# Patient Record
Sex: MALE | Race: WHITE | Employment: FULL TIME | URBAN - METROPOLITAN AREA
[De-identification: names, ages, dates, MRNs, and addresses within clinical notes are randomized per-mention and may not be internally consistent; named-entity substitution may affect disease eponyms.]

---

## 2020-12-05 ENCOUNTER — HOSPITAL ENCOUNTER (EMERGENCY)
Age: 40
Discharge: HOME | End: 2020-12-05
Attending: EMERGENCY MEDICINE

## 2020-12-05 VITALS
RESPIRATION RATE: 16 BRPM | TEMPERATURE: 97.9 F | OXYGEN SATURATION: 98 % | DIASTOLIC BLOOD PRESSURE: 82 MMHG | HEIGHT: 72 IN | HEART RATE: 89 BPM | SYSTOLIC BLOOD PRESSURE: 136 MMHG | WEIGHT: 240 LBS | BODY MASS INDEX: 32.51 KG/M2

## 2020-12-05 DIAGNOSIS — S61.209A FINGERTIP AVULSION, INITIAL ENCOUNTER: Primary | ICD-10-CM

## 2020-12-05 PROCEDURE — 99284 EMERGENCY DEPT VISIT MOD MDM: CPT

## 2020-12-05 PROCEDURE — 74011000250 HC RX REV CODE- 250: Performed by: EMERGENCY MEDICINE

## 2020-12-05 PROCEDURE — 96372 THER/PROPH/DIAG INJ SC/IM: CPT

## 2020-12-05 RX ORDER — LIDOCAINE HYDROCHLORIDE AND EPINEPHRINE 10; 10 MG/ML; UG/ML
10 INJECTION, SOLUTION INFILTRATION; PERINEURAL
Status: COMPLETED | OUTPATIENT
Start: 2020-12-05 | End: 2020-12-05

## 2020-12-05 RX ADMIN — LIDOCAINE HYDROCHLORIDE,EPINEPHRINE BITARTRATE 100 MG: 10; .01 INJECTION, SOLUTION INFILTRATION; PERINEURAL at 13:47

## 2020-12-05 NOTE — DISCHARGE INSTRUCTIONS
Neopsporin, surgicel if bleeing, adaptic or telfa if not bleeding  Keep clean and wrapped  Return to the e.r. if worse in any way      Patient Education        Cuts Left Open: Care Instructions  Your Care Instructions     A cut can happen anywhere on your body. Sometimes a cut can injure the tendons, blood vessels, or nerves. A cut may be left open instead of being closed with stitches, staples, or adhesive. A cut may be left open when it is likely to become infected, because closing it can make infection even more likely. You will probably have a bandage. The doctor may want the cut to stay open the whole time it heals. This happens with some cuts when too much time has gone by since the cut happened. Or the doctor may tell you to come back to have the cut closed in 4 to 5 days, when there is less chance of infection. If the cut stays open while healing, your scar may be larger than if the cut was closed. But you can get treatment later to make the scar smaller. The doctor has checked you carefully, but problems can develop later. If you notice any problems or new symptoms, get medical treatment right away. Follow-up care is a key part of your treatment and safety. Be sure to make and go to all appointments, and call your doctor if you are having problems. It's also a good idea to know your test results and keep a list of the medicines you take. How can you care for yourself at home? · Keep the cut dry for the first 24 to 48 hours. After this, you can shower if your doctor okays it. Pat the cut dry. · Don't soak the cut, such as in a bathtub. Your doctor will tell you when it's safe to get the cut wet. · If your doctor told you how to care for your cut, follow your doctor's instructions. If you did not get instructions, follow this general advice:  ? After the first 24 to 48 hours, wash the cut with clean water 2 times a day. Don't use hydrogen peroxide or alcohol, which can slow healing.   ? You may cover the cut with a thin layer of petroleum jelly, such as Vaseline, and a nonstick bandage. ? Apply more petroleum jelly and replace the bandage as needed. · Prop up the injured area on a pillow anytime you sit or lie down during the next 3 days. Try to keep it above the level of your heart. This will help reduce swelling. · Avoid any activity that could cause your cut to get worse. · Take pain medicines exactly as directed. ? If the doctor gave you a prescription medicine for pain, take it as prescribed. ? If you are not taking a prescription pain medicine, ask your doctor if you can take an over-the-counter medicine. When should you call for help? Call your doctor now or seek immediate medical care if:    · You have new pain, or your pain gets worse.     · The cut starts to bleed, and blood soaks through the bandage. Oozing small amounts of blood is normal.     · The skin near the cut is cold or pale or changes color.     · You have tingling, weakness, or numbness near the cut.     · You have trouble moving the area near the cut.     · You have symptoms of infection, such as:  ? Increased pain, swelling, warmth, or redness around the cut.  ? Red streaks leading from the cut.  ? Pus draining from the cut.  ? A fever. Watch closely for changes in your health, and be sure to contact your doctor if:    · The cut is not closing (getting smaller).     · You do not get better as expected. Where can you learn more? Go to http://www.gray.com/  Enter O575 in the search box to learn more about \"Cuts Left Open: Care Instructions. \"  Current as of: June 26, 2019               Content Version: 12.6  © 3329-1981 [employer]. Care instructions adapted under license by [employer] (which disclaims liability or warranty for this information).  If you have questions about a medical condition or this instruction, always ask your healthcare professional. [employer] disclaims any warranty or liability for your use of this information.

## 2020-12-05 NOTE — ED NOTES
I have reviewed discharge instructions with the patient. The patient verbalized understanding. Patient left ED via Discharge Method: ambulatory to Home with self. Opportunity for questions and clarification provided. Patient given 0 scripts. To continue your aftercare when you leave the hospital, you may receive an automated call from our care team to check in on how you are doing. This is a free service and part of our promise to provide the best care and service to meet your aftercare needs.  If you have questions, or wish to unsubscribe from this service please call [phone]. Thank you for Choosing our [facility] Emergency Department.

## 2020-12-05 NOTE — ED TRIAGE NOTES
Pt states he was using a mandolin last night and chopped the tip of right thumb off. States it is still bleeding a lot so he came in to have it evaluated. States he is in recovery so does not want any type of narcotics. Pt has a mask on in triage.

## 2020-12-05 NOTE — ED PROVIDER NOTES
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     Social History     Socioeconomic History    Marital status: Not on file     Spouse name: Not on file    Number of children: Not on file    Years of education: Not on file    Highest education level: Not on file   Occupational History    Not on file   Social Needs    Financial resource strain: Not on file    Food insecurity     Worry: Not on file     Inability: Not on file    Transportation needs     Medical: Not on file     Non-medical: Not on file   Tobacco Use    Smoking status: Current Every Day Smoker   Substance and Sexual Activity    Alcohol use: Not Currently    Drug use: Not Currently    Sexual activity: Not on file   Lifestyle    Physical activity     Days per week: Not on file     Minutes per session: Not on file    Stress: Not on file   Relationships    Social connections     Talks on phone: Not on file     Gets together: Not on file     Attends Oriental orthodox service: Not on file     Active member of club or organization: Not on file     Attends meetings of clubs or organizations: Not on file     Relationship status: Not on file    Intimate partner violence     Fear of current or ex partner: Not on file     Emotionally abused: Not on file     Physically abused: Not on file     Forced sexual activity: Not on file   Other Topics Concern    Not on file   Social History Narrative    Not on file         ALLERGIES: Patient has no known allergies. Review of Systems   Musculoskeletal: Negative for arthralgias and joint swelling. Skin: Positive for wound. Negative for color change. Neurological: Negative for weakness and numbness. Vitals:    12/05/20 1318   BP: 132/88   Pulse: (!) 101   Resp: 16   Temp: 97.9 °F (36.6 °C)   SpO2: 97%   Weight: 108.9 kg (240 lb)   Height: 6' (1.829 m)            Physical Exam  Vitals signs and nursing note reviewed. Constitutional:       General: He is not in acute distress. Appearance: Normal appearance. He is well-developed. He is not ill-appearing, toxic-appearing or diaphoretic. HENT:      Head: Normocephalic and atraumatic. Right Ear: External ear normal.      Left Ear: External ear normal.   Eyes:      General:         Right eye: No discharge. Left eye: No discharge. Conjunctiva/sclera: Conjunctivae normal.   Neck:      Musculoskeletal: Normal range of motion and neck supple. Pulmonary:      Effort: Pulmonary effort is normal. No respiratory distress. Musculoskeletal: Normal range of motion. General: Tenderness present. Hands:    Skin:     General: Skin is warm and dry. Findings: No rash. Neurological:      General: No focal deficit present. Mental Status: He is alert and oriented to person, place, and time. Mental status is at baseline. Motor: No abnormal muscle tone. Comments: cni 2-12 grossly  Nl gait,  Nl speech     Psychiatric:         Mood and Affect: Mood normal.         Behavior: Behavior normal.          MDM  Number of Diagnoses or Management Options  Fingertip avulsion, initial encounter: new and requires workup  Diagnosis management comments: Medical decision making note:  Fingertip avulsion, bleeding controlled after small dose lidocaine with epi.   Dressed with surgicell  This concludes the \"medical decision making note\" part of this emergency department visit note. Risk of Complications, Morbidity, and/or Mortality  Presenting problems: minimal  Diagnostic procedures: minimal  Management options: minimal    Patient Progress  Patient progress: improved         Other Procedure    Date/Time: 12/5/2020 2:10 PM  Performed by: Kel Sims MD  Authorized by: Kel Sims MD     Consent:     Consent obtained:  Verbal    Consent given by:  Patient    Risks discussed:  Bleeding    Alternatives discussed:  No treatment  Pre-procedure details:     Skin preparation:  Betadine  Anesthesia (see MAR for exact dosages): Anesthesia method:  Local infiltration    Local anesthetic:  Lidocaine 1% WITH epi  Post-procedure details:     Patient tolerance of procedure:   Tolerated well, no immediate complications  Comments:      Numbed, cleaned wrapped with surgicell

## 2022-03-25 ENCOUNTER — HOSPITAL ENCOUNTER (EMERGENCY)
Age: 42
Discharge: HOME | End: 2022-03-25
Attending: EMERGENCY MEDICINE
Payer: COMMERCIAL

## 2022-03-25 VITALS
OXYGEN SATURATION: 98 % | DIASTOLIC BLOOD PRESSURE: 82 MMHG | RESPIRATION RATE: 18 BRPM | HEART RATE: 108 BPM | SYSTOLIC BLOOD PRESSURE: 129 MMHG | TEMPERATURE: 98 F

## 2022-03-25 DIAGNOSIS — M10.9 ACUTE GOUT OF RIGHT ANKLE, UNSPECIFIED CAUSE: Primary | ICD-10-CM

## 2022-03-25 PROCEDURE — 74011250636 HC RX REV CODE- 250/636: Performed by: EMERGENCY MEDICINE

## 2022-03-25 PROCEDURE — 99284 EMERGENCY DEPT VISIT MOD MDM: CPT

## 2022-03-25 PROCEDURE — 96374 THER/PROPH/DIAG INJ IV PUSH: CPT

## 2022-03-25 RX ORDER — DEXAMETHASONE SODIUM PHOSPHATE 100 MG/10ML
10 INJECTION INTRAMUSCULAR; INTRAVENOUS
Status: COMPLETED | OUTPATIENT
Start: 2022-03-25 | End: 2022-03-25

## 2022-03-25 RX ORDER — INDOMETHACIN 50 MG/1
50 CAPSULE ORAL 3 TIMES DAILY
Qty: 30 CAPSULE | Refills: 0 | OUTPATIENT
Start: 2022-03-25 | End: 2022-03-27

## 2022-03-25 RX ADMIN — DEXAMETHASONE SODIUM PHOSPHATE 10 MG: 10 INJECTION INTRAMUSCULAR; INTRAVENOUS at 20:51

## 2022-03-26 PROCEDURE — 96374 THER/PROPH/DIAG INJ IV PUSH: CPT

## 2022-03-26 PROCEDURE — 99284 EMERGENCY DEPT VISIT MOD MDM: CPT

## 2022-03-26 NOTE — ED PROVIDER NOTES
Mask was worn during the entire patient examination. Simon Stauffer is a 46 y.o. male who presents to the ED with a chief complaint of right ankle pain and foot pain. He states it started about 4 days ago and he was seen in the emergency room yesterday. He states they did x-rays and they reported everything looked okay. Maurice Stock He went back to an urgent care today after his boss told him he could not work like that as he is having trouble bearing weight on the ankle. He states the urgent care did some lab work and told him he had an elevated white blood cell count and they sent him here for further evaluation. Patient states that when he is not bearing weight on the ankle his discomfort is 2/10 if he is walking or having any sort of weight on the ankle it becomes a 9/10. He denies any measured fevers, chills, body aches, calf pain, shortness of breath, rashes, or any other symptoms. The history is provided by the patient. Leg Pain  This is a new problem. The current episode started more than 2 days ago. The problem occurs constantly. The problem has been gradually worsening. Pertinent negatives include no chest pain, no abdominal pain, no headaches and no shortness of breath. Nothing aggravates the symptoms. Nothing relieves the symptoms. He has tried nothing for the symptoms. Past Medical History:   Diagnosis Date    Infectious disease     hep C       Past Surgical History:   Procedure Laterality Date    HX ORTHOPAEDIC      spinal fusion         No family history on file.     Social History     Socioeconomic History    Marital status:      Spouse name: Not on file    Number of children: Not on file    Years of education: Not on file    Highest education level: Not on file   Occupational History    Not on file   Tobacco Use    Smoking status: Current Every Day Smoker    Smokeless tobacco: Not on file   Substance and Sexual Activity    Alcohol use: Not Currently    Drug use: Not Currently    Sexual activity: Not on file   Other Topics Concern    Not on file   Social History Narrative    Not on file     Social Determinants of Health     Financial Resource Strain:     Difficulty of Paying Living Expenses: Not on file   Food Insecurity:     Worried About Running Out of Food in the Last Year: Not on file    Em of Food in the Last Year: Not on file   Transportation Needs:     Lack of Transportation (Medical): Not on file    Lack of Transportation (Non-Medical): Not on file   Physical Activity:     Days of Exercise per Week: Not on file    Minutes of Exercise per Session: Not on file   Stress:     Feeling of Stress : Not on file   Social Connections:     Frequency of Communication with Friends and Family: Not on file    Frequency of Social Gatherings with Friends and Family: Not on file    Attends Gnosticist Services: Not on file    Active Member of [address] [place] [address] [employer] or Organizations: Not on file    Attends Club or Organization Meetings: Not on file    Marital Status: Not on file   Intimate Partner Violence:     Fear of Current or Ex-Partner: Not on file    Emotionally Abused: Not on file    Physically Abused: Not on file    Sexually Abused: Not on file   Housing Stability:     Unable to Pay for Housing in the Last Year: Not on file    Number of [place] in the Last Year: Not on file    Unstable Housing in the Last Year: Not on file         ALLERGIES: Patient has no known allergies. Review of Systems   Constitutional: Negative for chills and fever. Respiratory: Negative for shortness of breath. Cardiovascular: Negative for chest pain. Gastrointestinal: Negative for abdominal pain, nausea and vomiting. Musculoskeletal: Positive for arthralgias (R ankle) and gait problem (painful). Skin: Negative for color change, rash and wound. Neurological: Negative for headaches. Psychiatric/Behavioral: Negative for agitation and behavioral problems.    All other systems reviewed and are negative. Vitals:    03/25/22 2035   BP: 129/82   Pulse: (!) 108   Resp: 18   Temp: 98 °F (36.7 °C)   SpO2: 98%            Physical Exam  Vitals and nursing note reviewed. Constitutional:       General: He is not in acute distress. Appearance: Normal appearance. He is not ill-appearing. HENT:      Head: Normocephalic and atraumatic. Right Ear: External ear normal.      Left Ear: External ear normal.   Eyes:      Extraocular Movements: Extraocular movements intact. Conjunctiva/sclera: Conjunctivae normal.   Cardiovascular:      Rate and Rhythm: Normal rate and regular rhythm. Pulses: Normal pulses. Heart sounds: Normal heart sounds. Pulmonary:      Effort: Pulmonary effort is normal.      Breath sounds: Normal breath sounds. Abdominal:      General: Abdomen is flat. Bowel sounds are normal.      Palpations: Abdomen is soft. Musculoskeletal:         General: Tenderness (R ankle) present. No swelling, deformity or signs of injury. Normal range of motion. Cervical back: Normal range of motion. Comments: Right ankle and proximal foot are moderately warm to palpation; range of motion is intact but he reports pain with it   Skin:     General: Skin is warm and dry. Capillary Refill: Capillary refill takes less than 2 seconds. Coloration: Skin is not jaundiced or pale. Findings: No bruising, erythema, lesion or rash. Neurological:      General: No focal deficit present. Mental Status: He is alert and oriented to person, place, and time. Psychiatric:         Mood and Affect: Mood normal.         Behavior: Behavior normal.          MDM  Number of Diagnoses or Management Options  Acute gout of right ankle, unspecified cause  Diagnosis management comments: Mr. Aris Damon is a 79-year-old male who presented to the facility today with right ankle pain. On exam patient is well-appearing but mildly uncomfortable.   Patient is afebrile and mildly tachycardic. Physical exam is noted for some warmth to the right ankle and proximal foot. Range of motion does elicit some discomfort. Calf and distal foot are unremarkable. No overlying skin changes or streaking concerning for cellulitis or superficial skin. No appreciable edema, calf pain, shortness of breath, or other symptoms concerning for DVT. I discussed with patient the clinical likelihood of an acute gout flare given the findings here today. Patient had normal x-rays yesterday in a different emergency department of both the right ankle and right foot. I discussed with patient the plan of care inclusive of anti-inflammatories for the next 10 days to help treat the inflammation as well as a steroid shot in the department today. I gave the patient educational material on gout as well as purine diet restriction type of education. Patient reports understanding of the findings here today     Voice dictation software was used during the making of this note. This software is not perfect and grammatical and other typographical errors may be present. This note has been proofread, but may still contain errors.   VINICIUS Phillips; 3/25/2022 @10:02 PM   ===================================================================         Amount and/or Complexity of Data Reviewed  Review and summarize past medical records: yes  Independent visualization of images, tracings, or specimens: yes    Risk of Complications, Morbidity, and/or Mortality  Presenting problems: moderate  Diagnostic procedures: low  Management options: low    Patient Progress  Patient progress: stable         Procedures

## 2022-03-26 NOTE — DISCHARGE INSTRUCTIONS
Symptoms here today are highly consistent with gout attacks. I have provided you with education both on gout as well as purine restricted diets. Take the medication as instructed for the next 1 week. You will have a couple extra days worth of medications to be used if you are still having symptoms at the 1 week anant. Follow-up with your PCP or the ER if needed.

## 2022-03-26 NOTE — ED TRIAGE NOTES
Ambulated to triage with mask in place. C/o right ankle pain. No obvious deformity. Warmth to ankle.  Describes increased pain with movement

## 2022-03-27 ENCOUNTER — APPOINTMENT (OUTPATIENT)
Dept: GENERAL RADIOLOGY | Age: 42
End: 2022-03-27
Attending: EMERGENCY MEDICINE
Payer: COMMERCIAL

## 2022-03-27 ENCOUNTER — HOSPITAL ENCOUNTER (EMERGENCY)
Age: 42
Discharge: HOME | End: 2022-03-27
Attending: EMERGENCY MEDICINE
Payer: COMMERCIAL

## 2022-03-27 VITALS
SYSTOLIC BLOOD PRESSURE: 153 MMHG | DIASTOLIC BLOOD PRESSURE: 98 MMHG | WEIGHT: 230 LBS | HEIGHT: 72 IN | OXYGEN SATURATION: 96 % | HEART RATE: 121 BPM | TEMPERATURE: 98.7 F | BODY MASS INDEX: 31.15 KG/M2 | RESPIRATION RATE: 18 BRPM

## 2022-03-27 DIAGNOSIS — L03.119 CELLULITIS OF LOWER EXTREMITY, UNSPECIFIED LATERALITY: Primary | ICD-10-CM

## 2022-03-27 LAB
ALBUMIN SERPL-MCNC: 2.7 G/DL (ref 3.5–5)
ALBUMIN/GLOB SERPL: 0.5 {RATIO} (ref 1.2–3.5)
ALP SERPL-CCNC: 88 U/L (ref 50–136)
ALT SERPL-CCNC: 37 U/L (ref 12–65)
ANION GAP SERPL CALC-SCNC: 5 MMOL/L (ref 7–16)
AST SERPL-CCNC: 19 U/L (ref 15–37)
BASOPHILS # BLD: 0 K/UL (ref 0–0.2)
BASOPHILS NFR BLD: 0 % (ref 0–2)
BILIRUB SERPL-MCNC: 0.5 MG/DL (ref 0.2–1.1)
BUN SERPL-MCNC: 13 MG/DL (ref 6–23)
CALCIUM SERPL-MCNC: 8.3 MG/DL (ref 8.3–10.4)
CHLORIDE SERPL-SCNC: 104 MMOL/L (ref 98–107)
CO2 SERPL-SCNC: 27 MMOL/L (ref 21–32)
CREAT SERPL-MCNC: 0.5 MG/DL (ref 0.8–1.5)
CRP SERPL HS-MCNC: 99.1 MG/L
DIFFERENTIAL METHOD BLD: ABNORMAL
EOSINOPHIL # BLD: 0 K/UL (ref 0–0.8)
EOSINOPHIL NFR BLD: 0 % (ref 0.5–7.8)
ERYTHROCYTE [DISTWIDTH] IN BLOOD BY AUTOMATED COUNT: 13.3 % (ref 11.9–14.6)
ERYTHROCYTE [SEDIMENTATION RATE] IN BLOOD: 93 MM/HR (ref 0–20)
GLOBULIN SER CALC-MCNC: 5.8 G/DL (ref 2.3–3.5)
GLUCOSE SERPL-MCNC: 123 MG/DL (ref 65–100)
HCT VFR BLD AUTO: 34.4 % (ref 41.1–50.3)
HGB BLD-MCNC: 11.6 G/DL (ref 13.6–17.2)
IMM GRANULOCYTES # BLD AUTO: 0.2 K/UL (ref 0–0.5)
IMM GRANULOCYTES NFR BLD AUTO: 1 % (ref 0–5)
LACTATE SERPL-SCNC: 0.9 MMOL/L (ref 0.4–2)
LYMPHOCYTES # BLD: 1.7 K/UL (ref 0.5–4.6)
LYMPHOCYTES NFR BLD: 12 % (ref 13–44)
MCH RBC QN AUTO: 31.1 PG (ref 26.1–32.9)
MCHC RBC AUTO-ENTMCNC: 33.7 G/DL (ref 31.4–35)
MCV RBC AUTO: 92.2 FL (ref 79.6–97.8)
MONOCYTES # BLD: 1.1 K/UL (ref 0.1–1.3)
MONOCYTES NFR BLD: 8 % (ref 4–12)
NEUTS SEG # BLD: 11.4 K/UL (ref 1.7–8.2)
NEUTS SEG NFR BLD: 79 % (ref 43–78)
NRBC # BLD: 0 K/UL (ref 0–0.2)
PLATELET # BLD AUTO: 249 K/UL (ref 150–450)
PMV BLD AUTO: 8.5 FL (ref 9.4–12.3)
POTASSIUM SERPL-SCNC: 3.7 MMOL/L (ref 3.5–5.1)
PROT SERPL-MCNC: 8.5 G/DL (ref 6.3–8.2)
RBC # BLD AUTO: 3.73 M/UL (ref 4.23–5.6)
SODIUM SERPL-SCNC: 136 MMOL/L (ref 136–145)
URATE SERPL-MCNC: 3.2 MG/DL (ref 2.6–6)
WBC # BLD AUTO: 14.5 K/UL (ref 4.3–11.1)

## 2022-03-27 PROCEDURE — 85025 COMPLETE CBC W/AUTO DIFF WBC: CPT

## 2022-03-27 PROCEDURE — 74011250636 HC RX REV CODE- 250/636: Performed by: EMERGENCY MEDICINE

## 2022-03-27 PROCEDURE — 83605 ASSAY OF LACTIC ACID: CPT

## 2022-03-27 PROCEDURE — 73630 X-RAY EXAM OF FOOT: CPT

## 2022-03-27 PROCEDURE — 85652 RBC SED RATE AUTOMATED: CPT

## 2022-03-27 PROCEDURE — 86141 C-REACTIVE PROTEIN HS: CPT

## 2022-03-27 PROCEDURE — 73610 X-RAY EXAM OF ANKLE: CPT

## 2022-03-27 PROCEDURE — 73660 X-RAY EXAM OF TOE(S): CPT

## 2022-03-27 PROCEDURE — 84550 ASSAY OF BLOOD/URIC ACID: CPT

## 2022-03-27 PROCEDURE — 80053 COMPREHEN METABOLIC PANEL: CPT

## 2022-03-27 RX ORDER — KETOROLAC TROMETHAMINE 30 MG/ML
30 INJECTION, SOLUTION INTRAMUSCULAR; INTRAVENOUS
Status: COMPLETED | OUTPATIENT
Start: 2022-03-27 | End: 2022-03-27

## 2022-03-27 RX ORDER — SULFAMETHOXAZOLE AND TRIMETHOPRIM 800; 160 MG/1; MG/1
1 TABLET ORAL 2 TIMES DAILY
Qty: 20 TABLET | Refills: 0 | Status: SHIPPED | OUTPATIENT
Start: 2022-03-27 | End: 2022-04-06

## 2022-03-27 RX ORDER — KETOROLAC TROMETHAMINE 10 MG/1
10 TABLET, FILM COATED ORAL
Qty: 15 TABLET | Refills: 0 | Status: SHIPPED | OUTPATIENT
Start: 2022-03-27

## 2022-03-27 RX ADMIN — SODIUM CHLORIDE 1000 ML: 900 INJECTION, SOLUTION INTRAVENOUS at 01:32

## 2022-03-27 RX ADMIN — KETOROLAC TROMETHAMINE 30 MG: 30 INJECTION, SOLUTION INTRAMUSCULAR at 01:43

## 2022-03-27 NOTE — Clinical Note
[address] [facility] EMERGENCY DEPT   [facility] [id number] [phone]    Work/School Note    Date: 3/26/2022    To Whom It May concern:    Lucille Virk was seen and treated today in the emergency room by the following provider(s):  No providers found. Lucille Virk is excused from work/school on 03/27/22 and 03/28/22. He is medically clear to return to work/school on 3/29/2022.        Sincerely,          Altagracia Gotti RN

## 2022-03-27 NOTE — ED TRIAGE NOTES
Pt was seen here yesterday and was told he had gout and he states he started the medication he was given and he started to have fever and more swelling and pain is worse after taking the medication.

## 2022-03-27 NOTE — Clinical Note
[address] [facility] EMERGENCY DEPT   [facility]  [address] [place] [address] [phone]    Work/School Note    Date: 3/26/2022    To Whom It May concern:    Sangeetha Genao was seen and treated today in the emergency room by the following provider(s):  Attending Provider: Arian Conawy MD.      Sangeetha Genao is excused from work/school on 03/27/22 and 03/28/22. He is medically clear to return to work/school on 3/29/2022.        Sincerely,          Eric Mcgregor MD

## 2022-03-27 NOTE — ED NOTES
.I have reviewed discharge instructions with the patient. The patient verbalized understanding. Patient left ED via Discharge Method: wheelchair to Home with self). Opportunity for questions and clarification provided. Patient given 2 scripts. To continue your aftercare when you leave the hospital, you may receive an automated call from our care team to check in on how you are doing. This is a free service and part of our promise to provide the best care and service to meet your aftercare needs.  If you have questions, or wish to unsubscribe from this service please call [phone]. Thank you for Choosing our [facility] Emergency Department.

## 2022-03-27 NOTE — ED PROVIDER NOTES
Patient was seen 2 days ago yesterday and today. Has been having right ankle and foot pain and swelling. 2 days ago x-rays negative told it was arthritis. Yesterday told possible gout. Today pain is worse. 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 States he had an infection of the bone there. States now the left great toe is red painful and swollen and getting worse. Here for evaluation. States he did have a fever at home earlier today but none currently. The history is provided by the patient. No  was used. Foot Pain   This is a new problem. The current episode started more than 2 days ago. The problem occurs constantly. The problem has been gradually worsening. The pain is present in the right ankle, right foot and left toes. The quality of the pain is described as aching and sharp. The pain is moderate. Associated symptoms include numbness and limited range of motion. Pertinent negatives include no back pain and no neck pain. The symptoms are aggravated by palpation, movement and standing. He has tried nothing for the symptoms. There has been no history of extremity trauma. Past Medical History:   Diagnosis Date    Infectious disease     hep C       Past Surgical History:   Procedure Laterality Date    HX ORTHOPAEDIC      spinal fusion         No family history on file.     Social History     Socioeconomic History    Marital status:      Spouse name: Not on file    Number of children: Not on file    Years of education: Not on file    Highest education level: Not on file   Occupational History    Not on file   Tobacco Use    Smoking status: Current Every Day Smoker    Smokeless tobacco: Not on file   Substance and Sexual Activity    Alcohol use: Not Currently    Drug use: Not Currently    Sexual activity: Not on file   Other Topics Concern    Not on file   Social History Narrative    Not on file     Social Determinants of Health Financial Resource Strain:     Difficulty of Paying Living Expenses: Not on file   Food Insecurity:     Worried About Running Out of Food in the Last Year: Not on file    Em of Food in the Last Year: Not on file   Transportation Needs:     Lack of Transportation (Medical): Not on file    Lack of Transportation (Non-Medical): Not on file   Physical Activity:     Days of Exercise per Week: Not on file    Minutes of Exercise per Session: Not on file   Stress:     Feeling of Stress : Not on file   Social Connections:     Frequency of Communication with Friends and Family: Not on file    Frequency of Social Gatherings with Friends and Family: Not on file    Attends [employer] Services: Not on file    Active Member of [address] [place] [address] [employer] or Organizations: Not on file    Attends Club or Organization Meetings: Not on file    Marital Status: Not on file   Intimate Partner Violence:     Fear of Current or Ex-Partner: Not on file    Emotionally Abused: Not on file    Physically Abused: Not on file    Sexually Abused: Not on file   Housing Stability:     Unable to Pay for Housing in the Last Year: Not on file    Number of [place] in the Last Year: Not on file    Unstable Housing in the Last Year: Not on file         ALLERGIES: Patient has no known allergies. Review of Systems   Constitutional: Positive for fever. Negative for chills. HENT: Negative for rhinorrhea and sore throat. Eyes: Negative for pain and redness. Respiratory: Negative for chest tightness, shortness of breath and wheezing. Cardiovascular: Negative for chest pain and leg swelling. Gastrointestinal: Negative for abdominal pain, diarrhea, nausea and vomiting. Genitourinary: Negative for dysuria and hematuria. Musculoskeletal: Positive for arthralgias, gait problem and joint swelling. Negative for back pain, neck pain and neck stiffness. Skin: Positive for color change. Negative for rash and wound.    Neurological: Positive for numbness. Negative for weakness and headaches. Vitals:    03/26/22 2300   BP: (!) 157/107   Pulse: (!) 121   Resp: 18   Temp: 98.7 °F (37.1 °C)   SpO2: 98%   Weight: 104.3 kg (230 lb)   Height: 6' (1.829 m)            Physical Exam  Constitutional:       Appearance: Normal appearance. He is well-developed. HENT:      Head: Normocephalic and atraumatic. Cardiovascular:      Rate and Rhythm: Regular rhythm. Tachycardia present. Pulmonary:      Effort: Pulmonary effort is normal.      Breath sounds: Normal breath sounds. Abdominal:      General: Bowel sounds are normal.      Palpations: Abdomen is soft. Tenderness: There is no abdominal tenderness. Musculoskeletal:         General: Swelling and tenderness present. Cervical back: Normal range of motion and neck supple. Feet:    Skin:     General: Skin is warm and dry. Findings: Erythema present. Neurological:      Mental Status: He is alert and oriented to person, place, and time. MDM  Number of Diagnoses or Management Options  Diagnosis management comments: Patient with cellulitis. Will treat at home with pain medication antibiotics and outpatient follow-up. Amount and/or Complexity of Data Reviewed  Clinical lab tests: ordered and reviewed  Tests in the radiology section of CPT®: ordered and reviewed  Tests in the medicine section of CPT®: ordered and reviewed    Patient Progress  Patient progress: stable         Procedures        XR GREAT TOE LT MIN 2 V (Final result)  Result time 03/27/22 02:33:02  Final result by Bishop Underwood MD (03/27/22 02:33:02)                Impression:    No evidence of acute fracture or dislocation within the great toe. Apparent fusion of the proximal and distal phalanges with mild lateral   angulation of the distal portion. There is soft tissue swelling of the great   toe. Changes involving the remaining toes as described above.             Narrative:    EXAMINATION: XR GREAT TOE LT MIN 2 V     HISTORY: pain.       TECHNIQUE: Frontal, lateral, and oblique views of the left great toe. COMPARISON: None available. FINDINGS:   There is no evidence of acute fracture or dislocation. There appears to the fusion of the proximal and distal phalanges with mild   lateral angulation of the distal portion. The second toe is absent. The visualized portions of the third and fourth toes   show absence of the distal phalanges. There is soft tissue swelling of the great toe. The bones appear diffusely osteopenic. No radiopaque foreign body.                         XR ANKLE RT MIN 3 V (Final result)  Result time 03/27/22 02:18:12  Final result by Simi Holm MD (03/27/22 02:18:12)                Impression:    No evidence of acute fracture or dislocation within the right foot. Degenerative changes are noted in the tarsal bones. Soft tissue swelling as described above. EXAMINATION: Right Ankle     HISTORY: pain.       TECHNIQUE: Frontal, lateral, and oblique views of the right ankle. COMPARISON: None available. FINDINGS:   There is no evidence of acute fracture or dislocation. Joint spaces are maintained. Soft tissues are within normal limits. No radiopaque foreign body. IMPRESSION:   No evidence of acute fracture or dislocation within the right ankle. Narrative:    EXAMINATION: Right foot     HISTORY: pain.       TECHNIQUE: Frontal, lateral, and oblique views of the right foot. COMPARISON: None. FINDINGS:   Due to technique, the distal portions of the toes are overpenetrated on the AP   and oblique views of the. As seen, there is no evidence of acute fracture or dislocation. Joint spaces are maintained. Degenerative changes are noted in the tarsal bones. Small calcaneal bone spur. There is medial, lateral, and dorsal soft tissue swelling, particularly   proximally. No radiopaque foreign body.           XR FOOT RT MIN 3 V (Final result)  Result time 03/27/22 02:18:12  Final result by Lakeisha Fortune MD (03/27/22 02:18:12)                Impression:    No evidence of acute fracture or dislocation within the right foot. Degenerative changes are noted in the tarsal bones. Soft tissue swelling as described above. EXAMINATION: Right Ankle     HISTORY: pain.       TECHNIQUE: Frontal, lateral, and oblique views of the right ankle. COMPARISON: None available. FINDINGS:   There is no evidence of acute fracture or dislocation. Joint spaces are maintained. Soft tissues are within normal limits. No radiopaque foreign body. IMPRESSION:   No evidence of acute fracture or dislocation within the right ankle. Narrative:    EXAMINATION: Right foot     HISTORY: pain.       TECHNIQUE: Frontal, lateral, and oblique views of the right foot. COMPARISON: None. FINDINGS:   Due to technique, the distal portions of the toes are overpenetrated on the AP   and oblique views of the. As seen, there is no evidence of acute fracture or dislocation. Joint spaces are maintained. Degenerative changes are noted in the tarsal bones. Small calcaneal bone spur. There is medial, lateral, and dorsal soft tissue swelling, particularly   proximally.    No radiopaque foreign body.                   Results Include:    Recent Results (from the past 24 hour(s))   CBC WITH AUTOMATED DIFF    Collection Time: 03/27/22 12:22 AM   Result Value Ref Range    WBC 14.5 (H) 4.3 - 11.1 K/uL    RBC 3.73 (L) 4.23 - 5.6 M/uL    HGB 11.6 (L) 13.6 - 17.2 g/dL    HCT 34.4 (L) 41.1 - 50.3 %    MCV 92.2 79.6 - 97.8 FL    MCH 31.1 26.1 - 32.9 PG    MCHC 33.7 31.4 - 35.0 g/dL    RDW 13.3 11.9 - 14.6 %    PLATELET 698 775 - 150 K/uL    MPV 8.5 (L) 9.4 - 12.3 FL    ABSOLUTE NRBC 0.00 0.0 - 0.2 K/uL    DF AUTOMATED      NEUTROPHILS 79 (H) 43 - 78 %    LYMPHOCYTES 12 (L) 13 - 44 %    MONOCYTES 8 4.0 - 12.0 %    EOSINOPHILS 0 (L) 0.5 - 7.8 %    BASOPHILS 0 0.0 - 2.0 %    IMMATURE GRANULOCYTES 1 0.0 - 5.0 %    ABS. NEUTROPHILS 11.4 (H) 1.7 - 8.2 K/UL    ABS. LYMPHOCYTES 1.7 0.5 - 4.6 K/UL    ABS. MONOCYTES 1.1 0.1 - 1.3 K/UL    ABS. EOSINOPHILS 0.0 0.0 - 0.8 K/UL    ABS. BASOPHILS 0.0 0.0 - 0.2 K/UL    ABS. IMM. GRANS. 0.2 0.0 - 0.5 K/UL   METABOLIC PANEL, COMPREHENSIVE    Collection Time: 03/27/22 12:22 AM   Result Value Ref Range    Sodium 136 136 - 145 mmol/L    Potassium 3.7 3.5 - 5.1 mmol/L    Chloride 104 98 - 107 mmol/L    CO2 27 21 - 32 mmol/L    Anion gap 5 (L) 7 - 16 mmol/L    Glucose 123 (H) 65 - 100 mg/dL    BUN 13 6 - 23 MG/DL    Creatinine 0.50 (L) 0.8 - 1.5 MG/DL    GFR est AA >60 >60 ml/min/1.73m2    GFR est non-AA >60 >60 ml/min/1.73m2    Calcium 8.3 8.3 - 10.4 MG/DL    Bilirubin, total 0.5 0.2 - 1.1 MG/DL    ALT (SGPT) 37 12 - 65 U/L    AST (SGOT) 19 15 - 37 U/L    Alk.  phosphatase 88 50 - 136 U/L    Protein, total 8.5 (H) 6.3 - 8.2 g/dL    Albumin 2.7 (L) 3.5 - 5.0 g/dL    Globulin 5.8 (H) 2.3 - 3.5 g/dL    A-G Ratio 0.5 (L) 1.2 - 3.5     SED RATE, AUTOMATED    Collection Time: 03/27/22 12:22 AM   Result Value Ref Range    Sed rate, automated 93 (H) 0 - 20 mm/hr   CRP, HIGH SENSITIVITY    Collection Time: 03/27/22 12:22 AM   Result Value Ref Range    CRP, High sensitivity 99.1 mg/L   URIC ACID    Collection Time: 03/27/22 12:22 AM   Result Value Ref Range    Uric acid 3.2 2.6 - 6.0 MG/DL   LACTIC ACID    Collection Time: 03/27/22  1:42 AM   Result Value Ref Range    Lactic acid 0.9 0.4 - 2.0 MMOL/L

## 2022-06-16 ENCOUNTER — HOSPITAL ENCOUNTER (INPATIENT)
Age: 42
LOS: 6 days | Discharge: HOME | DRG: 477 | End: 2022-06-22
Attending: HOSPITALIST | Admitting: HOSPITALIST

## 2022-06-16 ENCOUNTER — HOSPITAL ENCOUNTER (INPATIENT)
Age: 42
LOS: 1 days | Discharge: ANOTHER HOSPITAL | DRG: 539 | End: 2022-06-16
Attending: EMERGENCY MEDICINE | Admitting: FAMILY MEDICINE

## 2022-06-16 ENCOUNTER — HOSPITAL ENCOUNTER (EMERGENCY)
Dept: GENERAL RADIOLOGY | Age: 42
Discharge: HOME | DRG: 539 | End: 2022-06-19

## 2022-06-16 ENCOUNTER — HOSPITAL ENCOUNTER (EMERGENCY)
Dept: CT IMAGING | Age: 42
Discharge: HOME | DRG: 539 | End: 2022-06-19

## 2022-06-16 VITALS
RESPIRATION RATE: 16 BRPM | HEIGHT: 72 IN | OXYGEN SATURATION: 96 % | DIASTOLIC BLOOD PRESSURE: 77 MMHG | TEMPERATURE: 97.9 F | BODY MASS INDEX: 30.48 KG/M2 | SYSTOLIC BLOOD PRESSURE: 121 MMHG | HEART RATE: 76 BPM | WEIGHT: 225 LBS

## 2022-06-16 DIAGNOSIS — M46.45 DISCITIS OF THORACOLUMBAR REGION: Primary | ICD-10-CM

## 2022-06-16 DIAGNOSIS — M46.20 PARASPINAL ABSCESS (HCC): ICD-10-CM

## 2022-06-16 DIAGNOSIS — M46.44 DISCITIS OF THORACIC REGION: Primary | ICD-10-CM

## 2022-06-16 DIAGNOSIS — K59.00 CONSTIPATION, UNSPECIFIED CONSTIPATION TYPE: ICD-10-CM

## 2022-06-16 DIAGNOSIS — R16.1 SPLENOMEGALY: ICD-10-CM

## 2022-06-16 DIAGNOSIS — M46.44 DISCITIS OF THORACIC REGION: ICD-10-CM

## 2022-06-16 DIAGNOSIS — M46.24 OSTEOMYELITIS OF THORACIC SPINE (HCC): ICD-10-CM

## 2022-06-16 PROBLEM — L02.91 PHLEGMON: Status: ACTIVE | Noted: 2022-06-16

## 2022-06-16 PROBLEM — R07.9 CHEST PAIN: Status: ACTIVE | Noted: 2022-06-16

## 2022-06-16 PROBLEM — B18.2 CHRONIC HEPATITIS C WITHOUT HEPATIC COMA (HCC): Status: ACTIVE | Noted: 2022-06-16

## 2022-06-16 PROBLEM — J90 PLEURAL EFFUSION: Status: ACTIVE | Noted: 2022-06-16

## 2022-06-16 PROBLEM — G06.2 EPIDURAL ABSCESS: Status: ACTIVE | Noted: 2022-06-16

## 2022-06-16 PROBLEM — M46.40 DISCITIS: Status: ACTIVE | Noted: 2022-06-16

## 2022-06-16 PROBLEM — J86.9 EMPYEMA LUNG (HCC): Status: ACTIVE | Noted: 2022-06-16

## 2022-06-16 LAB
ALBUMIN SERPL-MCNC: 2.8 G/DL (ref 3.5–5)
ALBUMIN/GLOB SERPL: 0.6 {RATIO} (ref 1.2–3.5)
ALP SERPL-CCNC: 100 U/L (ref 50–136)
ALT SERPL-CCNC: 14 U/L (ref 12–65)
ANION GAP SERPL CALC-SCNC: 8 MMOL/L (ref 7–16)
AST SERPL-CCNC: 12 U/L (ref 15–37)
BASOPHILS # BLD: 0 K/UL (ref 0–0.2)
BASOPHILS NFR BLD: 0 % (ref 0–2)
BILIRUB SERPL-MCNC: 0.3 MG/DL (ref 0.2–1.1)
BUN SERPL-MCNC: 19 MG/DL (ref 6–23)
CALCIUM SERPL-MCNC: 8.6 MG/DL (ref 8.3–10.4)
CHLORIDE SERPL-SCNC: 103 MMOL/L (ref 98–107)
CO2 SERPL-SCNC: 27 MMOL/L (ref 21–32)
CREAT SERPL-MCNC: 0.63 MG/DL (ref 0.8–1.5)
CRP SERPL-MCNC: 2.5 MG/DL (ref 0–0.9)
DIFFERENTIAL METHOD BLD: ABNORMAL
EOSINOPHIL # BLD: 0.1 K/UL (ref 0–0.8)
EOSINOPHIL NFR BLD: 1 % (ref 0.5–7.8)
ERYTHROCYTE [DISTWIDTH] IN BLOOD BY AUTOMATED COUNT: 13.7 % (ref 11.9–14.6)
ERYTHROCYTE [SEDIMENTATION RATE] IN BLOOD: 40 MM/HR (ref 0–20)
GLOBULIN SER CALC-MCNC: 5 G/DL (ref 2.3–3.5)
GLUCOSE SERPL-MCNC: 189 MG/DL (ref 65–100)
HCT VFR BLD AUTO: 38.7 % (ref 41.1–50.3)
HGB BLD-MCNC: 13 G/DL (ref 13.6–17.2)
IMM GRANULOCYTES # BLD AUTO: 0.1 K/UL (ref 0–0.5)
IMM GRANULOCYTES NFR BLD AUTO: 1 % (ref 0–5)
LACTATE SERPL-SCNC: 1 MMOL/L (ref 0.4–2)
LIPASE SERPL-CCNC: 118 U/L (ref 73–393)
LYMPHOCYTES # BLD: 2.5 K/UL (ref 0.5–4.6)
LYMPHOCYTES NFR BLD: 29 % (ref 13–44)
MAGNESIUM SERPL-MCNC: 1.9 MG/DL (ref 1.8–2.4)
MCH RBC QN AUTO: 31.5 PG (ref 26.1–32.9)
MCHC RBC AUTO-ENTMCNC: 33.6 G/DL (ref 31.4–35)
MCV RBC AUTO: 93.7 FL (ref 79.6–97.8)
MONOCYTES # BLD: 0.8 K/UL (ref 0.1–1.3)
MONOCYTES NFR BLD: 9 % (ref 4–12)
NEUTS SEG # BLD: 5.1 K/UL (ref 1.7–8.2)
NEUTS SEG NFR BLD: 59 % (ref 43–78)
NRBC # BLD: 0 K/UL (ref 0–0.2)
PLATELET # BLD AUTO: 313 K/UL (ref 150–450)
PMV BLD AUTO: 8 FL (ref 9.4–12.3)
POTASSIUM SERPL-SCNC: 3.6 MMOL/L (ref 3.5–5.1)
PROCALCITONIN SERPL-MCNC: <0.05 NG/ML (ref 0–0.49)
PROT SERPL-MCNC: 7.8 G/DL (ref 6.3–8.2)
RBC # BLD AUTO: 4.13 M/UL (ref 4.23–5.6)
SODIUM SERPL-SCNC: 138 MMOL/L (ref 136–145)
TROPONIN I SERPL HS-MCNC: 5 PG/ML (ref 0–14)
TROPONIN I SERPL HS-MCNC: 5.5 PG/ML (ref 0–14)
TROPONIN I SERPL HS-MCNC: 5.8 PG/ML (ref 0–14)
TROPONIN I SERPL HS-MCNC: 8.8 PG/ML (ref 0–14)
WBC # BLD AUTO: 8.6 K/UL (ref 4.3–11.1)

## 2022-06-16 PROCEDURE — 83735 ASSAY OF MAGNESIUM: CPT

## 2022-06-16 PROCEDURE — 6360000002 HC RX W HCPCS: Performed by: PHYSICIAN ASSISTANT

## 2022-06-16 PROCEDURE — 2580000003 HC RX 258: Performed by: HOSPITALIST

## 2022-06-16 PROCEDURE — 84484 ASSAY OF TROPONIN QUANT: CPT

## 2022-06-16 PROCEDURE — 2500000003 HC RX 250 WO HCPCS: Performed by: HOSPITALIST

## 2022-06-16 PROCEDURE — 6370000000 HC RX 637 (ALT 250 FOR IP): Performed by: FAMILY MEDICINE

## 2022-06-16 PROCEDURE — 2580000003 HC RX 258: Performed by: PHYSICIAN ASSISTANT

## 2022-06-16 PROCEDURE — 87040 BLOOD CULTURE FOR BACTERIA: CPT

## 2022-06-16 PROCEDURE — 6370000000 HC RX 637 (ALT 250 FOR IP): Performed by: HOSPITALIST

## 2022-06-16 PROCEDURE — 96375 TX/PRO/DX INJ NEW DRUG ADDON: CPT

## 2022-06-16 PROCEDURE — 85025 COMPLETE CBC W/AUTO DIFF WBC: CPT

## 2022-06-16 PROCEDURE — 96374 THER/PROPH/DIAG INJ IV PUSH: CPT

## 2022-06-16 PROCEDURE — 36415 COLL VENOUS BLD VENIPUNCTURE: CPT

## 2022-06-16 PROCEDURE — 80307 DRUG TEST PRSMV CHEM ANLYZR: CPT

## 2022-06-16 PROCEDURE — 81003 URINALYSIS AUTO W/O SCOPE: CPT

## 2022-06-16 PROCEDURE — 6360000002 HC RX W HCPCS: Performed by: HOSPITALIST

## 2022-06-16 PROCEDURE — 6360000004 HC RX CONTRAST MEDICATION: Performed by: EMERGENCY MEDICINE

## 2022-06-16 PROCEDURE — 2580000003 HC RX 258: Performed by: EMERGENCY MEDICINE

## 2022-06-16 PROCEDURE — 99285 EMERGENCY DEPT VISIT HI MDM: CPT

## 2022-06-16 PROCEDURE — 85652 RBC SED RATE AUTOMATED: CPT

## 2022-06-16 PROCEDURE — 71046 X-RAY EXAM CHEST 2 VIEWS: CPT

## 2022-06-16 PROCEDURE — 1100000000 HC RM PRIVATE

## 2022-06-16 PROCEDURE — 83690 ASSAY OF LIPASE: CPT

## 2022-06-16 PROCEDURE — 74177 CT ABD & PELVIS W/CONTRAST: CPT

## 2022-06-16 PROCEDURE — 80053 COMPREHEN METABOLIC PANEL: CPT

## 2022-06-16 PROCEDURE — 6360000002 HC RX W HCPCS: Performed by: EMERGENCY MEDICINE

## 2022-06-16 PROCEDURE — 83605 ASSAY OF LACTIC ACID: CPT

## 2022-06-16 PROCEDURE — 84145 PROCALCITONIN (PCT): CPT

## 2022-06-16 PROCEDURE — 1100000003 HC PRIVATE W/ TELEMETRY

## 2022-06-16 PROCEDURE — 86140 C-REACTIVE PROTEIN: CPT

## 2022-06-16 RX ORDER — CYCLOBENZAPRINE HCL 10 MG
10 TABLET ORAL 3 TIMES DAILY PRN
Status: DISCONTINUED | OUTPATIENT
Start: 2022-06-16 | End: 2022-06-22 | Stop reason: HOSPADM

## 2022-06-16 RX ORDER — PANTOPRAZOLE SODIUM 40 MG/1
40 TABLET, DELAYED RELEASE ORAL
Status: DISCONTINUED | OUTPATIENT
Start: 2022-06-16 | End: 2022-06-22 | Stop reason: HOSPADM

## 2022-06-16 RX ORDER — ENOXAPARIN SODIUM 100 MG/ML
30 INJECTION SUBCUTANEOUS EVERY 12 HOURS
Status: DISCONTINUED | OUTPATIENT
Start: 2022-06-16 | End: 2022-06-22 | Stop reason: HOSPADM

## 2022-06-16 RX ORDER — ONDANSETRON 2 MG/ML
4 INJECTION INTRAMUSCULAR; INTRAVENOUS
Status: COMPLETED | OUTPATIENT
Start: 2022-06-16 | End: 2022-06-16

## 2022-06-16 RX ORDER — SODIUM CHLORIDE 0.9 % (FLUSH) 0.9 %
5-40 SYRINGE (ML) INJECTION EVERY 12 HOURS SCHEDULED
Status: DISCONTINUED | OUTPATIENT
Start: 2022-06-16 | End: 2022-06-22 | Stop reason: HOSPADM

## 2022-06-16 RX ORDER — 0.9 % SODIUM CHLORIDE 0.9 %
100 INTRAVENOUS SOLUTION INTRAVENOUS
Status: COMPLETED | OUTPATIENT
Start: 2022-06-16 | End: 2022-06-16

## 2022-06-16 RX ORDER — HYDROMORPHONE HYDROCHLORIDE 1 MG/ML
0.5 INJECTION, SOLUTION INTRAMUSCULAR; INTRAVENOUS; SUBCUTANEOUS EVERY 4 HOURS PRN
Status: DISCONTINUED | OUTPATIENT
Start: 2022-06-16 | End: 2022-06-22 | Stop reason: HOSPADM

## 2022-06-16 RX ORDER — HYDROMORPHONE HYDROCHLORIDE 1 MG/ML
1 INJECTION, SOLUTION INTRAMUSCULAR; INTRAVENOUS; SUBCUTANEOUS EVERY 4 HOURS PRN
Status: DISCONTINUED | OUTPATIENT
Start: 2022-06-16 | End: 2022-06-22 | Stop reason: HOSPADM

## 2022-06-16 RX ORDER — ONDANSETRON 8 MG/1
4 TABLET, ORALLY DISINTEGRATING ORAL EVERY 8 HOURS PRN
Status: DISCONTINUED | OUTPATIENT
Start: 2022-06-16 | End: 2022-06-22 | Stop reason: HOSPADM

## 2022-06-16 RX ORDER — SODIUM CHLORIDE 0.9 % (FLUSH) 0.9 %
5-40 SYRINGE (ML) INJECTION PRN
Status: DISCONTINUED | OUTPATIENT
Start: 2022-06-16 | End: 2022-06-22 | Stop reason: HOSPADM

## 2022-06-16 RX ORDER — KETOROLAC TROMETHAMINE 15 MG/ML
15 INJECTION, SOLUTION INTRAMUSCULAR; INTRAVENOUS ONCE
Status: COMPLETED | OUTPATIENT
Start: 2022-06-16 | End: 2022-06-16

## 2022-06-16 RX ORDER — SODIUM CHLORIDE 9 MG/ML
INJECTION, SOLUTION INTRAVENOUS PRN
Status: DISCONTINUED | OUTPATIENT
Start: 2022-06-16 | End: 2022-06-22 | Stop reason: HOSPADM

## 2022-06-16 RX ORDER — LORAZEPAM 2 MG/ML
1 INJECTION INTRAMUSCULAR
Status: ACTIVE | OUTPATIENT
Start: 2022-06-16 | End: 2022-06-16

## 2022-06-16 RX ORDER — KETOROLAC TROMETHAMINE 30 MG/ML
30 INJECTION, SOLUTION INTRAMUSCULAR; INTRAVENOUS EVERY 6 HOURS PRN
Status: DISPENSED | OUTPATIENT
Start: 2022-06-16 | End: 2022-06-21

## 2022-06-16 RX ORDER — ONDANSETRON 2 MG/ML
4 INJECTION INTRAMUSCULAR; INTRAVENOUS EVERY 6 HOURS PRN
Status: DISCONTINUED | OUTPATIENT
Start: 2022-06-16 | End: 2022-06-22 | Stop reason: HOSPADM

## 2022-06-16 RX ORDER — SODIUM CHLORIDE, SODIUM LACTATE, POTASSIUM CHLORIDE, AND CALCIUM CHLORIDE .6; .31; .03; .02 G/100ML; G/100ML; G/100ML; G/100ML
1000 INJECTION, SOLUTION INTRAVENOUS ONCE
Status: COMPLETED | OUTPATIENT
Start: 2022-06-16 | End: 2022-06-16

## 2022-06-16 RX ORDER — ACETAMINOPHEN 650 MG/1
650 SUPPOSITORY RECTAL EVERY 6 HOURS PRN
Status: DISCONTINUED | OUTPATIENT
Start: 2022-06-16 | End: 2022-06-22 | Stop reason: HOSPADM

## 2022-06-16 RX ORDER — ACETAMINOPHEN 325 MG/1
650 TABLET ORAL EVERY 6 HOURS PRN
Status: DISCONTINUED | OUTPATIENT
Start: 2022-06-16 | End: 2022-06-22 | Stop reason: HOSPADM

## 2022-06-16 RX ORDER — POLYETHYLENE GLYCOL 3350 17 G/17G
17 POWDER, FOR SOLUTION ORAL DAILY PRN
Status: DISCONTINUED | OUTPATIENT
Start: 2022-06-16 | End: 2022-06-22 | Stop reason: HOSPADM

## 2022-06-16 RX ORDER — CYCLOBENZAPRINE HCL 10 MG
10 TABLET ORAL 3 TIMES DAILY PRN
Status: ON HOLD | COMMUNITY
End: 2022-06-22 | Stop reason: SDUPTHER

## 2022-06-16 RX ORDER — OXYCODONE HYDROCHLORIDE 5 MG/1
10 TABLET ORAL EVERY 4 HOURS PRN
Status: DISCONTINUED | OUTPATIENT
Start: 2022-06-16 | End: 2022-06-22 | Stop reason: HOSPADM

## 2022-06-16 RX ORDER — SODIUM CHLORIDE 0.9 % (FLUSH) 0.9 %
10 SYRINGE (ML) INJECTION
Status: COMPLETED | OUTPATIENT
Start: 2022-06-16 | End: 2022-06-16

## 2022-06-16 RX ADMIN — OXYCODONE 10 MG: 5 TABLET ORAL at 22:23

## 2022-06-16 RX ADMIN — ENOXAPARIN SODIUM 30 MG: 100 INJECTION SUBCUTANEOUS at 16:43

## 2022-06-16 RX ADMIN — HYDROMORPHONE HYDROCHLORIDE 1 MG: 1 INJECTION, SOLUTION INTRAMUSCULAR; INTRAVENOUS; SUBCUTANEOUS at 20:05

## 2022-06-16 RX ADMIN — HYDROMORPHONE HYDROCHLORIDE 1 MG: 1 INJECTION, SOLUTION INTRAMUSCULAR; INTRAVENOUS; SUBCUTANEOUS at 15:54

## 2022-06-16 RX ADMIN — KETOROLAC TROMETHAMINE 15 MG: 15 INJECTION, SOLUTION INTRAMUSCULAR; INTRAVENOUS at 07:43

## 2022-06-16 RX ADMIN — ONDANSETRON 4 MG: 2 INJECTION INTRAMUSCULAR; INTRAVENOUS at 07:43

## 2022-06-16 RX ADMIN — PIPERACILLIN SODIUM,TAZOBACTAM SODIUM 4500 MG: 4; .5 INJECTION, POWDER, FOR SOLUTION INTRAVENOUS at 10:08

## 2022-06-16 RX ADMIN — SODIUM CHLORIDE, PRESERVATIVE FREE 10 ML: 5 INJECTION INTRAVENOUS at 20:07

## 2022-06-16 RX ADMIN — SODIUM CHLORIDE, PRESERVATIVE FREE 10 ML: 5 INJECTION INTRAVENOUS at 08:38

## 2022-06-16 RX ADMIN — SODIUM CHLORIDE 100 ML: 9 INJECTION, SOLUTION INTRAVENOUS at 08:39

## 2022-06-16 RX ADMIN — IOPAMIDOL 100 ML: 755 INJECTION, SOLUTION INTRAVENOUS at 08:37

## 2022-06-16 RX ADMIN — PANTOPRAZOLE SODIUM 40 MG: 40 TABLET, DELAYED RELEASE ORAL at 15:54

## 2022-06-16 RX ADMIN — VANCOMYCIN HYDROCHLORIDE 1250 MG: 10 INJECTION, POWDER, LYOPHILIZED, FOR SOLUTION INTRAVENOUS at 22:23

## 2022-06-16 RX ADMIN — PIPERACILLIN AND TAZOBACTAM 3375 MG: 3; .375 INJECTION, POWDER, LYOPHILIZED, FOR SOLUTION INTRAVENOUS at 16:41

## 2022-06-16 RX ADMIN — CYCLOBENZAPRINE HYDROCHLORIDE 10 MG: 10 TABLET, FILM COATED ORAL at 22:23

## 2022-06-16 RX ADMIN — Medication 2500 MG: at 10:39

## 2022-06-16 RX ADMIN — SODIUM CHLORIDE, POTASSIUM CHLORIDE, SODIUM LACTATE AND CALCIUM CHLORIDE 1000 ML: 600; 310; 30; 20 INJECTION, SOLUTION INTRAVENOUS at 07:42

## 2022-06-16 ASSESSMENT — PAIN DESCRIPTION - LOCATION
LOCATION: BACK;FLANK
LOCATION: ABDOMEN;BACK
LOCATION: ABDOMEN
LOCATION: BACK;FLANK
LOCATION: ABDOMEN

## 2022-06-16 ASSESSMENT — PAIN DESCRIPTION - FREQUENCY
FREQUENCY: INTERMITTENT

## 2022-06-16 ASSESSMENT — PAIN - FUNCTIONAL ASSESSMENT
PAIN_FUNCTIONAL_ASSESSMENT: PREVENTS OR INTERFERES SOME ACTIVE ACTIVITIES AND ADLS
PAIN_FUNCTIONAL_ASSESSMENT: 0-10

## 2022-06-16 ASSESSMENT — PAIN DESCRIPTION - DESCRIPTORS
DESCRIPTORS: ACHING;SORE;SHARP
DESCRIPTORS: ACHING;SORE;SHARP
DESCRIPTORS: ACHING;SORE
DESCRIPTORS: ACHING

## 2022-06-16 ASSESSMENT — PAIN DESCRIPTION - PAIN TYPE
TYPE: ACUTE PAIN

## 2022-06-16 ASSESSMENT — ENCOUNTER SYMPTOMS
BELCHING: 0
SHORTNESS OF BREATH: 0
CHEST TIGHTNESS: 0
HEMATOCHEZIA: 0
VOMITING: 0
EYE DISCHARGE: 0
ABDOMINAL PAIN: 1
NAUSEA: 1
HEMATEMESIS: 0
EYE ITCHING: 0
CONSTIPATION: 1
DIARRHEA: 0
COUGH: 0

## 2022-06-16 ASSESSMENT — PAIN SCALES - GENERAL
PAINLEVEL_OUTOF10: 2
PAINLEVEL_OUTOF10: 10
PAINLEVEL_OUTOF10: 0
PAINLEVEL_OUTOF10: 5
PAINLEVEL_OUTOF10: 8
PAINLEVEL_OUTOF10: 6
PAINLEVEL_OUTOF10: 4
PAINLEVEL_OUTOF10: 0
PAINLEVEL_OUTOF10: 10

## 2022-06-16 ASSESSMENT — PAIN DESCRIPTION - ORIENTATION
ORIENTATION: MID;LEFT
ORIENTATION: MID;LEFT
ORIENTATION: MID
ORIENTATION: MID

## 2022-06-16 ASSESSMENT — PAIN DESCRIPTION - ONSET
ONSET: ON-GOING
ONSET: ON-GOING
ONSET: GRADUAL

## 2022-06-16 NOTE — Clinical Note
Discharge Plan[de-identified] Other/[facility])   Telemetry/Cardiac Monitoring Required?: Yes

## 2022-06-16 NOTE — ED PROVIDER NOTES
[facility] Emergency Department Provider Note                   PCP:                Rosangela Mccallum MD               Age: 46 y.o. Sex: male       ICD-10-CM    1. Discitis of thoracic region  M46.44    2. Osteomyelitis of thoracic spine (HCC)  M46.24    3. 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 Constipation, unspecified constipation type  K59.00    5. Splenomegaly  R16.1        DISPOSITION Admitted 06/16/2022 10:06:09 AM       New Prescriptions    No medications on file       Orders Placed This Encounter   Procedures    Culture, Blood 1    Culture, Blood 1    XR CHEST (2 VW)    CT ABDOMEN PELVIS W IV CONTRAST Additional Contrast? Radiologist Recommendation    CBC with Diff    CMP    Lipase    Troponin    Magnesium    Troponin    Lactic Acid    Procalcitonin    Sedimentation Rate    C-Reactive Protein    Diet NPO    POCT Urine Dipstick    Telemetry monitoring - 24 hour duration    EKG 12 Lead (Select if Upper Abd Pain, or SOB, Diaphoresis or Tachy)    ADMIT TO INPATIENT        Chriss Helton MD 10:30 AM      MDM  Number of Diagnoses or Management Options  Constipation, unspecified constipation type: new, needed workup  Discitis of thoracic region: new, needed workup  Osteomyelitis of thoracic spine [facility]): new, needed workup  Paraspinal abscess ([facility] [address]): new, needed workup  Splenomegaly: new, needed workup  Diagnosis management comments: 62-year-old male patient presents with a progressive left-sided chest and abdominal pain. Patient reports that his symptoms started at least a month ago  They are gradually gotten worse. He gets associated some intermittent episodes of constipation although he said he had something of a bowel movement yesterday. Has had nausea but no vomiting. He is on no blood in his stools or vomitus.   Has had no fever  He has not seen his primary care doctor in several months related to a lapse in his current medical coverage    We will proceed with labs chest Progress  Patient progress: stable       Lotus Barbosa is a 46 y.o. male who presents to the Emergency Department with chief complaint of    Chief Complaint   Patient presents with    Abdominal Pain    Chest Pain      The history is provided by the patient. Abdominal Pain  Pain location:  LUQ  Pain quality: aching and cramping    Pain radiates to:  Does not radiate  Pain severity:  Moderate  Onset quality:  Gradual  Duration:  4 weeks  Timing:  Intermittent  Progression:  Waxing and waning  Chronicity:  New  Context: not alcohol use, not diet changes, not laxative use, not medication withdrawal, not sick contacts and not suspicious food intake    Relieved by:  Nothing  Worsened by: Movement and deep breathing  Associated symptoms: chest pain, constipation and nausea    Associated symptoms: no belching, no chills, no cough, no diarrhea, no dysuria, no fever, no hematemesis, no hematochezia, no hematuria, no melena, no shortness of breath and no vomiting    Chest Pain  Associated symptoms: abdominal pain and nausea    Associated symptoms: no cough, no fever, no headache, no palpitations, no shortness of breath and no vomiting        All other systems reviewed and are negative. Review of Systems   Constitutional: Negative for chills and fever. HENT: Negative for hearing loss, sneezing and tinnitus. Eyes: Negative for discharge and itching. Respiratory: Negative for cough, chest tightness and shortness of breath. Cardiovascular: Positive for chest pain. Negative for palpitations. Gastrointestinal: Positive for abdominal pain, constipation and nausea. Negative for diarrhea, hematemesis, hematochezia, melena and vomiting. Endocrine: Negative for cold intolerance, heat intolerance, polydipsia, polyphagia and polyuria. Genitourinary: Negative for dysuria, hematuria and urgency. Musculoskeletal: Positive for arthralgias. Negative for myalgias. Skin: Negative for rash and wound. Allergic/Immunologic: Negative for immunocompromised state. Neurological: Negative for seizures, syncope and headaches. Hematological: Negative. Psychiatric/Behavioral: Negative for dysphoric mood and self-injury. The patient is not nervous/anxious. All other systems reviewed and are negative. Past Medical History:   Diagnosis Date    Infectious disease     hep C        Past Surgical History:   Procedure Laterality Date    ORTHOPEDIC SURGERY      spinal fusion        History reviewed. No pertinent family history. Social Connections:     Frequency of Communication with Friends and Family: Not on file    Frequency of Social Gatherings with Friends and Family: Not on file    Attends [employer] Services: Not on file    Active Member of Clubs or Organizations: Not on file    Attends Club or Organization Meetings: Not on file    Marital Status: Not on file        Allergies   Allergen Reactions    Ethanol-Alcohol [Ethanol]         Vitals signs and nursing note reviewed. Patient Vitals for the past 4 hrs:   Temp Pulse Resp BP SpO2   06/16/22 0830 -- 87 14 (!) 162/92 98 %   06/16/22 0703 97.9 °F (36.6 °C) 95 20 (!) 160/100 98 %          Physical Exam  Vitals and nursing note reviewed. Constitutional:       General: He is in acute distress. Appearance: Normal appearance. He is well-developed and normal weight. HENT:      Head: Normocephalic and atraumatic. Right Ear: External ear normal.      Left Ear: External ear normal.      Nose: Nose normal.      Mouth/Throat:      Mouth: Mucous membranes are moist.      Pharynx: Oropharynx is clear. Eyes:      General: No scleral icterus. Extraocular Movements: Extraocular movements intact. Conjunctiva/sclera: Conjunctivae normal.      Pupils: Pupils are equal, round, and reactive to light. Cardiovascular:      Rate and Rhythm: Normal rate and regular rhythm. Pulses: Normal pulses. Heart sounds: Normal heart sounds. Pulmonary:      Effort: Pulmonary effort is normal. No respiratory distress. Breath sounds: Normal breath sounds. Chest:       Abdominal:      General: Abdomen is flat. Bowel sounds are normal. There is no distension. Palpations: Abdomen is soft. There is no mass. Tenderness: There is no abdominal tenderness. Musculoskeletal:         General: No deformity or signs of injury. Normal range of motion. Cervical back: Normal range of motion and neck supple. Skin:     General: Skin is warm and dry. Capillary Refill: Capillary refill takes less than 2 seconds. Neurological:      General: No focal deficit present. Mental Status: He is alert and oriented to person, place, and time. Psychiatric:         Mood and Affect: Mood normal. Affect is blunt. Speech: Speech normal.         Behavior: Behavior normal. Behavior is cooperative. Thought Content:  Thought content normal.         Cognition and Memory: Cognition and memory normal.         Judgment: Judgment normal.          EKG 12 Lead (Select if Upper Abd Pain, or SOB, Diaphoresis or Tachy)    Date/Time: 6/16/2022 7:15 AM  Performed by: Marianne Love MD  Authorized by: Marianne Love MD     ECG reviewed by ED Physician in the absence of a cardiologist: yes    Previous ECG:     Previous ECG:  Unavailable  Interpretation:     Interpretation: normal    Rate:     ECG rate assessment: normal    Rhythm:     Rhythm: sinus rhythm    Ectopy:     Ectopy: none    QRS:     QRS axis:  Normal    QRS intervals:  Normal  ST segments:     ST segments:  Normal  T waves:     T waves: normal    Comments:      No acute ischemic changes  No prior tracings available for [id number]        ED EKG Interpretation  EKG was interpreted in the absence of a cardiologist.    Rate: Rate: Normal  EKG Interpretation: EKG Interpretation: no acute changes  ST Segments: Normal ST segments - NO STEMI    Labs Reviewed   CBC WITH AUTO DIFFERENTIAL - Abnormal; Notable for the following components:       Result Value    RBC 4.13 (*)     Hemoglobin 13.0 (*)     Hematocrit 38.7 (*)     MPV 8.0 (*)     All other components within normal limits   COMPREHENSIVE METABOLIC PANEL - Abnormal; Notable for the following components:    Glucose 189 (*)     CREATININE 0.63 (*)     AST 12 (*)     Albumin 2.8 (*)     Globulin 5.0 (*)     Albumin/Globulin Ratio 0.6 (*)     All other components within normal limits   SEDIMENTATION RATE - Abnormal; Notable for the following components:    Sed Rate, Automated 40 (*)     All other components within normal limits   C-REACTIVE PROTEIN - Abnormal; Notable for the following components:    CRP 2.5 (*)     All other components within normal limits   CULTURE, BLOOD 1   CULTURE, BLOOD 1   LIPASE   MAGNESIUM   TROPONIN   LACTIC ACID   PROCALCITONIN   TROPONIN   TROPONIN   TROPONIN   TROPONIN   TROPONIN   TROPONIN   TROPONIN        CT ABDOMEN PELVIS W IV CONTRAST Additional Contrast? Radiologist Recommendation   Final Result      1. Suspected discitis osteomyelitis at T9-T10 with paraspinal phlegmon and left   pleural effusion/empyema. 2. Constipation. 3. Spinal megaly. XR CHEST (2 VW)   Final Result   Left lower lobe pleural thickening or unusual left pleural   collection. Correlate with prior thoracic imaging if available and consider   short follow-up thoracic imaging in several weeks for further assessment. ED Course as of 06/16/22 1030   Thu Jun 16, 2022   0933 XR CHEST (2 VW) [KH]      ED Course User Index  [KH] Kelly Castaneda MD        Voice dictation software was used during the making of this note. This software is not perfect and grammatical and other typographical errors may be present. This note has not been completely proofread for errors.      Kelly Castaneda MD  06/16/22 [address], [place]  06/16/22 1030

## 2022-06-16 NOTE — H&P
Hospitalist History and Physical   Admit Date:  2022  7:00 AM   Name:  Raul Flood   Age:  46 y.o. Sex:  male  :  1980   MRN:  [id number]   Room:  [facility]/    Presenting Complaint: Abdominal Pain and Chest Pain     Reason(s) for Admission: Chest pain [R07.9]     History of Present Illness:   Raul Flood is a 46 y.o. male with medical history of hepatitis C, L foot osteomyelitis, IVDU (recovery x 3 months)  who presented with complaints of ongoing and intolerable L sided chest and abdominal pain. Recent visit to urgent care and was given toradol for symptoms with no improvement. CT of abd/pelvis found to have T9-10 discitisi/osteomyelitis with paraspinal phlegmon and associated L pleural effusion/empyema. Seen at bedside. Continued pain that is worse with inspiration. Concerned about his financial situation and did try to delay coming to the hospital given he is currently uninsured and will not receive benefits until . Lives with roommates. Takes his medications regularly. Has not used heroine in 3 months and active in recovery. Denies n/v/d, palpitations, dizziness, changes in vision/hearing, SOB. Does endorse some chronic constipation. Review of Systems:  10 systems reviewed and negative except as noted in HPI. Assessment & Plan:   Raul Flood is a 46 y.o. male with medical history of hepatitis C, L foot osteomyelitis, IVDU (recovery x 3 months) who presented with L sided chest pain found to have T9-10 discitis/osteomyelitis with paraspinal phlegmon and associated L pleural effusion/empyema.     Patient Active Problem List   Diagnosis    Chest pain    Discitis of thoracic region    Pleural effusion    Empyema lung (HCC)    Phlegmon     T9-10 discitis/osteomyelitis  - Given vanc/zosyn in the ED, continue upon transfer  - Likely requires IR intervention and/or neurosurgical consult  - ID consult  - BCx pending  - MRI spine    Chest pain, low suspicion for cardiac etiology  - Likely pleuritic/ related to effusion  - Pulm c/s for thoracentesis and culture  - trend troponin X3 (negative x1)     Hepatitis C, chronic  - continue Mavyret - patient needs to confirm home dose      Dispo/Discharge Planning:     [place] facility for further ongoing acute care    Diet: Diet NPO  VTE ppx: lovenox  Code status: No Order    Hospital Problems           Last Modified POA    * (Principal) Chest pain 6/16/2022 Yes          Past History:  Past Medical History:   Diagnosis Date    Infectious disease     hep C     Past Surgical History:   Procedure Laterality Date    ORTHOPEDIC SURGERY      spinal fusion      Allergies   Allergen Reactions    Ethanol-Alcohol [Ethanol]       Social History     Tobacco Use    Smoking status: Current Every Day Smoker    Smokeless tobacco: Not on file   Substance Use Topics    Alcohol use: Not Currently      History reviewed. No pertinent family history. Family history reviewed and negative except as noted above. There is no immunization history on file for this patient. Prior to Admit Medications:  Current Outpatient Medications   Medication Instructions    cyclobenzaprine (FLEXERIL) 10 mg, Oral, 3 TIMES DAILY PRN    Glecaprevir-Pibrentasvir (MAVYRET PO) Oral    ketorolac (TORADOL) 10 mg, Oral, EVERY 6 HOURS PRN         Objective:     Patient Vitals for the past 24 hrs:   Temp Pulse Resp BP SpO2   06/16/22 0830 -- 87 14 (!) 162/92 98 %   06/16/22 0703 97.9 °F (36.6 °C) 95 20 (!) 160/100 98 %       Estimated body mass index is 30.52 kg/m² as calculated from the following:    Height as of this encounter: 6' (1.829 m). Weight as of this encounter: 225 lb (102.1 kg). No intake or output data in the 24 hours ending 06/16/22 1136      Physical Exam:  Blood pressure (!) 162/92, pulse 87, temperature 97.9 °F (36.6 °C), temperature source Oral, resp. rate 14, height 6' (1.829 m), weight 225 lb (102.1 kg), SpO2 98 %.   General:    Well nourished. No overt distress  Head:  Normocephalic, atraumatic  Eyes:  Sclerae appear normal.  Pupils equally round. ENT:  Nares appear normal, no drainage. Moist oral mucosa  Neck:  No restricted ROM. Trachea midline   CV:   RRR. No m/r/g. No jugular venous distension. Lungs:   CTAB. No wheezing, rhonchi, or rales. Respirations even, unlabored  Abdomen: Bowel sounds present. Soft, nontender, nondistended. Extremities: No cyanosis or clubbing. No edema  Skin:     No rashes and normal coloration. Warm and dry. Neuro:  CN II-XII grossly intact. Sensation intact. A&Ox3  Psych:  Normal mood and affect.       I have reviewed ordered lab tests and independently visualized imaging below:    Last 24hr Labs:  Recent Results (from the past 24 hour(s))   CBC with Diff    Collection Time: 06/16/22  7:45 AM   Result Value Ref Range    WBC 8.6 4.3 - 11.1 K/uL    RBC 4.13 (L) 4.23 - 5.6 M/uL    Hemoglobin 13.0 (L) 13.6 - 17.2 g/dL    Hematocrit 38.7 (L) 41.1 - 50.3 %    MCV 93.7 79.6 - 97.8 FL    MCH 31.5 26.1 - 32.9 PG    MCHC 33.6 31.4 - 35.0 g/dL    RDW 13.7 11.9 - 14.6 %    Platelets 998 749 - 744 K/uL    MPV 8.0 (L) 9.4 - 12.3 FL    nRBC 0.00 0.0 - 0.2 K/uL    Differential Type AUTOMATED      Seg Neutrophils 59 43 - 78 %    Lymphocytes 29 13 - 44 %    Monocytes 9 4.0 - 12.0 %    Eosinophils % 1 0.5 - 7.8 %    Basophils 0 0.0 - 2.0 %    Immature Granulocytes 1 0.0 - 5.0 %    Segs Absolute 5.1 1.7 - 8.2 K/UL    Absolute Lymph # 2.5 0.5 - 4.6 K/UL    Absolute Mono # 0.8 0.1 - 1.3 K/UL    Absolute Eos # 0.1 0.0 - 0.8 K/UL    Basophils Absolute 0.0 0.0 - 0.2 K/UL    Absolute Immature Granulocyte 0.1 0.0 - 0.5 K/UL   CMP    Collection Time: 06/16/22  7:45 AM   Result Value Ref Range    Sodium 138 136 - 145 mmol/L    Potassium 3.6 3.5 - 5.1 mmol/L    Chloride 103 98 - 107 mmol/L    CO2 27 21 - 32 mmol/L    Anion Gap 8 7 - 16 mmol/L    Glucose 189 (H) 65 - 100 mg/dL    BUN 19 6 - 23 MG/DL    CREATININE 0.63 (L) 0.8 - 1.5 MG/DL    GFR African American >60 >60 ml/min/1.73m2    GFR Non- >60 >60 ml/min/1.73m2    Calcium 8.6 8.3 - 10.4 MG/DL    Total Bilirubin 0.3 0.2 - 1.1 MG/DL    ALT 14 12 - 65 U/L    AST 12 (L) 15 - 37 U/L    Alk Phosphatase 100 50 - 136 U/L    Total Protein 7.8 6.3 - 8.2 g/dL    Albumin 2.8 (L) 3.5 - 5.0 g/dL    Globulin 5.0 (H) 2.3 - 3.5 g/dL    Albumin/Globulin Ratio 0.6 (L) 1.2 - 3.5     Lipase    Collection Time: 06/16/22  7:45 AM   Result Value Ref Range    Lipase 118 73 - 393 U/L   Magnesium    Collection Time: 06/16/22  7:45 AM   Result Value Ref Range    Magnesium 1.9 1.8 - 2.4 mg/dL   Troponin    Collection Time: 06/16/22  7:45 AM   Result Value Ref Range    Troponin, High Sensitivity 8.8 0 - 14 pg/mL   Procalcitonin    Collection Time: 06/16/22  7:45 AM   Result Value Ref Range    Procalcitonin <0.05 0.00 - 0.49 ng/mL   Sedimentation Rate    Collection Time: 06/16/22  7:45 AM   Result Value Ref Range    Sed Rate, Automated 40 (H) 0 - 20 mm/hr   C-Reactive Protein    Collection Time: 06/16/22  7:45 AM   Result Value Ref Range    CRP 2.5 (H) 0.0 - 0.9 mg/dL   Lactic Acid    Collection Time: 06/16/22  9:53 AM   Result Value Ref Range    Lactic Acid, Plasma 1.0 0.4 - 2.0 MMOL/L         Other Studies:  XR CHEST (2 VW)    Result Date: 6/16/2022  AP LATERAL CHEST X-RAY HISTORY: Chest pain; left upper and lower quadrant pain. Constipation. COMPARISON: None FINDINGS: Left lower lobe pleural thickening is present. There is no lobar consolidation. Pleural pulmonary vascularity is normal. The heart size is upper limits of normal.     Left lower lobe pleural thickening or unusual left pleural collection. Correlate with prior thoracic imaging if available and consider short follow-up thoracic imaging in several weeks for further assessment.     CT ABDOMEN PELVIS W IV CONTRAST Additional Contrast? Radiologist Recommendation    Result Date: 6/16/2022  CT ABDOMEN AND PELVIS WITH CONTRAST 6/16/2022 8:55 AM History:  Left lower quadrant and left upper quadrant abdominal pain and chest pain.; TECHNIQUE: The patient received 100 mL Isovue-370 nonionic IV contrast. Axial images were obtained through the abdomen and pelvis. Coronal reformatted images were generated. All CT scans at this facility used dose modulation, interactive reconstruction and/or weight based dosing when appropriate to reduce radiation dose to as low as reasonably achievable. Comparison: None available Findings: Included portions of the lung bases demonstrate a left pleural effusion with atelectasis in the lower lobes. There are endplate irregularities around the T9-T10 disc with abnormal paraspinal soft tissue. Features are suggestive of discitis osteomyelitis with paraspinal phlegmon and associated left pleural effusion/empyema. ABDOMEN: Gallbladder: Contracted Liver: No focal hepatic lesions or biliary ductal dilatation. Pancreas: Normal. Spleen: Splenomegaly with splenic length measuring 17 cm. Adrenal glands: Normal. Kidneys: The kidneys enhance symmetrically with contrast and there is no hydronephrosis. Bowel: The appendix is normal in appearance. Small bowel loops are normal in caliber. A large amount of stool is present throughout the colon. Retroperitoneum:  No adenopathy. Abdominal aorta is normal in caliber. PELVIS:The bladder is normal in appearance. There is no free pelvic fluid. Hardware is present in the left hip. Posterior fixation hardware is present at L4-L5 and S1.     1. Suspected discitis osteomyelitis at T9-T10 with paraspinal phlegmon and left pleural effusion/empyema. 2. Constipation. 3. Spinal megaly. No results found for this or any previous visit.        vancomycin  25 mg/kg IntraVENous Once       Signed:  OTF Sawyer

## 2022-06-16 NOTE — ED TRIAGE NOTES
Pt here with LLQ/LUQ abd pain and CP. Was seen at urgent care for same thing and sent homme with flexeril and Toradol. C/o some constipation and that this pain has been going on for a month. Masked.

## 2022-06-16 NOTE — CARE COORDINATION
06/16/22 1044   Service Assessment   Patient Orientation Alert and Oriented;Person;Place;Situation   Cognition Alert   History Provided By Patient   Primary Caregiver Self   Support Systems None   PCP Verified by CM No   Prior Functional Level Independent in ADLs/IADLs   Can patient return to prior living arrangement Yes   Ability to make needs known: Good   Family able to assist with home care needs: No   Social/Functional History   Lives With Other (comment)  (States he has housemates.)   Type of [address] One level   ADL Assistance Independent   Homemaking Assistance Independent   Active  Yes   Mode of Transportation Car   Type of Occupation [employer]    Discharge Planning   Type of [place]       In to see patient. Came in [facility] chest pain/back pain and SOBl. States he lives in [place], [place] in a home and has housemates. 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 States he ttried to wait to come in for when his insurance kicked in but pain got so bad he had to come in. Uninsured packet given. Patient being admitted but plan is to return home after discharge. Did not give any emergency contacts. States he is happily  and is estranged from his parents. CM following.

## 2022-06-16 NOTE — PROGRESS NOTES
[facility] Pharmacokinetic Monitoring Service - Vancomycin     Eri Live is a 46 y.o. male starting on vancomycin therapy for bone and joint infection. Pharmacy consulted by OTF Harris for monitoring and adjustment. Target Concentration: Goal AUC/LAURA 400-600 mg*hr/L    Additional Antimicrobials: Zosyn    Pertinent Laboratory Values: Wt Readings from Last 1 Encounters:   06/16/22 225 lb (102.1 kg)     Temp Readings from Last 1 Encounters:   06/16/22 97.9 °F (36.6 °C) (Oral)     Recent Labs     06/16/22  0745 06/16/22  0953   BUN 19  --    CREATININE 0.63*  --    WBC 8.6  --    PROCAL <0.05  --    LACACIDPL  --  1.0     Estimated Creatinine Clearance: 195 mL/min (A) (based on SCr of 0.63 mg/dL (L)). No results found for: Khurram Segovia    MRSA Nasal Swab: N/A. Non-respiratory infection.     Plan:  Dosing recommendations based on Bayesian software  Start vancomycin 2500 mg x 1, followed by 1250 mg q12h  Anticipated AUC of 464 and trough concentration of 14.3 at steady state  Renal labs as indicated   Vancomycin concentration ordered for 6/17 @ 0600    Pharmacy will continue to monitor patient and adjust therapy as indicated    Thank you for the consult,  Graeme Gannon, [facility]

## 2022-06-17 ENCOUNTER — APPOINTMENT (OUTPATIENT)
Dept: MRI IMAGING | Age: 42
DRG: 477 | End: 2022-06-17
Attending: HOSPITALIST

## 2022-06-17 LAB
AMPHET UR QL SCN: POSITIVE
BARBITURATES UR QL SCN: NEGATIVE
BENZODIAZ UR QL: NEGATIVE
CANNABINOIDS UR QL SCN: NEGATIVE
COCAINE UR QL SCN: NEGATIVE
CRP SERPL-MCNC: 1.9 MG/DL (ref 0–0.9)
ERYTHROCYTE [SEDIMENTATION RATE] IN BLOOD: 63 MM/HR (ref 0–20)
METHADONE UR QL: NEGATIVE
OPIATES UR QL: NEGATIVE
PCP UR QL: NEGATIVE
PROCALCITONIN SERPL-MCNC: <0.05 NG/ML (ref 0–0.49)
VANCOMYCIN SERPL-MCNC: 9.9 UG/ML

## 2022-06-17 PROCEDURE — 97165 OT EVAL LOW COMPLEX 30 MIN: CPT

## 2022-06-17 PROCEDURE — 72157 MRI CHEST SPINE W/O & W/DYE: CPT

## 2022-06-17 PROCEDURE — 6360000002 HC RX W HCPCS: Performed by: HOSPITALIST

## 2022-06-17 PROCEDURE — 6360000004 HC RX CONTRAST MEDICATION: Performed by: PHYSICIAN ASSISTANT

## 2022-06-17 PROCEDURE — 6370000000 HC RX 637 (ALT 250 FOR IP): Performed by: HOSPITALIST

## 2022-06-17 PROCEDURE — 86803 HEPATITIS C AB TEST: CPT

## 2022-06-17 PROCEDURE — 97161 PT EVAL LOW COMPLEX 20 MIN: CPT

## 2022-06-17 PROCEDURE — 85652 RBC SED RATE AUTOMATED: CPT

## 2022-06-17 PROCEDURE — 36415 COLL VENOUS BLD VENIPUNCTURE: CPT

## 2022-06-17 PROCEDURE — 2580000003 HC RX 258: Performed by: PHYSICIAN ASSISTANT

## 2022-06-17 PROCEDURE — 6370000000 HC RX 637 (ALT 250 FOR IP): Performed by: INTERNAL MEDICINE

## 2022-06-17 PROCEDURE — 86140 C-REACTIVE PROTEIN: CPT

## 2022-06-17 PROCEDURE — 84145 PROCALCITONIN (PCT): CPT

## 2022-06-17 PROCEDURE — 1100000003 HC PRIVATE W/ TELEMETRY

## 2022-06-17 PROCEDURE — 87522 HEPATITIS C REVRS TRNSCRPJ: CPT

## 2022-06-17 PROCEDURE — 2500000003 HC RX 250 WO HCPCS: Performed by: HOSPITALIST

## 2022-06-17 PROCEDURE — A9579 GAD-BASE MR CONTRAST NOS,1ML: HCPCS | Performed by: PHYSICIAN ASSISTANT

## 2022-06-17 PROCEDURE — 80202 ASSAY OF VANCOMYCIN: CPT

## 2022-06-17 PROCEDURE — 6360000002 HC RX W HCPCS: Performed by: PHYSICIAN ASSISTANT

## 2022-06-17 RX ORDER — DIAPER,BRIEF,INFANT-TODD,DISP
EACH MISCELLANEOUS 2 TIMES DAILY
Status: DISCONTINUED | OUTPATIENT
Start: 2022-06-17 | End: 2022-06-22 | Stop reason: HOSPADM

## 2022-06-17 RX ORDER — LORAZEPAM 2 MG/ML
1 INJECTION INTRAMUSCULAR
Status: COMPLETED | OUTPATIENT
Start: 2022-06-17 | End: 2022-06-17

## 2022-06-17 RX ORDER — LORAZEPAM 2 MG/ML
1 INJECTION INTRAMUSCULAR ONCE
Status: DISCONTINUED | OUTPATIENT
Start: 2022-06-17 | End: 2022-06-17

## 2022-06-17 RX ADMIN — PANTOPRAZOLE SODIUM 40 MG: 40 TABLET, DELAYED RELEASE ORAL at 04:35

## 2022-06-17 RX ADMIN — OXYCODONE 10 MG: 5 TABLET ORAL at 08:39

## 2022-06-17 RX ADMIN — HYDROCORTISONE: 1 CREAM TOPICAL at 14:47

## 2022-06-17 RX ADMIN — SODIUM CHLORIDE 25 ML: 9 INJECTION, SOLUTION INTRAVENOUS at 00:18

## 2022-06-17 RX ADMIN — ENOXAPARIN SODIUM 30 MG: 100 INJECTION SUBCUTANEOUS at 04:36

## 2022-06-17 RX ADMIN — PIPERACILLIN AND TAZOBACTAM 3375 MG: 3; .375 INJECTION, POWDER, LYOPHILIZED, FOR SOLUTION INTRAVENOUS at 00:17

## 2022-06-17 RX ADMIN — HYDROMORPHONE HYDROCHLORIDE 0.5 MG: 1 INJECTION, SOLUTION INTRAMUSCULAR; INTRAVENOUS; SUBCUTANEOUS at 21:12

## 2022-06-17 RX ADMIN — KETOROLAC TROMETHAMINE 30 MG: 30 INJECTION, SOLUTION INTRAMUSCULAR at 18:21

## 2022-06-17 RX ADMIN — PIPERACILLIN AND TAZOBACTAM 3375 MG: 3; .375 INJECTION, POWDER, LYOPHILIZED, FOR SOLUTION INTRAVENOUS at 08:40

## 2022-06-17 RX ADMIN — SODIUM CHLORIDE, PRESERVATIVE FREE 10 ML: 5 INJECTION INTRAVENOUS at 20:47

## 2022-06-17 RX ADMIN — SODIUM CHLORIDE, PRESERVATIVE FREE 5 ML: 5 INJECTION INTRAVENOUS at 08:40

## 2022-06-17 RX ADMIN — LORAZEPAM 1 MG: 2 INJECTION INTRAMUSCULAR; INTRAVENOUS at 20:43

## 2022-06-17 RX ADMIN — KETOROLAC TROMETHAMINE 30 MG: 30 INJECTION, SOLUTION INTRAMUSCULAR at 00:33

## 2022-06-17 RX ADMIN — HYDROMORPHONE HYDROCHLORIDE 0.5 MG: 1 INJECTION, SOLUTION INTRAMUSCULAR; INTRAVENOUS; SUBCUTANEOUS at 09:40

## 2022-06-17 RX ADMIN — MICONAZOLE NITRATE: 20 CREAM TOPICAL at 14:47

## 2022-06-17 RX ADMIN — KETOROLAC TROMETHAMINE 30 MG: 30 INJECTION, SOLUTION INTRAMUSCULAR at 11:15

## 2022-06-17 RX ADMIN — PANTOPRAZOLE SODIUM 40 MG: 40 TABLET, DELAYED RELEASE ORAL at 16:50

## 2022-06-17 RX ADMIN — HYDROMORPHONE HYDROCHLORIDE 0.5 MG: 1 INJECTION, SOLUTION INTRAMUSCULAR; INTRAVENOUS; SUBCUTANEOUS at 14:58

## 2022-06-17 RX ADMIN — OXYCODONE 10 MG: 5 TABLET ORAL at 04:35

## 2022-06-17 RX ADMIN — GADOTERIDOL 22 ML: 279.3 INJECTION, SOLUTION INTRAVENOUS at 21:55

## 2022-06-17 RX ADMIN — ENOXAPARIN SODIUM 30 MG: 100 INJECTION SUBCUTANEOUS at 16:50

## 2022-06-17 ASSESSMENT — PAIN DESCRIPTION - DESCRIPTORS
DESCRIPTORS: ACHING;SORE
DESCRIPTORS: ACHING;SORE
DESCRIPTORS: ACHING;SHARP
DESCRIPTORS: ACHING;SORE
DESCRIPTORS: ACHING;SORE
DESCRIPTORS: SHARP
DESCRIPTORS: ACHING;SORE
DESCRIPTORS: ACHING;SORE

## 2022-06-17 ASSESSMENT — PAIN SCALES - GENERAL
PAINLEVEL_OUTOF10: 6
PAINLEVEL_OUTOF10: 0
PAINLEVEL_OUTOF10: 6
PAINLEVEL_OUTOF10: 7
PAINLEVEL_OUTOF10: 5
PAINLEVEL_OUTOF10: 6
PAINLEVEL_OUTOF10: 0

## 2022-06-17 ASSESSMENT — PAIN DESCRIPTION - FREQUENCY
FREQUENCY: CONTINUOUS
FREQUENCY: INTERMITTENT
FREQUENCY: CONTINUOUS
FREQUENCY: INTERMITTENT

## 2022-06-17 ASSESSMENT — PAIN - FUNCTIONAL ASSESSMENT
PAIN_FUNCTIONAL_ASSESSMENT: PREVENTS OR INTERFERES SOME ACTIVE ACTIVITIES AND ADLS

## 2022-06-17 ASSESSMENT — PAIN DESCRIPTION - LOCATION
LOCATION: BACK

## 2022-06-17 ASSESSMENT — PAIN DESCRIPTION - ONSET
ONSET: ON-GOING

## 2022-06-17 ASSESSMENT — PAIN DESCRIPTION - ORIENTATION
ORIENTATION: MID
ORIENTATION: LEFT
ORIENTATION: MID
ORIENTATION: LEFT

## 2022-06-17 ASSESSMENT — PAIN DESCRIPTION - PAIN TYPE
TYPE: ACUTE PAIN

## 2022-06-17 NOTE — PROGRESS NOTES
OCCUPATIONAL THERAPY Initial Assessment, Discharge and AM           Acknowledge Orders  Time  OT Charge Capture  Rehab Caseload Tracker      Chris Bustamante is a 46 y.o. male   PRIMARY DIAGNOSIS: Discitis  Discitis [M46.40]       Reason for Referral: Generalized Muscle Weakness (M62.81)  Inpatient: Payor: /     ASSESSMENT:     REHAB RECOMMENDATIONS:   Recommendation to date pending progress:  Setting:   No further skilled therapy after discharge from hospital    Equipment:     None     ASSESSMENT:  Mr. Latha Tijerina presents with deficits in overall strength, activity tolerance, ADL performance, and functional mobility. Presents for L sided back and chest pain . BUE (4/5) are generally decreased but WFL. Supervision for functional bed mobility/supine <> sit transfer to EOB; good EOB unsupported sitting balance. SBA for functional mobility for short household distances; fair dynamic standing balance (limited d/t back pain). At this time, Chris Bustamante is functioning close to baseline for ADL performance and functional mobility. Patient to be discharged at this time. [facility] [address] [address] [facility] 6 Clicks Daily Activity Inpatient Short Form:    AM-PAC Daily Activity Inpatient   How much help for putting on and taking off regular lower body clothing?: A Little  How much help for Bathing?: None  How much help for Toileting?: None  How much help for putting on and taking off regular upper body clothing?: None  How much help for taking care of personal grooming?: None  How much help for eating meals?: None  [facility] Inpatient Daily Activity Raw Score: 23  AM-PAC Inpatient ADL T-Scale Score : 51.12  ADL Inpatient CMS 0-100% Score: 15.86  ADL Inpatient CMS G-Code Modifier : CI           SUBJECTIVE:     Mr. Latha Tijerina states, \"Yeah i'm familiar with physical therapy. \"     Social/Functional Lives With: Other (comment) (Roommates)  Type of Home: House  ADL Assistance: Independent  Ambulation Assistance: Independent  Transfer Body Dressing [] [] [] [] [] [] [] [] [] [x]    Feeding [] [] [] [] [] [] [] [] [] [x]    Grooming [] [] [] [] [] [] [] [] [] [x]    Personal Device Care [] [] [] [] [] [] [] [] [] [x]    Functional Mobility [] [] [x] [] [] [] [] [] [] []    I=Independent, Mod I=Modified Independent, S=Supervision/Setup, SBA=Standby Assistance, CGA=Contact Guard Assistance, Min=Minimal Assistance, Mod=Moderate Assistance, Max=Maximal Assistance, Total=Total Assistance, NT=Not Tested    PLAN:     FREQUENCY/DURATION     for duration of hospital stay or until stated goals are met, whichever comes first.    ACUTE OCCUPATIONAL THERAPY GOALS:   (Developed with and agreed upon by patient and/or caregiver.)  eval and d/c    PROBLEM LIST:   (Skilled intervention is medically necessary to address:)  discharge   INTERVENTIONS PLANNED:  (Benefits and precautions of occupational therapy have been discussed with the patient.)  discharge         TREATMENT:     EVALUATION: LOW COMPLEXITY: (Untimed Charge)    TREATMENT:   eval and d/c; at baseline    TREATMENT GRID:  N/A    AFTER TREATMENT PRECAUTIONS: Bed, Call light within reach and RN notified    INTERDISCIPLINARY COLLABORATION:  RN/ PCT, PT/ PTA and OT/ GRIJALVA    EDUCATION:  Education Given To: Patient  Education Provided: Role of Therapy;Plan of Care  Barriers to Learning: None  Education Outcome: Verbalized understanding;Demonstrated understanding    TOTAL TREATMENT DURATION AND TIME:  Time In: [address]  Time Out: 1108  Minutes: [address] [place] [address] Mayte Linares, OT

## 2022-06-17 NOTE — PROGRESS NOTES
Hospitalist Progress Note   Admit Date:  2022  2:26 PM   Name:  Becca Khan   Age:  46 y.o. Sex:  male  :  1980   MRN:  [id number]   Room:  [facility]/    Presenting Complaint: back pain with fever     Reason(s) for Admission: Discitis [M46.40]     Interval Hx/Subjective:   Becca Khan is a 46 y.o. male with medical history of hepatitis C, L foot osteomyelitis, IVDU (recovery x 3 months) admitted on  with discitis and osteomyelitis with possible epidural vs paraspinal abscess. CT of abd/pelvis found to have T9-10 discitisi/osteomyelitis with paraspinal phlegmon and associated L pleural effusion/empyema. :  Bedside, resting in bed comfortably. Reports of having back pain but is better controlled. Denies any chest pain, nausea vomiting, fever, chills, diarrhea. Discussed about possibility epidural versus paraspinal abscess for which MRI spine needs to be done. Review of Systems:  10 systems reviewed and negative except as noted above. Assessment & Plan:   Becca Khan is a 46 y.o. male with medical history of hepatitis C, L foot osteomyelitis, IVDU (recovery x 3 months) who presented with L sided chest pain found to have T9-10 discitis/osteomyelitis with paraspinal phlegmon and associated L pleural effusion/empyema. Patient Active Problem List   Diagnosis    Chest pain    Discitis of thoracic region    Pleural effusion    Empyema lung (HCC)    Phlegmon    Chronic hepatitis C without hepatic coma ([facility] [address]    Discitis    Epidural abscess     T9-10 discitis/osteomyelitis  :  Vanco and Zosyn stopped as cultures are negative so far the patient is clinically stable. Follow-up with MRI spine as ordered  ID on board. Pro-José Manuel 0.05, CRP 1.9, ESR 63  Blood cultures negative to date. Chest pain, low suspicion for cardiac etiology  Chest pain has resolved  If blood culture is positive, will consider doing WEST.        Hepatitis C, chronic  - continue [place] Dispo/Discharge Planning:   Patient is uninsured,  is assisting with disposition and insurance. Diet: ADULT DIET; Regular  VTE ppx: lovenox  Code status: Full Code    Hospital Problems           Last Modified POA    * (Principal) Discitis 6/16/2022 Yes    Chest pain 6/16/2022 Yes    Pleural effusion 6/16/2022 Yes    Epidural abscess 6/16/2022 Yes    Discitis of thoracic region 6/16/2022 Yes    Overview Signed 6/16/2022 11:44 AM by OTF Dickens     T9-T10                 Objective:     Patient Vitals for the past 24 hrs:   Temp Pulse Resp BP SpO2   06/17/22 0528 98 °F (36.7 °C) 82 18 (!) 143/93 97 %   06/17/22 0125 98.2 °F (36.8 °C) 93 18 137/88 94 %   06/16/22 2020 98.4 °F (36.9 °C) 97 18 130/86 96 %   06/16/22 1415 97.9 °F (36.6 °C) 78 16 (!) 142/98 --       Estimated body mass index is 32.98 kg/m² as calculated from the following:    Height as of an earlier encounter on 6/16/22: 6' (1.829 m). Weight as of this encounter: 243 lb 3.2 oz (110.3 kg). Intake/Output Summary (Last 24 hours) at 6/17/2022 0721  Last data filed at 6/17/2022 0437  Gross per 24 hour   Intake 380 ml   Output 875 ml   Net -495 ml         Physical Exam:  Blood pressure (!) 143/93, pulse 82, temperature 98 °F (36.7 °C), temperature source Oral, resp. rate 18, height 6' (1.829 m), weight 243 lb 3.2 oz (110.3 kg), SpO2 97 %. General:    Alert, awake, NAD, on room air  HEENT:           Head NCAT, PERRLA positive, MMM  Neck:  No restricted ROM. Trachea midline   CV:   RRR. No m/r/g. No jugular venous distension. Lungs:   Breath sounds clear to auscultation bilaterally  Abdomen: Bowel sounds present. Soft, nontender, nondistended. Extremities: No cyanosis or clubbing. No edema  Skin:     No rashes and normal coloration. Warm and dry.     Neuro:  GCS 15, cranial nerves intact, no motor or sensory deficit, cerebellar functions intact  Psych:  AOx3, mood and affect flat    I have reviewed ordered lab tests and independently visualized imaging below:    Last 24hr Labs:  Recent Results (from the past 24 hour(s))   CBC with Diff    Collection Time: 06/16/22  7:45 AM   Result Value Ref Range    WBC 8.6 4.3 - 11.1 K/uL    RBC 4.13 (L) 4.23 - 5.6 M/uL    Hemoglobin 13.0 (L) 13.6 - 17.2 g/dL    Hematocrit 38.7 (L) 41.1 - 50.3 %    MCV 93.7 79.6 - 97.8 FL    MCH 31.5 26.1 - 32.9 PG    MCHC 33.6 31.4 - 35.0 g/dL    RDW 13.7 11.9 - 14.6 %    Platelets 783 681 - 591 K/uL    MPV 8.0 (L) 9.4 - 12.3 FL    nRBC 0.00 0.0 - 0.2 K/uL    Differential Type AUTOMATED      Seg Neutrophils 59 43 - 78 %    Lymphocytes 29 13 - 44 %    Monocytes 9 4.0 - 12.0 %    Eosinophils % 1 0.5 - 7.8 %    Basophils 0 0.0 - 2.0 %    Immature Granulocytes 1 0.0 - 5.0 %    Segs Absolute 5.1 1.7 - 8.2 K/UL    Absolute Lymph # 2.5 0.5 - 4.6 K/UL    Absolute Mono # 0.8 0.1 - 1.3 K/UL    Absolute Eos # 0.1 0.0 - 0.8 K/UL    Basophils Absolute 0.0 0.0 - 0.2 K/UL    Absolute Immature Granulocyte 0.1 0.0 - 0.5 K/UL   CMP    Collection Time: 06/16/22  7:45 AM   Result Value Ref Range    Sodium 138 136 - 145 mmol/L    Potassium 3.6 3.5 - 5.1 mmol/L    Chloride 103 98 - 107 mmol/L    CO2 27 21 - 32 mmol/L    Anion Gap 8 7 - 16 mmol/L    Glucose 189 (H) 65 - 100 mg/dL    BUN 19 6 - 23 MG/DL    CREATININE 0.63 (L) 0.8 - 1.5 MG/DL    GFR African American >60 >60 ml/min/1.73m2    GFR Non- >60 >60 ml/min/1.73m2    Calcium 8.6 8.3 - 10.4 MG/DL    Total Bilirubin 0.3 0.2 - 1.1 MG/DL    ALT 14 12 - 65 U/L    AST 12 (L) 15 - 37 U/L    Alk Phosphatase 100 50 - 136 U/L    Total Protein 7.8 6.3 - 8.2 g/dL    Albumin 2.8 (L) 3.5 - 5.0 g/dL    Globulin 5.0 (H) 2.3 - 3.5 g/dL    Albumin/Globulin Ratio 0.6 (L) 1.2 - 3.5     Lipase    Collection Time: 06/16/22  7:45 AM   Result Value Ref Range    Lipase 118 73 - 393 U/L   Magnesium    Collection Time: 06/16/22  7:45 AM   Result Value Ref Range    Magnesium 1.9 1.8 - 2.4 mg/dL   Troponin    Collection Time: 06/16/22  7:45 AM   Result Value Ref Range    Troponin, High Sensitivity 8.8 0 - 14 pg/mL   Procalcitonin    Collection Time: 06/16/22  7:45 AM   Result Value Ref Range    Procalcitonin <0.05 0.00 - 0.49 ng/mL   Sedimentation Rate    Collection Time: 06/16/22  7:45 AM   Result Value Ref Range    Sed Rate, Automated 40 (H) 0 - 20 mm/hr   C-Reactive Protein    Collection Time: 06/16/22  7:45 AM   Result Value Ref Range    CRP 2.5 (H) 0.0 - 0.9 mg/dL   Lactic Acid    Collection Time: 06/16/22  9:53 AM   Result Value Ref Range    Lactic Acid, Plasma 1.0 0.4 - 2.0 MMOL/L   Urine Drug Screen    Collection Time: 06/16/22  3:59 PM   Result Value Ref Range    PCP, Urine Negative      Benzodiazepines, Urine Negative      Cocaine, Urine Negative      Amphetamine, Urine Positive      Methadone, Urine Negative      THC, TH-Cannabinol, Urine Negative      Opiates, Urine Negative      Barbiturates, Urine Negative     Culture, Blood 1    Collection Time: 06/16/22  4:06 PM    Specimen: Blood   Result Value Ref Range    Special Requests RIGHT  Antecubital        Culture NO GROWTH AFTER 13 HOURS     Troponin    Collection Time: 06/16/22  4:07 PM   Result Value Ref Range    Troponin, High Sensitivity 5.0 0 - 14 pg/mL   Troponin    Collection Time: 06/16/22  5:20 PM   Result Value Ref Range    Troponin, High Sensitivity 5.8 0 - 14 pg/mL   Troponin    Collection Time: 06/16/22  7:28 PM   Result Value Ref Range    Troponin, High Sensitivity 5.5 0 - 14 pg/mL         Other Studies:  XR CHEST (2 VW)    Result Date: 6/16/2022  AP LATERAL CHEST X-RAY HISTORY: Chest pain; left upper and lower quadrant pain. Constipation. COMPARISON: None FINDINGS: Left lower lobe pleural thickening is present. There is no lobar consolidation. Pleural pulmonary vascularity is normal. The heart size is upper limits of normal.     Left lower lobe pleural thickening or unusual left pleural collection.  Correlate with prior thoracic imaging if available and consider short follow-up thoracic imaging in several weeks for further assessment. CT ABDOMEN PELVIS W IV CONTRAST Additional Contrast? Radiologist Recommendation    Result Date: 6/16/2022  CT ABDOMEN AND PELVIS WITH CONTRAST 6/16/2022 8:55 AM History:  Left lower quadrant and left upper quadrant abdominal pain and chest pain.; TECHNIQUE: The patient received 100 mL Isovue-370 nonionic IV contrast. Axial images were obtained through the abdomen and pelvis. Coronal reformatted images were generated. All CT scans at this facility used dose modulation, interactive reconstruction and/or weight based dosing when appropriate to reduce radiation dose to as low as reasonably achievable. Comparison: None available Findings: Included portions of the lung bases demonstrate a left pleural effusion with atelectasis in the lower lobes. There are endplate irregularities around the T9-T10 disc with abnormal paraspinal soft tissue. Features are suggestive of discitis osteomyelitis with paraspinal phlegmon and associated left pleural effusion/empyema. ABDOMEN: Gallbladder: Contracted Liver: No focal hepatic lesions or biliary ductal dilatation. Pancreas: Normal. Spleen: Splenomegaly with splenic length measuring 17 cm. Adrenal glands: Normal. Kidneys: The kidneys enhance symmetrically with contrast and there is no hydronephrosis. Bowel: The appendix is normal in appearance. Small bowel loops are normal in caliber. A large amount of stool is present throughout the colon. Retroperitoneum:  No adenopathy. Abdominal aorta is normal in caliber. PELVIS:The bladder is normal in appearance. There is no free pelvic fluid. Hardware is present in the left hip. Posterior fixation hardware is present at L4-L5 and S1.     1. Suspected discitis osteomyelitis at T9-T10 with paraspinal phlegmon and left pleural effusion/empyema. 2. Constipation. 3. Spinal megaly. No results found for this or any previous visit.        glecaprevir-pibrentasvir  3 tablet Oral Lunch  enoxaparin  30 mg SubCUTAneous Q12H    sodium chloride flush  5-40 mL IntraVENous 2 times per day    piperacillin-tazobactam  3,375 mg IntraVENous q8h    pantoprazole  40 mg Oral BID AC    vancomycin  1,250 mg IntraVENous Q12H       Signed:  Robby Herrera MD

## 2022-06-17 NOTE — PROGRESS NOTES
PHYSICAL THERAPY Initial Assessment, Discharge and AM  (Link to Caseload Tracking: PT Visit Days : 1  Acknowledge Orders  Time In/Out  PT Charge Capture  Rehab Caseload Tracker    Zonia Joe is a 46 y.o. male   PRIMARY DIAGNOSIS: Discitis  Discitis [M46.40]       Reason for Referral: Difficulty in walking, Not elsewhere classified (R26.2)  Other abnormalities of gait and mobility (R26.89)  Low Back Pain (M54.5)  Inpatient: Payor: /     ASSESSMENT:     REHAB RECOMMENDATIONS:   Recommendation to date pending progress:  Setting:   No further skilled therapy after discharge from hospital    Equipment:     None     ASSESSMENT:  Mr. Arian Ignacio Is a 46 y.o. male presenting to PT after being admitted on 6/16 for discitis. PTA pt lives in a single-level home c roomates where he is typically functionally (I) for all needs and uses a SPC PRN for mobility needs; he also drives for [employer]. At time of initial evaluation, pt presents at approximate baseline LOF with only slight deficits and activity tolerance d/t back pain limiting his overall functional mobility. Today, pt performed all mobility Mod (I)/ (S) with inc time including ambulation of 125'x1 without any AD, miss-steps or LOB. Of note, pt c/o constant 6/10 back pain throughout session but otherwise did not require physical (A) from therapy staff throughout session. On inquiry, pt confirming he is at his approximate baseline mobility status and declines need for therapy services. At this time, pt is an appropriate candidate for skilled PT and will benefit from POC designed to address the aforementioned deficits. Upon completion of treatment, pt was positioned to comfort in bed with needs in reach. RN was made aware of pt performance.      DC Recommendation: No Needs       [facility] AM-PAC 6 Clicks Basic Mobility Inpatient Short Form  AM-PAC Mobility Inpatient   How much difficulty turning over in bed?: None  How much difficulty sitting down on / standing up from a chair with arms?: None  How much difficulty moving from lying on back to sitting on side of bed?: A Little  How much help from another person moving to and from a bed to a chair?: None  How much help from another person needed to walk in hospital room?: None  How much help from another person for climbing 3-5 steps with a railing?: A Little  AM-PAC Inpatient Mobility Raw Score : 22  AM-PAC Inpatient T-Scale Score : 53.28  Mobility Inpatient CMS 0-100% Score: 20.91  Mobility Inpatient CMS G-Code Modifier : CJ    SUBJECTIVE:   Mr. Daralyn Canavan states, \"I don't need any therapy but thanks\"     Social/Functional Lives With: Other (comment) (Roommates)  Type of Home: House  ADL Assistance: Independent  Ambulation Assistance: Independent  Transfer Assistance: Independent  Active : Yes  Mode of Transportation: Car  Type of Occupation: [employer]     OBJECTIVE:     PAIN: Tirso Ji / O2: PRECAUTION / Ardith Pott / Pownal Ebbing:   Pre Treatment:   Pain Assessment: 0-10  Pain Level: 7      Post Treatment: 7 Vitals        Oxygen  O2 Device: None (Room air)   None    RESTRICTIONS/PRECAUTIONS:                    GROSS EVALUATION: Intact Impaired (Comments):   AROM [x]  trunk rotation limited BEAU d/t pain   PROM []    Strength []  L foot drop; WFL fr txfers and mobility   Balance []  inc postural sway   Posture [] Forward Head   Sensation []  NT   Coordination []   WNL   Tone []     Edema []    Activity Tolerance []  slightly diminished    []      COGNITION/  PERCEPTION: Intact Impaired (Comments):   Orientation [x]     Vision [x]     Hearing [x]     Cognition  [x]       MOBILITY: I Mod I S SBA CGA Min Mod Max Total  NT x2 Comments:   Bed Mobility    Rolling [] [x] [] [] [] [] [] [] [] [] []    Supine to Sit [] [x] [] [] [] [] [] [] [] [] []    Scooting [] [x] [] [] [] [] [] [] [] [] []    Sit to Supine [] [x] [] [] [] [] [] [] [] [] []    Transfers    Sit to Stand [] [x] [] [] [] [] [] [] [] [] []    Bed to Chair [] [] [] [] [] [] [] []

## 2022-06-17 NOTE — CARE COORDINATION
LOS 1 D  CM following for discharge needs. Patient admitted with discitis. CM met with patient to discuss discharge needs. Patient is currently employed as [employer] . Patient voices concern to get back to work to be able to pay a premium to get insurance. Patient has a h/o of IV drug abuse. He states he used twice in the last few months. Prior to that, patient states he was drug free for several years. Patient discussed in [address] [place] [address]. MRI of spine pending. ID consulted for possible discitis diagnosis. Patient is self pay and  will require continued hospital stay for IV antibiotic therapy if discitis diagnosis conclusive. CM informed patient is a flight risk due to concerns of keeping his job. CM will continue to follow.
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
## 2022-06-18 PROBLEM — M46.20 PARASPINAL ABSCESS (HCC): Status: ACTIVE | Noted: 2022-06-16

## 2022-06-18 LAB
ALBUMIN SERPL-MCNC: 2.8 G/DL (ref 3.5–5)
ALBUMIN/GLOB SERPL: 0.5 {RATIO} (ref 1.2–3.5)
ALP SERPL-CCNC: 99 U/L (ref 50–136)
ALT SERPL-CCNC: 16 U/L (ref 12–65)
ANION GAP SERPL CALC-SCNC: 5 MMOL/L (ref 7–16)
AST SERPL-CCNC: 19 U/L (ref 15–37)
BILIRUB SERPL-MCNC: 0.4 MG/DL (ref 0.2–1.1)
BUN SERPL-MCNC: 22 MG/DL (ref 6–23)
CALCIUM SERPL-MCNC: 8.4 MG/DL (ref 8.3–10.4)
CHLORIDE SERPL-SCNC: 101 MMOL/L (ref 98–107)
CO2 SERPL-SCNC: 30 MMOL/L (ref 21–32)
CREAT SERPL-MCNC: 0.6 MG/DL (ref 0.8–1.5)
ERYTHROCYTE [DISTWIDTH] IN BLOOD BY AUTOMATED COUNT: 13.6 % (ref 11.9–14.6)
GLOBULIN SER CALC-MCNC: 5.4 G/DL (ref 2.3–3.5)
GLUCOSE SERPL-MCNC: 107 MG/DL (ref 65–100)
HCT VFR BLD AUTO: 37.4 % (ref 41.1–50.3)
HGB BLD-MCNC: 12.9 G/DL (ref 13.6–17.2)
MCH RBC QN AUTO: 31.5 PG (ref 26.1–32.9)
MCHC RBC AUTO-ENTMCNC: 34.5 G/DL (ref 31.4–35)
MCV RBC AUTO: 91.4 FL (ref 79.6–97.8)
NRBC # BLD: 0 K/UL (ref 0–0.2)
PLATELET # BLD AUTO: 262 K/UL (ref 150–450)
PMV BLD AUTO: 8.3 FL (ref 9.4–12.3)
POTASSIUM SERPL-SCNC: 3.9 MMOL/L (ref 3.5–5.1)
PROT SERPL-MCNC: 8.2 G/DL (ref 6.3–8.2)
RBC # BLD AUTO: 4.09 M/UL (ref 4.23–5.6)
SODIUM SERPL-SCNC: 136 MMOL/L (ref 138–145)
WBC # BLD AUTO: 7.4 K/UL (ref 4.3–11.1)

## 2022-06-18 PROCEDURE — 76604 US EXAM CHEST: CPT | Performed by: INTERNAL MEDICINE

## 2022-06-18 PROCEDURE — 85027 COMPLETE CBC AUTOMATED: CPT

## 2022-06-18 PROCEDURE — 36415 COLL VENOUS BLD VENIPUNCTURE: CPT

## 2022-06-18 PROCEDURE — 6360000002 HC RX W HCPCS: Performed by: HOSPITALIST

## 2022-06-18 PROCEDURE — 2580000003 HC RX 258: Performed by: PHYSICIAN ASSISTANT

## 2022-06-18 PROCEDURE — 2500000003 HC RX 250 WO HCPCS: Performed by: HOSPITALIST

## 2022-06-18 PROCEDURE — 80053 COMPREHEN METABOLIC PANEL: CPT

## 2022-06-18 PROCEDURE — 6370000000 HC RX 637 (ALT 250 FOR IP): Performed by: PHYSICIAN ASSISTANT

## 2022-06-18 PROCEDURE — 6360000002 HC RX W HCPCS: Performed by: PHYSICIAN ASSISTANT

## 2022-06-18 PROCEDURE — 6370000000 HC RX 637 (ALT 250 FOR IP): Performed by: HOSPITALIST

## 2022-06-18 PROCEDURE — 99223 1ST HOSP IP/OBS HIGH 75: CPT | Performed by: INTERNAL MEDICINE

## 2022-06-18 PROCEDURE — 1100000003 HC PRIVATE W/ TELEMETRY

## 2022-06-18 RX ADMIN — KETOROLAC TROMETHAMINE 30 MG: 30 INJECTION, SOLUTION INTRAMUSCULAR at 20:26

## 2022-06-18 RX ADMIN — OXYCODONE 10 MG: 5 TABLET ORAL at 07:30

## 2022-06-18 RX ADMIN — MICONAZOLE NITRATE: 20 CREAM TOPICAL at 20:32

## 2022-06-18 RX ADMIN — HYDROMORPHONE HYDROCHLORIDE 1 MG: 1 INJECTION, SOLUTION INTRAMUSCULAR; INTRAVENOUS; SUBCUTANEOUS at 10:12

## 2022-06-18 RX ADMIN — HYDROMORPHONE HYDROCHLORIDE 1 MG: 1 INJECTION, SOLUTION INTRAMUSCULAR; INTRAVENOUS; SUBCUTANEOUS at 01:44

## 2022-06-18 RX ADMIN — HYDROMORPHONE HYDROCHLORIDE 1 MG: 1 INJECTION, SOLUTION INTRAMUSCULAR; INTRAVENOUS; SUBCUTANEOUS at 21:31

## 2022-06-18 RX ADMIN — ENOXAPARIN SODIUM 30 MG: 100 INJECTION SUBCUTANEOUS at 04:05

## 2022-06-18 RX ADMIN — HYDROCORTISONE: 1 CREAM TOPICAL at 20:33

## 2022-06-18 RX ADMIN — HYDROCORTISONE: 1 CREAM TOPICAL at 09:10

## 2022-06-18 RX ADMIN — KETOROLAC TROMETHAMINE 30 MG: 30 INJECTION, SOLUTION INTRAMUSCULAR at 08:47

## 2022-06-18 RX ADMIN — POLYETHYLENE GLYCOL 3350 17 G: 17 POWDER, FOR SOLUTION ORAL at 08:46

## 2022-06-18 RX ADMIN — SODIUM CHLORIDE, PRESERVATIVE FREE 10 ML: 5 INJECTION INTRAVENOUS at 09:10

## 2022-06-18 RX ADMIN — HYDROMORPHONE HYDROCHLORIDE 1 MG: 1 INJECTION, SOLUTION INTRAMUSCULAR; INTRAVENOUS; SUBCUTANEOUS at 17:02

## 2022-06-18 RX ADMIN — HYDROMORPHONE HYDROCHLORIDE 1 MG: 1 INJECTION, SOLUTION INTRAMUSCULAR; INTRAVENOUS; SUBCUTANEOUS at 05:55

## 2022-06-18 RX ADMIN — PANTOPRAZOLE SODIUM 40 MG: 40 TABLET, DELAYED RELEASE ORAL at 17:02

## 2022-06-18 RX ADMIN — MICONAZOLE NITRATE: 20 CREAM TOPICAL at 09:10

## 2022-06-18 RX ADMIN — SODIUM CHLORIDE, PRESERVATIVE FREE 10 ML: 5 INJECTION INTRAVENOUS at 20:28

## 2022-06-18 RX ADMIN — ENOXAPARIN SODIUM 30 MG: 100 INJECTION SUBCUTANEOUS at 17:02

## 2022-06-18 RX ADMIN — PANTOPRAZOLE SODIUM 40 MG: 40 TABLET, DELAYED RELEASE ORAL at 05:56

## 2022-06-18 ASSESSMENT — PAIN DESCRIPTION - PAIN TYPE
TYPE: ACUTE PAIN

## 2022-06-18 ASSESSMENT — PAIN DESCRIPTION - DESCRIPTORS
DESCRIPTORS: SHARP
DESCRIPTORS: ACHING;SHARP
DESCRIPTORS: SHARP

## 2022-06-18 ASSESSMENT — PAIN DESCRIPTION - ORIENTATION
ORIENTATION: LEFT
ORIENTATION: MID
ORIENTATION: LEFT

## 2022-06-18 ASSESSMENT — PAIN SCALES - GENERAL
PAINLEVEL_OUTOF10: 6
PAINLEVEL_OUTOF10: 7
PAINLEVEL_OUTOF10: 6
PAINLEVEL_OUTOF10: 9
PAINLEVEL_OUTOF10: 6
PAINLEVEL_OUTOF10: 5
PAINLEVEL_OUTOF10: 0
PAINLEVEL_OUTOF10: 0
PAINLEVEL_OUTOF10: 4
PAINLEVEL_OUTOF10: 6
PAINLEVEL_OUTOF10: 10
PAINLEVEL_OUTOF10: 0
PAINLEVEL_OUTOF10: 6
PAINLEVEL_OUTOF10: 7
PAINLEVEL_OUTOF10: 8

## 2022-06-18 ASSESSMENT — PAIN DESCRIPTION - LOCATION
LOCATION: BACK
LOCATION: FLANK
LOCATION: BACK
LOCATION: FLANK
LOCATION: BACK
LOCATION: BACK
LOCATION: FLANK
LOCATION: BACK
LOCATION: BACK
LOCATION: FLANK
LOCATION: FLANK

## 2022-06-18 ASSESSMENT — PAIN DESCRIPTION - FREQUENCY
FREQUENCY: CONTINUOUS

## 2022-06-18 ASSESSMENT — PAIN DESCRIPTION - ONSET
ONSET: ON-GOING

## 2022-06-18 ASSESSMENT — PAIN - FUNCTIONAL ASSESSMENT
PAIN_FUNCTIONAL_ASSESSMENT: PREVENTS OR INTERFERES SOME ACTIVE ACTIVITIES AND ADLS
PAIN_FUNCTIONAL_ASSESSMENT: ACTIVITIES ARE NOT PREVENTED
PAIN_FUNCTIONAL_ASSESSMENT: PREVENTS OR INTERFERES SOME ACTIVE ACTIVITIES AND ADLS
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
## 2022-06-18 NOTE — PROGRESS NOTES
suspicion for cardiac etiology  Chest pain has resolved  If blood culture is positive, will consider doing WEST. Hepatitis C, chronic  - continue Devante       Dispo/Discharge Planning:   Patient is uninsured,  is assisting with disposition and insurance. Patient is not medically clear for discharge yet. Diet: ADULT DIET; Regular  VTE ppx: lovenox  Code status: Full Code    Hospital Problems           Last Modified POA    * (Principal) Discitis 6/16/2022 Yes    Chest pain 6/16/2022 Yes    Pleural effusion 6/16/2022 Yes    Epidural abscess 6/16/2022 Yes    Discitis of thoracic region 6/16/2022 Yes    Overview Signed 6/16/2022 11:44 AM by OTF Rivas     T9-T10                 Objective:     Patient Vitals for the past 24 hrs:   Temp Pulse Resp BP SpO2   06/18/22 0441 98.4 °F (36.9 °C) 92 18 127/80 100 %   06/18/22 0034 98.7 °F (37.1 °C) 99 16 (!) 129/91 100 %   06/17/22 2014 98.1 °F (36.7 °C) 91 20 111/68 96 %   06/17/22 1524 97.5 °F (36.4 °C) 80 16 131/89 95 %   06/17/22 1134 97.7 °F (36.5 °C) 88 20 134/87 95 %   06/17/22 0854 98.4 °F (36.9 °C) 84 20 139/82 98 %       Estimated body mass index is 33.43 kg/m² as calculated from the following:    Height as of an earlier encounter on 6/16/22: 6' (1.829 m). Weight as of this encounter: 246 lb 8 oz (111.8 kg). Intake/Output Summary (Last 24 hours) at 6/18/2022 0806  Last data filed at 6/17/2022 2048  Gross per 24 hour   Intake 480 ml   Output 1010 ml   Net -530 ml         Physical Exam:  Blood pressure 127/80, pulse 92, temperature 98.4 °F (36.9 °C), resp. rate 18, height 6' (1.829 m), weight 246 lb 8 oz (111.8 kg), SpO2 100 %. General:    Alert, awake, NAD, on room air  HEENT:           Head NCAT, PERRLA positive, MMM  Neck:  No restricted ROM. Trachea midline   CV:   RRR. No m/r/g. No jugular venous distension. Lungs:   Breath sounds clear to auscultation bilaterally  Abdomen: Bowel sounds present.   Soft, nontender, nondistended. Extremities: No cyanosis or clubbing. No edema  Skin:     No rashes and normal coloration. Warm and dry.     Neuro:  GCS 15, cranial nerves intact, no motor or sensory deficit, cerebellar functions intact  Psych:  AOx3, mood and affect flat    I have reviewed ordered lab tests and independently visualized imaging below:    Last 24hr Labs:  Recent Results (from the past 24 hour(s))   Sedimentation Rate    Collection Time: 06/17/22  9:56 AM   Result Value Ref Range    Sed Rate, Automated 63 (H) 0 - 20 mm/hr   Hepatitis C Ab, Rflx to Qt by PCR    Collection Time: 06/17/22  9:56 AM   Result Value Ref Range    HCV Ab >11.0 (H) 0.0 - 0.9 s/co ratio   HCV by PCR Qn Reflex    Collection Time: 06/17/22  9:56 AM   Result Value Ref Range    Hep C Qnt PENDING IU/mL    HCV LOG10 PENDING log10 IU/mL    TEST INFORMATION Comment      Interpretation PENDING     Comprehensive Metabolic Panel    Collection Time: 06/18/22  6:18 AM   Result Value Ref Range    Sodium 136 (L) 138 - 145 mmol/L    Potassium 3.9 3.5 - 5.1 mmol/L    Chloride 101 98 - 107 mmol/L    CO2 30 21 - 32 mmol/L    Anion Gap 5 (L) 7 - 16 mmol/L    Glucose 107 (H) 65 - 100 mg/dL    BUN 22 6 - 23 MG/DL    CREATININE 0.60 (L) 0.8 - 1.5 MG/DL    GFR African American >60 >60 ml/min/1.73m2    GFR Non- >60 >60 ml/min/1.73m2    Calcium 8.4 8.3 - 10.4 MG/DL    Total Bilirubin 0.4 0.2 - 1.1 MG/DL    ALT 16 12 - 65 U/L    AST 19 15 - 37 U/L    Alk Phosphatase 99 50 - 136 U/L    Total Protein 8.2 6.3 - 8.2 g/dL    Albumin 2.8 (L) 3.5 - 5.0 g/dL    Globulin 5.4 (H) 2.3 - 3.5 g/dL    Albumin/Globulin Ratio 0.5 (L) 1.2 - 3.5     CBC    Collection Time: 06/18/22  6:18 AM   Result Value Ref Range    WBC 7.4 4.3 - 11.1 K/uL    RBC 4.09 (L) 4.23 - 5.6 M/uL    Hemoglobin 12.9 (L) 13.6 - 17.2 g/dL    Hematocrit 37.4 (L) 41.1 - 50.3 %    MCV 91.4 79.6 - 97.8 FL    MCH 31.5 26.1 - 32.9 PG    MCHC 34.5 31.4 - 35.0 g/dL    RDW 13.6 11.9 - 14.6 %    Platelets 262 150 - 450 K/uL    MPV 8.3 (L) 9.4 - 12.3 FL    nRBC 0.00 0.0 - 0.2 K/uL         Other Studies:  XR CHEST (2 VW)    Result Date: 6/16/2022  AP LATERAL CHEST X-RAY HISTORY: Chest pain; left upper and lower quadrant pain. Constipation. COMPARISON: None FINDINGS: Left lower lobe pleural thickening is present. There is no lobar consolidation. Pleural pulmonary vascularity is normal. The heart size is upper limits of normal.     Left lower lobe pleural thickening or unusual left pleural collection. Correlate with prior thoracic imaging if available and consider short follow-up thoracic imaging in several weeks for further assessment. CT ABDOMEN PELVIS W IV CONTRAST Additional Contrast? Radiologist Recommendation    Result Date: 6/16/2022  CT ABDOMEN AND PELVIS WITH CONTRAST 6/16/2022 8:55 AM History:  Left lower quadrant and left upper quadrant abdominal pain and chest pain.; TECHNIQUE: The patient received 100 mL Isovue-370 nonionic IV contrast. Axial images were obtained through the abdomen and pelvis. Coronal reformatted images were generated. All CT scans at this facility used dose modulation, interactive reconstruction and/or weight based dosing when appropriate to reduce radiation dose to as low as reasonably achievable. Comparison: None available Findings: Included portions of the lung bases demonstrate a left pleural effusion with atelectasis in the lower lobes. There are endplate irregularities around the T9-T10 disc with abnormal paraspinal soft tissue. Features are suggestive of discitis osteomyelitis with paraspinal phlegmon and associated left pleural effusion/empyema. ABDOMEN: Gallbladder: Contracted Liver: No focal hepatic lesions or biliary ductal dilatation. Pancreas: Normal. Spleen: Splenomegaly with splenic length measuring 17 cm. Adrenal glands: Normal. Kidneys: The kidneys enhance symmetrically with contrast and there is no hydronephrosis. Bowel: The appendix is normal in appearance.  Small bowel loops are normal in caliber. A large amount of stool is present throughout the colon. Retroperitoneum:  No adenopathy. Abdominal aorta is normal in caliber. PELVIS:The bladder is normal in appearance. There is no free pelvic fluid. Hardware is present in the left hip. Posterior fixation hardware is present at L4-L5 and S1.     1. Suspected discitis osteomyelitis at T9-T10 with paraspinal phlegmon and left pleural effusion/empyema. 2. Constipation. 3. Spinal megaly. No results found for this or any previous visit.        glecaprevir-pibrentasvir  3 tablet Oral Lunch    hydrocortisone   Topical BID    miconazole   Topical BID    enoxaparin  30 mg SubCUTAneous Q12H    sodium chloride flush  5-40 mL IntraVENous 2 times per day    pantoprazole  40 mg Oral BID AC       Signed:  Emma Griggs MD

## 2022-06-18 NOTE — PROCEDURES
PROCEDURE:  DIAGNOSTIC ULTRASOUND OF CHEST    DIAGNOSIS:  Left PLEURAL EFFUSION    CHEST ULTRASOUND FINDINGS:    A [employer] ultrasound was used to image the chest and localize the pleural effusion on the Left  chest.    A very small anechoic space was seen on the Left  consistent with an uncomplicated pleural effusion.     No effusion on right    IMAGE: scanned to Owen Bain MD

## 2022-06-19 PROCEDURE — 1100000003 HC PRIVATE W/ TELEMETRY

## 2022-06-19 PROCEDURE — 2580000003 HC RX 258: Performed by: PHYSICIAN ASSISTANT

## 2022-06-19 PROCEDURE — 6370000000 HC RX 637 (ALT 250 FOR IP): Performed by: HOSPITALIST

## 2022-06-19 PROCEDURE — 2500000003 HC RX 250 WO HCPCS: Performed by: HOSPITALIST

## 2022-06-19 PROCEDURE — 6370000000 HC RX 637 (ALT 250 FOR IP): Performed by: PHYSICIAN ASSISTANT

## 2022-06-19 PROCEDURE — 6360000002 HC RX W HCPCS: Performed by: PHYSICIAN ASSISTANT

## 2022-06-19 PROCEDURE — 6360000002 HC RX W HCPCS: Performed by: HOSPITALIST

## 2022-06-19 RX ADMIN — ENOXAPARIN SODIUM 30 MG: 100 INJECTION SUBCUTANEOUS at 16:16

## 2022-06-19 RX ADMIN — KETOROLAC TROMETHAMINE 30 MG: 30 INJECTION, SOLUTION INTRAMUSCULAR at 09:14

## 2022-06-19 RX ADMIN — OXYCODONE 10 MG: 5 TABLET ORAL at 14:24

## 2022-06-19 RX ADMIN — MICONAZOLE NITRATE: 20 CREAM TOPICAL at 20:32

## 2022-06-19 RX ADMIN — OXYCODONE 10 MG: 5 TABLET ORAL at 09:49

## 2022-06-19 RX ADMIN — HYDROCORTISONE: 1 CREAM TOPICAL at 09:17

## 2022-06-19 RX ADMIN — HYDROMORPHONE HYDROCHLORIDE 1 MG: 1 INJECTION, SOLUTION INTRAMUSCULAR; INTRAVENOUS; SUBCUTANEOUS at 02:35

## 2022-06-19 RX ADMIN — OXYCODONE 10 MG: 5 TABLET ORAL at 22:07

## 2022-06-19 RX ADMIN — HYDROMORPHONE HYDROCHLORIDE 1 MG: 1 INJECTION, SOLUTION INTRAMUSCULAR; INTRAVENOUS; SUBCUTANEOUS at 06:39

## 2022-06-19 RX ADMIN — SODIUM CHLORIDE, PRESERVATIVE FREE 10 ML: 5 INJECTION INTRAVENOUS at 09:18

## 2022-06-19 RX ADMIN — MICONAZOLE NITRATE: 20 CREAM TOPICAL at 09:17

## 2022-06-19 RX ADMIN — POLYETHYLENE GLYCOL 3350 17 G: 17 POWDER, FOR SOLUTION ORAL at 22:15

## 2022-06-19 RX ADMIN — SODIUM CHLORIDE, PRESERVATIVE FREE 10 ML: 5 INJECTION INTRAVENOUS at 20:34

## 2022-06-19 RX ADMIN — HYDROMORPHONE HYDROCHLORIDE 0.5 MG: 1 INJECTION, SOLUTION INTRAMUSCULAR; INTRAVENOUS; SUBCUTANEOUS at 16:17

## 2022-06-19 RX ADMIN — HYDROMORPHONE HYDROCHLORIDE 1 MG: 1 INJECTION, SOLUTION INTRAMUSCULAR; INTRAVENOUS; SUBCUTANEOUS at 20:23

## 2022-06-19 RX ADMIN — PANTOPRAZOLE SODIUM 40 MG: 40 TABLET, DELAYED RELEASE ORAL at 05:22

## 2022-06-19 RX ADMIN — PANTOPRAZOLE SODIUM 40 MG: 40 TABLET, DELAYED RELEASE ORAL at 16:17

## 2022-06-19 RX ADMIN — ENOXAPARIN SODIUM 30 MG: 100 INJECTION SUBCUTANEOUS at 04:16

## 2022-06-19 RX ADMIN — HYDROCORTISONE: 1 CREAM TOPICAL at 20:31

## 2022-06-19 RX ADMIN — HYDROMORPHONE HYDROCHLORIDE 1 MG: 1 INJECTION, SOLUTION INTRAMUSCULAR; INTRAVENOUS; SUBCUTANEOUS at 11:05

## 2022-06-19 ASSESSMENT — PAIN SCALES - GENERAL
PAINLEVEL_OUTOF10: 4
PAINLEVEL_OUTOF10: 7
PAINLEVEL_OUTOF10: 4
PAINLEVEL_OUTOF10: 6
PAINLEVEL_OUTOF10: 5
PAINLEVEL_OUTOF10: 7
PAINLEVEL_OUTOF10: 5
PAINLEVEL_OUTOF10: 4
PAINLEVEL_OUTOF10: 4
PAINLEVEL_OUTOF10: 8
PAINLEVEL_OUTOF10: 5
PAINLEVEL_OUTOF10: 4
PAINLEVEL_OUTOF10: 0
PAINLEVEL_OUTOF10: 6

## 2022-06-19 ASSESSMENT — PAIN - FUNCTIONAL ASSESSMENT

## 2022-06-19 ASSESSMENT — PAIN DESCRIPTION - DESCRIPTORS
DESCRIPTORS: SHARP

## 2022-06-19 ASSESSMENT — PAIN DESCRIPTION - LOCATION
LOCATION: BACK

## 2022-06-19 ASSESSMENT — PAIN DESCRIPTION - PAIN TYPE
TYPE: ACUTE PAIN

## 2022-06-19 ASSESSMENT — PAIN DESCRIPTION - FREQUENCY
FREQUENCY: CONTINUOUS

## 2022-06-19 ASSESSMENT — PAIN DESCRIPTION - ONSET
ONSET: ON-GOING

## 2022-06-19 ASSESSMENT — PAIN DESCRIPTION - ORIENTATION
ORIENTATION: LEFT

## 2022-06-19 NOTE — PLAN OF CARE
Pt /104, 138/95. MD Madden Payment notified, told to give pt PO oxycodone for pain control and to address HTN.

## 2022-06-19 NOTE — PROGRESS NOTES
Hospitalist Progress Note   Admit Date:  2022  2:26 PM   Name:  Isai Small   Age:  46 y.o. Sex:  male  :  1980   MRN:  [id number]   Room:  [facility]/    Presenting Complaint: back pain with fever     Reason(s) for Admission: Discitis [M46.40]     Interval Hx/Subjective:   Isai Small is a 46 y.o. male with medical history of hepatitis C, L foot osteomyelitis, IVDU (recovery x 3 months) admitted on  with discitis and osteomyelitis with possible epidural vs paraspinal abscess. CT of abd/pelvis found to have T9-10 discitisi/osteomyelitis with paraspinal phlegmon and associated L pleural effusion/empyema. :  Patient seen at bedside, resting in bed. Reports of having 7/10 pain in lower back. Denies any fever, chills, nausea vomiting or diarrhea. Review of Systems:  10 systems reviewed and negative except as noted above. Assessment & Plan:   Isai Small is a 46 y.o. male with medical history of hepatitis C, L foot osteomyelitis, IVDU (recovery x 3 months) who presented with L sided chest pain found to have T9-10 discitis/osteomyelitis with paraspinal phlegmon and associated L pleural effusion/empyema. Patient Active Problem List   Diagnosis    Chest pain    Discitis of thoracic region    Pleural effusion    Empyema lung (HCC)    Phlegmon    Chronic hepatitis C without hepatic coma (HCC)    Discitis    Paraspinal abscess (HCC)     T9-10 discitis/osteomyelitis with paraspinal phlegmon/abscess  :  Vanco and Zosyn stopped on  as cultures need to be obtained  MRI spine shows discitis osteomyelitis at T9-T10 with paraspinal phlegmon/abscess, no evidence of epidural abscess. IR consulted for  for aspiration/biopsy  ID on board. Pro-José Manuel 0.05, CRP 1.9, ESR 63  Blood cultures negative to date.     Chest pain, low suspicion for cardiac etiology  Chest pain has resolved  Blood cultures negative to date       Hepatitis C, chronic  - continue Mavyret Dispo/Discharge Planning:   Patient is uninsured,  is assisting with disposition and insurance. Patient is not medically clear for discharge yet. Diet: ADULT DIET; Regular  VTE ppx: lovenox  Code status: Full Code    Hospital Problems           Last Modified POA    * (Principal) Discitis 6/16/2022 Yes    Chest pain 6/16/2022 Yes    Pleural effusion 6/16/2022 Yes    Paraspinal abscess ([address] 6/18/2022 Yes    Discitis of thoracic region 6/16/2022 Yes    Overview Signed 6/16/2022 11:44 AM by OTF West     T9-T10                 Objective:     Patient Vitals for the past 24 hrs:   Temp Pulse Resp BP SpO2   06/19/22 0408 97.8 °F (36.6 °C) 90 16 (!) 164/89 100 %   06/19/22 0103 98.3 °F (36.8 °C) 85 22 (!) 147/90 98 %   06/18/22 2015 97.9 °F (36.6 °C) 95 18 (!) 160/77 96 %   06/18/22 1601 97.6 °F (36.4 °C) 88 16 120/81 95 %   06/18/22 1148 98 °F (36.7 °C) 94 16 (!) 134/93 96 %       Estimated body mass index is 33.47 kg/m² as calculated from the following:    Height as of an earlier encounter on 6/16/22: 6' (1.829 m). Weight as of this encounter: 246 lb 12.8 oz (111.9 kg). Intake/Output Summary (Last 24 hours) at 6/19/2022 0816  Last data filed at 6/19/2022 0525  Gross per 24 hour   Intake 530 ml   Output 1225 ml   Net -695 ml         Physical Exam:  Blood pressure (!) 164/89, pulse 90, temperature 97.8 °F (36.6 °C), resp. rate 16, height 6' (1.829 m), weight 246 lb 12.8 oz (111.9 kg), SpO2 100 %. General:    Alert, awake, NAD, on room air  HEENT:           Head NCAT, PERRLA positive, MMM  Neck:  No restricted ROM. Trachea midline   CV:   RRR. No m/r/g. No jugular venous distension. Lungs:   Breath sounds clear to auscultation bilaterally  Abdomen: Bowel sounds present. Soft, nontender, nondistended. Extremities: No cyanosis or clubbing. No edema  Skin:     No rashes and normal coloration. Warm and dry.     Neuro:  GCS 15, cranial nerves intact, no motor or sensory deficit, cerebellar functions intact  Psych:  AOx3, mood and affect flat    I have reviewed ordered lab tests and independently visualized imaging below:    Last 24hr Labs:  No results found for this or any previous visit (from the past 24 hour(s)). Other Studies:  XR CHEST (2 VW)    Result Date: 6/16/2022  AP LATERAL CHEST X-RAY HISTORY: Chest pain; left upper and lower quadrant pain. Constipation. COMPARISON: None FINDINGS: Left lower lobe pleural thickening is present. There is no lobar consolidation. Pleural pulmonary vascularity is normal. The heart size is upper limits of normal.     Left lower lobe pleural thickening or unusual left pleural collection. Correlate with prior thoracic imaging if available and consider short follow-up thoracic imaging in several weeks for further assessment. CT ABDOMEN PELVIS W IV CONTRAST Additional Contrast? Radiologist Recommendation    Result Date: 6/16/2022  CT ABDOMEN AND PELVIS WITH CONTRAST 6/16/2022 8:55 AM History:  Left lower quadrant and left upper quadrant abdominal pain and chest pain.; TECHNIQUE: The patient received 100 mL Isovue-370 nonionic IV contrast. Axial images were obtained through the abdomen and pelvis. Coronal reformatted images were generated. All CT scans at this facility used dose modulation, interactive reconstruction and/or weight based dosing when appropriate to reduce radiation dose to as low as reasonably achievable. Comparison: None available Findings: Included portions of the lung bases demonstrate a left pleural effusion with atelectasis in the lower lobes. There are endplate irregularities around the T9-T10 disc with abnormal paraspinal soft tissue. Features are suggestive of discitis osteomyelitis with paraspinal phlegmon and associated left pleural effusion/empyema. ABDOMEN: Gallbladder: Contracted Liver: No focal hepatic lesions or biliary ductal dilatation. Pancreas: Normal. Spleen: Splenomegaly with splenic length measuring 17 cm.  Adrenal glands: Normal. Kidneys: The kidneys enhance symmetrically with contrast and there is no hydronephrosis. Bowel: The appendix is normal in appearance. Small bowel loops are normal in caliber. A large amount of stool is present throughout the colon. Retroperitoneum:  No adenopathy. Abdominal aorta is normal in caliber. PELVIS:The bladder is normal in appearance. There is no free pelvic fluid. Hardware is present in the left hip. Posterior fixation hardware is present at L4-L5 and S1.     1. Suspected discitis osteomyelitis at T9-T10 with paraspinal phlegmon and left pleural effusion/empyema. 2. Constipation. 3. Spinal megaly. No results found for this or any previous visit.        glecaprevir-pibrentasvir  3 tablet Oral Lunch    hydrocortisone   Topical BID    miconazole   Topical BID    enoxaparin  30 mg SubCUTAneous Q12H    sodium chloride flush  5-40 mL IntraVENous 2 times per day    pantoprazole  40 mg Oral BID AC       Signed:  Blanka Ritchie MD

## 2022-06-19 NOTE — PROGRESS NOTES
No new discharge needs identified. Pt not medically stable for discharge. Will continue to monitor. CM will see if able to negate IV abx cost between Intra[facility] Plus and SFD, will discuss with Seema tomorrow.

## 2022-06-20 LAB
ALBUMIN SERPL-MCNC: 3 G/DL (ref 3.5–5)
ALBUMIN/GLOB SERPL: 0.5 {RATIO} (ref 1.2–3.5)
ALP SERPL-CCNC: 98 U/L (ref 50–136)
ALT SERPL-CCNC: 18 U/L (ref 12–65)
ANION GAP SERPL CALC-SCNC: 4 MMOL/L (ref 7–16)
AST SERPL-CCNC: 15 U/L (ref 15–37)
BILIRUB SERPL-MCNC: 0.4 MG/DL (ref 0.2–1.1)
BUN SERPL-MCNC: 16 MG/DL (ref 6–23)
CALCIUM SERPL-MCNC: 8.7 MG/DL (ref 8.3–10.4)
CHLORIDE SERPL-SCNC: 99 MMOL/L (ref 98–107)
CO2 SERPL-SCNC: 33 MMOL/L (ref 21–32)
CREAT SERPL-MCNC: 0.7 MG/DL (ref 0.8–1.5)
ERYTHROCYTE [DISTWIDTH] IN BLOOD BY AUTOMATED COUNT: 13.2 % (ref 11.9–14.6)
GLOBULIN SER CALC-MCNC: 5.6 G/DL (ref 2.3–3.5)
GLUCOSE SERPL-MCNC: 89 MG/DL (ref 65–100)
HCT VFR BLD AUTO: 39.8 % (ref 41.1–50.3)
HGB BLD-MCNC: 13.3 G/DL (ref 13.6–17.2)
MCH RBC QN AUTO: 30.9 PG (ref 26.1–32.9)
MCHC RBC AUTO-ENTMCNC: 33.4 G/DL (ref 31.4–35)
MCV RBC AUTO: 92.6 FL (ref 79.6–97.8)
NRBC # BLD: 0 K/UL (ref 0–0.2)
PLATELET # BLD AUTO: 255 K/UL (ref 150–450)
PMV BLD AUTO: 7.9 FL (ref 9.4–12.3)
POTASSIUM SERPL-SCNC: 4.5 MMOL/L (ref 3.5–5.1)
PROT SERPL-MCNC: 8.6 G/DL (ref 6.3–8.2)
RBC # BLD AUTO: 4.3 M/UL (ref 4.23–5.6)
SODIUM SERPL-SCNC: 136 MMOL/L (ref 138–145)
WBC # BLD AUTO: 6.8 K/UL (ref 4.3–11.1)

## 2022-06-20 PROCEDURE — 36415 COLL VENOUS BLD VENIPUNCTURE: CPT

## 2022-06-20 PROCEDURE — 6370000000 HC RX 637 (ALT 250 FOR IP): Performed by: HOSPITALIST

## 2022-06-20 PROCEDURE — 1100000003 HC PRIVATE W/ TELEMETRY

## 2022-06-20 PROCEDURE — 2500000003 HC RX 250 WO HCPCS: Performed by: HOSPITALIST

## 2022-06-20 PROCEDURE — 2580000003 HC RX 258: Performed by: PHYSICIAN ASSISTANT

## 2022-06-20 PROCEDURE — 6360000002 HC RX W HCPCS: Performed by: PHYSICIAN ASSISTANT

## 2022-06-20 PROCEDURE — 85027 COMPLETE CBC AUTOMATED: CPT

## 2022-06-20 PROCEDURE — 80053 COMPREHEN METABOLIC PANEL: CPT

## 2022-06-20 RX ADMIN — KETOROLAC TROMETHAMINE 30 MG: 30 INJECTION, SOLUTION INTRAMUSCULAR at 16:50

## 2022-06-20 RX ADMIN — OXYCODONE 10 MG: 5 TABLET ORAL at 14:22

## 2022-06-20 RX ADMIN — PANTOPRAZOLE SODIUM 40 MG: 40 TABLET, DELAYED RELEASE ORAL at 05:12

## 2022-06-20 RX ADMIN — ENOXAPARIN SODIUM 30 MG: 100 INJECTION SUBCUTANEOUS at 16:48

## 2022-06-20 RX ADMIN — ENOXAPARIN SODIUM 30 MG: 100 INJECTION SUBCUTANEOUS at 04:57

## 2022-06-20 RX ADMIN — HYDROCORTISONE: 1 CREAM TOPICAL at 08:24

## 2022-06-20 RX ADMIN — MICONAZOLE NITRATE: 20 CREAM TOPICAL at 08:25

## 2022-06-20 RX ADMIN — HYDROMORPHONE HYDROCHLORIDE 1 MG: 1 INJECTION, SOLUTION INTRAMUSCULAR; INTRAVENOUS; SUBCUTANEOUS at 22:26

## 2022-06-20 RX ADMIN — PANTOPRAZOLE SODIUM 40 MG: 40 TABLET, DELAYED RELEASE ORAL at 16:48

## 2022-06-20 RX ADMIN — OXYCODONE 10 MG: 5 TABLET ORAL at 21:10

## 2022-06-20 RX ADMIN — HYDROMORPHONE HYDROCHLORIDE 0.5 MG: 1 INJECTION, SOLUTION INTRAMUSCULAR; INTRAVENOUS; SUBCUTANEOUS at 08:20

## 2022-06-20 RX ADMIN — HYDROMORPHONE HYDROCHLORIDE 1 MG: 1 INJECTION, SOLUTION INTRAMUSCULAR; INTRAVENOUS; SUBCUTANEOUS at 05:07

## 2022-06-20 RX ADMIN — SODIUM CHLORIDE, PRESERVATIVE FREE 10 ML: 5 INJECTION INTRAVENOUS at 08:28

## 2022-06-20 RX ADMIN — HYDROMORPHONE HYDROCHLORIDE 1 MG: 1 INJECTION, SOLUTION INTRAMUSCULAR; INTRAVENOUS; SUBCUTANEOUS at 00:37

## 2022-06-20 RX ADMIN — HYDROMORPHONE HYDROCHLORIDE 1 MG: 1 INJECTION, SOLUTION INTRAMUSCULAR; INTRAVENOUS; SUBCUTANEOUS at 12:40

## 2022-06-20 RX ADMIN — SODIUM CHLORIDE, PRESERVATIVE FREE 10 ML: 5 INJECTION INTRAVENOUS at 20:20

## 2022-06-20 ASSESSMENT — PAIN DESCRIPTION - LOCATION
LOCATION: BACK

## 2022-06-20 ASSESSMENT — PAIN SCALES - GENERAL
PAINLEVEL_OUTOF10: 8
PAINLEVEL_OUTOF10: 0
PAINLEVEL_OUTOF10: 8
PAINLEVEL_OUTOF10: 2
PAINLEVEL_OUTOF10: 6
PAINLEVEL_OUTOF10: 0
PAINLEVEL_OUTOF10: 5
PAINLEVEL_OUTOF10: 0
PAINLEVEL_OUTOF10: 3
PAINLEVEL_OUTOF10: 8
PAINLEVEL_OUTOF10: 9
PAINLEVEL_OUTOF10: 3
PAINLEVEL_OUTOF10: 0
PAINLEVEL_OUTOF10: 0
PAINLEVEL_OUTOF10: 8
PAINLEVEL_OUTOF10: 8

## 2022-06-20 ASSESSMENT — PAIN DESCRIPTION - PAIN TYPE
TYPE: ACUTE PAIN

## 2022-06-20 ASSESSMENT — PAIN DESCRIPTION - ONSET
ONSET: ON-GOING
ONSET: ON-GOING
ONSET: AWAKENED FROM SLEEP
ONSET: ON-GOING

## 2022-06-20 ASSESSMENT — PAIN DESCRIPTION - ORIENTATION
ORIENTATION: LEFT
ORIENTATION: LEFT
ORIENTATION: UPPER
ORIENTATION: LEFT
ORIENTATION: MID
ORIENTATION: LEFT
ORIENTATION: MID
ORIENTATION: LEFT
ORIENTATION: LEFT

## 2022-06-20 ASSESSMENT — PAIN - FUNCTIONAL ASSESSMENT
PAIN_FUNCTIONAL_ASSESSMENT: PREVENTS OR INTERFERES SOME ACTIVE ACTIVITIES AND ADLS
PAIN_FUNCTIONAL_ASSESSMENT: ACTIVITIES ARE NOT PREVENTED
PAIN_FUNCTIONAL_ASSESSMENT: PREVENTS OR INTERFERES SOME ACTIVE ACTIVITIES AND ADLS
PAIN_FUNCTIONAL_ASSESSMENT: ACTIVITIES ARE NOT PREVENTED
PAIN_FUNCTIONAL_ASSESSMENT: PREVENTS OR INTERFERES SOME ACTIVE ACTIVITIES AND ADLS
PAIN_FUNCTIONAL_ASSESSMENT: ACTIVITIES ARE NOT PREVENTED
PAIN_FUNCTIONAL_ASSESSMENT: PREVENTS OR INTERFERES WITH MANY ACTIVE NOT PASSIVE ACTIVITIES

## 2022-06-20 ASSESSMENT — PAIN DESCRIPTION - DESCRIPTORS
DESCRIPTORS: SHARP
DESCRIPTORS: STABBING;SHOOTING
DESCRIPTORS: ACHING;SHARP;THROBBING
DESCRIPTORS: SHOOTING
DESCRIPTORS: SHARP
DESCRIPTORS: SHARP;SHOOTING
DESCRIPTORS: ACHING
DESCRIPTORS: THROBBING;ACHING
DESCRIPTORS: SHARP
DESCRIPTORS: SHARP

## 2022-06-20 ASSESSMENT — PAIN DESCRIPTION - FREQUENCY
FREQUENCY: CONTINUOUS

## 2022-06-20 NOTE — CARE COORDINATION
LOS 4 D  CM following for continued hospitalization. Per ID, awaiting IR to perform disc aspirate. Pulm following. CM awaiting ID recommendation with completion of testing. 6

## 2022-06-20 NOTE — PROGRESS NOTES
Hospitalist Progress Note   Admit Date:  2022  2:26 PM   Name:  Faisal Vásquez   Age:  46 y.o. Sex:  male  :  1980   MRN:  [id number]   Room:  [facility]/    Presenting Complaint: back pain with fever     Reason(s) for Admission: Discitis [M46.40]     Interval Hx/Subjective:   Faisal Vásquez is a 46 y.o. male with medical history of hepatitis C, L foot osteomyelitis, IVDU (recovery x 3 months) admitted on  with discitis and osteomyelitis with possible epidural vs paraspinal abscess. CT of abd/pelvis found to have T9-10 discitisi/osteomyelitis with paraspinal phlegmon and associated L pleural effusion/empyema. :  Pt seen at bedside, resting in bed comfortably. Continues to report of back pain. Denies nausea/vomiting, fever, chills, abdominal pain. Review of Systems:  10 systems reviewed and negative except as noted above. Assessment & Plan:   Faisal Vásquez is a 46 y.o. male with medical history of hepatitis C, L foot osteomyelitis, IVDU (recovery x 3 months) who presented with L sided chest pain found to have T9-10 discitis/osteomyelitis with paraspinal phlegmon and associated L pleural effusion/empyema. Patient Active Problem List   Diagnosis    Chest pain    Discitis of thoracic region    Pleural effusion    Empyema lung (HCC)    Phlegmon    Chronic hepatitis C without hepatic coma (HCC)    Discitis    Paraspinal abscess (HCC)     T9-10 discitis/osteomyelitis with paraspinal phlegmon/abscess  :  Vanco and Zosyn stopped on  as cultures need to be obtained  MRI spine shows discitis osteomyelitis at T9-T10 with paraspinal phlegmon/abscess, no evidence of epidural abscess. IR consulted for  for aspiration/biopsy  ID on board. Pro-José Manuel 0.05, CRP 1.9, ESR 63  Blood cultures negative to date.     Chest pain, low suspicion for cardiac etiology  Chest pain has resolved  Blood cultures negative to date     Hepatitis C, chronic  - continue Mavyret       Dispo/Discharge Planning:   Patient is uninsured,  is assisting with disposition and insurance. Patient is not medically clear for discharge yet. Diet: ADULT DIET; Regular  VTE ppx: lovenox  Code status: Full Code    Hospital Problems           Last Modified POA    * (Principal) Discitis 6/16/2022 Yes    Chest pain 6/16/2022 Yes    Pleural effusion 6/16/2022 Yes    Paraspinal abscess ([address] 6/18/2022 Yes    Discitis of thoracic region 6/16/2022 Yes    Overview Signed 6/16/2022 11:44 AM by OTF Ortiz     T9-T10                 Objective:     Patient Vitals for the past 24 hrs:   Temp Pulse Resp BP SpO2   06/20/22 0820 -- -- 16 -- --   06/20/22 0450 98.3 °F (36.8 °C) 84 16 (!) 120/98 96 %   06/20/22 0016 97.8 °F (36.6 °C) 90 16 (!) 140/88 96 %   06/19/22 2043 97.9 °F (36.6 °C) 89 16 (!) 145/99 93 %   06/19/22 1630 97.2 °F (36.2 °C) 87 16 (!) 124/90 95 %   06/19/22 1158 97.6 °F (36.4 °C) 84 18 (!) 127/90 95 %   06/19/22 1100 -- -- -- 120/83 --   06/19/22 0930 -- -- -- Lafrances Found 138/95 --       Estimated body mass index is 32.97 kg/m² as calculated from the following:    Height as of an earlier encounter on 6/16/22: 6' (1.829 m). Weight as of this encounter: 243 lb 1.6 oz (110.3 kg). Intake/Output Summary (Last 24 hours) at 6/20/2022 0824  Last data filed at 6/20/2022 0501  Gross per 24 hour   Intake 600 ml   Output 2875 ml   Net -2275 ml         Physical Exam:  Blood pressure (!) 120/98, pulse 84, temperature 98.3 °F (36.8 °C), temperature source Oral, resp. rate 16, height 6' (1.829 m), weight 243 lb 1.6 oz (110.3 kg), SpO2 96 %. General:    Alert, awake, NAD, on room air  HEENT:           Head NCAT, PERRLA positive, MMM  Neck:  No restricted ROM. Trachea midline   CV:   RRR. No m/r/g. No jugular venous distension. Lungs:   Breath sounds clear to auscultation bilaterally  Abdomen: Bowel sounds present. Soft, nontender, nondistended. Extremities: No cyanosis or clubbing.   No edema  Skin:     No rashes and normal coloration. Warm and dry. Neuro:  GCS 15, cranial nerves intact, no motor or sensory deficit, cerebellar functions intact  Psych:  AOx3, mood and affect flat    I have reviewed ordered lab tests and independently visualized imaging below:    Last 24hr Labs:  Recent Results (from the past 24 hour(s))   Comprehensive Metabolic Panel    Collection Time: 06/20/22  6:53 AM   Result Value Ref Range    Sodium 136 (L) 138 - 145 mmol/L    Potassium 4.5 3.5 - 5.1 mmol/L    Chloride 99 98 - 107 mmol/L    CO2 33 (H) 21 - 32 mmol/L    Anion Gap 4 (L) 7 - 16 mmol/L    Glucose 89 65 - 100 mg/dL    BUN 16 6 - 23 MG/DL    CREATININE 0.70 (L) 0.8 - 1.5 MG/DL    GFR African American >60 >60 ml/min/1.73m2    GFR Non- >60 >60 ml/min/1.73m2    Calcium 8.7 8.3 - 10.4 MG/DL    Total Bilirubin 0.4 0.2 - 1.1 MG/DL    ALT 18 12 - 65 U/L    AST 15 15 - 37 U/L    Alk Phosphatase 98 50 - 136 U/L    Total Protein 8.6 (H) 6.3 - 8.2 g/dL    Albumin 3.0 (L) 3.5 - 5.0 g/dL    Globulin 5.6 (H) 2.3 - 3.5 g/dL    Albumin/Globulin Ratio 0.5 (L) 1.2 - 3.5     CBC    Collection Time: 06/20/22  6:53 AM   Result Value Ref Range    WBC 6.8 4.3 - 11.1 K/uL    RBC 4.30 4.23 - 5.6 M/uL    Hemoglobin 13.3 (L) 13.6 - 17.2 g/dL    Hematocrit 39.8 (L) 41.1 - 50.3 %    MCV 92.6 79.6 - 97.8 FL    MCH 30.9 26.1 - 32.9 PG    MCHC 33.4 31.4 - 35.0 g/dL    RDW 13.2 11.9 - 14.6 %    Platelets 612 530 - 941 K/uL    MPV 7.9 (L) 9.4 - 12.3 FL    nRBC 0.00 0.0 - 0.2 K/uL         Other Studies:  XR CHEST (2 VW)    Result Date: 6/16/2022  AP LATERAL CHEST X-RAY HISTORY: Chest pain; left upper and lower quadrant pain. Constipation. COMPARISON: None FINDINGS: Left lower lobe pleural thickening is present. There is no lobar consolidation. Pleural pulmonary vascularity is normal. The heart size is upper limits of normal.     Left lower lobe pleural thickening or unusual left pleural collection.  Correlate with prior thoracic imaging if available and consider short follow-up thoracic imaging in several weeks for further assessment. CT ABDOMEN PELVIS W IV CONTRAST Additional Contrast? Radiologist Recommendation    Result Date: 6/16/2022  CT ABDOMEN AND PELVIS WITH CONTRAST 6/16/2022 8:55 AM History:  Left lower quadrant and left upper quadrant abdominal pain and chest pain.; TECHNIQUE: The patient received 100 mL Isovue-370 nonionic IV contrast. Axial images were obtained through the abdomen and pelvis. Coronal reformatted images were generated. All CT scans at this facility used dose modulation, interactive reconstruction and/or weight based dosing when appropriate to reduce radiation dose to as low as reasonably achievable. Comparison: None available Findings: Included portions of the lung bases demonstrate a left pleural effusion with atelectasis in the lower lobes. There are endplate irregularities around the T9-T10 disc with abnormal paraspinal soft tissue. Features are suggestive of discitis osteomyelitis with paraspinal phlegmon and associated left pleural effusion/empyema. ABDOMEN: Gallbladder: Contracted Liver: No focal hepatic lesions or biliary ductal dilatation. Pancreas: Normal. Spleen: Splenomegaly with splenic length measuring 17 cm. Adrenal glands: Normal. Kidneys: The kidneys enhance symmetrically with contrast and there is no hydronephrosis. Bowel: The appendix is normal in appearance. Small bowel loops are normal in caliber. A large amount of stool is present throughout the colon. Retroperitoneum:  No adenopathy. Abdominal aorta is normal in caliber. PELVIS:The bladder is normal in appearance. There is no free pelvic fluid. Hardware is present in the left hip. Posterior fixation hardware is present at L4-L5 and S1.     1. Suspected discitis osteomyelitis at T9-T10 with paraspinal phlegmon and left pleural effusion/empyema. 2. Constipation. 3. Spinal megaly. No results found for this or any previous visit.        glecaprevir-pibrentasvir  3 tablet Oral Lunch    hydrocortisone   Topical BID    miconazole   Topical BID    enoxaparin  30 mg SubCUTAneous Q12H    sodium chloride flush  5-40 mL IntraVENous 2 times per day    pantoprazole  40 mg Oral BID AC       Signed:  Emma Griggs MD

## 2022-06-20 NOTE — PROGRESS NOTES
Chart reviewed. Awaiting IR disc aspirate. Unclear if this is acute discitis or related to previously treated infection.

## 2022-06-21 ENCOUNTER — APPOINTMENT (OUTPATIENT)
Dept: CT IMAGING | Age: 42
DRG: 477 | End: 2022-06-21
Attending: HOSPITALIST

## 2022-06-21 LAB
BACTERIA SPEC CULT: NORMAL
BACTERIA SPEC CULT: NORMAL
HCV AB S/CO SERPL IA: >11 S/CO RATIO (ref 0–0.9)
HCV RNA SERPL NAA+PROBE-ACNC: NORMAL IU/ML
INTERPRETATION: NORMAL
REF LAB TEST REF RANGE: NORMAL
SERVICE CMNT-IMP: NORMAL
SERVICE CMNT-IMP: NORMAL

## 2022-06-21 PROCEDURE — 87205 SMEAR GRAM STAIN: CPT

## 2022-06-21 PROCEDURE — 6360000002 HC RX W HCPCS: Performed by: HOSPITALIST

## 2022-06-21 PROCEDURE — 2580000003 HC RX 258: Performed by: RADIOLOGY

## 2022-06-21 PROCEDURE — 0PB43ZX EXCISION OF THORACIC VERTEBRA, PERCUTANEOUS APPROACH, DIAGNOSTIC: ICD-10-PCS | Performed by: RADIOLOGY

## 2022-06-21 PROCEDURE — 87186 SC STD MICRODIL/AGAR DIL: CPT

## 2022-06-21 PROCEDURE — 6370000000 HC RX 637 (ALT 250 FOR IP): Performed by: HOSPITALIST

## 2022-06-21 PROCEDURE — 10009 FNA BX W/CT GDN 1ST LES: CPT

## 2022-06-21 PROCEDURE — 1100000003 HC PRIVATE W/ TELEMETRY

## 2022-06-21 PROCEDURE — 88311 DECALCIFY TISSUE: CPT

## 2022-06-21 PROCEDURE — 2580000003 HC RX 258: Performed by: PHYSICIAN ASSISTANT

## 2022-06-21 PROCEDURE — 87176 TISSUE HOMOGENIZATION CULTR: CPT

## 2022-06-21 PROCEDURE — 2500000003 HC RX 250 WO HCPCS: Performed by: RADIOLOGY

## 2022-06-21 PROCEDURE — 87077 CULTURE AEROBIC IDENTIFY: CPT

## 2022-06-21 PROCEDURE — 88307 TISSUE EXAM BY PATHOLOGIST: CPT

## 2022-06-21 PROCEDURE — 6360000002 HC RX W HCPCS: Performed by: RADIOLOGY

## 2022-06-21 RX ORDER — SODIUM CHLORIDE 9 MG/ML
INJECTION, SOLUTION INTRAVENOUS CONTINUOUS PRN
Status: COMPLETED | OUTPATIENT
Start: 2022-06-21 | End: 2022-06-21

## 2022-06-21 RX ORDER — MIDAZOLAM HYDROCHLORIDE 1 MG/ML
INJECTION INTRAMUSCULAR; INTRAVENOUS
Status: COMPLETED | OUTPATIENT
Start: 2022-06-21 | End: 2022-06-21

## 2022-06-21 RX ORDER — FENTANYL CITRATE 50 UG/ML
INJECTION, SOLUTION INTRAMUSCULAR; INTRAVENOUS
Status: COMPLETED | OUTPATIENT
Start: 2022-06-21 | End: 2022-06-21

## 2022-06-21 RX ORDER — LIDOCAINE HYDROCHLORIDE 20 MG/ML
INJECTION, SOLUTION EPIDURAL; INFILTRATION; INTRACAUDAL; PERINEURAL
Status: COMPLETED | OUTPATIENT
Start: 2022-06-21 | End: 2022-06-21

## 2022-06-21 RX ADMIN — SODIUM CHLORIDE, PRESERVATIVE FREE 10 ML: 5 INJECTION INTRAVENOUS at 08:17

## 2022-06-21 RX ADMIN — MIDAZOLAM 1 MG: 1 INJECTION INTRAMUSCULAR; INTRAVENOUS at 14:50

## 2022-06-21 RX ADMIN — OXYCODONE 10 MG: 5 TABLET ORAL at 12:04

## 2022-06-21 RX ADMIN — OXYCODONE 10 MG: 5 TABLET ORAL at 04:46

## 2022-06-21 RX ADMIN — PANTOPRAZOLE SODIUM 40 MG: 40 TABLET, DELAYED RELEASE ORAL at 06:13

## 2022-06-21 RX ADMIN — FENTANYL CITRATE 50 MCG: 50 INJECTION INTRAMUSCULAR; INTRAVENOUS at 14:45

## 2022-06-21 RX ADMIN — HYDROMORPHONE HYDROCHLORIDE 1 MG: 1 INJECTION, SOLUTION INTRAMUSCULAR; INTRAVENOUS; SUBCUTANEOUS at 22:06

## 2022-06-21 RX ADMIN — OXYCODONE 10 MG: 5 TABLET ORAL at 16:19

## 2022-06-21 RX ADMIN — FENTANYL CITRATE 50 MCG: 50 INJECTION INTRAMUSCULAR; INTRAVENOUS at 14:50

## 2022-06-21 RX ADMIN — HYDROMORPHONE HYDROCHLORIDE 1 MG: 1 INJECTION, SOLUTION INTRAMUSCULAR; INTRAVENOUS; SUBCUTANEOUS at 18:06

## 2022-06-21 RX ADMIN — SODIUM CHLORIDE, PRESERVATIVE FREE 10 ML: 5 INJECTION INTRAVENOUS at 20:09

## 2022-06-21 RX ADMIN — MIDAZOLAM 1 MG: 1 INJECTION INTRAMUSCULAR; INTRAVENOUS at 14:36

## 2022-06-21 RX ADMIN — OXYCODONE 10 MG: 5 TABLET ORAL at 20:33

## 2022-06-21 RX ADMIN — LIDOCAINE HYDROCHLORIDE 8 ML: 20 INJECTION, SOLUTION EPIDURAL; INFILTRATION; INTRACAUDAL; PERINEURAL at 14:46

## 2022-06-21 RX ADMIN — PANTOPRAZOLE SODIUM 40 MG: 40 TABLET, DELAYED RELEASE ORAL at 16:19

## 2022-06-21 RX ADMIN — MIDAZOLAM 1 MG: 1 INJECTION INTRAMUSCULAR; INTRAVENOUS at 14:45

## 2022-06-21 RX ADMIN — HYDROMORPHONE HYDROCHLORIDE 1 MG: 1 INJECTION, SOLUTION INTRAMUSCULAR; INTRAVENOUS; SUBCUTANEOUS at 08:15

## 2022-06-21 RX ADMIN — FENTANYL CITRATE 50 MCG: 50 INJECTION INTRAMUSCULAR; INTRAVENOUS at 14:36

## 2022-06-21 RX ADMIN — SODIUM CHLORIDE 25 ML/HR: 9 INJECTION, SOLUTION INTRAVENOUS at 14:32

## 2022-06-21 ASSESSMENT — PAIN DESCRIPTION - DESCRIPTORS
DESCRIPTORS: ACHING

## 2022-06-21 ASSESSMENT — PAIN DESCRIPTION - PAIN TYPE
TYPE: ACUTE PAIN

## 2022-06-21 ASSESSMENT — PAIN DESCRIPTION - FREQUENCY
FREQUENCY: CONTINUOUS

## 2022-06-21 ASSESSMENT — PAIN DESCRIPTION - LOCATION
LOCATION: BACK

## 2022-06-21 ASSESSMENT — PAIN SCALES - GENERAL
PAINLEVEL_OUTOF10: 7
PAINLEVEL_OUTOF10: 10
PAINLEVEL_OUTOF10: 6
PAINLEVEL_OUTOF10: 0
PAINLEVEL_OUTOF10: 9
PAINLEVEL_OUTOF10: 6
PAINLEVEL_OUTOF10: 6

## 2022-06-21 ASSESSMENT — PAIN DESCRIPTION - ORIENTATION
ORIENTATION: MID

## 2022-06-21 ASSESSMENT — PAIN - FUNCTIONAL ASSESSMENT
PAIN_FUNCTIONAL_ASSESSMENT: PREVENTS OR INTERFERES SOME ACTIVE ACTIVITIES AND ADLS
PAIN_FUNCTIONAL_ASSESSMENT: NONE - DENIES PAIN
PAIN_FUNCTIONAL_ASSESSMENT: PREVENTS OR INTERFERES SOME ACTIVE ACTIVITIES AND ADLS
PAIN_FUNCTIONAL_ASSESSMENT: PREVENTS OR INTERFERES WITH MANY ACTIVE NOT PASSIVE ACTIVITIES
PAIN_FUNCTIONAL_ASSESSMENT: PREVENTS OR INTERFERES SOME ACTIVE ACTIVITIES AND ADLS

## 2022-06-21 ASSESSMENT — PAIN DESCRIPTION - ONSET
ONSET: ON-GOING

## 2022-06-21 NOTE — PROGRESS NOTES
Pt moved from IR to CT for procedure. Moved self on to stretcher in prone position, secured for safety.

## 2022-06-21 NOTE — PROGRESS NOTES
Hospitalist Progress Note   Admit Date:  2022  2:26 PM   Name:  Faisal Vásquez   Age:  46 y.o. Sex:  male  :  1980   MRN:  [id number]   Room:  [facility]/    Presenting Complaint: back pain with fever     Reason(s) for Admission: Discitis [M46.40]     Interval Hx/Subjective:   Faisal Vásquez is a 46 y.o. male with medical history of hepatitis C, L foot osteomyelitis, IVDU (recovery x 3 months) admitted on  with discitis and osteomyelitis with possible epidural vs paraspinal abscess. CT of abd/pelvis found to have T9-10 discitisi/osteomyelitis with paraspinal phlegmon and associated L pleural effusion/empyema. :  Patient seen at bedside, resting in bed comfortably. He continues to report back pain but is optimally controlled with pain medications. Denies any worsening of pain, numbness tingling in buttocks or pain radiating down to the back of his thighs. No bladder or bowel incontinence. Patient is tearful, stating that not having insurance and not being able to work is causing significant financial strain on him and he would like to get out of the hospital soon. No fever chills, nausea vomiting or diarrhea. Review of Systems:  10 systems reviewed and negative except as noted above. Assessment & Plan:   Faisal Vásquez is a 46 y.o. male with medical history of hepatitis C, L foot osteomyelitis, IVDU (recovery x 3 months) who presented with L sided chest pain found to have T9-10 discitis/osteomyelitis with paraspinal phlegmon and associated L pleural effusion/empyema.     Patient Active Problem List   Diagnosis    Chest pain    Discitis of thoracic region    Pleural effusion    Empyema lung (HCC)    Phlegmon    Chronic hepatitis C without hepatic coma (HCC)    Discitis    Paraspinal abscess (HCC)     T9-10 discitis/osteomyelitis with paraspinal phlegmon/abscess  :  Vanco and Zosyn stopped on  as cultures need to be obtained  MRI spine shows discitis osteomyelitis at T9-T10 with paraspinal phlegmon/abscess, no evidence of epidural abscess. Plan for IR guided biopsy/aspiration on 6/21. ID on board. Pro-José Manuel 0.05, CRP 1.9, ESR 63  Blood cultures negative to date. Chest pain, low suspicion for cardiac etiology  Chest pain has resolved  Blood cultures negative to date     Hepatitis C, chronic  - continue Mavyret       Dispo/Discharge Planning:   Patient is uninsured,  is assisting with disposition and insurance. Patient is not medically clear for discharge yet. Diet: Diet NPO Exceptions are: Sips of Water with Meds  VTE ppx: lovenox  Code status: Full Code    Hospital Problems           Last Modified POA    * (Principal) Discitis 6/16/2022 Yes    Chest pain 6/16/2022 Yes    Pleural effusion 6/16/2022 Yes    Paraspinal abscess ([address] 6/18/2022 Yes    Discitis of thoracic region 6/16/2022 Yes    Overview Signed 6/16/2022 11:44 AM by OTF Conteh     T9-T10                 Objective:     Patient Vitals for the past 24 hrs:   Temp Pulse Resp BP SpO2   06/21/22 0728 97.9 °F (36.6 °C) 92 18 (!) 131/92 97 %   06/21/22 0441 98.1 °F (36.7 °C) 89 16 99/74 95 %   06/21/22 0019 98.4 °F (36.9 °C) 98 16 (!) 143/60 94 %   06/20/22 2044 97.5 °F (36.4 °C) 87 16 (!) 149/69 97 %   06/20/22 1540 98.5 °F (36.9 °C) 82 16 124/77 99 %   06/20/22 1452 -- -- 16 -- --   06/20/22 1422 -- -- 14 -- --   06/20/22 1310 -- -- 16 -- --   06/20/22 1240 -- -- 20 -- --   06/20/22 1142 98.4 °F (36.9 °C) 89 16 (!) 148/68 96 %   06/20/22 0850 -- -- 16 -- --   06/20/22 0820 -- -- 16 -- --   06/20/22 0801 97.7 °F (36.5 °C) (!) 109 22 (!) 149/81 96 %       Estimated body mass index is 32.98 kg/m² as calculated from the following:    Height as of an earlier encounter on 6/16/22: 6' (1.829 m). Weight as of this encounter: 243 lb 3.2 oz (110.3 kg).     Intake/Output Summary (Last 24 hours) at 6/21/2022 0749  Last data filed at 6/21/2022 0441  Gross per 24 hour   Intake 480 ml   Output 625 ml   Net -145 ml         Physical Exam:  Blood pressure (!) 131/92, pulse 92, temperature 97.9 °F (36.6 °C), temperature source Oral, resp. rate 18, height 6' (1.829 m), weight 243 lb 3.2 oz (110.3 kg), SpO2 97 %. General:    Alert, awake, NAD, on room air  HEENT:           Head NCAT, PERRLA positive, MMM  Neck:  No restricted ROM. Trachea midline   CV:   RRR. No m/r/g. No jugular venous distension. Lungs:   Breath sounds clear to auscultation bilaterally  Abdomen: Bowel sounds present. Soft, nontender, nondistended. Extremities: No cyanosis or clubbing. No edema  Skin:     No rashes and normal coloration. Warm and dry. Neuro:  GCS 15, cranial nerves intact, no motor or sensory deficit, cerebellar functions intact  Psych:  AOx3, mood and affect flat    I have reviewed ordered lab tests and independently visualized imaging below:    Last 24hr Labs:  No results found for this or any previous visit (from the past 24 hour(s)). Other Studies:  XR CHEST (2 VW)    Result Date: 6/16/2022  AP LATERAL CHEST X-RAY HISTORY: Chest pain; left upper and lower quadrant pain. Constipation. COMPARISON: None FINDINGS: Left lower lobe pleural thickening is present. There is no lobar consolidation. Pleural pulmonary vascularity is normal. The heart size is upper limits of normal.     Left lower lobe pleural thickening or unusual left pleural collection. Correlate with prior thoracic imaging if available and consider short follow-up thoracic imaging in several weeks for further assessment. CT ABDOMEN PELVIS W IV CONTRAST Additional Contrast? Radiologist Recommendation    Result Date: 6/16/2022  CT ABDOMEN AND PELVIS WITH CONTRAST 6/16/2022 8:55 AM History:  Left lower quadrant and left upper quadrant abdominal pain and chest pain.; TECHNIQUE: The patient received 100 mL Isovue-370 nonionic IV contrast. Axial images were obtained through the abdomen and pelvis. Coronal reformatted images were generated.   All CT scans at this facility used dose modulation, interactive reconstruction and/or weight based dosing when appropriate to reduce radiation dose to as low as reasonably achievable. Comparison: None available Findings: Included portions of the lung bases demonstrate a left pleural effusion with atelectasis in the lower lobes. There are endplate irregularities around the T9-T10 disc with abnormal paraspinal soft tissue. Features are suggestive of discitis osteomyelitis with paraspinal phlegmon and associated left pleural effusion/empyema. ABDOMEN: Gallbladder: Contracted Liver: No focal hepatic lesions or biliary ductal dilatation. Pancreas: Normal. Spleen: Splenomegaly with splenic length measuring 17 cm. Adrenal glands: Normal. Kidneys: The kidneys enhance symmetrically with contrast and there is no hydronephrosis. Bowel: The appendix is normal in appearance. Small bowel loops are normal in caliber. A large amount of stool is present throughout the colon. Retroperitoneum:  No adenopathy. Abdominal aorta is normal in caliber. PELVIS:The bladder is normal in appearance. There is no free pelvic fluid. Hardware is present in the left hip. Posterior fixation hardware is present at L4-L5 and S1.     1. Suspected discitis osteomyelitis at T9-T10 with paraspinal phlegmon and left pleural effusion/empyema. 2. Constipation. 3. Spinal megaly. No results found for this or any previous visit.        glecaprevir-pibrentasvir  3 tablet Oral Lunch    hydrocortisone   Topical BID    miconazole   Topical BID    enoxaparin  30 mg SubCUTAneous Q12H    sodium chloride flush  5-40 mL IntraVENous 2 times per day    pantoprazole  40 mg Oral BID AC       Signed:  Martín Pickett MD

## 2022-06-21 NOTE — PRE SEDATION
Sedation Pre-Procedure Note    Patient Name: Trey Alvarez   YOB: 1980  Room/Bed: [facility]  Medical Record Number: [id number]  Date: 6/21/2022   Time: 2:15 PM       Indication:  Paraspinal abscess        Vital Signs:   Vitals:    06/21/22 1336   BP: 130/89   Pulse: 89   Resp: 16   Temp: 98.1 °F (36.7 °C)   SpO2: 100%       Past Medical History:   has a past medical history of Infectious disease. Past Surgical History:   has a past surgical history that includes orthopedic surgery. Medications:   Scheduled Meds:    glecaprevir-pibrentasvir  3 tablet Oral Lunch    hydrocortisone   Topical BID    miconazole   Topical BID    enoxaparin  30 mg SubCUTAneous Q12H    sodium chloride flush  5-40 mL IntraVENous 2 times per day    pantoprazole  40 mg Oral BID AC     Continuous Infusions:    sodium chloride 25 mL (06/17/22 0018)     PRN Meds: sodium chloride, acetaminophen **OR** acetaminophen, ondansetron **OR** ondansetron, polyethylene glycol, sodium chloride flush, ketorolac, HYDROmorphone, HYDROmorphone, oxyCODONE, cyclobenzaprine  Home Meds:   Prior to Admission medications    Medication Sig Start Date End Date Taking? Authorizing Provider   glecaprevir-pibrentasvir (MAVYRET) 100-40 MG TABS tablet Take 3 tablets by mouth once Pt takes at mid day with meals    Historical Provider, MD   cyclobenzaprine (FLEXERIL) 10 MG tablet Take 10 mg by mouth 3 times daily as needed for Muscle spasms    Historical Provider, MD   ketorolac (TORADOL) 10 MG tablet Take 10 mg by mouth every 6 hours as needed 3/27/22   Ar Automatic Reconciliation     Coumadin Use Last 7 Days:  no  Antiplatelet drug therapy use last 7 days: no  Other anticoagulant use last 7 days: no  Additional Medication Information:        Pre-Sedation Documentation and Exam:   Vital signs have been reviewed (see flow sheet for vitals).     Mallampati Airway Assessment:  Mallampati Class II - (soft palate, fauces & uvula are visible)    Prior History of Anesthesia Complications:   none    ASA Classification:  Class 2 - A normal healthy patient with mild systemic disease    Sedation/ Anesthesia Plan:   intravenous sedation    Medications Planned:   midazolam (Versed) intravenously and fentanyl intravenously    Patient is an appropriate candidate for plan of sedation: no    Electronically signed by Marcell Sicard, APRN - NP on 6/21/2022 at 2:15 PM

## 2022-06-21 NOTE — PLAN OF CARE
Problem: Discharge Planning  Goal: Discharge to home or other facility with appropriate resources  6/20/2022 2134 by Cherry Yanes RN  Outcome: Cherri Michael  6/20/2022 1254 by Ligia Au RN  Outcome: Progressing  Flowsheets (Taken 6/20/2022 5605)  Discharge to home or other facility with appropriate resources:   Identify barriers to discharge with patient and caregiver   Arrange for needed discharge resources and transportation as appropriate   Identify discharge learning needs (meds, wound care, etc)   Refer to discharge planning if patient needs post-hospital services based on physician order or complex needs related to functional status, cognitive ability or social support system     Problem: Pain  Goal: Verbalizes/displays adequate comfort level or baseline comfort level  6/20/2022 2134 by Cherry Yanes RN  Outcome: Progressing  6/20/2022 1254 by Ligia Au RN  Outcome: Progressing     Problem: ABCDS Injury Assessment  Goal: Absence of physical injury  6/20/2022 2134 by Cherry Yanes RN  Outcome: Progressing  6/20/2022 1254 by Ligia Au RN  Outcome: Progressing  Flowsheets (Taken 6/20/2022 0097)  Absence of Physical Injury: Implement safety measures based on patient assessment     Problem: Safety - Adult  Goal: Free from fall injury  6/20/2022 2134 by Cherry Yanes RN  Outcome: Progressing  6/20/2022 1254 by Ligia Au RN  Outcome: Progressing  Flowsheets (Taken 6/20/2022 0828)  Free From Fall Injury: Instruct family/caregiver on patient safety

## 2022-06-21 NOTE — BRIEF OP NOTE
Brief Postoperative Note      Patient: Jose De Jesus Montoya  YOB: 1980  MRN: [id number]    Date of Procedure: 06-21-22    Pre-Op Diagnosis: Thoracic spine discitis/osteomyelitis    Post-Op Diagnosis: Same       CT guided core biopsy of the T9-T10 disc space with samples sent for histo-pathology and culture. : Gildardo Avilez    Anesthesia: Moderate sedation    Estimated Blood Loss (mL): Minimal    Complications: None    Specimens:   As above    Implants:  * No surgical log found *      Drains: * No LDAs found *    Findings: Destructive changes centered at T9-T10.     Electronically signed by Niharika Sharma MD on 6/21/2022 at 3:03 PM

## 2022-06-21 NOTE — CARE COORDINATION
LOS 5 D    CM met with patient for continued hospitalization. Patient states he's staying in the hospital for the procedure today but will likely discharge afterward due to financial strain. Patient informs this CM that he could lose his house, job if he doesn't return to work. CM will follow up with patient after IR procedure for any discharge needs.

## 2022-06-21 NOTE — OR NURSING
Post-procedure report given to MUNA CABRERA [facility] on 3rd floor. Medications reviewed.

## 2022-06-21 NOTE — H&P
Department of Interventional Radiology  [phone]    History and Physical    Patient:  Ann Marie Mcclendon MRN:  [id number]  SSN:  [id number]    YOB: 1980  Age:  46 y.o. Sex:  male      Primary Care Provider:  Victorino Palacios MD  Referring Physician:  OTF Daley    Subjective:     Chief Complaint: Discitis    History of the Present Illness: The patient is a 46 y.o. male who presents paraspinal abscess and discitis for disc aspiration/biopsy. Past Medical History:   Diagnosis Date    Infectious disease     hep C     Past Surgical History:   Procedure Laterality Date    ORTHOPEDIC SURGERY      spinal fusion        Review of Systems:    Pertinent items are noted in HPI. Prior to Admission medications    Medication Sig Start Date End Date Taking? Authorizing Provider   glecaprevir-pibrentasvir (MAVYRET) 100-40 MG TABS tablet Take 3 tablets by mouth once Pt takes at mid day with meals    Historical Provider, MD   cyclobenzaprine (FLEXERIL) 10 MG tablet Take 10 mg by mouth 3 times daily as needed for Muscle spasms    Historical Provider, MD   ketorolac (TORADOL) 10 MG tablet Take 10 mg by mouth every 6 hours as needed 3/27/22   Ar Automatic Reconciliation        Allergies   Allergen Reactions    Ethanol-Alcohol [Ethanol]        History reviewed. No pertinent family history.   Social History     Tobacco Use    Smoking status: Current Every Day Smoker    Smokeless tobacco: Not on file   Substance Use Topics    Alcohol use: Not Currently        Medications Prior to Admission: glecaprevir-pibrentasvir (MAVYRET) 100-40 MG TABS tablet, Take 3 tablets by mouth once Pt takes at mid day with meals  cyclobenzaprine (FLEXERIL) 10 MG tablet, Take 10 mg by mouth 3 times daily as needed for Muscle spasms  ketorolac (TORADOL) 10 MG tablet, Take 10 mg by mouth every 6 hours as needed    Objective:       Physical Examination:    Vitals:    06/21/22 0441 06/21/22 0728 06/21/22 1127 06/21/22 1336   BP: 99/74 (!) 131/92 135/89 130/89   Pulse: 89 92 83 89   Resp: 16 18 16 16   Temp: 98.1 °F (36.7 °C) 97.9 °F (36.6 °C) 97.6 °F (36.4 °C) 98.1 °F (36.7 °C)   TempSrc:  Oral Oral Oral   SpO2: 95% 97% 97% 100%   Weight: 243 lb 3.2 oz (110.3 kg)      Height:           Pain Assessment  Pain Level: 6  Patient's Stated Pain Goal: 0 - No pain  Pain Location: Back  Pain Orientation: Mid          Pain Level: 6   Patient's Stated Pain Goal: 0 - No pain   Pain Location: Back   Pain Orientation: Mid   Pain Descriptors: Aching   Functional Pain Assessment: Prevents or interferes some active activities and ADLs   Pain Type: Acute pain   Pain Frequency: Continuous   Non-Pharmaceutical Pain Intervention(s): Repositioned,Rest       HEART: regular rate and rhythm  LUNG: clear to auscultation bilaterally  ABDOMEN: normal findings: soft, non-tender  EXTREMITIES: warm, no edema noted    Laboratory:     Lab Results   Component Value Date     06/20/2022     06/18/2022    K 4.5 06/20/2022    K 3.9 06/18/2022    CL 99 06/20/2022     06/18/2022    CO2 33 06/20/2022    CO2 30 06/18/2022    BUN 16 06/20/2022    BUN 22 06/18/2022    GFRAA >60 06/20/2022    GFRAA >60 06/18/2022    MG 1.9 06/16/2022    GLOB 5.6 06/20/2022    GLOB 5.4 06/18/2022    ALT 18 06/20/2022    ALT 16 06/18/2022     Lab Results   Component Value Date    WBC 6.8 06/20/2022    WBC 7.4 06/18/2022    HGB 13.3 06/20/2022    HGB 12.9 06/18/2022    HCT 39.8 06/20/2022    HCT 37.4 06/18/2022     06/20/2022     06/18/2022     No results found for: APTT, INR    Assessment:     46 y.o. male with history of paraspinal abscess and discitis    [unfilled]    Plan:     Planned Procedure:  Disc aspiration/biopsy    Risks, benefits, and alternatives reviewed with patient and he agrees to proceed with the procedure.       Signed By: Claudene Mount, APRN - NP     June 21, 2022

## 2022-06-22 VITALS
BODY MASS INDEX: 34.29 KG/M2 | SYSTOLIC BLOOD PRESSURE: 134 MMHG | WEIGHT: 253.2 LBS | HEIGHT: 72 IN | RESPIRATION RATE: 17 BRPM | DIASTOLIC BLOOD PRESSURE: 97 MMHG | OXYGEN SATURATION: 97 % | HEART RATE: 101 BPM | TEMPERATURE: 97.7 F

## 2022-06-22 LAB
ALBUMIN SERPL-MCNC: 3.1 G/DL (ref 3.5–5)
ALBUMIN/GLOB SERPL: 0.5 {RATIO} (ref 1.2–3.5)
ALP SERPL-CCNC: 104 U/L (ref 50–136)
ALT SERPL-CCNC: 26 U/L (ref 12–65)
ANION GAP SERPL CALC-SCNC: 8 MMOL/L (ref 7–16)
AST SERPL-CCNC: 19 U/L (ref 15–37)
BILIRUB SERPL-MCNC: 0.5 MG/DL (ref 0.2–1.1)
BUN SERPL-MCNC: 17 MG/DL (ref 6–23)
CALCIUM SERPL-MCNC: 9.1 MG/DL (ref 8.3–10.4)
CHLORIDE SERPL-SCNC: 98 MMOL/L (ref 98–107)
CO2 SERPL-SCNC: 29 MMOL/L (ref 21–32)
CREAT SERPL-MCNC: 0.7 MG/DL (ref 0.8–1.5)
ERYTHROCYTE [DISTWIDTH] IN BLOOD BY AUTOMATED COUNT: 13.3 % (ref 11.9–14.6)
GLOBULIN SER CALC-MCNC: 5.8 G/DL (ref 2.3–3.5)
GLUCOSE SERPL-MCNC: 97 MG/DL (ref 65–100)
HCT VFR BLD AUTO: 40.9 % (ref 41.1–50.3)
HGB BLD-MCNC: 14.1 G/DL (ref 13.6–17.2)
MCH RBC QN AUTO: 31 PG (ref 26.1–32.9)
MCHC RBC AUTO-ENTMCNC: 34.5 G/DL (ref 31.4–35)
MCV RBC AUTO: 89.9 FL (ref 79.6–97.8)
NRBC # BLD: 0 K/UL (ref 0–0.2)
PLATELET # BLD AUTO: 281 K/UL (ref 150–450)
PMV BLD AUTO: 7.8 FL (ref 9.4–12.3)
POTASSIUM SERPL-SCNC: 3.8 MMOL/L (ref 3.5–5.1)
PROT SERPL-MCNC: 8.9 G/DL (ref 6.3–8.2)
RBC # BLD AUTO: 4.55 M/UL (ref 4.23–5.6)
SODIUM SERPL-SCNC: 135 MMOL/L (ref 138–145)
WBC # BLD AUTO: 7.8 K/UL (ref 4.3–11.1)

## 2022-06-22 PROCEDURE — 6360000002 HC RX W HCPCS: Performed by: HOSPITALIST

## 2022-06-22 PROCEDURE — 6370000000 HC RX 637 (ALT 250 FOR IP): Performed by: HOSPITALIST

## 2022-06-22 PROCEDURE — 6360000002 HC RX W HCPCS: Performed by: PHYSICIAN ASSISTANT

## 2022-06-22 PROCEDURE — 2580000003 HC RX 258: Performed by: PHYSICIAN ASSISTANT

## 2022-06-22 PROCEDURE — 85027 COMPLETE CBC AUTOMATED: CPT

## 2022-06-22 PROCEDURE — 36415 COLL VENOUS BLD VENIPUNCTURE: CPT

## 2022-06-22 PROCEDURE — 80053 COMPREHEN METABOLIC PANEL: CPT

## 2022-06-22 RX ORDER — PANTOPRAZOLE SODIUM 40 MG/1
40 TABLET, DELAYED RELEASE ORAL
Qty: 30 TABLET | Refills: 3 | Status: SHIPPED | OUTPATIENT
Start: 2022-06-22

## 2022-06-22 RX ORDER — KETOROLAC TROMETHAMINE 10 MG/1
10 TABLET, FILM COATED ORAL EVERY 6 HOURS PRN
Qty: 20 TABLET | Refills: 1 | Status: SHIPPED | OUTPATIENT
Start: 2022-06-22

## 2022-06-22 RX ORDER — CEFADROXIL 500 MG/1
500 CAPSULE ORAL 2 TIMES DAILY
Qty: 120 CAPSULE | Refills: 0 | Status: SHIPPED | OUTPATIENT
Start: 2022-06-22 | End: 2022-08-21

## 2022-06-22 RX ORDER — CEFADROXIL 500 MG/1
500 CAPSULE ORAL 2 TIMES DAILY
Qty: 120 CAPSULE | Refills: 0 | Status: SHIPPED | OUTPATIENT
Start: 2022-06-22 | End: 2022-06-22 | Stop reason: SDUPTHER

## 2022-06-22 RX ORDER — OXYCODONE HYDROCHLORIDE 10 MG/1
10 TABLET ORAL EVERY 6 HOURS PRN
Qty: 20 TABLET | Refills: 0 | Status: SHIPPED | OUTPATIENT
Start: 2022-06-22 | End: 2022-06-27

## 2022-06-22 RX ORDER — POLYETHYLENE GLYCOL 3350 17 G/17G
17 POWDER, FOR SOLUTION ORAL DAILY PRN
Qty: 527 G | Refills: 1 | Status: SHIPPED | OUTPATIENT
Start: 2022-06-22 | End: 2022-07-22

## 2022-06-22 RX ORDER — SACCHAROMYCES BOULARDII 250 MG
250 CAPSULE ORAL 2 TIMES DAILY
Qty: 60 CAPSULE | Refills: 1 | Status: SHIPPED | OUTPATIENT
Start: 2022-06-22 | End: 2022-07-22

## 2022-06-22 RX ORDER — CYCLOBENZAPRINE HCL 10 MG
10 TABLET ORAL 3 TIMES DAILY PRN
Qty: 30 TABLET | Refills: 0 | Status: SHIPPED | OUTPATIENT
Start: 2022-06-22 | End: 2022-07-02

## 2022-06-22 RX ADMIN — HYDROMORPHONE HYDROCHLORIDE 1 MG: 1 INJECTION, SOLUTION INTRAMUSCULAR; INTRAVENOUS; SUBCUTANEOUS at 06:15

## 2022-06-22 RX ADMIN — OXYCODONE 10 MG: 5 TABLET ORAL at 09:26

## 2022-06-22 RX ADMIN — MICONAZOLE NITRATE: 20 CREAM TOPICAL at 09:28

## 2022-06-22 RX ADMIN — SODIUM CHLORIDE, PRESERVATIVE FREE 10 ML: 5 INJECTION INTRAVENOUS at 09:30

## 2022-06-22 RX ADMIN — HYDROMORPHONE HYDROCHLORIDE 1 MG: 1 INJECTION, SOLUTION INTRAMUSCULAR; INTRAVENOUS; SUBCUTANEOUS at 10:48

## 2022-06-22 RX ADMIN — OXYCODONE 10 MG: 5 TABLET ORAL at 01:03

## 2022-06-22 RX ADMIN — ENOXAPARIN SODIUM 30 MG: 100 INJECTION SUBCUTANEOUS at 04:26

## 2022-06-22 RX ADMIN — HYDROCORTISONE: 1 CREAM TOPICAL at 09:29

## 2022-06-22 RX ADMIN — PANTOPRAZOLE SODIUM 40 MG: 40 TABLET, DELAYED RELEASE ORAL at 05:22

## 2022-06-22 RX ADMIN — HYDROMORPHONE HYDROCHLORIDE 1 MG: 1 INJECTION, SOLUTION INTRAMUSCULAR; INTRAVENOUS; SUBCUTANEOUS at 02:14

## 2022-06-22 RX ADMIN — OXYCODONE 10 MG: 5 TABLET ORAL at 05:22

## 2022-06-22 ASSESSMENT — PAIN DESCRIPTION - DESCRIPTORS
DESCRIPTORS: ACHING
DESCRIPTORS: NAGGING;ACHING;SHARP
DESCRIPTORS: ACHING
DESCRIPTORS: THROBBING;NAGGING

## 2022-06-22 ASSESSMENT — PAIN DESCRIPTION - ONSET
ONSET: ON-GOING

## 2022-06-22 ASSESSMENT — PAIN DESCRIPTION - ORIENTATION
ORIENTATION: MID
ORIENTATION: MID
ORIENTATION: LEFT;LOWER;MID
ORIENTATION: MID
ORIENTATION: MID
ORIENTATION: LEFT;LOWER;RIGHT;MID

## 2022-06-22 ASSESSMENT — PAIN SCALES - GENERAL
PAINLEVEL_OUTOF10: 5
PAINLEVEL_OUTOF10: 0
PAINLEVEL_OUTOF10: 7
PAINLEVEL_OUTOF10: 0
PAINLEVEL_OUTOF10: 6
PAINLEVEL_OUTOF10: 7
PAINLEVEL_OUTOF10: 0
PAINLEVEL_OUTOF10: 7
PAINLEVEL_OUTOF10: 9
PAINLEVEL_OUTOF10: 6

## 2022-06-22 ASSESSMENT — PAIN DESCRIPTION - LOCATION
LOCATION: BACK

## 2022-06-22 ASSESSMENT — PAIN DESCRIPTION - FREQUENCY
FREQUENCY: CONTINUOUS

## 2022-06-22 ASSESSMENT — PAIN DESCRIPTION - PAIN TYPE
TYPE: ACUTE PAIN

## 2022-06-22 NOTE — CARE COORDINATION
LOS 6 D  CM met with patient with discharge order. Patient underwent IR procedure on 6/21 for disc aspiration. Cultures pending. Due to financial need patient is discharging. ID ordered oral ABX of Duricef 500 mg BID for 2 months for MSSA. Patient is self pay. CM will send voucher to [facility] for antibiotic course totaling $49.98. CM arranging cab ride to [facility] ER parking lot for patient to  his car. DCP: Home with voucher antibiotic from [facility]. Yellow cab to [facility] to obtain his car.
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
## 2022-06-22 NOTE — DISCHARGE SUMMARY
Hospitalist Discharge Summary     Patient ID:  Caden Elizbaeth  [id number]  32 y.o.  1980  Admit date: 6/16/2022  2:26 PM  Discharge date and time: 6/22/2022  Attending: Gale Agrawal MD  PCP:  Palma Richardson MD  Treatment Team: Attending Provider: Gale Agrawal MD; Consulting Physician: Michelle Wolf MD; : Dennys Fontanez RN; Utilization Reviewer: Vanessa Recinos RN; Tech: Suly Noland; Registered Nurse: Radha Jerez RN    Principal Diagnosis Discitis   Principal Problem:    Discitis  Active Problems:    Chest pain    Pleural effusion    Paraspinal abscess (HCC)    Discitis of thoracic region  Resolved Problems:    * No resolved hospital problems. *     Caden Elizabeth is a 46 y.o. male with medical history of hepatitis C, L foot osteomyelitis, IVDU (recovery x 3 months) admitted on 6/16 with discitis and osteomyelitis with possible epidural vs paraspinal abscess. CT of abd/pelvis found to have T9-10 discitisi/osteomyelitis with paraspinal phlegmon and associated L pleural effusion/empyema. Pulmonary team did bedside ultrasound to evaluate for effusion, signs of pleural effusion was too small to be tapped via thoracentesis. Patient was on room air without any respite distress and was managed conservatively. Hospital Course:     T9-10 discitis/osteomyelitis with paraspinal phlegmon/abscess  6/22:  Vanco and Zosyn stopped on 6/17  MRI spine shows discitis osteomyelitis at T9-T10 with paraspinal phlegmon/abscess, no evidence of epidural abscess. Status post IR guided biopsy/aspiration on 6/21. ID recommends Duricef 500 mg p.o. twice daily for 2 months to treat MSSA  Blood cultures have been negative to date. Aspiration culture and gram stain is pending.   Due to socioeconomic reasons patient will be discharged home today with outpatient follow-up with ID on 8/22/2022 at 9:30 AM.  Patient is advised to be compliant with antibiotic regimen and avoid using any further IV drugs  Pro-José Manuel 0.05, CRP 1.9, ESR 63  Blood cultures negative to date.     Chest pain, low suspicion for cardiac etiology  Chest pain has resolved  Blood cultures negative to date      Hepatitis C, chronic  - continue Mavyret        Dispo/Discharge Planning: Patient is medically cleared and hemodynamically stable for discharge today. Rest of the hospital course was uneventful, for further details please refer to daily progress notes. Significant Diagnostic Studies:     MRI THORACIC SPINE WITHOUT AND WITH CONTRAST 6/17/2022       HISTORY: Osteomyelitis with paraspinal abscess       TECHNIQUE: A sagittal T1-weighted counting sequence of the cervicothoracic spine   was obtained followed by sagittal T2-weighted, T1-weighted, STIR and   postcontrast fat sat T1-weighted images of the thoracic spine.  Axial   T2-weighted, T1-weighted and postcontrast fat saturated T1-weighted images were   obtained from T7-T8 through T12-L1.  22 mL of ProHance gadolinium was   administered intravenously for the study.       COMPARISON: Correlation is made to a CT of the abdomen and pelvis June 16, 2022       FINDINGS: There is an effusion within the T9-T10 disc.  There are surrounding   endplate irregularities, marrow edema and marrow enhancement throughout the T9   to T10 vertebrae.  There is no large enhancing dural abscess.  Enhancement   extends into the prevertebral and paravertebral soft tissues.  There is an   associated left pleural effusion.       Mild right-sided vertebral body height loss at T8 is suggestive of an old   compression deformity.       The thoracic spinal cord is normal in caliber and signal intensity.       Axial images demonstrate minimal spinal stenosis at T9-T10 without a large   epidural abscess.           Impression   Discitis osteomyelitis at T9-T10 with paraspinal phlegmon/abscess   and left pleural effusion.  No visible epidural abscess.        Labs: Results:       Chemistry Recent Labs 06/20/22  0653 06/22/22  0639   * 135*   K 4.5 3.8   CL 99 98   CO2 33* 29   BUN 16 17   GLOB 5.6* 5.8*      CBC w/Diff Recent Labs     06/20/22  0653 06/22/22  0639   WBC 6.8 7.8   RBC 4.30 4.55   HGB 13.3* 14.1   HCT 39.8* 40.9*    281      Cardiac Enzymes No results for input(s): CPK, MIKO in the last 72 hours. Invalid input(s): CKRMB, CKND1, TROIP   Coagulation No results for input(s): INR, APTT in the last 72 hours. Invalid input(s): PTP    Lipid Panel No results found for: CHOL, CHOLPOCT, CHOLX, CHLST, CHOLV, 197978, HDL, HDLC, LDL, LDLC, 878013, VLDLC, VLDL, TGLX, TRIGL   BNP Invalid input(s): BNPP   Liver Enzymes No results for input(s): TP, ALB in the last 72 hours. Invalid input(s): TBIL, AP, SGOT, GPT, DBIL   Thyroid Studies No results found for: T4, TSH         Discharge Exam:  BP (!) 142/78   Pulse 92   Temp 97.8 °F (36.6 °C) (Oral)   Resp 16   Ht 6' (1.829 m)   Wt 253 lb 3.2 oz (114.9 kg)   SpO2 95%   BMI 34.34 kg/m²   General appearance: alert, cooperative, no distress, appears stated age  Lungs: clear to auscultation bilaterally  Heart: regular rate and rhythm, S1, S2 normal, no murmur, click, rub or gallop  Abdomen: soft, non-tender. Bowel sounds normal. No masses,  no organomegaly  Extremities: no cyanosis or edema  Neurologic: Grossly normal    Disposition: home  Discharge Condition: stable  Patient Instructions:      Medication List      START taking these medications    cefadroxil 500 MG capsule  Commonly known as: DURICEF  Take 1 capsule by mouth 2 times daily     oxyCODONE HCl 10 MG immediate release tablet  Commonly known as: OXY-IR  Take 1 tablet by mouth every 6 hours as needed for Pain for up to 5 days.      pantoprazole 40 MG tablet  Commonly known as: PROTONIX  Take 1 tablet by mouth 2 times daily (before meals)     polyethylene glycol 17 g packet  Commonly known as: GLYCOLAX  Take 17 g by mouth daily as needed for Constipation     saccharomyces boulardii 250 MG capsule  Commonly known as: Florastor  Take 1 capsule by mouth 2 times daily        CHANGE how you take these medications    ketorolac 10 MG tablet  Commonly known as: TORADOL  Take 1 tablet by mouth every 6 hours as needed for Pain  What changed: reasons to take this        CONTINUE taking these medications    cyclobenzaprine 10 MG tablet  Commonly known as: FLEXERIL  Take 1 tablet by mouth 3 times daily as needed for Muscle spasms     Mavyret 100-40 MG Tabs tablet  Generic drug: glecaprevir-pibrentasvir           Where to Get Your Medications      These medications were sent to [address] [place] [address] [phone] - [phone]  [address] [place] [address], [address] [place] [address] 30 [facility]    Phone: [phone]   · pantoprazole 40 MG tablet  · polyethylene glycol 17 g packet  · saccharomyces boulardii 250 MG capsule     Information about where to get these medications is not yet available    Ask your nurse or doctor about these medications  · cefadroxil 500 MG capsule  · cyclobenzaprine 10 MG tablet  · ketorolac 10 MG tablet  · oxyCODONE HCl 10 MG immediate release tablet         Activity: activity as tolerated  Diet: regular diet  Wound Care: none needed    Follow-up  · Follow-up with ID on 8/22/2022 at 9:30 AM.  Time spent to discharge patient 35 minutes  Signed:  Melissa Ruiz MD  6/22/2022  8:17 AM

## 2022-06-22 NOTE — PROGRESS NOTES
Discharge instructions were reviewed with patient. An opportunity was given for questions. All medications were reviewed, and information was given on the new medications. Patient verbalized understanding, and has no questions at this time. 1330  CM consulted due to pt needing a ride to get to his car at [facility]. Pt awaiting uber and voucher for antibiotics.

## 2022-06-22 NOTE — PLAN OF CARE
Problem: Discharge Planning  Goal: Discharge to home or other facility with appropriate resources  Outcome: Progressing     Problem: Pain  Goal: Verbalizes/displays adequate comfort level or baseline comfort level  Outcome: Progressing  Flowsheets (Taken 6/21/2022 1336 by Ashley Weston RN)  Verbalizes/displays adequate comfort level or baseline comfort level:   Encourage patient to monitor pain and request assistance   Assess pain using appropriate pain scale   Administer analgesics based on type and severity of pain and evaluate response   Implement non-pharmacological measures as appropriate and evaluate response   Consider cultural and social influences on pain and pain management   Notify Licensed Independent Practitioner if interventions unsuccessful or patient reports new pain     Problem: ABCDS Injury Assessment  Goal: Absence of physical injury  Outcome: Progressing     Problem: Safety - Adult  Goal: Free from fall injury  Outcome: Progressing

## 2022-06-24 LAB
BACTERIA SPEC CULT: ABNORMAL
GRAM STN SPEC: ABNORMAL
GRAM STN SPEC: ABNORMAL
SERVICE CMNT-IMP: ABNORMAL

## 2022-07-17 ENCOUNTER — HOSPITAL ENCOUNTER (EMERGENCY)
Age: 42
Discharge: HOME | End: 2022-07-17
Attending: EMERGENCY MEDICINE
Payer: COMMERCIAL

## 2022-07-17 VITALS
TEMPERATURE: 98.5 F | SYSTOLIC BLOOD PRESSURE: 114 MMHG | DIASTOLIC BLOOD PRESSURE: 66 MMHG | RESPIRATION RATE: 14 BRPM | HEART RATE: 108 BPM | OXYGEN SATURATION: 95 %

## 2022-07-17 DIAGNOSIS — T40.1X1A ACCIDENTAL OVERDOSE OF HEROIN, INITIAL ENCOUNTER (HCC): Primary | ICD-10-CM

## 2022-07-17 LAB
ALBUMIN SERPL-MCNC: 3.2 G/DL (ref 3.5–5)
ALBUMIN/GLOB SERPL: 0.7 {RATIO} (ref 1.2–3.5)
ALP SERPL-CCNC: 91 U/L (ref 50–136)
ALT SERPL-CCNC: 21 U/L (ref 12–65)
ANION GAP SERPL CALC-SCNC: 4 MMOL/L (ref 7–16)
APAP SERPL-MCNC: <2 UG/ML (ref 10–30)
AST SERPL-CCNC: 29 U/L (ref 15–37)
BASOPHILS # BLD: 0 K/UL (ref 0–0.2)
BASOPHILS NFR BLD: 0 % (ref 0–2)
BILIRUB SERPL-MCNC: 0.3 MG/DL (ref 0.2–1.1)
BUN SERPL-MCNC: 18 MG/DL (ref 6–23)
CALCIUM SERPL-MCNC: 8 MG/DL (ref 8.3–10.4)
CHLORIDE SERPL-SCNC: 109 MMOL/L (ref 98–107)
CO2 SERPL-SCNC: 28 MMOL/L (ref 21–32)
CREAT SERPL-MCNC: 1 MG/DL (ref 0.8–1.5)
DIFFERENTIAL METHOD BLD: ABNORMAL
EOSINOPHIL # BLD: 0.1 K/UL (ref 0–0.8)
EOSINOPHIL NFR BLD: 1 % (ref 0.5–7.8)
ERYTHROCYTE [DISTWIDTH] IN BLOOD BY AUTOMATED COUNT: 13 % (ref 11.9–14.6)
ETHANOL SERPL-MCNC: <3 MG/DL (ref 0–0.08)
GLOBULIN SER CALC-MCNC: 4.8 G/DL (ref 2.3–3.5)
GLUCOSE SERPL-MCNC: 113 MG/DL (ref 65–100)
HCT VFR BLD AUTO: 39.3 % (ref 41.1–50.3)
HGB BLD-MCNC: 13.2 G/DL (ref 13.6–17.2)
IMM GRANULOCYTES # BLD AUTO: 0.1 K/UL (ref 0–0.5)
IMM GRANULOCYTES NFR BLD AUTO: 1 % (ref 0–5)
LYMPHOCYTES # BLD: 1.4 K/UL (ref 0.5–4.6)
LYMPHOCYTES NFR BLD: 14 % (ref 13–44)
MAGNESIUM SERPL-MCNC: 2.1 MG/DL (ref 1.8–2.4)
MCH RBC QN AUTO: 31.9 PG (ref 26.1–32.9)
MCHC RBC AUTO-ENTMCNC: 33.6 G/DL (ref 31.4–35)
MCV RBC AUTO: 94.9 FL (ref 79.6–97.8)
MONOCYTES # BLD: 1 K/UL (ref 0.1–1.3)
MONOCYTES NFR BLD: 9 % (ref 4–12)
NEUTS SEG # BLD: 8 K/UL (ref 1.7–8.2)
NEUTS SEG NFR BLD: 75 % (ref 43–78)
NRBC # BLD: 0 K/UL (ref 0–0.2)
PLATELET # BLD AUTO: 176 K/UL (ref 150–450)
PMV BLD AUTO: 8 FL (ref 9.4–12.3)
POTASSIUM SERPL-SCNC: 3.3 MMOL/L (ref 3.5–5.1)
PROT SERPL-MCNC: 8 G/DL (ref 6.3–8.2)
RBC # BLD AUTO: 4.14 M/UL (ref 4.23–5.6)
SALICYLATES SERPL-MCNC: 2 MG/DL (ref 2.8–20)
SODIUM SERPL-SCNC: 141 MMOL/L (ref 138–145)
WBC # BLD AUTO: 10.6 K/UL (ref 4.3–11.1)

## 2022-07-17 PROCEDURE — 82077 ASSAY SPEC XCP UR&BREATH IA: CPT

## 2022-07-17 PROCEDURE — 83735 ASSAY OF MAGNESIUM: CPT

## 2022-07-17 PROCEDURE — 6360000002 HC RX W HCPCS: Performed by: EMERGENCY MEDICINE

## 2022-07-17 PROCEDURE — 80179 DRUG ASSAY SALICYLATE: CPT

## 2022-07-17 PROCEDURE — 96374 THER/PROPH/DIAG INJ IV PUSH: CPT

## 2022-07-17 PROCEDURE — 99284 EMERGENCY DEPT VISIT MOD MDM: CPT

## 2022-07-17 PROCEDURE — 85025 COMPLETE CBC W/AUTO DIFF WBC: CPT

## 2022-07-17 PROCEDURE — 80053 COMPREHEN METABOLIC PANEL: CPT

## 2022-07-17 PROCEDURE — 96375 TX/PRO/DX INJ NEW DRUG ADDON: CPT

## 2022-07-17 PROCEDURE — 80143 DRUG ASSAY ACETAMINOPHEN: CPT

## 2022-07-17 PROCEDURE — 96376 TX/PRO/DX INJ SAME DRUG ADON: CPT

## 2022-07-17 RX ORDER — NALOXONE HYDROCHLORIDE 0.4 MG/ML
0.4 INJECTION, SOLUTION INTRAMUSCULAR; INTRAVENOUS; SUBCUTANEOUS
Status: COMPLETED | OUTPATIENT
Start: 2022-07-17 | End: 2022-07-17

## 2022-07-17 RX ORDER — ONDANSETRON 2 MG/ML
4 INJECTION INTRAMUSCULAR; INTRAVENOUS
Status: COMPLETED | OUTPATIENT
Start: 2022-07-17 | End: 2022-07-17

## 2022-07-17 RX ADMIN — ONDANSETRON 4 MG: 2 INJECTION INTRAMUSCULAR; INTRAVENOUS at 13:17

## 2022-07-17 RX ADMIN — ONDANSETRON 4 MG: 2 INJECTION INTRAMUSCULAR; INTRAVENOUS at 10:42

## 2022-07-17 RX ADMIN — NALOXONE HYDROCHLORIDE 0.4 MG: 0.4 INJECTION, SOLUTION INTRAMUSCULAR; INTRAVENOUS; SUBCUTANEOUS at 13:17

## 2022-07-17 ASSESSMENT — PAIN DESCRIPTION - ORIENTATION: ORIENTATION: MID

## 2022-07-17 ASSESSMENT — ENCOUNTER SYMPTOMS
NAUSEA: 0
SHORTNESS OF BREATH: 0
DIARRHEA: 0
CHEST TIGHTNESS: 0
EYE DISCHARGE: 0
BACK PAIN: 0
COUGH: 0
VOMITING: 0
ABDOMINAL PAIN: 0

## 2022-07-17 ASSESSMENT — PAIN DESCRIPTION - DESCRIPTORS: DESCRIPTORS: ACHING

## 2022-07-17 ASSESSMENT — PAIN DESCRIPTION - LOCATION: LOCATION: ABDOMEN

## 2022-07-17 ASSESSMENT — PAIN - FUNCTIONAL ASSESSMENT: PAIN_FUNCTIONAL_ASSESSMENT: 0-10

## 2022-07-17 ASSESSMENT — PAIN SCALES - GENERAL: PAINLEVEL_OUTOF10: 4

## 2022-07-17 NOTE — PROGRESS NOTES
This RN stood pt up and ambulated him down the hallway, pt tolerated ambulation well, MD made aware. Pt A&O x4.

## 2022-07-17 NOTE — ED TRIAGE NOTES
Pt arrives via EMS from [facility], pt was found in the bathroom, had snoring respirations and in/out of consciousness, pinpoint pupils, diaphoretic. Pt had a needle on him, found heroin and also meth, believed to have only used the heroin. EMS gave 0.5mg of narcan IV, pt became alert after the narcan, vomited twice. EMS vitals 115HR, 122/79, 189BGL, sinus tach on EMS EKG. Pt states he currently has nausea, some shob, and a headache. Pt denies chest pain, diarrhea, fever/chills. Pt states this was his first time using heroin \"in awhile\", states he last used meth back in March, states he smokes and dips. Pt denies THC, cocaine or alcohol.      Pt states he currently lives with his mother, has a job in food and beverage,

## 2022-07-17 NOTE — ED NOTES
I have reviewed discharge instructions with the patient. The patient verbalized understanding. Patient left ED via Discharge Method: ambulatory to Home with father    Opportunity for questions and clarification provided. Patient given 0 scripts. Pt stated he has plenty of narcan at home. To continue your aftercare when you leave the hospital, you may receive an automated call from our care team to check in on how you are doing. This is a free service and part of our promise to provide the best care and service to meet your aftercare needs.  If you have questions, or wish to unsubscribe from this service please call [phone]. Thank you for Choosing our [facility] Emergency Department.         Gavin Reagan RN  07/17/22 6744

## 2022-07-17 NOTE — ED PROVIDER NOTES
[facility] Emergency Department Provider Note                   PCP:                None Provider               Age: 46 y.o. Sex: male       ICD-10-CM    1. Accidental overdose of heroin, initial encounter [facility])  T40.1X1A           DISPOSITION Decision To Discharge 07/17/2022 01:53:43 PM       MDM  Number of Diagnoses or Management Options  Accidental overdose of heroin, initial encounter [facility])  Diagnosis management comments: 29-year-old male arrives via EMS for heroin overdose. He was given Narcan in route. Is here are reviewed. Patient is slightly tachycardic here. He is able answer questions he is actually polite. He does admit to using heroin. He will be monitored down here. CBC, CMP here is fairly unremarkable. Care. 4 hours observation. The patient is awake. He is able to ambulate in the hallway with no difficulty. He states that he will call his dad to come pick him up. I have instructed him to not use heroin as this will kill him. Patient is stable on discharge examination         Orders Placed This Encounter   Procedures    CBC with Auto Differential    CMP    Acetaminophen Level    ETOH    Magnesium    Urine Drug Screen    Salicylate    Complete CSSRS Screen    Complete SBIRT Screen    Cardiac Monitor - ED Only    EKG 12 Lead    EKG 12 Lead        Devonte Choudhury is a 46 y.o. male who presents to the Emergency Department with chief complaint of    Chief Complaint   Patient presents with    Drug Overdose      29-year-old male arrives via EMS for heroin overdose. Was found in the [facility] bathroom with snoring respirations. EMS reports that he had pinpoint pupils and was unresponsive. 20-gauge IV was started and was given 0.5 mg of Narcan and the patient awoken. Does admit to using heroin. All other systems reviewed and are negative. Review of Systems   Constitutional:  Negative for chills, fatigue and fever.         Heroin overdose   HENT:  Negative for congestion, dental General: Skin is warm and dry. Capillary Refill: Capillary refill takes less than 2 seconds. Neurological:      General: No focal deficit present. Mental Status: He is alert and oriented to person, place, and time. Mental status is at baseline. Psychiatric:         Behavior: Behavior normal.        Procedures      Labs Reviewed   CBC WITH AUTO DIFFERENTIAL - Abnormal; Notable for the following components:       Result Value    RBC 4.14 (*)     Hemoglobin 13.2 (*)     Hematocrit 39.3 (*)     MPV 8.0 (*)     All other components within normal limits   COMPREHENSIVE METABOLIC PANEL - Abnormal; Notable for the following components:    Potassium 3.3 (*)     Chloride 109 (*)     Anion Gap 4 (*)     Glucose 113 (*)     Calcium 8.0 (*)     Albumin 3.2 (*)     Globulin 4.8 (*)     Albumin/Globulin Ratio 0.7 (*)     All other components within normal limits   ACETAMINOPHEN LEVEL - Abnormal; Notable for the following components:    Acetaminophen Level <2 (*)     All other components within normal limits   SALICYLATE LEVEL - Abnormal; Notable for the following components:    Salicylate, Serum 2.0 (*)     All other components within normal limits   ETHANOL   MAGNESIUM   URINE DRUG SCREEN        No orders to display                            Voice dictation software was used during the making of this note. This software is not perfect and grammatical and other typographical errors may be present. This note has not been completely proofread for errors.      [employer], DO  07/17/22 0427

## 2023-02-01 ENCOUNTER — OFFICE VISIT (OUTPATIENT)
Dept: ORTHOPEDIC SURGERY | Age: 43
End: 2023-02-01

## 2023-02-01 DIAGNOSIS — S46.312A RUPTURE OF LEFT TRICEPS TENDON, INITIAL ENCOUNTER: Primary | ICD-10-CM

## 2023-02-01 RX ORDER — MELOXICAM 15 MG/1
15 TABLET ORAL DAILY
Qty: 30 TABLET | Refills: 0 | Status: SHIPPED | OUTPATIENT
Start: 2023-02-01 | End: 2023-03-03

## 2023-02-01 RX ORDER — METHYLPREDNISOLONE 4 MG/1
TABLET ORAL
Qty: 1 KIT | Refills: 0 | Status: SHIPPED | OUTPATIENT
Start: 2023-02-01

## 2023-02-01 NOTE — LETTER
DME Patient Authorization Form    Name: Александр Taveras  : 1980  MRN: [id number]   Age: 38 y.o. Gender: male  Delivery Address: [facility]     Diagnosis:     ICD-10-CM    1. Rupture of left triceps tendon, initial encounter  K76.048D            Requested DME:  Reparel Elbow **AELBO ($30) X 1 - left        Clinical Notes:     **Indicates non-covered items by insurance. Payment expected on date of service. Electronically signed by  Provider: Poncho Arceo MD__Date: 2023                            [facility] Tax ID # [id number]        Durable Medical Equipment and/or Orthotics Patient Consent     I understand that my physician has prescribed this medical supply as part of my treatment plan as a matter of Medical Necessity.  I understand that I have a choice in where I receive my prescribed orthopedic supplies and/or services.  I authorize [facility] to furnish this service/product and to provide my insurance carrier with any information requested in order to process for payment.  I instruct my insurance carrier to pay [facility] directly for these services/products.  I understand that my insurance carrier may deny payment for this supply because it is a non-covered item, deemed not medically necessary or considered experimental.   I understand that any cost not covered by my insurance carrier will be solely my financial responsibility.  I have received the Supplier Standards and have reviewed them.  I have received the prescribed item and have been fully instructed on the proper use of the above services/products.    ______ (Patient Initials) I understand that all DME items are non-returnable after being dispensed. Items still in sealed packaging may be returned up to 14 days after purchasing.  [address] will replace items that are defective.    ______ (Patient Initials) I understand that Mattie Pedersen will not file a claim with my insurance carrier for this service/product and I am waiving my right to file a claim on my own for this service/product with my insurance company as this item is NON-COVERED (Denoted by the **) by my Insurance company/policy. ______ (Patient Initials) I understand that I am responsible to bring my equipment to the hospital for any surgery. ______________________________________________  ________________________  Patient / Katrin Martinez            Thank you for considering [address] Your physician has prescribed specific medical equipment or devices for your home use. The following describes your rights and responsibilities as our customer. Right to Choose Providers: You have a choice regarding which company supplies your home medical equipment and devices, and to consult your physician in this decision. You may choose a medical supply store, a home medical equipment provider, or a specialist such as [facility]. [facility] will coordinate with your physician to provide the medical equipment or devices prescribed for your home use. Right to Service:  You have the right to considerate, respectful and nondiscriminatory care. You have the right to receive accurate and easily understood information about your health care. If you speak a foreign language, or don't understand the discussions, assistance will be provided to allow you to make informed health care decisions. You have the right to know your treatment options and to participate in decisions about your care, including the right to accept or refuse treatment. You have the right to expect a reasonable response to your requests for treatment or service.   You have the right to talk in confidence with health care providers and to have your health care information protected. You have the right to receive an explanation of your bill. You have the right to complain about the service or product you receive. Patient Responsibilities:  Please provide complete and accurate information about your health insurance benefits and make arrangements for the timely payment of your bill. [facility] will, if possible, assume responsibility for billing your insurance (Medicare, Medicaid and commercial) for the prescribed equipment or devices. If your policy does not cover the prescribed product, or only covers a portion of the bill, you are responsible for any remaining balance. Return and Exchange Policy:  [facility] will honor published  Warranties for products. [facility] will accept returns or exchanges within 14 days from the date of receipt, providin) the product must be in new condition; 2) receipt as required; and 3) used disposable and hygiene products may only be returned due to a defective product. Note: Refunds will be issued in a timely manner, please allow 4-6 weeks for processing. Complaint Procedures and DME Consumer Protection Resources:  [facility] values you as a customer, and is committed to resolving patient concerns. This commitment includes understanding and documenting your concerns, conducting a review of internal procedures, and providing you with an explanation and resolution to your concerns. Should you have any questions about our services or billing process, please contact our office at (practice phone number). If we are unable to resolve the concern, you have the right to direct comments to the office of Consumer Protection, in the [address] or the [facility] office, without fear of repercussion. DMEPOS SUPPLIER STANDARDS    A supplier must be in compliance with all applicable Federal and [employer] and regulatory requirements.   A supplier must provide complete and accurate information on the DMEPOS supplier application. Any changes to this information must be reported to the [facility] within 30 days. An authorized individual (one whose signature is binding) must sign the application for billing privileges. A supplier must fill orders from its own inventory, or must contract with other companies for the purchase of items necessary to fill the order. A supplier may not contract with any entity that is currently excluded from the Medicare program, any [facility] program, or from any other Federal procurement or Nonprocurement programs. A supplier must advise beneficiaries that they may rent or purchase inexpensive or routinely purchased durable medical equipment, and of the purchase option for capped rental equipment. A supplier must notify beneficiaries of warranty coverage and honor all warranties under applicable State Law, and repair or replace free of charge Medicare covered items that are under warranty. A supplier must maintain a physical facility on an appropriate site. A supplier must permit CMS, or its agents to conduct on-site inspections to ascertain the supplier's compliance with these standards. The supplier location must be accessible to beneficiaries during reasonable business hours, and must maintain a visible sign and posted hours of operation. A supplier must maintain a primary business telephone listed under the name of the business in a [employer] or a toll free number available through directory assistance. The exclusive use of a beeper, answering machine or cell phone is prohibited. A supplier must have comprehensive liability insurance in the amount of at least $300,000 that covers both the supplier's place of business and all customers and employees of the supplier. If the supplier manufactures its own items, this insurance must also cover product liability and completed operations.   A supplier must agree not to initiate telephone contact with beneficiaries, with a few exceptions allowed.  This standard prohibits suppliers from calling beneficiaries in order to solicit new business.  A supplier is responsible for delivery and must instruct beneficiaries on use of Medicare covered items, and maintain proof of delivery.  A supplier must answer questions, and respond to complaints of the beneficiaries, and maintain documentation of such contacts.  A supplier must maintain and replace at no charge or repair directly, or through a service contract with another company, Medicare covered items it has rented to beneficiaries.  A supplier must accept returns of substandard (less than full quality for the particular item) or unsuitable items (inappropriate for the beneficiary at the time it was fitted and rented or sold) from beneficiaries.  A supplier must disclose these supplier standards to each beneficiary to whom it supplies a Medicare-covered item.  A supplier must disclose to the government any person having ownership, financial, or control interest in the supplier.  A supplier must not convey or reassign a supplier number; i.e., the supplier may not sell or allow another entity to use its Medicare billing number.  A supplier must have a complaint resolution protocol established to address beneficiary complaints that relate to these standards.  A record of these complaints must be maintained at the physical facility.  Complaint records must include: the name, address, telephone number and health insurance claim number of the beneficiary, a summary of the complaint, and any action taken to resolve it.  A supplier must agree to furnish CMS any information required by the Medicare statute and implementing regulations.  A supplier of DMEPOS and other items and services must be accredited by a CMS-approved accreditation organization in order to receive and retain a supplier billing number. The accreditation must  indicate the specific products and services, for which the supplier is accredited in order for the supplier to receive payment for those specific products and services. A DMEPOS supplier must notify their accreditation organization when a new DMEPOS location is opened. The accreditation organization may accredit the new supplier location for three months after it is operational without requiring a new site visit. All DMEPOS supplier locations, whether owned or subcontracted, must meet the [employer] and be separately accredited in order to bill Medicare. An accredited supplier may be denied enrollment or their enrollment may be revoked, if CMS determines that they are not in compliance with the DMEPOS quality standards. A DMEPOS supplier must disclose upon enrollment all products and services, including the addition of new product lines for which they are seeking accreditation. If a new product line is added after enrollment, the DMEPOS supplier will be responsible for notifying the accrediting body of the new product so that the DMEPOS supplier can be re-surveyed and accredited for these new products. Must meet the surety bond requirements specified in 42 C. F.R. 424.57(c). Implementation date- May 4, 2009. A supplier must obtain oxygen from a state-licensed oxygen supplier. A supplier must maintain ordering and referring documentation consistent with provisions found in 42 C. F.R. 424.516(f). DMEPOS suppliers are prohibited from sharing a practice location with certain other Medicare providers and suppliers. DMEPOS suppliers must remain open to the public for a minimum of 30 hours per week with certain exceptions.

## 2023-02-01 NOTE — PROGRESS NOTES
Orthopaedic Hand Clinic Note    Name: Christina Shanks  YOB: 1980  Gender: male  MRN: [id number]      CC: Patient referred for evaluation of upper extremity pain    HPI: Christina Shanks is a 38 y.o. male Right hand dominant with a chief complaint of left elbow swelling and pain, symptoms started a week ago when he fell down some steps at work, since the injury he has had difficulty extending the elbow as well as significant swelling and pain, he has a history of left humeral shaft fracture status post ORIF many years ago and he has done well without, before this injury he was able to fully extend the elbow. ROS/Meds/PSH/PMH/FH/SH: I personally reviewed the patients standard intake form. Pertinents are discussed in the HPI    Physical Examination:  General: Awake and alert. HEENT: Normocephalic, atraumatic  CV/Pulm: Breathing even and unlabored  Skin: No obvious rashes noted. Lymphatic: No obvious evidence of lymphedema or lymphadenopathy    Musculoskeletal Exam:  Examination on the left upper extremity demonstrates cap refill < 5 seconds in all fingers, moderate swelling and ecchymosis throughout the left upper arm, elbow and forearm, he has a 15 degree extension lag, he has good elbow extension strength although this is limited due to pain, I cannot palpate any step-offs of the triceps although there is tenderness palpation at the insertion and there is significant swelling so this is difficult to evaluate. Imaging / Electrodiagnostic Tests:     X-ray of the left elbow was reviewed and independently interpreted, this demonstrates arthritic changes especially with osteophytes at the coronoid, no fractures visualized, soft tissue swelling is noted    Assessment:   1. Rupture of left triceps tendon, initial encounter        Plan:   We discussed the diagnosis and different treatment options.  We discussed observation, therapy, antiinflammatory medications and other pertinent treatment modalities.    After discussing in detail the patient elects to proceed with Medrol Dosepak followed by Mobic 15 daily for 3 weeks, elbow compressive sleeve was provided, I will reassess the patient in 3 weeks, he has relatively good strength of elbow extension, he may have a partial tear of the triceps, I could not palpate any step-offs today, if in 3 weeks he continues to have severe pain and I have any concern about triceps tear then I will obtain an MRI.     Patient voiced accordance and understanding of the information provided and the formulated plan. All questions were answered to the patient's satisfaction during the encounter.    Daljit Hanley MD  Orthopaedic Surgery  02/01/23  4:56 PM

## 2023-02-01 NOTE — PROGRESS NOTES
The patient was prescribed and fitted with a Reparel elbow sleeve for the left elbow, size medium. Patient read and signed documenting they understand and agree to [facility]'s current DME return policy.

## 2023-02-01 NOTE — Clinical Note
[facility] - [place] [address] [address] [facility] [address]  Phone: [phone]  Fax: [phone]    Daljit Hanley MD        February 1, 2023     Patient: Fabio Dee   YOB: 1980   Date of Visit: 2/1/2023       To Whom It May Concern:    It is my medical opinion that Fabio Dee {Work release (duty restriction):73717}.    If you have any questions or concerns, please don't hesitate to call.    Sincerely,        Daljit Hanley MD

## 2023-02-09 ENCOUNTER — HOSPITAL ENCOUNTER (INPATIENT)
Age: 43
LOS: 1 days | Discharge: HOME | End: 2023-02-10
Attending: STUDENT IN AN ORGANIZED HEALTH CARE EDUCATION/TRAINING PROGRAM | Admitting: FAMILY MEDICINE
Payer: COMMERCIAL

## 2023-02-09 DIAGNOSIS — R93.7 ABNORMAL MRI, SPINE: ICD-10-CM

## 2023-02-09 DIAGNOSIS — M54.41 ACUTE MIDLINE LOW BACK PAIN WITH RIGHT-SIDED SCIATICA: ICD-10-CM

## 2023-02-09 DIAGNOSIS — R50.9 FEVER, UNSPECIFIED FEVER CAUSE: Primary | ICD-10-CM

## 2023-02-09 PROCEDURE — 87635 SARS-COV-2 COVID-19 AMP PRB: CPT

## 2023-02-09 PROCEDURE — 99285 EMERGENCY DEPT VISIT HI MDM: CPT | Performed by: STUDENT IN AN ORGANIZED HEALTH CARE EDUCATION/TRAINING PROGRAM

## 2023-02-09 RX ORDER — KETOROLAC TROMETHAMINE 15 MG/ML
15 INJECTION, SOLUTION INTRAMUSCULAR; INTRAVENOUS ONCE
Status: COMPLETED | OUTPATIENT
Start: 2023-02-09 | End: 2023-02-10

## 2023-02-09 ASSESSMENT — PAIN - FUNCTIONAL ASSESSMENT: PAIN_FUNCTIONAL_ASSESSMENT: 0-10

## 2023-02-09 ASSESSMENT — PAIN SCALES - GENERAL: PAINLEVEL_OUTOF10: 8

## 2023-02-10 ENCOUNTER — APPOINTMENT (OUTPATIENT)
Dept: MRI IMAGING | Age: 43
End: 2023-02-10
Payer: COMMERCIAL

## 2023-02-10 ENCOUNTER — HOSPITAL ENCOUNTER (INPATIENT)
Age: 43
LOS: 3 days | Discharge: HOME | DRG: 552 | End: 2023-02-13
Attending: HOSPITALIST | Admitting: FAMILY MEDICINE
Payer: COMMERCIAL

## 2023-02-10 VITALS
HEART RATE: 88 BPM | SYSTOLIC BLOOD PRESSURE: 118 MMHG | DIASTOLIC BLOOD PRESSURE: 84 MMHG | RESPIRATION RATE: 19 BRPM | BODY MASS INDEX: 33.86 KG/M2 | HEIGHT: 72 IN | TEMPERATURE: 98.6 F | OXYGEN SATURATION: 95 % | WEIGHT: 250 LBS

## 2023-02-10 DIAGNOSIS — M48.062 SPINAL STENOSIS OF LUMBAR REGION WITH NEUROGENIC CLAUDICATION: Primary | ICD-10-CM

## 2023-02-10 PROBLEM — G62.9 NEUROPATHY: Status: ACTIVE | Noted: 2023-02-10

## 2023-02-10 LAB
ALBUMIN SERPL-MCNC: 3.5 G/DL (ref 3.5–5)
ALBUMIN/GLOB SERPL: 0.7 (ref 0.4–1.6)
ALP SERPL-CCNC: 80 U/L (ref 50–136)
ALT SERPL-CCNC: 31 U/L (ref 12–65)
ANION GAP SERPL CALC-SCNC: 3 MMOL/L (ref 2–11)
ANION GAP SERPL CALC-SCNC: 5 MMOL/L (ref 2–11)
AST SERPL-CCNC: 20 U/L (ref 15–37)
BASOPHILS # BLD: 0 K/UL (ref 0–0.2)
BASOPHILS NFR BLD: 0 % (ref 0–2)
BILIRUB SERPL-MCNC: 0.5 MG/DL (ref 0.2–1.1)
BUN SERPL-MCNC: 21 MG/DL (ref 6–23)
BUN SERPL-MCNC: 25 MG/DL (ref 6–23)
CALCIUM SERPL-MCNC: 8.4 MG/DL (ref 8.3–10.4)
CALCIUM SERPL-MCNC: 9.1 MG/DL (ref 8.3–10.4)
CHLORIDE SERPL-SCNC: 102 MMOL/L (ref 101–110)
CHLORIDE SERPL-SCNC: 104 MMOL/L (ref 101–110)
CO2 SERPL-SCNC: 29 MMOL/L (ref 21–32)
CO2 SERPL-SCNC: 29 MMOL/L (ref 21–32)
CREAT SERPL-MCNC: 0.72 MG/DL (ref 0.8–1.5)
CREAT SERPL-MCNC: 0.75 MG/DL (ref 0.8–1.5)
CRP SERPL-MCNC: 8.4 MG/DL (ref 0–0.9)
DIFFERENTIAL METHOD BLD: ABNORMAL
EOSINOPHIL # BLD: 0.1 K/UL (ref 0–0.8)
EOSINOPHIL NFR BLD: 1 % (ref 0.5–7.8)
ERYTHROCYTE [DISTWIDTH] IN BLOOD BY AUTOMATED COUNT: 12.8 % (ref 11.9–14.6)
ERYTHROCYTE [SEDIMENTATION RATE] IN BLOOD: 36 MM/HR (ref 0–20)
FLUAV RNA SPEC QL NAA+PROBE: NOT DETECTED
FLUBV RNA SPEC QL NAA+PROBE: NOT DETECTED
GLOBULIN SER CALC-MCNC: 5.1 G/DL (ref 2.8–4.5)
GLUCOSE SERPL-MCNC: 114 MG/DL (ref 65–100)
GLUCOSE SERPL-MCNC: 136 MG/DL (ref 65–100)
HCT VFR BLD AUTO: 39.7 % (ref 41.1–50.3)
HGB BLD-MCNC: 13.4 G/DL (ref 13.6–17.2)
IMM GRANULOCYTES # BLD AUTO: 0.1 K/UL (ref 0–0.5)
IMM GRANULOCYTES NFR BLD AUTO: 1 % (ref 0–5)
LACTATE SERPL-SCNC: 1.4 MMOL/L (ref 0.4–2)
LYMPHOCYTES # BLD: 2.3 K/UL (ref 0.5–4.6)
LYMPHOCYTES NFR BLD: 21 % (ref 13–44)
MCH RBC QN AUTO: 31.8 PG (ref 26.1–32.9)
MCHC RBC AUTO-ENTMCNC: 33.8 G/DL (ref 31.4–35)
MCV RBC AUTO: 94.3 FL (ref 82–102)
MONOCYTES # BLD: 1.3 K/UL (ref 0.1–1.3)
MONOCYTES NFR BLD: 12 % (ref 4–12)
NEUTS SEG # BLD: 7 K/UL (ref 1.7–8.2)
NEUTS SEG NFR BLD: 65 % (ref 43–78)
NRBC # BLD: 0 K/UL (ref 0–0.2)
PLATELET # BLD AUTO: 230 K/UL (ref 150–450)
PMV BLD AUTO: 8.1 FL (ref 9.4–12.3)
POTASSIUM SERPL-SCNC: 3.9 MMOL/L (ref 3.5–5.1)
POTASSIUM SERPL-SCNC: 4 MMOL/L (ref 3.5–5.1)
PROCALCITONIN SERPL-MCNC: <0.05 NG/ML (ref 0–0.49)
PROT SERPL-MCNC: 8.6 G/DL (ref 6.3–8.2)
RBC # BLD AUTO: 4.21 M/UL (ref 4.23–5.6)
SARS-COV-2 RDRP RESP QL NAA+PROBE: NOT DETECTED
SODIUM SERPL-SCNC: 136 MMOL/L (ref 133–143)
SODIUM SERPL-SCNC: 136 MMOL/L (ref 133–143)
SOURCE: NORMAL
WBC # BLD AUTO: 10.7 K/UL (ref 4.3–11.1)

## 2023-02-10 PROCEDURE — 85025 COMPLETE CBC W/AUTO DIFF WBC: CPT

## 2023-02-10 PROCEDURE — 80053 COMPREHEN METABOLIC PANEL: CPT

## 2023-02-10 PROCEDURE — 6370000000 HC RX 637 (ALT 250 FOR IP): Performed by: INTERNAL MEDICINE

## 2023-02-10 PROCEDURE — 72158 MRI LUMBAR SPINE W/O & W/DYE: CPT

## 2023-02-10 PROCEDURE — 72157 MRI CHEST SPINE W/O & W/DYE: CPT

## 2023-02-10 PROCEDURE — 6360000004 HC RX CONTRAST MEDICATION: Performed by: STUDENT IN AN ORGANIZED HEALTH CARE EDUCATION/TRAINING PROGRAM

## 2023-02-10 PROCEDURE — 85652 RBC SED RATE AUTOMATED: CPT

## 2023-02-10 PROCEDURE — 6370000000 HC RX 637 (ALT 250 FOR IP): Performed by: FAMILY MEDICINE

## 2023-02-10 PROCEDURE — 87040 BLOOD CULTURE FOR BACTERIA: CPT

## 2023-02-10 PROCEDURE — 97161 PT EVAL LOW COMPLEX 20 MIN: CPT

## 2023-02-10 PROCEDURE — 36415 COLL VENOUS BLD VENIPUNCTURE: CPT

## 2023-02-10 PROCEDURE — 2580000003 HC RX 258: Performed by: FAMILY MEDICINE

## 2023-02-10 PROCEDURE — 6360000002 HC RX W HCPCS: Performed by: INTERNAL MEDICINE

## 2023-02-10 PROCEDURE — 96376 TX/PRO/DX INJ SAME DRUG ADON: CPT | Performed by: STUDENT IN AN ORGANIZED HEALTH CARE EDUCATION/TRAINING PROGRAM

## 2023-02-10 PROCEDURE — 6370000000 HC RX 637 (ALT 250 FOR IP): Performed by: PSYCHIATRY & NEUROLOGY

## 2023-02-10 PROCEDURE — 1100000000 HC RM PRIVATE

## 2023-02-10 PROCEDURE — 6360000002 HC RX W HCPCS: Performed by: STUDENT IN AN ORGANIZED HEALTH CARE EDUCATION/TRAINING PROGRAM

## 2023-02-10 PROCEDURE — 99233 SBSQ HOSP IP/OBS HIGH 50: CPT | Performed by: PSYCHIATRY & NEUROLOGY

## 2023-02-10 PROCEDURE — 86140 C-REACTIVE PROTEIN: CPT

## 2023-02-10 PROCEDURE — 2580000003 HC RX 258: Performed by: STUDENT IN AN ORGANIZED HEALTH CARE EDUCATION/TRAINING PROGRAM

## 2023-02-10 PROCEDURE — 2580000003 HC RX 258: Performed by: INTERNAL MEDICINE

## 2023-02-10 PROCEDURE — 96365 THER/PROPH/DIAG IV INF INIT: CPT | Performed by: STUDENT IN AN ORGANIZED HEALTH CARE EDUCATION/TRAINING PROGRAM

## 2023-02-10 PROCEDURE — 83605 ASSAY OF LACTIC ACID: CPT

## 2023-02-10 PROCEDURE — 84145 PROCALCITONIN (PCT): CPT

## 2023-02-10 PROCEDURE — A9579 GAD-BASE MR CONTRAST NOS,1ML: HCPCS | Performed by: STUDENT IN AN ORGANIZED HEALTH CARE EDUCATION/TRAINING PROGRAM

## 2023-02-10 PROCEDURE — 87502 INFLUENZA DNA AMP PROBE: CPT

## 2023-02-10 RX ORDER — SODIUM CHLORIDE 0.9 % (FLUSH) 0.9 %
5-40 SYRINGE (ML) INJECTION PRN
Status: CANCELLED | OUTPATIENT
Start: 2023-02-10

## 2023-02-10 RX ORDER — SODIUM CHLORIDE 9 MG/ML
INJECTION, SOLUTION INTRAVENOUS PRN
Status: DISCONTINUED | OUTPATIENT
Start: 2023-02-10 | End: 2023-02-13 | Stop reason: HOSPADM

## 2023-02-10 RX ORDER — PANTOPRAZOLE SODIUM 40 MG/1
40 TABLET, DELAYED RELEASE ORAL
Status: DISCONTINUED | OUTPATIENT
Start: 2023-02-10 | End: 2023-02-10 | Stop reason: HOSPADM

## 2023-02-10 RX ORDER — ACETAMINOPHEN 325 MG/1
650 TABLET ORAL EVERY 6 HOURS PRN
Status: CANCELLED | OUTPATIENT
Start: 2023-02-10

## 2023-02-10 RX ORDER — MELOXICAM 7.5 MG/1
15 TABLET ORAL DAILY
Status: DISCONTINUED | OUTPATIENT
Start: 2023-02-11 | End: 2023-02-10 | Stop reason: HOSPADM

## 2023-02-10 RX ORDER — SODIUM CHLORIDE 0.9 % (FLUSH) 0.9 %
5-40 SYRINGE (ML) INJECTION PRN
Status: DISCONTINUED | OUTPATIENT
Start: 2023-02-10 | End: 2023-02-10 | Stop reason: HOSPADM

## 2023-02-10 RX ORDER — TRAMADOL HYDROCHLORIDE 50 MG/1
50 TABLET ORAL EVERY 6 HOURS PRN
Status: DISCONTINUED | OUTPATIENT
Start: 2023-02-10 | End: 2023-02-10 | Stop reason: HOSPADM

## 2023-02-10 RX ORDER — HYDROMORPHONE HYDROCHLORIDE 1 MG/ML
1 INJECTION, SOLUTION INTRAMUSCULAR; INTRAVENOUS; SUBCUTANEOUS
Status: DISCONTINUED | OUTPATIENT
Start: 2023-02-10 | End: 2023-02-12

## 2023-02-10 RX ORDER — HYDROMORPHONE HYDROCHLORIDE 1 MG/ML
1 INJECTION, SOLUTION INTRAMUSCULAR; INTRAVENOUS; SUBCUTANEOUS
Status: DISCONTINUED | OUTPATIENT
Start: 2023-02-10 | End: 2023-02-10 | Stop reason: HOSPADM

## 2023-02-10 RX ORDER — SODIUM CHLORIDE 9 MG/ML
INJECTION, SOLUTION INTRAVENOUS PRN
Status: CANCELLED | OUTPATIENT
Start: 2023-02-10

## 2023-02-10 RX ORDER — HYDROMORPHONE HYDROCHLORIDE 1 MG/ML
1 INJECTION, SOLUTION INTRAMUSCULAR; INTRAVENOUS; SUBCUTANEOUS
Status: CANCELLED | OUTPATIENT
Start: 2023-02-10

## 2023-02-10 RX ORDER — KETOROLAC TROMETHAMINE 30 MG/ML
15 INJECTION, SOLUTION INTRAMUSCULAR; INTRAVENOUS EVERY 6 HOURS
Status: DISCONTINUED | OUTPATIENT
Start: 2023-02-10 | End: 2023-02-10 | Stop reason: HOSPADM

## 2023-02-10 RX ORDER — ACETAMINOPHEN 325 MG/1
650 TABLET ORAL EVERY 6 HOURS PRN
Status: DISCONTINUED | OUTPATIENT
Start: 2023-02-10 | End: 2023-02-13 | Stop reason: HOSPADM

## 2023-02-10 RX ORDER — SODIUM CHLORIDE 0.9 % (FLUSH) 0.9 %
10 SYRINGE (ML) INJECTION AS NEEDED
Status: DISCONTINUED | OUTPATIENT
Start: 2023-02-10 | End: 2023-02-13 | Stop reason: HOSPADM

## 2023-02-10 RX ORDER — ACETAMINOPHEN 650 MG/1
650 SUPPOSITORY RECTAL EVERY 6 HOURS PRN
Status: DISCONTINUED | OUTPATIENT
Start: 2023-02-10 | End: 2023-02-10 | Stop reason: HOSPADM

## 2023-02-10 RX ORDER — PANTOPRAZOLE SODIUM 40 MG/1
40 TABLET, DELAYED RELEASE ORAL
Status: DISCONTINUED | OUTPATIENT
Start: 2023-02-11 | End: 2023-02-13 | Stop reason: HOSPADM

## 2023-02-10 RX ORDER — ONDANSETRON 2 MG/ML
4 INJECTION INTRAMUSCULAR; INTRAVENOUS EVERY 6 HOURS PRN
Status: CANCELLED | OUTPATIENT
Start: 2023-02-10

## 2023-02-10 RX ORDER — MELOXICAM 7.5 MG/1
15 TABLET ORAL DAILY
Status: DISCONTINUED | OUTPATIENT
Start: 2023-02-10 | End: 2023-02-10

## 2023-02-10 RX ORDER — SODIUM CHLORIDE 0.9 % (FLUSH) 0.9 %
5-40 SYRINGE (ML) INJECTION PRN
Status: DISCONTINUED | OUTPATIENT
Start: 2023-02-10 | End: 2023-02-13 | Stop reason: HOSPADM

## 2023-02-10 RX ORDER — SODIUM CHLORIDE 0.9 % (FLUSH) 0.9 %
10 SYRINGE (ML) INJECTION AS NEEDED
Status: DISCONTINUED | OUTPATIENT
Start: 2023-02-10 | End: 2023-02-10 | Stop reason: HOSPADM

## 2023-02-10 RX ORDER — ACETAMINOPHEN 650 MG/1
650 SUPPOSITORY RECTAL EVERY 6 HOURS PRN
Status: DISCONTINUED | OUTPATIENT
Start: 2023-02-10 | End: 2023-02-13 | Stop reason: HOSPADM

## 2023-02-10 RX ORDER — PANTOPRAZOLE SODIUM 40 MG/1
40 TABLET, DELAYED RELEASE ORAL
Status: DISCONTINUED | OUTPATIENT
Start: 2023-02-10 | End: 2023-02-10

## 2023-02-10 RX ORDER — ONDANSETRON 4 MG/1
4 TABLET, ORALLY DISINTEGRATING ORAL EVERY 8 HOURS PRN
Status: CANCELLED | OUTPATIENT
Start: 2023-02-10

## 2023-02-10 RX ORDER — SODIUM CHLORIDE 0.9 % (FLUSH) 0.9 %
5-40 SYRINGE (ML) INJECTION EVERY 12 HOURS SCHEDULED
Status: CANCELLED | OUTPATIENT
Start: 2023-02-10

## 2023-02-10 RX ORDER — TRAMADOL HYDROCHLORIDE 50 MG/1
50 TABLET ORAL EVERY 6 HOURS PRN
Status: DISCONTINUED | OUTPATIENT
Start: 2023-02-10 | End: 2023-02-13 | Stop reason: HOSPADM

## 2023-02-10 RX ORDER — ONDANSETRON 2 MG/ML
4 INJECTION INTRAMUSCULAR; INTRAVENOUS EVERY 6 HOURS PRN
Status: DISCONTINUED | OUTPATIENT
Start: 2023-02-10 | End: 2023-02-10 | Stop reason: HOSPADM

## 2023-02-10 RX ORDER — POLYETHYLENE GLYCOL 3350 17 G/17G
17 POWDER, FOR SOLUTION ORAL DAILY PRN
Status: CANCELLED | OUTPATIENT
Start: 2023-02-10

## 2023-02-10 RX ORDER — ONDANSETRON 4 MG/1
4 TABLET, ORALLY DISINTEGRATING ORAL EVERY 8 HOURS PRN
Status: DISCONTINUED | OUTPATIENT
Start: 2023-02-10 | End: 2023-02-13 | Stop reason: HOSPADM

## 2023-02-10 RX ORDER — TRAMADOL HYDROCHLORIDE 50 MG/1
50 TABLET ORAL EVERY 6 HOURS PRN
Status: CANCELLED | OUTPATIENT
Start: 2023-02-10

## 2023-02-10 RX ORDER — KETOROLAC TROMETHAMINE 30 MG/ML
15 INJECTION, SOLUTION INTRAMUSCULAR; INTRAVENOUS EVERY 6 HOURS
Status: DISCONTINUED | OUTPATIENT
Start: 2023-02-10 | End: 2023-02-13 | Stop reason: HOSPADM

## 2023-02-10 RX ORDER — SODIUM CHLORIDE 0.9 % (FLUSH) 0.9 %
5-40 SYRINGE (ML) INJECTION EVERY 12 HOURS SCHEDULED
Status: DISCONTINUED | OUTPATIENT
Start: 2023-02-10 | End: 2023-02-13 | Stop reason: HOSPADM

## 2023-02-10 RX ORDER — ACETAMINOPHEN 325 MG/1
650 TABLET ORAL EVERY 6 HOURS PRN
Status: DISCONTINUED | OUTPATIENT
Start: 2023-02-10 | End: 2023-02-10 | Stop reason: HOSPADM

## 2023-02-10 RX ORDER — POLYETHYLENE GLYCOL 3350 17 G/17G
17 POWDER, FOR SOLUTION ORAL DAILY PRN
Status: DISCONTINUED | OUTPATIENT
Start: 2023-02-10 | End: 2023-02-13 | Stop reason: HOSPADM

## 2023-02-10 RX ORDER — ACETAMINOPHEN 650 MG/1
650 SUPPOSITORY RECTAL EVERY 6 HOURS PRN
Status: CANCELLED | OUTPATIENT
Start: 2023-02-10

## 2023-02-10 RX ORDER — BACLOFEN 10 MG/1
10 TABLET ORAL 3 TIMES DAILY
Status: DISCONTINUED | OUTPATIENT
Start: 2023-02-10 | End: 2023-02-13 | Stop reason: HOSPADM

## 2023-02-10 RX ORDER — KETOROLAC TROMETHAMINE 30 MG/ML
30 INJECTION, SOLUTION INTRAMUSCULAR; INTRAVENOUS EVERY 6 HOURS PRN
Status: DISCONTINUED | OUTPATIENT
Start: 2023-02-10 | End: 2023-02-10 | Stop reason: SDUPTHER

## 2023-02-10 RX ORDER — ONDANSETRON 4 MG/1
4 TABLET, ORALLY DISINTEGRATING ORAL EVERY 8 HOURS PRN
Status: DISCONTINUED | OUTPATIENT
Start: 2023-02-10 | End: 2023-02-10 | Stop reason: HOSPADM

## 2023-02-10 RX ORDER — ONDANSETRON 2 MG/ML
4 INJECTION INTRAMUSCULAR; INTRAVENOUS EVERY 6 HOURS PRN
Status: DISCONTINUED | OUTPATIENT
Start: 2023-02-10 | End: 2023-02-13 | Stop reason: HOSPADM

## 2023-02-10 RX ORDER — SODIUM CHLORIDE 9 MG/ML
INJECTION, SOLUTION INTRAVENOUS PRN
Status: DISCONTINUED | OUTPATIENT
Start: 2023-02-10 | End: 2023-02-10 | Stop reason: HOSPADM

## 2023-02-10 RX ORDER — KETOROLAC TROMETHAMINE 30 MG/ML
30 INJECTION, SOLUTION INTRAMUSCULAR; INTRAVENOUS EVERY 6 HOURS PRN
Status: DISCONTINUED | OUTPATIENT
Start: 2023-02-10 | End: 2023-02-10

## 2023-02-10 RX ORDER — KETOROLAC TROMETHAMINE 30 MG/ML
15 INJECTION, SOLUTION INTRAMUSCULAR; INTRAVENOUS EVERY 6 HOURS
Status: CANCELLED | OUTPATIENT
Start: 2023-02-10 | End: 2023-02-15

## 2023-02-10 RX ORDER — ACETAMINOPHEN 500 MG
1000 TABLET ORAL EVERY 6 HOURS PRN
Status: DISCONTINUED | OUTPATIENT
Start: 2023-02-10 | End: 2023-02-10 | Stop reason: SDUPTHER

## 2023-02-10 RX ORDER — SODIUM CHLORIDE 0.9 % (FLUSH) 0.9 %
10 SYRINGE (ML) INJECTION AS NEEDED
Status: CANCELLED | OUTPATIENT
Start: 2023-02-10

## 2023-02-10 RX ORDER — ENOXAPARIN SODIUM 100 MG/ML
30 INJECTION SUBCUTANEOUS 2 TIMES DAILY
Status: DISCONTINUED | OUTPATIENT
Start: 2023-02-10 | End: 2023-02-10

## 2023-02-10 RX ORDER — POLYETHYLENE GLYCOL 3350 17 G/17G
17 POWDER, FOR SOLUTION ORAL DAILY PRN
Status: DISCONTINUED | OUTPATIENT
Start: 2023-02-10 | End: 2023-02-10 | Stop reason: HOSPADM

## 2023-02-10 RX ORDER — PANTOPRAZOLE SODIUM 40 MG/1
40 TABLET, DELAYED RELEASE ORAL
Status: CANCELLED | OUTPATIENT
Start: 2023-02-10

## 2023-02-10 RX ORDER — SODIUM CHLORIDE 0.9 % (FLUSH) 0.9 %
5-40 SYRINGE (ML) INJECTION EVERY 12 HOURS SCHEDULED
Status: DISCONTINUED | OUTPATIENT
Start: 2023-02-10 | End: 2023-02-10 | Stop reason: HOSPADM

## 2023-02-10 RX ADMIN — HYDROMORPHONE HYDROCHLORIDE 1 MG: 1 INJECTION, SOLUTION INTRAMUSCULAR; INTRAVENOUS; SUBCUTANEOUS at 11:11

## 2023-02-10 RX ADMIN — Medication 2500 MG: at 01:41

## 2023-02-10 RX ADMIN — SODIUM CHLORIDE, PRESERVATIVE FREE 10 ML: 5 INJECTION INTRAVENOUS at 02:28

## 2023-02-10 RX ADMIN — KETOROLAC TROMETHAMINE 15 MG: 15 INJECTION, SOLUTION INTRAMUSCULAR; INTRAVENOUS at 00:57

## 2023-02-10 RX ADMIN — GADOTERIDOL 23 ML: 279.3 INJECTION, SOLUTION INTRAVENOUS at 02:28

## 2023-02-10 RX ADMIN — KETOROLAC TROMETHAMINE 15 MG: 30 INJECTION, SOLUTION INTRAMUSCULAR at 21:19

## 2023-02-10 RX ADMIN — CEFEPIME 2000 MG: 2 INJECTION, POWDER, FOR SOLUTION INTRAVENOUS at 01:15

## 2023-02-10 RX ADMIN — SODIUM CHLORIDE, PRESERVATIVE FREE 10 ML: 5 INJECTION INTRAVENOUS at 21:04

## 2023-02-10 RX ADMIN — PANTOPRAZOLE SODIUM 40 MG: 40 TABLET, DELAYED RELEASE ORAL at 08:33

## 2023-02-10 RX ADMIN — HYDROMORPHONE HYDROCHLORIDE 1 MG: 1 INJECTION, SOLUTION INTRAMUSCULAR; INTRAVENOUS; SUBCUTANEOUS at 15:15

## 2023-02-10 RX ADMIN — SODIUM CHLORIDE, PRESERVATIVE FREE 10 ML: 5 INJECTION INTRAVENOUS at 08:33

## 2023-02-10 RX ADMIN — ONDANSETRON 4 MG: 2 INJECTION INTRAMUSCULAR; INTRAVENOUS at 19:22

## 2023-02-10 RX ADMIN — HYDROMORPHONE HYDROCHLORIDE 1 MG: 1 INJECTION, SOLUTION INTRAMUSCULAR; INTRAVENOUS; SUBCUTANEOUS at 19:23

## 2023-02-10 RX ADMIN — ACETAMINOPHEN 650 MG: 325 TABLET, FILM COATED ORAL at 08:33

## 2023-02-10 RX ADMIN — BACLOFEN 10 MG: 10 TABLET ORAL at 21:03

## 2023-02-10 RX ADMIN — KETOROLAC TROMETHAMINE 15 MG: 30 INJECTION, SOLUTION INTRAMUSCULAR at 13:13

## 2023-02-10 ASSESSMENT — PAIN DESCRIPTION - LOCATION
LOCATION: BACK
LOCATION: BACK;HIP
LOCATION: BACK
LOCATION: HIP;SACRUM

## 2023-02-10 ASSESSMENT — PAIN DESCRIPTION - DESCRIPTORS
DESCRIPTORS: ACHING
DESCRIPTORS: ACHING;SHARP
DESCRIPTORS: THROBBING;SHARP

## 2023-02-10 ASSESSMENT — PAIN SCALES - GENERAL
PAINLEVEL_OUTOF10: 7
PAINLEVEL_OUTOF10: 4
PAINLEVEL_OUTOF10: 10
PAINLEVEL_OUTOF10: 7
PAINLEVEL_OUTOF10: 9
PAINLEVEL_OUTOF10: 10
PAINLEVEL_OUTOF10: 9
PAINLEVEL_OUTOF10: 9

## 2023-02-10 ASSESSMENT — PAIN DESCRIPTION - ORIENTATION
ORIENTATION: RIGHT;LEFT;MID
ORIENTATION: MID
ORIENTATION: MID;LOWER

## 2023-02-10 ASSESSMENT — PAIN DESCRIPTION - PAIN TYPE: TYPE: ACUTE PAIN

## 2023-02-10 ASSESSMENT — PAIN DESCRIPTION - FREQUENCY: FREQUENCY: INTERMITTENT

## 2023-02-10 ASSESSMENT — PAIN DESCRIPTION - ONSET: ONSET: GRADUAL

## 2023-02-10 ASSESSMENT — PAIN - FUNCTIONAL ASSESSMENT: PAIN_FUNCTIONAL_ASSESSMENT: PREVENTS OR INTERFERES SOME ACTIVE ACTIVITIES AND ADLS

## 2023-02-10 NOTE — ED TRIAGE NOTES
Pt to er c/o fall 1.5 wks ago, sts he does not want any narcotic pain medicine as he is a recovering drug addict, sts he fell 1.5 wks ago and has left arm pain and back pain, sts he has been to orthopedic doctor and now has numbness and tingling in his back, no loss of bowel or bladder function, he drove to the er.

## 2023-02-10 NOTE — PROGRESS NOTES
ACUTE PHYSICAL THERAPY GOALS:   (Developed with and agreed upon by patient and/or caregiver.)  STG:  (1.)Mr. Felipe Hancock will move from supine to sit and sit to supine  and roll side to side with INDEPENDENCE within 5 treatment day(s). (2.)Mr. Felipe Hancock will transfer from bed to chair and chair to bed with INDEPENDENT using the least restrictive device within 5 treatment day(s). (3.)Mr. Felipe Hancock will ambulate with INDEPENDENCE for 650 feet with the least restrictive device within 5 treatment day(s). (4.)Pt. will climb up/down 5 steps with rail independently within 5 days  (5.)Pt. will demonstrate a decrease in pain and radiating symptoms and centralize to lumbar spine with a rating of 3/10      ________________________________________________________________________________________________     PHYSICAL THERAPY Initial Assessment and AM  (Link to Caseload Tracking: PT Visit Days : 1  Acknowledge Orders  Time In/Out  PT Charge Capture  Rehab Caseload Tracker    Ayush Bradford is a 38 y.o. male   PRIMARY DIAGNOSIS: Neuropathy  Neuropathy [G62.9]  Abnormal MRI, spine [R93.7]  Fever, unspecified fever cause [R50.9]  Acute midline low back pain with right-sided sciatica [M54.41]       Reason for Referral: Other abnormalities of gait and mobility (R26.89)  History of falling (Z91.81)  Low Back Pain (M54.5)  Inpatient: Payor: [employer] / Plan: Kayla YEPEZ WC 1500 / Product Type: *No Product type* /     ASSESSMENT:     REHAB RECOMMENDATIONS:   Recommendation to date pending progress:  Setting:  Outpatient Therapy    Equipment:     Don't feel like he needs a device, but will see how he progresses     ASSESSMENT:  Mr. Felipe Hancock is admitted with increase in low back pain and numbness and tingling in both Les. He has a hx of L3-S1 fusion and is experiencing new symptoms. He would benefit from further PT while here pending neuro recommendations.  He is experiencing increased shooting pain and tingling in B Les, residual L ankle weakness from prior fusion surgery, decreased standing and ambulation tolerance and need for supervision for functional mobility for safety. He plans on returning home to his apt with his roommates at [employer]. May benefit from OP PT for LBP pending neuro recommendations. This am, he performs bed mobility and transfers with supervision. He amb 48' without a device and supervision. He does express pain and discomfort in his low back \"under his surgery site\" and shooting pain and tingling sensation into B Les when walking. He is uncomfortable when sitting, standing, walking and finds comfort in L sidelying. .     [address] \[facility] [place] [facility]\"" [address] [address] AM-PAC 6 Clicks Basic Mobility Inpatient Short Form  AM-PAC Basic Mobility - Inpatient   How much help is needed turning from your back to your side while in a flat bed without using bedrails?: None  How much help is needed moving from lying on your back to sitting on the side of a flat bed without using bedrails?: None  How much help is needed moving to and from a bed to a chair?: None  How much help is needed standing up from a chair using your arms?: None  How much help is needed walking in hospital room?: None  How much help is needed climbing 3-5 steps with a railing?: A Little  [facility] Inpatient Mobility Raw Score : 23  AM-PAC Inpatient T-Scale Score : 56.93  Mobility Inpatient CMS 0-100% Score: 11.2  Mobility Inpatient CMS G-Code Modifier : CI    SUBJECTIVE:   Mr. Astrid Mixon states, \"I haven't slept in days. I'm in  such pain.  I just need some pain medicine\"     Social/Functional Lives With:  (roommates)  Type of Home: Apartment  Home Layout: One level  Home Access: Level entry  Bathroom Shower/Tub: Tub/Shower unit  Bathroom Toilet: Standard  Receives Help From: Friend(s)  ADL Assistance: Independent  Homemaking Assistance: Independent  Ambulation Assistance: Independent  Transfer Assistance: Independent  Active : Yes  Mode of Transportation: Car  Occupation: Full time employment (works as a  at [employer]. 12 hr shifts)    OBJECTIVE:     PAIN: VITALS / O2: PRECAUTION / Duaine Royals / DRAINS:   Pre Treatment:   Pain Assessment: 0-10  Pain Level: 10  Pain Location: Back  Non-Pharmaceutical Pain Intervention(s): Ambulation/Increased Activity; Emotional support;Repositioned;Nurse notified (comment) (RN aware he asked for pain med-non narcotic)      Post Treatment: 5, sidelying with pillow between legs Vitals        Oxygen      None    RESTRICTIONS/PRECAUTIONS:  Restrictions/Precautions: Fall Risk                 GROSS EVALUATION: Intact Impaired (Comments):   AROM [x]  B LEs   PROM []    Strength [] Strength RLE  Strength RLE: WFL  Comment: grossly 4/5  Strength LLE  Strength LLE: Exception  L Hip Flexion: 4/5  L Knee Extension: 4/5  L Ankle Dorsiflexion: 1/5  L Ankle Plantar Flexion: 1/5   Balance []  Slight decrease standing   Posture [] Rounded Shoulders  Painful to stand erect, flexed at hips   Sensation [] Overall Sensation Status: Impaired  Light Touch: Severe deficits in the LLE (L4-5dermatomes)  Sharp/Dull: Severe deficits in the LLE (L4-5 dermatomes)   Coordination [x]   B LEs   Tone []  Decreased at L ankle due to footdrop from prior back fusion   Edema []    Activity Tolerance []  Decreased:in pain and hasn't slept    []      COGNITION/  PERCEPTION: Intact Impaired (Comments):   Orientation [x]     Vision [x]     Hearing [x]     Cognition  [x]       MOBILITY: I Mod I S SBA CGA Min Mod Max Total  NT x2 Comments:   Bed Mobility    Rolling [] [] [x] [] [] [] [] [] [] [] []    Supine to Sit [] [] [x] [] [] [] [] [] [] [] []    Scooting [] [] [x] [] [] [] [] [] [] [] []    Sit to Supine [] [] [x] [] [] [] [] [] [] [] []    Transfers    Sit to Stand [] [] [x] [] [] [] [] [] [] [] []    Bed to Chair [] [] [] [] [] [] [] [] [] [] []    Stand to Sit [] [] [x] [] [] [] [] [] [] [] []    SPT [] [] [x] [] [] [] [] [] [] [] []    I=Independent, Mod I=Modified Independent, S=Supervision, SBA=Standby Assistance, CGA=Contact [place],   Min=Minimal Assistance, Mod=Moderate Assistance, Max=Maximal Assistance, Total=Total Assistance, NT=Not Tested    GAIT: I Mod I S SBA CGA Min Mod Max Total  NT x2 Comments:   Level of Assistance [] [] [x] [] [] [] [] [] [] [] []    Distance 50 feet    DME None    Gait Quality Antalgic, Foot drop, and stooped posture. Painful to stand erect    Weightbearing Status Restrictions/Precautions  Restrictions/Precautions: Fall Risk    Stairs      I=Independent, Mod I=Modified Independent, S=Supervision, SBA=Standby Assistance, CGA=Contact Guard Assistance,   Min=Minimal Assistance, Mod=Moderate Assistance, Max=Maximal Assistance, Total=Total Assistance, NT=Not Tested    PLAN:   FREQUENCY AND DURATION:  (2 more visits) for duration of hospital stay or until stated goals are met, whichever comes first.    THERAPY PROGNOSIS: Fair    PROBLEM LIST:   (Skilled intervention is medically necessary to address:)  Decreased ADL/Functional Activities  Decreased Activity Tolerance  Decreased Gait Ability  Decreased Strength  Decreased Transfer Abilities  Increased Pain INTERVENTIONS PLANNED:   (Benefits and precautions of physical therapy have been discussed with the patient.)  Self Care Training  Therapeutic Activity  Therapeutic Exercise/HEP  Gait Training  Modalities  Education       TREATMENT:   EVALUATION: LOW COMPLEXITY: (Untimed Charge)    TREATMENT:   NA    TREATMENT GRID:  N/A    AFTER TREATMENT PRECAUTIONS: Bed, Bed/Chair Locked, Call light within reach, Needs within reach, RN notified, and Side rails x3    INTERDISCIPLINARY COLLABORATION:  RN/ PCT and PT/ PTA    EDUCATION: Education Given To: Patient  Education Provided: Role of Therapy;Plan of Care;Precautions; Fall Prevention Strategies  Education Method: Demonstration;Verbal  Education Outcome: Verbalized understanding;Demonstrated understanding    TIME IN/OUT:  Time In: 2468  Time Out: 0805  Minutes: Héctor Sanchez PT

## 2023-02-10 NOTE — H&P
Hospitalist History and Physical   Admit Date:  2023 11:15 PM   Name:  Paris Beltran   Age:  38 y.o. Sex:  male  :  1980   MRN:  [id number]     Presenting Complaint: back pain and BLE paresthesias  Reason(s) for Admission: Neuropathy [G62.9]  Abnormal MRI, spine [R93.7]  Fever, unspecified fever cause [R50.9]  Acute midline low back pain with right-sided sciatica [M54.41]     History of Present Illness:   Paris Beltran is a 38 y.o. male with medical history of prior IVDA, hepatitis C who presented to ED with low back pain and BLE paresthesias. Patient reports back pain started about 10 days ago. He reports associated BLE \"tingling\" as well. He does have h/o spinal abscess. Upon ER evaluation, MRI was obtained which shows:  1. Extensive edema and enhancement in and around the right L3 nerve as it exits    the neural foramen and courses along the posterior aspect of the psoas muscle. The nerve is expanded and edematous and there is edema and enhancement    throughout the surrounding soft tissues including the posterior aspect of the    psoas and anterior aspect of the posterior paravertebral muscles. The etiology    is unclear. There is high-grade right L2-L3 foraminal stenosis and impingement    on the L3 nerve in the foramen, but the degree of inflammation and edema is    greater than expected from nerve impingement and raises the possibility of an    infection involving the nerve. No drainable fluid collection/abscess. 2. No evidence of discitis-osteomyelitis. 3. Prior posterior pedicle screw and preeti fusion of L3-S1 again noted. The    lucency surrounding the L4 screws and fusion rods seen on prior CT abdomen not    as well visualized with MRI. 4. Severe degenerative change at L3-L4 adjacent to the fusion, including a    moderate annular disc bulge and markedly severe bilateral facet arthrosis.     Severe spinal canal stenosis with complete effacement of the CSF, narrowing of    the AP diameter of the thecal sac to 2 mm, and impingement on all bilateral    descending L4, L5 and sacral nerve roots. There is also high-grade bilateral    foraminal stenosis at L3-L4 with evidence of mass effect on the exiting    bilateral L3 nerves. Edema and enhancement surrounding the L3-L4 facets is    favored to be degenerative given the severity of the arthropathy, but infection    of the hardware cannot be completely excluded. 5. No epidural mass or collection. There is clumping of the nerve roots below    the L3-L4 impingement, which is age indeterminant. No associated enhancement to    suggest acute arachnoiditis. Given concerning findings, Neurosurgery was consulted. Dr. Junior Shoemaker believes that this is likely non-surgical at this time and recommended Hospitalist to admit with consultation to Neurology. Review of Systems:  10 systems reviewed and negative except as noted in HPI. Assessment & Plan:   * Neuropathy  Assessment & Plan  - BLE paresthesias  - Multiple chronic and some acute findings on MRI  - Cncerning finding is the \"extensive edema\" around L3 nerve root. No obvious abscess noted. No evidence of discitis/osteo  - Also concerning finding noted in read at #4, the patient has \"severe spinal canal stenosis with complete effacement of the CSF\" and \"impingement on all bilateral descending L4/L5 and sacral nerve roots\"  - Have consulted with Neurosurgery, Dr. Junior Shoemaker aware of patient  - Consulting with Neurology as per Neurosurgery recommendation as well  - Of note, while patient is having significant pain, he does NOT want any narcotics for fear of relapse. PRN toradol for now    Chronic hepatitis C without hepatic coma ([facility] [address]  Assessment & Plan  - Of note  - Home meds      Disposition: inpatient    Past medical history reviewed. Past Medical History:   Diagnosis Date    Infectious disease     hep C     Past surgical history reviewed.     Past Surgical History: Procedure Laterality Date    ORTHOPEDIC SURGERY      spinal fusion      Allergies   Allergen Reactions    Ethanol-Alcohol [Ethanol]       Social History     Tobacco Use    Smoking status: Every Day    Smokeless tobacco: Not on file   Substance Use Topics    Alcohol use: Not Currently      History reviewed. No pertinent family history. Family history reviewed and noncontributory to patient's acute condition; no relevant family history unless otherwise noted above. There is no immunization history on file for this patient. PTA Medications:  Current Outpatient Medications   Medication Instructions    glecaprevir-pibrentasvir (MAVYRET) 100-40 MG TABS tablet 3 tablets, Oral, ONCE, Pt takes at mid day with meals    ketorolac (TORADOL) 10 mg, Oral, EVERY 6 HOURS PRN    meloxicam (MOBIC) 15 mg, Oral, DAILY    methylPREDNISolone (MEDROL DOSEPACK) 4 MG tablet Follow package instructions    pantoprazole (PROTONIX) 40 mg, Oral, 2 TIMES DAILY BEFORE MEALS       Objective:   Patient Vitals for the past 24 hrs:   Temp Pulse Resp BP SpO2   02/10/23 0750 99.3 °F (37.4 °C) 93 18 125/77 96 %   02/10/23 0300 -- 100 16 138/78 96 %   02/09/23 2244 (!) 101 °F (38.3 °C) (!) 108 18 138/83 98 %          Estimated body mass index is 33.91 kg/m² as calculated from the following:    Height as of this encounter: 6' (1.829 m). Weight as of this encounter: 250 lb (113.4 kg). No intake or output data in the 24 hours ending 02/10/23 0810      Physical Exam:  General:    Well nourished. No overt distress  Head:  Normocephalic, atraumatic  Eyes:  Sclerae appear normal.  Pupils equally round. HENT:  Nares appear normal, no drainage. Moist mucous membranes  Neck:  No restricted ROM. Trachea midline  CV:   RRR. S1/S2 auscultated  Lungs:   CTAB. No wheezing, rhonchi, or rales. Appears even, unlabored  Abdomen: Bowel sounds present. Soft, nontender, nondistended. Extremities: Warm and dry. No cyanosis or clubbing. No edema. Skin:     No rashes. Normal turgor. Normal coloration  Neuro:  Cranial nerves II-XII grossly intact. BLE with tingling  Psych:  Normal mood and affect.   Alert and oriented x3    Data Ordered and Personally Reviewed:    Last 24hr Labs:  Recent Results (from the past 24 hour(s))   COVID-19, Rapid    Collection Time: 02/09/23 11:44 PM    Specimen: Nasopharyngeal   Result Value Ref Range    Source Nasopharyngeal      SARS-CoV-2, Rapid Not detected NOTD     Influenza A/B, Molecular    Collection Time: 02/10/23 12:03 AM    Specimen: Not Specified   Result Value Ref Range    Influenza A, ARNEL Not detected NOTD      Influenza B, ARNEL Not detected NOTD     CBC with Auto Differential    Collection Time: 02/10/23 12:54 AM   Result Value Ref Range    WBC 10.7 4.3 - 11.1 K/uL    RBC 4.21 (L) 4.23 - 5.6 M/uL    Hemoglobin 13.4 (L) 13.6 - 17.2 g/dL    Hematocrit 39.7 (L) 41.1 - 50.3 %    MCV 94.3 82.0 - 102.0 FL    MCH 31.8 26.1 - 32.9 PG    MCHC 33.8 31.4 - 35.0 g/dL    RDW 12.8 11.9 - 14.6 %    Platelets 018 520 - 854 K/uL    MPV 8.1 (L) 9.4 - 12.3 FL    nRBC 0.00 0.0 - 0.2 K/uL    Differential Type AUTOMATED      Seg Neutrophils 65 43 - 78 %    Lymphocytes 21 13 - 44 %    Monocytes 12 4.0 - 12.0 %    Eosinophils % 1 0.5 - 7.8 %    Basophils 0 0.0 - 2.0 %    Immature Granulocytes 1 0.0 - 5.0 %    Segs Absolute 7.0 1.7 - 8.2 K/UL    Absolute Lymph # 2.3 0.5 - 4.6 K/UL    Absolute Mono # 1.3 0.1 - 1.3 K/UL    Absolute Eos # 0.1 0.0 - 0.8 K/UL    Basophils Absolute 0.0 0.0 - 0.2 K/UL    Absolute Immature Granulocyte 0.1 0.0 - 0.5 K/UL   Comprehensive Metabolic Panel    Collection Time: 02/10/23 12:54 AM   Result Value Ref Range    Sodium 136 133 - 143 mmol/L    Potassium 4.0 3.5 - 5.1 mmol/L    Chloride 102 101 - 110 mmol/L    CO2 29 21 - 32 mmol/L    Anion Gap 5 2 - 11 mmol/L    Glucose 136 (H) 65 - 100 mg/dL    BUN 25 (H) 6 - 23 MG/DL    Creatinine 0.75 (L) 0.8 - 1.5 MG/DL    Est, Glom Filt Rate >60 >60 ml/min/1.73m2 Calcium 9.1 8.3 - 10.4 MG/DL    Total Bilirubin 0.5 0.2 - 1.1 MG/DL    ALT 31 12 - 65 U/L    AST 20 15 - 37 U/L    Alk Phosphatase 80 50 - 136 U/L    Total Protein 8.6 (H) 6.3 - 8.2 g/dL    Albumin 3.5 3.5 - 5.0 g/dL    Globulin 5.1 (H) 2.8 - 4.5 g/dL    Albumin/Globulin Ratio 0.7 0.4 - 1.6     Lactate, Sepsis    Collection Time: 02/10/23 12:54 AM   Result Value Ref Range    Lactic Acid, Sepsis 1.4 0.4 - 2.0 MMOL/L   Procalcitonin    Collection Time: 02/10/23 12:54 AM   Result Value Ref Range    Procalcitonin <0.05 0.00 - 0.49 ng/mL   Sedimentation Rate    Collection Time: 02/10/23 12:54 AM   Result Value Ref Range    Sed Rate, Automated 36 (H) 0 - 20 mm/hr   Basic Metabolic Panel w/ Reflex to MG    Collection Time: 02/10/23  7:15 AM   Result Value Ref Range    Sodium 136 133 - 143 mmol/L    Potassium 3.9 3.5 - 5.1 mmol/L    Chloride 104 101 - 110 mmol/L    CO2 29 21 - 32 mmol/L    Anion Gap 3 2 - 11 mmol/L    Glucose 114 (H) 65 - 100 mg/dL    BUN 21 6 - 23 MG/DL    Creatinine 0.72 (L) 0.8 - 1.5 MG/DL    Est, Glom Filt Rate >60 >60 ml/min/1.73m2    Calcium 8.4 8.3 - 10.4 MG/DL       Signed:  Emily Guzmán MD

## 2023-02-10 NOTE — ED PROVIDER NOTES
Emergency Department Provider Note                   PCP:                On File Not (Inactive)               Age: 38 y.o. Sex: male       ICD-10-CM    1. Fever, unspecified fever cause  R50.9       2. Abnormal MRI, spine  R93.7       3. Acute midline low back pain with right-sided sciatica  M54.41           DISPOSITION Decision To Admit 02/10/2023 03:55:42 AM        Medical Decision Making  44-year-old male presents to the emergency department with back pain, arrives febrile tachycardic. Will obtain a broad-based work-up. History of discitis and IV drug abuse, states he relapsed since completing his antibiotics for his prior discitis. Will give Toradol for pain. Obtain a broad-based work-up including blood cultures. We will also obtain stat MRI of the thoracic and lumbar spine given his history and symptoms on presentation. Patient given empiric antibiotics vancomycin and cefepime. Lab work showed normal white count, stable H&H, normal electrolytes, normal kidney function, normal LFTs, normal lactic acid, Pro-José Manuel is normal, sed rate is 36, blood cultures obtained. Flu and COVID both negative. MRI shows extensive edema enhancing around the right L3 nerve as it exits The spine, stenosis and impingement present radiology interpreted the degree of inflammation is greater than expected for strictly impingement etiology concern for underlying infection involving the nerve root. No other emergent findings, no evidence of osteomyelitis or discitis. Given patient's fever, history of discitis with abnormal findings on MRI, patient would benefit from admission. Hospitalist consulted for admission, MRI findings also discussed with neurosurgery, Dr. Juan Stevenson. Amount and/or Complexity of Data Reviewed  External Data Reviewed: notes. Labs: ordered. Decision-making details documented in ED Course. Radiology: ordered. Risk  Prescription drug management. Decision regarding hospitalization. Orders Placed This Encounter   Procedures    Blood Culture 1    Blood Culture 2    COVID-19, Rapid    Influenza A/B, Molecular    MRI LUMBAR SPINE W WO CONTRAST    MRI THORACIC SPINE W WO CONTRAST    CBC with Auto Differential    Comprehensive Metabolic Panel    Lactate, Sepsis    Procalcitonin    Sedimentation Rate    Urinalysis with Reflex to Culture    POCT Urine Dipstick    Measure post void residual        Medications   vancomycin (VANCOCIN) 2500 mg in sodium chloride 0.9 % 500 mL IVPB ( IntraVENous Restarted 2/10/23 0308)   sodium chloride flush 0.9 % injection 10 mL (10 mLs IntraVENous Given 2/10/23 0228)   ketorolac (TORADOL) injection 15 mg (15 mg IntraVENous Given 2/10/23 0057)   cefepime (MAXIPIME) 2,000 mg in sodium chloride 0.9 % 50 mL IVPB (mini-bag) (0 mg IntraVENous Stopped 2/10/23 0145)   gadoteridol (PROHANCE) injection 23 mL (23 mLs IntraVENous Given 2/10/23 0228)       New Prescriptions    No medications on file        Evin Helton is a 38 y.o. male who presents to the Emergency Department with chief complaint of    Chief Complaint   Patient presents with    Back Pain      71-year-old male presents to the emerged department with worsening low back pain over the past 10 days. Did have a fall and has hardware in his lumbar spine. Had a fall approximate 10 days ago at work, reports he saw orthopedics for his left elbow pain and lumbar spine fine pain. He is doing limited his left elbow. States he is felt unwell for the past few days. History of IV drug abuse and history of prior spinal abscess. Also history of hepatitis C. Currently has been sober for 6 months. States his pain is gotten so severe that he came to the emergency department because he does not know what else to do and does not want to relapse. Adamant he does not want any narcotics. Patient reports tingling sensation down both legs.   States his left lower extremity is always weaker than his right and this is baseline. Denies fecal urinary incontinence. Denies saddle anesthesia. Review of Systems    Past Medical History:   Diagnosis Date    Infectious disease     hep C        Past Surgical History:   Procedure Laterality Date    ORTHOPEDIC SURGERY      spinal fusion        History reviewed. No pertinent family history. Social History     Socioeconomic History    Marital status:      Spouse name: None    Number of children: None    Years of education: None    Highest education level: None   Tobacco Use    Smoking status: Every Day   Vaping Use    Vaping Use: Never used   Substance and Sexual Activity    Alcohol use: Not Currently    Drug use: Not Currently     Types: IV     Comment: 3 months recovery from IVDU         Ethanol-alcohol [ethanol]     Previous Medications    GLECAPREVIR-PIBRENTASVIR (MAVYRET) 100-40 MG TABS TABLET    Take 3 tablets by mouth once Pt takes at mid day with meals    KETOROLAC (TORADOL) 10 MG TABLET    Take 1 tablet by mouth every 6 hours as needed for Pain    MELOXICAM (MOBIC) 15 MG TABLET    Take 1 tablet by mouth daily    METHYLPREDNISOLONE (MEDROL DOSEPACK) 4 MG TABLET    Follow package instructions    PANTOPRAZOLE (PROTONIX) 40 MG TABLET    Take 1 tablet by mouth 2 times daily (before meals)        Vitals signs and nursing note reviewed. Patient Vitals for the past 4 hrs:   Pulse Resp BP SpO2   02/10/23 0300 100 16 138/78 96 %          Physical Exam  Vitals and nursing note reviewed. Constitutional:       General: He is not in acute distress. Appearance: Normal appearance. HENT:      Head: Normocephalic. Nose: Nose normal.      Mouth/Throat:      Mouth: Mucous membranes are moist.   Eyes:      Extraocular Movements: Extraocular movements intact. Cardiovascular:      Rate and Rhythm: Regular rhythm. Tachycardia present. Pulses: Normal pulses. Heart sounds: Normal heart sounds.    Pulmonary:      Effort: Pulmonary effort is normal. No respiratory distress. Breath sounds: Normal breath sounds. Abdominal:      General: Abdomen is flat. Palpations: Abdomen is soft. Tenderness: There is no abdominal tenderness. Musculoskeletal:         General: Tenderness present. No swelling. Normal range of motion. Cervical back: Normal range of motion. No rigidity. Comments: Midline lumbar tenderness palpation with prior surgical scars noted. Decreased range of motion bilateral lower extremity secondary to the radiating back pain from this. Normal sensation to both lower extremities but does endorse some paresthesias bilaterally. Skin:     General: Skin is warm. Capillary Refill: Capillary refill takes less than 2 seconds. Findings: No rash. Neurological:      General: No focal deficit present. Mental Status: He is alert and oriented to person, place, and time. Psychiatric:         Mood and Affect: Mood normal.        Procedures    Results for orders placed or performed during the hospital encounter of 02/09/23   COVID-19, Rapid    Specimen: Nasopharyngeal   Result Value Ref Range    Source Nasopharyngeal      SARS-CoV-2, Rapid Not detected NOTD     Influenza A/B, Molecular    Specimen: Not Specified   Result Value Ref Range    Influenza A, ARNEL Not detected NOTD      Influenza B, ARNEL Not detected NOTD     MRI LUMBAR SPINE W WO CONTRAST    Narrative    EXAMINATION: MRI LUMBAR SPINE W WO CONTRAST    DATE: 2/10/2023 2:01 AM     INDICATION: Low back pain, fever, bilateral lower extremity radiculopathy,   history of discitis and IV drug abuse. COMPARISON: CT abdomen 6/16/2022, which included the lumbar spine. TECHNIQUE: Multiplanar, multisequence MRI of the lumbar spine was performed with   and without contrast. 23 mL ProHance injected intravenously. FINDINGS:     Vertebral column:    Redemonstration of prior bilateral pedicle screw and preeti fusion of L4-S1. L4-L5   laminectomy again noted.  Bilateral facet fusion secondary to bone graft from L4   through the sacrum. Lucency around the bilateral L4 pedicle screws and bilateral   fusion rods better appreciated on prior CT scan. No abnormal signal in the signal in the intervertebral discs or marrow of the   vertebral bodies to suggest discitis-osteomyelitis. Extensive edema and inflammatory stranding in and around the right L3 nerve as   it exits the neural foramen and courses along the posterior aspect of the right   psoas muscle, with diffuse edema and enhancement throughout the surrounding soft   tissues, including the right psoas and posterior paravertebral muscle. Redemonstration of markedly severe bilateral facet arthrosis at L3-L4 adjacent   to the fusion. Mild edema and enhancement in the surrounding soft tissues as   well as in the posterior epidural fat at this level is felt to most likely   reflect degenerative arthropathy rather than infection or other acute process. Trace retrolisthesis of L3 on L4 and L4 on L5, otherwise alignment is preserved. Cord and epidural space: The conus medullaris terminates above the lumbar spine and is not evaluated. Evidence of clumping of the cauda equina below L3-L4, consistent with   arachnoiditis, but without enhancement. This is age-indeterminate and could be   related to prior infection rather than the acute process. No epidural mass or collection. Level by level details as follows: At T12-L1 there is no disc herniation, spinal canal stenosis or neural foraminal   stenosis. L1-L2 there is no disc herniation, spinal canal stenosis or neural foraminal   stenosis. At L2-L3 there is a small annular disc bulge. Moderate bilateral facet   arthrosis. Mild spinal canal stenosis. Moderate right neural foraminal stenosis   with evidence of mass effect on the exiting right L2 nerve. A left foraminal   stenosis without visible impingement.     At L3-L4 there is markedly severe bilateral facet arthropathy and there is a   moderate annular disc bulge. Together this causes markedly severe spinal canal   stenosis with complete effacement of the CSF and narrowing of the AP diameter of   the thecal sac to an estimated 2 mm, with impingement on all descending L4, L5   and sacral nerve roots. Additionally, there is moderate-severe bilateral neural   foraminal stenosis with mass effect on the exiting bilateral L3 nerves. At L4-L5 there is no disc herniation. Markedly severe bilateral facet arthrosis. The thecal sac is decompressed by the previous laminectomy. Evidence of   moderate right neural foraminal stenosis with mass effect on the exiting nerve   root secondary to the facet arthropathy. Mild left foraminal stenosis. At L5-S1 there is no disc herniation. Severe bilateral facet arthrosis. No   significant spinal canal or right foraminal stenosis. Mild left foraminal   stenosis without gross evidence of nerve root impingement (neural foramina   slightly obscured by artifact from the surrounding hardware). Impression    1. Extensive edema and enhancement in and around the right L3 nerve as it exits   the neural foramen and courses along the posterior aspect of the psoas muscle. The nerve is expanded and edematous and there is edema and enhancement   throughout the surrounding soft tissues including the posterior aspect of the   psoas and anterior aspect of the posterior paravertebral muscles. The etiology   is unclear. There is high-grade right L2-L3 foraminal stenosis and impingement   on the L3 nerve in the foramen, but the degree of inflammation and edema is   greater than expected from nerve impingement and raises the possibility of an   infection involving the nerve. No drainable fluid collection/abscess. 2. No evidence of discitis-osteomyelitis. 3. Prior posterior pedicle screw and preeti fusion of L3-S1 again noted.  The   lucency surrounding the L4 screws and fusion rods seen on prior CT abdomen not   as well visualized with MRI. 4. Severe degenerative change at L3-L4 adjacent to the fusion, including a   moderate annular disc bulge and markedly severe bilateral facet arthrosis. Severe spinal canal stenosis with complete effacement of the CSF, narrowing of   the AP diameter of the thecal sac to 2 mm, and impingement on all bilateral   descending L4, L5 and sacral nerve roots. There is also high-grade bilateral   foraminal stenosis at L3-L4 with evidence of mass effect on the exiting   bilateral L3 nerves. Edema and enhancement surrounding the L3-L4 facets is   favored to be degenerative given the severity of the arthropathy, but infection   of the hardware cannot be completely excluded. 5. No epidural mass or collection. There is clumping of the nerve roots below   the L3-L4 impingement, which is age indeterminant. No associated enhancement to   suggest acute arachnoiditis. This examination was interpreted by Asim Lombardo M.D. Asim Lombardo M.D.   2/10/2023 3:17:00 AM   MRI THORACIC SPINE W WO CONTRAST    Narrative    EXAMINATION: MRI THORACIC SPINE W WO CONTRAST    DATE: 2/10/2023 2:01 AM     INDICATION: hx of IVDU, hx of discitis, low back pain w/ fever and BLE symptoms,   r/o critical cord compression/abscess;     COMPARISON: MRI thoracic spine June 17, 2022. TECHNIQUE: Sagittal and axial noncontrast images of the thoracic spine were   obtained in the usual manner. Then, following the uneventful intravenous   administration of 23 mL of ProHance contrast, additional axial and fat saturated    sagittal postcontrast T1-weighted images were obtained. There were no   immediate complications. FINDINGS:      Alignment is anatomic. No acute fractures. Chronic Schmorl's node in the superior endplate of T4. Chronic mild compression deformity superior endplate of U27 with 30% height   loss.     At T9-T10 there is loss of disc height and chronic changes within the bone   marrow represents sequela of prior discitis osteomyelitis. STIR hyperintensity   within the T9-T10 disc space and in the adjacent vertebral body marrow,   eccentrically on the right. No significant enhancement or edema within the   adjacent soft tissues convincing for acute inflammation. No abnormal cord signal. No abnormal intrathecal enhancement. No fluid   collection or mass in the spinal canal.     T8-T9: Loss of disc height. Tiny central protrusion. No significant spinal canal   or foraminal narrowing. T9-T10: Loss of disc height. Facet arthropathy. Moderate bilateral foraminal   narrowing. T10-T11: Left paracentral disc protrusion which contours the left ventral thecal   sac. Facet arthropathy. Mild right foraminal narrowing. Soft tissues:  Imaged portions of the chest and upper abdomen are unremarkable. Impression    1. Sequelae of prior discitis osteomyelitis at T9-T10 with loss of disc height   and chronic bone marrow changes. No convincing findings of current infection. If   there is further concern for a low-grade smoldering infection, suggest   follow-up MRI of the thoracic spine in 6-8 weeks. 2.  Loss of disc height and facet arthropathy results in moderate bilateral   foraminal narrowing at T9-T10.   3.  Disc protrusion at T10-T11 without significant spinal canal narrowing. 4.  No cord signal abnormality or cord compression.  No fluid collection in the   spinal canal.           Viral DO Abhi   2/10/2023 3:14:00 AM   CBC with Auto Differential   Result Value Ref Range    WBC 10.7 4.3 - 11.1 K/uL    RBC 4.21 (L) 4.23 - 5.6 M/uL    Hemoglobin 13.4 (L) 13.6 - 17.2 g/dL    Hematocrit 39.7 (L) 41.1 - 50.3 %    MCV 94.3 82.0 - 102.0 FL    MCH 31.8 26.1 - 32.9 PG    MCHC 33.8 31.4 - 35.0 g/dL    RDW 12.8 11.9 - 14.6 %    Platelets 073 860 - 410 K/uL    MPV 8.1 (L) 9.4 - 12.3 FL    nRBC 0.00 0.0 - 0.2 K/uL    Differential Type AUTOMATED      Seg Neutrophils 65 43 - 78 % Lymphocytes 21 13 - 44 %    Monocytes 12 4.0 - 12.0 %    Eosinophils % 1 0.5 - 7.8 %    Basophils 0 0.0 - 2.0 %    Immature Granulocytes 1 0.0 - 5.0 %    Segs Absolute 7.0 1.7 - 8.2 K/UL    Absolute Lymph # 2.3 0.5 - 4.6 K/UL    Absolute Mono # 1.3 0.1 - 1.3 K/UL    Absolute Eos # 0.1 0.0 - 0.8 K/UL    Basophils Absolute 0.0 0.0 - 0.2 K/UL    Absolute Immature Granulocyte 0.1 0.0 - 0.5 K/UL   Comprehensive Metabolic Panel   Result Value Ref Range    Sodium 136 133 - 143 mmol/L    Potassium 4.0 3.5 - 5.1 mmol/L    Chloride 102 101 - 110 mmol/L    CO2 29 21 - 32 mmol/L    Anion Gap 5 2 - 11 mmol/L    Glucose 136 (H) 65 - 100 mg/dL    BUN 25 (H) 6 - 23 MG/DL    Creatinine 0.75 (L) 0.8 - 1.5 MG/DL    Est, Glom Filt Rate >60 >60 ml/min/1.73m2    Calcium 9.1 8.3 - 10.4 MG/DL    Total Bilirubin 0.5 0.2 - 1.1 MG/DL    ALT 31 12 - 65 U/L    AST 20 15 - 37 U/L    Alk Phosphatase 80 50 - 136 U/L    Total Protein 8.6 (H) 6.3 - 8.2 g/dL    Albumin 3.5 3.5 - 5.0 g/dL    Globulin 5.1 (H) 2.8 - 4.5 g/dL    Albumin/Globulin Ratio 0.7 0.4 - 1.6     Lactate, Sepsis   Result Value Ref Range    Lactic Acid, Sepsis 1.4 0.4 - 2.0 MMOL/L   Procalcitonin   Result Value Ref Range    Procalcitonin <0.05 0.00 - 0.49 ng/mL   Sedimentation Rate   Result Value Ref Range    Sed Rate, Automated 36 (H) 0 - 20 mm/hr        MRI LUMBAR SPINE W WO CONTRAST   Final Result      1. Extensive edema and enhancement in and around the right L3 nerve as it exits    the neural foramen and courses along the posterior aspect of the psoas muscle. The nerve is expanded and edematous and there is edema and enhancement    throughout the surrounding soft tissues including the posterior aspect of the    psoas and anterior aspect of the posterior paravertebral muscles. The etiology    is unclear.  There is high-grade right L2-L3 foraminal stenosis and impingement    on the L3 nerve in the foramen, but the degree of inflammation and edema is    greater than expected from nerve impingement and raises the possibility of an    infection involving the nerve. No drainable fluid collection/abscess. 2. No evidence of discitis-osteomyelitis. 3. Prior posterior pedicle screw and preeti fusion of L3-S1 again noted. The    lucency surrounding the L4 screws and fusion rods seen on prior CT abdomen not    as well visualized with MRI. 4. Severe degenerative change at L3-L4 adjacent to the fusion, including a    moderate annular disc bulge and markedly severe bilateral facet arthrosis. Severe spinal canal stenosis with complete effacement of the CSF, narrowing of    the AP diameter of the thecal sac to 2 mm, and impingement on all bilateral    descending L4, L5 and sacral nerve roots. There is also high-grade bilateral    foraminal stenosis at L3-L4 with evidence of mass effect on the exiting    bilateral L3 nerves. Edema and enhancement surrounding the L3-L4 facets is    favored to be degenerative given the severity of the arthropathy, but infection    of the hardware cannot be completely excluded. 5. No epidural mass or collection. There is clumping of the nerve roots below    the L3-L4 impingement, which is age indeterminant. No associated enhancement to    suggest acute arachnoiditis. This examination was interpreted by Reg Resendez M.D. Reg Resendez M.D.    2/10/2023 3:17:00 AM      MRI THORACIC SPINE W WO CONTRAST   Final Result      1. Sequelae of prior discitis osteomyelitis at T9-T10 with loss of disc height    and chronic bone marrow changes. No convincing findings of current infection. If    there is further concern for a low-grade smoldering infection, suggest    follow-up MRI of the thoracic spine in 6-8 weeks. 2.  Loss of disc height and facet arthropathy results in moderate bilateral    foraminal narrowing at T9-T10.    3.  Disc protrusion at T10-T11 without significant spinal canal narrowing.    4.  No cord signal abnormality or cord compression. No fluid collection in the    spinal canal.                Jett Moe, DO    2/10/2023 3:14:00 AM                          Voice dictation software was used during the making of this note. This software is not perfect and grammatical and other typographical errors may be present. This note has not been completely proofread for errors.         Frances Bravo, DO  02/10/23 [id number] [facility], DO  02/10/23 0479

## 2023-02-10 NOTE — ASSESSMENT & PLAN NOTE
- BLE paresthesias  - Multiple chronic and some acute findings on MRI  - Cncerning finding is the \"extensive edema\" around L3 nerve root. No obvious abscess noted. No evidence of discitis/osteo  - Also concerning finding noted in read at #4, the patient has \"severe spinal canal stenosis with complete effacement of the CSF\" and \"impingement on all bilateral descending L4/L5 and sacral nerve roots\"  - Have consulted with Neurosurgery, Dr. Shahana Larkin aware of patient  - Consulting with Neurology as per Neurosurgery recommendation as well  - Of note, while patient is having significant pain, he does NOT want any narcotics for fear of relapse.   PRN toradol for now

## 2023-02-10 NOTE — CONSULTS
[facility]rology [facility]  [address] [facility], [address] [place]            Laisha Pierson is a 38 y.o. male who presents on referral from the hospitalist service. I had requested the patient be transferred downtown because of the desirability of a formal neurological examination. This gentleman has a complex history  He had a back injury and multiple extremity trauma as a young man which resulted in considerable deformity and multiple procedures to the left lower extremity as result of which he has a Charcot foot. At that time he had incurred a lumbar spinal derangement and had a multilevel fusion  Did well with reference to this problem  He has had however chronic pain and does have a past history of MRSA. This resulted in a spinal abscess 18 months ago from which he recovered with no additional impairment  Current symptomatology was precipitated by a fall at work  Prior to that time he reports that his overall function and in terms of his back was on best days at 85% of full normal capacity at times it would dip to 65 or 70% but that overall he was highly functional.  The current issue and increase in back pain was precipitated by a fall on stairs. .  This was not a piece of historical information that have thus far been recorded in the medical chart  The patient did indicate that he had discussed a his increase in back discomfort at the time he had been originally assessed after this fall and had been given a Medrol Dosepak or equivalent for a period of 7 days but had no response from same he did note that he was becoming febrile-out of concern for sepsis he came to the hospital       Past Medical History:   Diagnosis Date    Infectious disease     hep C       Past Surgical History:   Procedure Laterality Date    ORTHOPEDIC SURGERY      spinal fusion        No family history on file.      Social History     Socioeconomic History    Marital status:    Tobacco Use    Smoking status: Every Day   Vaping Use    Vaping Use: Never used   Substance and Sexual Activity    Alcohol use: Not Currently    Drug use: Not Currently     Types: IV     Comment: 3 months recovery from IVDU     Patient works as a . He did indicate that the current fall occurred at his employment    No current facility-administered medications for this encounter. Allergies   Allergen Reactions    Ethanol-Alcohol [Ethanol]        Review of Systems    /84   Pulse 89   Temp 97.9 °F (36.6 °C) (Oral)   Resp 18   SpO2 99%     Neurologic Exam     Pleasant alert cooperative gentleman. Normal cranial nerve function normal mobility in the neck shoulder girdle  Extensive trauma on the inner aspect of the left upper extremity with extensive bruising  He has longstanding obvious findings in the left lower extremity with evidence of a immobile left foot in terms of dorsiflexion or plantarflexion Charcot joint type features he is well muscled  On the right side straight leg raising causes some discomfort at approximately 50 degrees foot dorsiflexion and plantarflexion is intact I do not find any specific weakness of great toe extension or flexion  Knee jerk is suppressed on the left side which is a longstanding feature and is somewhat hyperactive on the right  There is an absent right ankle reflex  No localized sensory impairment          Most recent lipid panels  No results found for: CHOL, CHOLPOCT, CHOLX, CHLST, CHOLV, HDL, HDLPOC, HDLC, LDL, LDLC, VLDLC, VLDL, TGLX, TRIGL    Most recent Hgb A1C  No results found for: HBA1C, [id number]      Assessment/Plan:    I believe the current problem based upon the clear-cut findings in the psoas muscle on the right side on MRI and the edematous nerve root likely represent the results of trauma rather than sepsis based upon the negative inflammatory indices that have been obtained on his laboratory evaluation.   I was not aware of the fall at the time by was given that his original story today  At the present time his neurologic examination is substantially limited by pain but he is gratifyingly not paraplegic  The degree of spinal stenosis however mandates I suspect that he will require decompression  Given that he has a history of MRSA I would concur with Dr. Bree Jung very valid concerns read the potential hazard of immediate intervention  Would try initial clinical approach with ketorolac and baclofen            Kelby Swan MD

## 2023-02-10 NOTE — PROGRESS NOTES
TRANSFER - OUT REPORT:    Verbal report given to Charmayne Divine, RN on Humza Camacho  being transferred to [facility] for routine progression of patient care and neurology/neurosurgery evaluation. Report consisted of patient's Situation, Background, Assessment and   Recommendations(SBAR). Information from the following report(s) Nurse Handoff Report, Adult Overview, and MAR was reviewed with the receiving nurse. Post Falls Assessment: No data recorded  Lines:   Peripheral IV 02/10/23 Right Hand (Active)       Peripheral IV 02/10/23 Right Forearm (Active)        Opportunity for questions and clarification was provided. Awaiting transport at this time. Patient stable at this time.

## 2023-02-10 NOTE — CONSULTS
Neurology consultation    Medical neurological consultation was requested on this patient. The patient is currently at [facility] and therefore any evaluation would need to be by telemedicine. For this type of problem the telemedicine evaluation is inherently other than image review and history taking useless  Clinical history reviewed  There is a 10-day history of evolving back pain and development of lower extremity tingling  Of concern is the fact that he has a prior history of IV drug abuse and a prior history of spinal abscess. MRI reviewed. Dr. Riddle Guess opinion noted    I have acute concern about a possible acute inflammatory and potentially infectious disorder. I have discussed this with Dr. Leydi Hooks    I would recommend a infectious disease consultation    In the light of the presence of hardware and the high-grade stenosis I would like an additional opinion from a nationally known center    I placed a call to [facility] physician access line [phone] at 9:40 AM.  I had previously per Dr. Leydi Hooks requested that the patient's MRI films be forwarded over to [facility]. Dr. Leydi Hooks undertook to expedite this in person from the [facility]    Dr. Leydi Hooks asked with reference to advisability of transfer to the [facility].     I have indicated I would prefer to obtain an opinion from the [facility] prior to perhaps an additional transfer    Clearly however if the overall opinion is this is not a surgical issue he would be better served being seen where serial neurological examinations can be obtained as as noted above there are profound limitations in terms of telemedicine in this regard  Please note that I was informed by the [facility] physician  that that facility is closed to any and all transfers currently  Addendum  I received a telephone call from the neurosurgical physicians assistant and we discussed the case  She confirmed that the MRI films had been received into the [facility] system  She confirmed my suspicion that it would be extremely important to have a formal neurological examination  I discussed the issue with Dr. Ly Aguayo  It is agreed that the patient will be transferred downtown to allow for a formal examination

## 2023-02-10 NOTE — PROGRESS NOTES
Occupational Therapy Note:    Attempted to see patient this AM for occupational therapy evaluation session. Patient was seen by Physical therapy earlier at supervision level. He was admitted with back pain. He drove himself to the ER. He works as a . He was using the urinal by himself because he recently had pain meds otherwise he would get up to use the restroom. He is not having difficulty with ADLs. No skilled OT eval indicated at this time. Patient in agreement with poc. Will discharge OT order.  Thank you for this referral.      Micheline Reed, OT    Rehab Caseload Tracker

## 2023-02-10 NOTE — CARE COORDINATION
02/10/23 1515   Service Assessment   Patient Orientation Alert and Oriented;Person;Place;Situation;Self   Cognition Alert   History Provided By Patient   Primary Caregiver Self   Support Systems Other (Comment)  (room mates)   PCP Verified by CM No  (Discussed referral to [facility] closer to discharge)   Prior Functional Level Independent in ADLs/IADLs   Current Functional Level Independent in ADLs/IADLs   Can patient return to prior living arrangement Yes   Ability to make needs known: Good   Family able to assist with home care needs: Yes   Would you like for me to discuss the discharge plan with any other family members/significant others, and if so, who? No   Financial Resources Other (Comment)  (workers compensation)   CM/SW Referral Other (see comment)  (d/c planning)   Social/Functional History   Lives With Friend(s)   Type of [address] Help From Friend(s)   ADL Assistance Independent   Ambulation Assistance Independent   Transfer Assistance Independent   Active  Yes   Mode of Transportation Car   Discharge Planning   Type of [address] Prior To Admission None   Potential Assistance Needed N/A   DME Ordered? No   Potential Assistance Purchasing Medications No   Type of Home Care Services None   Services At/After Discharge   Transition of Care Consult (CM Consult) Discharge Planning   Services At/After Discharge None   Confirm Follow Up Transport Friends   Condition of Participation: Discharge Planning   The Plan for Transition of Care is related to the following treatment goals: Home with roommates   Interdisciplinary team meeting with attending, CM, nursing, PT and nutritional services for plan of care. CM met with patient for d/c for planning. Patient alert and oriented x 3, independent of ADL's and lives with roommates. He requires no DME and has transportation and able to drive. He works at [employer] and as an [employer] .  He has workers comp currently states he is to have new insurance shortly with his job. He does no have PCP and is agreeable to referral when d/c to [facility]. [facility]

## 2023-02-10 NOTE — PROGRESS NOTES
Dilaudid 1mg given IV for the primary nurse SONIA Malhotra. Patient reported pain 8/10 tailbone and bilateral hip. Will monitor for changes.

## 2023-02-10 NOTE — PROGRESS NOTES
TRANSFER - IN REPORT:    Verbal report received from Josselin Alves [id number] on Steffen Gains  being received [place] [facility] for routine progression of patient care      Report consisted of patient's Situation, Background, Assessment and   Recommendations(SBAR). Information from the following report(s) Nurse Handoff Report was reviewed with the receiving nurse. Opportunity for questions and clarification was provided. Assessment completed upon patient's arrival to unit and care assumed.

## 2023-02-10 NOTE — PROGRESS NOTES
Neurosurgery    Patient admitted with a 10-day history of severe back pain and fever. MRI scanning with contrast of the lumbar spine shows no epidural mass or collection prior surgical fusion L3-S1 and some definite spinal stenosis at L3-4. There is foraminal stenosis as well however edema and enhancement surrounding the L 3-4 facets indicating inflammation and/or infection. Neurology has been consulted. Infectious disease consultation was recommended. Possible evaluation at tertiary care [address] [place] [address]. Assessment/plan: I agree that this patient most probably has acute infection with a history of fever back pain and prior epidural abscesses. He does not have a fluid collection to drain at this time. I do not feel that surgery is needed now however if his infection clears and the source appears to be the hardware then that instrumentation will need to be removed in order to clear the infection. That would be at a later time. Right now, I do not believe that acute surgical intervention is indicated. No charge the patient for this review. Shanell Jasso.  Shahana Larkin MD

## 2023-02-10 NOTE — ED NOTES
Pt reports back and bilateral hip pain after falling 1.5 weeks ago.       Obey Jimenez RN  02/10/23 8665

## 2023-02-10 NOTE — PROGRESS NOTES
AFTER MIDNIGHT UPDATE PROGRESS NOTE:  I personally saw and examined the patient. 38year old male with prior IVDA, hepatitis C, and previous vertebral OM and discitis who presented to ED with low back pain and BLE paresthesias. Patient reports back pain started about 10 days ago. He reports associated BLE \"tingling\" as well. He does have h/o spinal abscess. Upon ER evaluation, MRI was obtained which shows:  1. Extensive edema and enhancement in and around the right L3 nerve as it exits    the neural foramen and courses along the posterior aspect of the psoas muscle. The nerve is expanded and edematous and there is edema and enhancement    throughout the surrounding soft tissues including the posterior aspect of the    psoas and anterior aspect of the posterior paravertebral muscles. The etiology    is unclear. There is high-grade right L2-L3 foraminal stenosis and impingement    on the L3 nerve in the foramen, but the degree of inflammation and edema is    greater than expected from nerve impingement and raises the possibility of an    infection involving the nerve. No drainable fluid collection/abscess. 2. No evidence of discitis-osteomyelitis. 3. Prior posterior pedicle screw and preeti fusion of L3-S1 again noted. The    lucency surrounding the L4 screws and fusion rods seen on prior CT abdomen not    as well visualized with MRI. 4. Severe degenerative change at L3-L4 adjacent to the fusion, including a    moderate annular disc bulge and markedly severe bilateral facet arthrosis. Severe spinal canal stenosis with complete effacement of the CSF, narrowing of    the AP diameter of the thecal sac to 2 mm, and impingement on all bilateral    descending L4, L5 and sacral nerve roots. There is also high-grade bilateral    foraminal stenosis at L3-L4 with evidence of mass effect on the exiting    bilateral L3 nerves.  Edema and enhancement surrounding the L3-L4 facets is    favored to be degenerative given the severity of the arthropathy, but infection    of the hardware cannot be completely excluded. 5. No epidural mass or collection. There is clumping of the nerve roots below    the L3-L4 impingement, which is age indeterminant. No associated enhancement to    suggest acute arachnoiditis. Given concerning findings, Neurosurgery was consulted. Dr. Sofia Olmos believes that this is likely non-surgical based on imaging. Neurology also looked at imaging and requested transfer DT for neuro exam and possible surgery. The patient complains of 10/10 back pain and is asking for opiates despite declining them at first. LE numbess the same as admission. Physical Exam:   General:        Well nourished. Appears in pain. Head:             Normocephalic, atraumatic  Eyes:             Sclerae appear normal.  Pupils equally round. ENT:              Nares appear normal, no drainage. Moist oral mucosa  Neck:             No restricted ROM. Trachea midline         CV:                RRR. No m/r/g. No jugular venous distension. Lungs:           CTAB. No wheezing, rhonchi, or rales. Abdomen: Bowel sounds present. Soft, nontender, nondistended. Extremities:   No cyanosis or clubbing. No edema  Skin:              No rashes and normal coloration. Warm and dry. Neuro:           Cranial nerves II-XII grossly intact. Sensation intact  Psych:           Normal mood and affect. Alert and oriented x3    Principal Problem:    Neuropathy  Active Problems:    Chronic hepatitis C without hepatic coma (HCC)  Resolved Problems:    * No resolved hospital problems.  *      Plan:  Received Vanc + Cafepime in ER  Hold antibiotics for now until Neurology/Neurosurgery states no biopsy/culture  Pain control  PT/OT    Signed:  Lorene Osorio DO

## 2023-02-10 NOTE — DISCHARGE SUMMARY
(NOT A) Discharge Summary    Patient admitted to [address] on 2/10/23  Transferred to [facility] on 2/10/23. Please see H&P from 2/10 for full HPI, PE, and A/P.     [employer]

## 2023-02-10 NOTE — H&P
(NOT AN) H&P    Patient admitted to [address] on 2/10/23  Transferred to [facility] on 2/10/23. Please see H&P from 2/10 for full HPI, PE, and A/P.      [employer]

## 2023-02-11 LAB
ANION GAP SERPL CALC-SCNC: 4 MMOL/L (ref 2–11)
BASOPHILS # BLD: 0 K/UL (ref 0–0.2)
BASOPHILS NFR BLD: 0 % (ref 0–2)
BUN SERPL-MCNC: 15 MG/DL (ref 6–23)
CALCIUM SERPL-MCNC: 8.8 MG/DL (ref 8.3–10.4)
CHLORIDE SERPL-SCNC: 101 MMOL/L (ref 101–110)
CO2 SERPL-SCNC: 29 MMOL/L (ref 21–32)
CREAT SERPL-MCNC: 0.7 MG/DL (ref 0.8–1.5)
DIFFERENTIAL METHOD BLD: ABNORMAL
EOSINOPHIL # BLD: 0 K/UL (ref 0–0.8)
EOSINOPHIL NFR BLD: 0 % (ref 0.5–7.8)
ERYTHROCYTE [DISTWIDTH] IN BLOOD BY AUTOMATED COUNT: 12.9 % (ref 11.9–14.6)
GLUCOSE SERPL-MCNC: 116 MG/DL (ref 65–100)
HCT VFR BLD AUTO: 34.8 % (ref 41.1–50.3)
HGB BLD-MCNC: 11.9 G/DL (ref 13.6–17.2)
IMM GRANULOCYTES # BLD AUTO: 0.1 K/UL (ref 0–0.5)
IMM GRANULOCYTES NFR BLD AUTO: 1 % (ref 0–5)
LYMPHOCYTES # BLD: 1.9 K/UL (ref 0.5–4.6)
LYMPHOCYTES NFR BLD: 16 % (ref 13–44)
MCH RBC QN AUTO: 32.8 PG (ref 26.1–32.9)
MCHC RBC AUTO-ENTMCNC: 34.2 G/DL (ref 31.4–35)
MCV RBC AUTO: 95.9 FL (ref 82–102)
MONOCYTES # BLD: 1.2 K/UL (ref 0.1–1.3)
MONOCYTES NFR BLD: 11 % (ref 4–12)
NEUTS SEG # BLD: 8.3 K/UL (ref 1.7–8.2)
NEUTS SEG NFR BLD: 72 % (ref 43–78)
NRBC # BLD: 0 K/UL (ref 0–0.2)
PLATELET # BLD AUTO: 251 K/UL (ref 150–450)
PMV BLD AUTO: 9 FL (ref 9.4–12.3)
POTASSIUM SERPL-SCNC: 4.4 MMOL/L (ref 3.5–5.1)
RBC # BLD AUTO: 3.63 M/UL (ref 4.23–5.6)
SODIUM SERPL-SCNC: 134 MMOL/L (ref 133–143)
WBC # BLD AUTO: 11.6 K/UL (ref 4.3–11.1)

## 2023-02-11 PROCEDURE — 6360000002 HC RX W HCPCS: Performed by: INTERNAL MEDICINE

## 2023-02-11 PROCEDURE — 1100000000 HC RM PRIVATE

## 2023-02-11 PROCEDURE — 80048 BASIC METABOLIC PNL TOTAL CA: CPT

## 2023-02-11 PROCEDURE — 97161 PT EVAL LOW COMPLEX 20 MIN: CPT

## 2023-02-11 PROCEDURE — 99232 SBSQ HOSP IP/OBS MODERATE 35: CPT | Performed by: PSYCHIATRY & NEUROLOGY

## 2023-02-11 PROCEDURE — 6370000000 HC RX 637 (ALT 250 FOR IP): Performed by: INTERNAL MEDICINE

## 2023-02-11 PROCEDURE — 6370000000 HC RX 637 (ALT 250 FOR IP): Performed by: PSYCHIATRY & NEUROLOGY

## 2023-02-11 PROCEDURE — 85025 COMPLETE CBC W/AUTO DIFF WBC: CPT

## 2023-02-11 PROCEDURE — 97164 PT RE-EVAL EST PLAN CARE: CPT

## 2023-02-11 PROCEDURE — 36415 COLL VENOUS BLD VENIPUNCTURE: CPT

## 2023-02-11 PROCEDURE — 2580000003 HC RX 258: Performed by: INTERNAL MEDICINE

## 2023-02-11 PROCEDURE — 87040 BLOOD CULTURE FOR BACTERIA: CPT

## 2023-02-11 PROCEDURE — 6370000000 HC RX 637 (ALT 250 FOR IP): Performed by: HOSPITALIST

## 2023-02-11 PROCEDURE — 97165 OT EVAL LOW COMPLEX 30 MIN: CPT

## 2023-02-11 RX ORDER — PROMETHAZINE HYDROCHLORIDE 12.5 MG/1
25 TABLET ORAL EVERY 6 HOURS PRN
Status: DISCONTINUED | OUTPATIENT
Start: 2023-02-11 | End: 2023-02-13 | Stop reason: HOSPADM

## 2023-02-11 RX ORDER — PROMETHAZINE HYDROCHLORIDE 25 MG/ML
6.25 INJECTION, SOLUTION INTRAMUSCULAR; INTRAVENOUS EVERY 6 HOURS PRN
Status: DISCONTINUED | OUTPATIENT
Start: 2023-02-11 | End: 2023-02-13 | Stop reason: HOSPADM

## 2023-02-11 RX ADMIN — KETOROLAC TROMETHAMINE 15 MG: 30 INJECTION, SOLUTION INTRAMUSCULAR at 08:25

## 2023-02-11 RX ADMIN — ACETAMINOPHEN 650 MG: 325 TABLET ORAL at 08:36

## 2023-02-11 RX ADMIN — HYDROMORPHONE HYDROCHLORIDE 1 MG: 1 INJECTION, SOLUTION INTRAMUSCULAR; INTRAVENOUS; SUBCUTANEOUS at 16:57

## 2023-02-11 RX ADMIN — ONDANSETRON 4 MG: 4 TABLET, ORALLY DISINTEGRATING ORAL at 08:30

## 2023-02-11 RX ADMIN — SODIUM CHLORIDE, PRESERVATIVE FREE 10 ML: 5 INJECTION INTRAVENOUS at 08:25

## 2023-02-11 RX ADMIN — BACLOFEN 10 MG: 10 TABLET ORAL at 14:46

## 2023-02-11 RX ADMIN — PANTOPRAZOLE SODIUM 40 MG: 40 TABLET, DELAYED RELEASE ORAL at 05:54

## 2023-02-11 RX ADMIN — KETOROLAC TROMETHAMINE 15 MG: 30 INJECTION, SOLUTION INTRAMUSCULAR at 01:58

## 2023-02-11 RX ADMIN — PROMETHAZINE HYDROCHLORIDE 25 MG: 12.5 TABLET ORAL at 12:53

## 2023-02-11 RX ADMIN — HYDROMORPHONE HYDROCHLORIDE 1 MG: 1 INJECTION, SOLUTION INTRAMUSCULAR; INTRAVENOUS; SUBCUTANEOUS at 01:57

## 2023-02-11 RX ADMIN — SODIUM CHLORIDE, PRESERVATIVE FREE 10 ML: 5 INJECTION INTRAVENOUS at 22:00

## 2023-02-11 RX ADMIN — HYDROMORPHONE HYDROCHLORIDE 1 MG: 1 INJECTION, SOLUTION INTRAMUSCULAR; INTRAVENOUS; SUBCUTANEOUS at 23:49

## 2023-02-11 RX ADMIN — KETOROLAC TROMETHAMINE 15 MG: 30 INJECTION, SOLUTION INTRAMUSCULAR at 14:47

## 2023-02-11 RX ADMIN — BACLOFEN 10 MG: 10 TABLET ORAL at 08:25

## 2023-02-11 RX ADMIN — HYDROMORPHONE HYDROCHLORIDE 1 MG: 1 INJECTION, SOLUTION INTRAMUSCULAR; INTRAVENOUS; SUBCUTANEOUS at 11:20

## 2023-02-11 RX ADMIN — BACLOFEN 10 MG: 10 TABLET ORAL at 22:00

## 2023-02-11 RX ADMIN — KETOROLAC TROMETHAMINE 15 MG: 30 INJECTION, SOLUTION INTRAMUSCULAR at 22:00

## 2023-02-11 ASSESSMENT — PAIN DESCRIPTION - DESCRIPTORS
DESCRIPTORS: ACHING

## 2023-02-11 ASSESSMENT — PAIN DESCRIPTION - ORIENTATION
ORIENTATION: POSTERIOR

## 2023-02-11 ASSESSMENT — PAIN SCALES - GENERAL
PAINLEVEL_OUTOF10: 0
PAINLEVEL_OUTOF10: 7
PAINLEVEL_OUTOF10: 3
PAINLEVEL_OUTOF10: 0
PAINLEVEL_OUTOF10: 7
PAINLEVEL_OUTOF10: 8

## 2023-02-11 ASSESSMENT — PAIN DESCRIPTION - LOCATION
LOCATION: BACK
LOCATION: BACK
LOCATION: HEAD
LOCATION: BACK
LOCATION: BACK

## 2023-02-11 ASSESSMENT — PAIN DESCRIPTION - ONSET
ONSET: GRADUAL

## 2023-02-11 ASSESSMENT — PAIN DESCRIPTION - PAIN TYPE
TYPE: ACUTE PAIN

## 2023-02-11 ASSESSMENT — PAIN DESCRIPTION - FREQUENCY
FREQUENCY: INTERMITTENT

## 2023-02-11 NOTE — PROGRESS NOTES
[facility] Neurology [address], [address] [facility]            Jose Carlos Choe is a 38 y.o. male who presents on referral from the hospitalist service. I had requested the patient be transferred downtown because of the desirability of a formal neurological examination. This gentleman has a complex history  He had a back injury and multiple extremity trauma as a young man which resulted in considerable deformity and multiple procedures to the left lower extremity as result of which he has a Charcot foot. At that time he had incurred a lumbar spinal derangement and had a multilevel fusion  Did well with reference to this problem  He has had however chronic pain and does have a past history of MRSA. This resulted in a spinal abscess 18 months ago from which he recovered with no additional impairment  Current symptomatology was precipitated by a fall at work  Prior to that time he reports that his overall function and in terms of his back was on best days at 85% of full normal capacity at times it would dip to 65 or 70% but that overall he was highly functional.  The current issue and increase in back pain was precipitated by a fall on stairs. .  This was not a piece of historical information that have thus far been recorded in the medical chart  The patient did indicate that he had discussed a his increase in back discomfort at the time he had been originally assessed after this fall and had been given a Medrol Dosepak or equivalent for a period of 7 days but had no response from same he did note that he was becoming febrile-out of concern for sepsis he came to the hospital       Past Medical History:   Diagnosis Date    Infectious disease     hep C       Past Surgical History:   Procedure Laterality Date    ORTHOPEDIC SURGERY      spinal fusion        No family history on file.      Social History     Socioeconomic History    Marital status:    Tobacco Use    Smoking status: Every Day   Vaping Use    Vaping Use: Never used   Substance and Sexual Activity    Alcohol use: Not Currently    Drug use: Not Currently     Types: IV     Comment: 3 months recovery from IVDU     Patient works as a . He did indicate that the current fall occurred at his employment    No current facility-administered medications for this encounter. Allergies   Allergen Reactions    Ethanol-Alcohol [Ethanol]        Review of Systems    /84   Pulse 89   Temp 97.9 °F (36.6 °C) (Oral)   Resp 18   SpO2 99%     Neurologic Exam     Pleasant alert cooperative gentleman. Normal cranial nerve function normal mobility in the neck shoulder girdle  Extensive trauma on the inner aspect of the left upper extremity with extensive bruising  He has longstanding obvious findings in the left lower extremity with evidence of a immobile left foot in terms of dorsiflexion or plantarflexion Charcot joint type features he is well muscled  On the right side straight leg raising causes some discomfort at approximately 50 degrees foot dorsiflexion and plantarflexion is intact I do not find any specific weakness of great toe extension or flexion  Knee jerk is suppressed on the left side which is a longstanding feature and is somewhat hyperactive on the right  There is an absent right ankle reflex  No localized sensory impairment          Most recent lipid panels  No results found for: CHOL, CHOLPOCT, CHOLX, CHLST, CHOLV, HDL, HDLPOC, HDLC, LDL, LDLC, VLDLC, VLDL, TGLX, TRIGL    Most recent Hgb A1C  No results found for: HBA1C, [id number]      Assessment/Plan:    I believe the current problem based upon the clear-cut findings in the psoas muscle on the right side on MRI and the edematous nerve root likely represent the results of trauma rather than sepsis based upon the negative inflammatory indices that have been obtained on his laboratory evaluation.   I was not aware of the fall at the time and had acute concern with reference to his sepsis issues his history of MRSA etc.  At the present time his neurologic examination is substantially limited by pain but he is gratifyingly not paraplegic. He has excellent strength in hip flexion  The degree of spinal stenosis however mandates I suspect that he will require decompression  Given that he has a history of MRSA I would concur with Dr. Michelle Riley very valid concerns read the potential hazard of immediate intervention  He continues to be very uncomfortable today it may be appropriate to add systemic steroids but with the prior history in terms of sepsis etc. though will leave to the medicine team.  No acute neurological issues  Given the progression of pain problem appropriate to get a formal opinion from neuro/Ortho spine in terms of of elective operative approach.   Quite clearly his symptomatology from his spinal stenosis which was pre-existing was fairly well compensated until the current fall and the acute production of severe edema which that precipitated            Chelle Salazar MD

## 2023-02-11 NOTE — PROGRESS NOTES
Nesha and d/c only    OCCUPATIONAL THERAPY Initial Assessment and Discharge       OT Visit Days: 1  Acknowledge Orders  Time  OT Charge Capture  Rehab Caseload Tracker      Guanako Hastings is a 38 y.o. male   PRIMARY DIAGNOSIS: Spinal stenosis of lumbar region with neurogenic claudication  Spinal stenosis of lumbar region with neurogenic claudication [M48.062]       Reason for Referral: Generalized Muscle Weakness (M62.81)  Difficulty in walking, Not elsewhere classified (R26.2)  Other abnormalities of gait and mobility (R26.89)  Low Back Pain (M54.5)  Inpatient: Payor: /     ASSESSMENT:     REHAB RECOMMENDATIONS:   Recommendation to date pending progress:  Setting:  No further skilled therapy after discharge from hospital    Equipment:    None     ASSESSMENT:  Mr. Coco Miranda is a 37 y/o male who presents with above diagnosis. At baseline pt is independent, lives with roommates and is a . Pt reports that he had a fall while in the stairwell last week. Pt also has PMHx of traumatic event 20 years ago that has resulted in some LLE deformities and leg/back pain. This date, supervision for functional mobility of household distances and for LBD while seated edge of bed. Today pt presents with decreased activity tolerance and balance however functioning very close to baseline. PT to keep on caseload to address while inpatient. No acute OT needs. Will remove from caseload.      [facility] [place] [address] [address] AM-PAC 6 Clicks Daily Activity Inpatient Short Form:    AM-PAC Daily Activity - Inpatient   How much help is needed for putting on and taking off regular lower body clothing?: None  How much help is needed for bathing (which includes washing, rinsing, drying)?: None  How much help is needed for toileting (which includes using toilet, bedpan, or urinal)?: None  How much help is needed for putting on and taking off regular upper body clothing?: None  How much help is needed for taking care of personal grooming?: None  How much help for eating meals?: None  AM-[facility] Inpatient Daily Activity Raw Score: 24  AM-PAC Inpatient ADL T-Scale Score : 57.54  ADL Inpatient CMS 0-100% Score: 0  ADL Inpatient CMS G-Code Modifier : CH           SUBJECTIVE:     Mr. Yolanda Graham states, Razia Rodriguez I had a fall\"     Social/Functional Lives With: Friend(s)  Type of Home: House  Receives Help From: Friend(s)  ADL Assistance: Independent  Ambulation Assistance: Independent  Transfer Assistance: Independent  Active : Yes  Mode of Transportation: Car    OBJECTIVE:     Mckinley Margoth / Velvet Mix / Love Anna: None    RESTRICTIONS/PRECAUTIONS:       PAIN: VITALS / O2:   Pre Treatment:    5/10, back      Post Treatment: same, however resting comfortably while sidelying       Vitals          Oxygen            GROSS EVALUATION: INTACT IMPAIRED   (See Comments)   UE AROM [x] []   UE PROM [x] []   Strength [x]       Posture / Balance [] Posture: Good  Sitting - Static: Good  Sitting - Dynamic: Good  Standing - Static: Fair, +  Standing - Dynamic: Fair, +   Sensation []     Coordination []       Tone []       Edema []    Activity Tolerance []  Decreased, limited by pain and fatigue     Hand Dominance R [] L []      COGNITION/  PERCEPTION: INTACT IMPAIRED   (See Comments)   Orientation [x]     Vision [x]     Hearing [x]     Cognition  [x]     Perception []       MOBILITY: I Mod I S SBA CGA Min Mod Max Total  NT x2 Comments:   Bed Mobility    Rolling [] [] [] [] [] [] [] [] [] [x] []    Supine to Sit [] [] [x] [] [] [] [] [] [] [] []    Scooting [] [] [x] [] [] [] [] [] [] [] []    Sit to Supine [] [] [x] [] [] [] [] [] [] [] []    Transfers    Sit to Stand [] [] [x] [] [] [] [] [] [] [] []    Bed to Chair [] [] [] [] [] [] [] [] [] [x] []    Stand to Sit [] [] [x] [] [] [] [] [] [] [] []    Tub/Shower [] [] [] [] [] [] [] [] [] [x] []     Toilet [] [] [] [] [] [] [] [] [] [x] []      [] [] [] [] [] [] [] [] [] [] []    I=Independent, Mod I=Modified Independent, S=Supervision/Setup, SBA=Standby Assistance, CGA=Contact Guard Assistance, Min=Minimal Assistance, Mod=Moderate Assistance, Max=Maximal Assistance, Total=Total Assistance, NT=Not Tested    ACTIVITIES OF DAILY LIVING: I Mod I S SBA CGA Min Mod Max Total NT Comments   BASIC ADLs:              Upper Body Bathing  [] [] [] [] [] [] [] [] [] [x]    Lower Body Bathing [] [] [] [] [] [] [] [] [] [x]    Toileting [] [] [] [] [] [] [] [] [] [x]    Upper Body Dressing [] [] [] [] [] [] [] [] [] [x]    Lower Body Dressing [] [] [x] [] [] [] [] [] [] [] Seated, donning socks   Feeding [] [] [] [] [] [] [] [] [] [x]    Grooming [] [] [] [] [] [] [] [] [] [x]    Personal Device Care [] [] [] [] [] [] [] [] [] [x]    Functional Mobility [] [] [x] [] [] [] [] [] [] [] No AD, household distance   I=Independent, Mod I=Modified Independent, S=Supervision/Setup, SBA=Standby Assistance, CGA=Contact Guard Assistance, Min=Minimal Assistance, Mod=Moderate Assistance, Max=Maximal Assistance, Total=Total Assistance, NT=Not Tested    PLAN:   FREQUENCY/DURATION   OT Plan of Care:  (EVAL AND D/C, PT is keeping on caseload to address balance) for duration of hospital stay or until stated goals are met, whichever comes first.    PROBLEM LIST:   (Skilled intervention is medically necessary to address:)  Eval and d/c   INTERVENTIONS PLANNED:  (Benefits and precautions of occupational therapy have been discussed with the patient.)  none         TREATMENT:     EVALUATION: LOW COMPLEXITY: (Untimed Charge)    TREATMENT:   No tx billed this date, eval only    TREATMENT GRID:  N/A    AFTER TREATMENT PRECAUTIONS: Bed, Bed/Chair Locked, Call light within reach, Needs within reach, and RN notified    INTERDISCIPLINARY COLLABORATION:  RN/ PCT and PT/ PTA    EDUCATION:  Education Given To: Patient  Education Provided: Role of Therapy  Education Method: Verbal  Barriers to Learning: None  Education Outcome: Verbalized understanding    TOTAL TREATMENT DURATION AND TIME:  Time In: 9824  Time Out: Mario Butt 45  Minutes: 48 Amira Tapia

## 2023-02-11 NOTE — PLAN OF CARE
Problem: Discharge Planning  Goal: Discharge to home or other facility with appropriate resources  2/10/2023 1753 by Buddy Collins RN  Outcome: Progressing     Problem: Skin/Tissue Integrity  Goal: Absence of new skin breakdown  Description: 1. Monitor for areas of redness and/or skin breakdown  2. Assess vascular access sites hourly  3. Every 4-6 hours minimum:  Change oxygen saturation probe site  4. Every 4-6 hours:  If on nasal continuous positive airway pressure, respiratory therapy assess nares and determine need for appliance change or resting period. 2/10/2023 1753 by Katya Collins RN  Outcome: Progressing     Problem: Pain  Goal: Verbalizes/displays adequate comfort level or baseline comfort level  2/10/2023 1753 by Buddy Collins RN  Outcome: Progressing     Problem: Safety - Adult  Goal: Free from fall injury  Outcome: Progressing

## 2023-02-11 NOTE — PROGRESS NOTES
Hospitalist   Admit Date:  2/10/2023  4:25 PM   Name:  Radha Griggs   Age:  38 y.o. Sex:  male  :  1980   MRN:  [id number]     History of Present Illness:     38 y.o. male with medical history of prior IVDA, hepatitis C who presented to ED with low back pain and BLE paresthesias. Patient reports back pain started about 10 days ago. He reports associated BLE \"tingling\" as well. He does have h/o spinal abscess. Upon ER evaluation, MRI was obtained which shows:  1. Extensive edema and enhancement in and around the right L3 nerve as it exits    the neural foramen and courses along the posterior aspect of the psoas muscle. The nerve is expanded and edematous and there is edema and enhancement    throughout the surrounding soft tissues including the posterior aspect of the    psoas and anterior aspect of the posterior paravertebral muscles. The etiology    is unclear. There is high-grade right L2-L3 foraminal stenosis and impingement    on the L3 nerve in the foramen, but the degree of inflammation and edema is    greater than expected from nerve impingement and raises the possibility of an    infection involving the nerve. No drainable fluid collection/abscess. 2. No evidence of discitis-osteomyelitis. 3. Prior posterior pedicle screw and preeti fusion of L3-S1 again noted. The    lucency surrounding the L4 screws and fusion rods seen on prior CT abdomen not    as well visualized with MRI. 4. Severe degenerative change at L3-L4 adjacent to the fusion, including a    moderate annular disc bulge and markedly severe bilateral facet arthrosis. Severe spinal canal stenosis with complete effacement of the CSF, narrowing of    the AP diameter of the thecal sac to 2 mm, and impingement on all bilateral    descending L4, L5 and sacral nerve roots. There is also high-grade bilateral    foraminal stenosis at L3-L4 with evidence of mass effect on the exiting    bilateral L3 nerves.  Edema and enhancement surrounding the L3-L4 facets is    favored to be degenerative given the severity of the arthropathy, but infection    of the hardware cannot be completely excluded. 5. No epidural mass or collection. There is clumping of the nerve roots below    the L3-L4 impingement, which is age indeterminant. No associated enhancement to    suggest acute arachnoiditis. Given concerning findings, Neurosurgery was consulted. Dr. Danilo Craig believes that this is likely non-surgical at this time and recommended Hospitalist to admit with consultation to Neurology. Today, afebrile, some back pain controlled w/ meds, has nausea    Assessment & Plan:     1- Spinal stenosis L3-4  2- L2-3 edema. Has hx of epid abscess/ mrsa+    Rx:  Pain control  No abx, follow blood CS, per ID  No intervention at this time, per NeuroSx  Follow Neurology output    Dispo: home when clear by Neurology    Objective:   Patient Vitals for the past 24 hrs:   Temp Pulse Resp BP SpO2   02/11/23 1132 97.7 °F (36.5 °C) 78 18 114/80 92 %   02/11/23 1100 97.8 °F (36.6 °C) -- 16 132/87 99 %   02/11/23 0742 97.3 °F (36.3 °C) 83 16 (!) 121/90 98 %   02/11/23 0404 97.7 °F (36.5 °C) 79 19 103/63 93 %   02/11/23 0227 -- -- 16 -- --   02/11/23 0157 -- -- 16 -- --   02/10/23 2317 97.9 °F (36.6 °C) 98 22 (!) 142/83 93 %   02/10/23 2006 97.9 °F (36.6 °C) (!) 113 22 (!) 145/87 (!) 89 %   02/10/23 1953 -- -- 16 -- --   02/10/23 1713 97.9 °F (36.6 °C) 89 18 131/84 99 %            Estimated body mass index is 33.91 kg/m² as calculated from the following:    Height as of this encounter: 6' (1.829 m). Weight as of this encounter: 250 lb (113.4 kg). Intake/Output Summary (Last 24 hours) at 2/11/2023 1324  Last data filed at 2/11/2023 1217  Gross per 24 hour   Intake 582 ml   Output --   Net 582 ml           Physical Exam:  General:    Well nourished. Moderate distress  CV:   RRR. S1/S2 auscultated  Lungs:   CTAB. No wheezing, rhonchi, or rales. Appears even, unlabored  Abdomen: Bowel sounds present. Soft, nontender, nondistended. Extremities: Warm and dry. No cyanosis or clubbing. No edema. Skin:     No rashes. Normal turgor. Normal coloration  Neuro:  grossly intact. Lower back midline moderate tenderness  Psych:  Normal mood and affect.   Alert and oriented x3    Data Ordered and Personally Reviewed:    Last 24hr Labs:  Recent Results (from the past 24 hour(s))   C-Reactive Protein    Collection Time: 02/10/23  2:09 PM   Result Value Ref Range    CRP 8.4 (H) 0.0 - 0.9 mg/dL   Basic Metabolic Panel w/ Reflex to MG    Collection Time: 02/11/23  5:30 AM   Result Value Ref Range    Sodium 134 133 - 143 mmol/L    Potassium 4.4 3.5 - 5.1 mmol/L    Chloride 101 101 - 110 mmol/L    CO2 29 21 - 32 mmol/L    Anion Gap 4 2 - 11 mmol/L    Glucose 116 (H) 65 - 100 mg/dL    BUN 15 6 - 23 MG/DL    Creatinine 0.70 (L) 0.8 - 1.5 MG/DL    Est, Glom Filt Rate >60 >60 ml/min/1.73m2    Calcium 8.8 8.3 - 10.4 MG/DL   CBC with Auto Differential    Collection Time: 02/11/23  5:30 AM   Result Value Ref Range    WBC 11.6 (H) 4.3 - 11.1 K/uL    RBC 3.63 (L) 4.23 - 5.6 M/uL    Hemoglobin 11.9 (L) 13.6 - 17.2 g/dL    Hematocrit 34.8 (L) 41.1 - 50.3 %    MCV 95.9 82 - 102 FL    MCH 32.8 26.1 - 32.9 PG    MCHC 34.2 31.4 - 35.0 g/dL    RDW 12.9 11.9 - 14.6 %    Platelets 615 263 - 215 K/uL    MPV 9.0 (L) 9.4 - 12.3 FL    nRBC 0.00 0.0 - 0.2 K/uL    Differential Type AUTOMATED      Seg Neutrophils 72 43 - 78 %    Lymphocytes 16 13 - 44 %    Monocytes 11 4.0 - 12.0 %    Eosinophils % 0 (L) 0.5 - 7.8 %    Basophils 0 0.0 - 2.0 %    Immature Granulocytes 1 0.0 - 5.0 %    Segs Absolute 8.3 (H) 1.7 - 8.2 K/UL    Absolute Lymph # 1.9 0.5 - 4.6 K/UL    Absolute Mono # 1.2 0.1 - 1.3 K/UL    Absolute Eos # 0.0 0.0 - 0.8 K/UL    Basophils Absolute 0.0 0.0 - 0.2 K/UL    Absolute Immature Granulocyte 0.1 0.0 - 0.5 K/UL       Signed:  Kalyan Lane MD

## 2023-02-11 NOTE — CONSULTS
Infectious Disease Consult  Today's Date: 2/11/2023   Admit Date: 2/10/2023  CHIEF Complaint:    Impression:   Abnormal MRI T spine consistent with past discitis/OM. Treated in summer but not with IV ABX and left hospital without completing. Changes MRI L spine that seem to note hardware lucency in prior fusion site; no epidural or paraspinal collection. History of IVDA: abstinent per patient. Still living in recovery  housing so going home on IV ABX are not clearly an option but I will say I do believe him that he is clean now so I might be willing to consider home IV ABX if we need that. History of treated Hep C  Plan:   Repeat blood cx today and possibly again tomorrow so that we have cx that are off antibiotics. Overall doses not sound like a site to aspirate in spine to obtain cx  Some concern now that change seen are due trauma noted  No signs of persistent infection in T spine which is what we saw before. For lumbar hardware to now be infected he has to have been bacteremic. He was not bacteremic in June. No sign of infection between T9/10 and L spine  Continue off ABX as long as clinically stable. Follow repeat Cx and CRP. IF any blood cx do pop + for GPC then start Vanc  The same or similar social issues are still present. If he needs IV ABX and cannot do them at [address] [facility] then he would have to stay in house and he would likely loose his job so I want to make an effort to be sure  he needs ABX. He certainly has risk factors as previous treatment was not ideal.  That said he appears to have cleared T spine infection. If he has actually been injecting then this could represent a new infection or if the L elbow was infected earlier, it may have acted as source. It does not look overtly infected now and I would not expect a septic bursa to resolve without ABX or at least I&D.      Anti-infectives:   One dose Vanc and Cefepime on 2/9 at quarter to midnight    Subjective:   Date of Consultation:  February 11, 2023  Date of Admission: 2/10/2023   Referring Provider: Robinson  Reason for Consult: abnormal MRI spine    Patient is a 42 y.o. male with a history of  IVDA, Hep C, OM L foot with MSSA bacteremia who was seen by ID in June for questionable discitis after CT abd/pelvis noted endplate irregularities around T9-10 with abnormal paraspinal ST and L effusion. UDS at that time was + for amphetamines and he admitted recent IVDA but of heroine and stated took Adderal. For full details, see my consult note 6/17/22 but there are notes at [facility] about ADHD.  He underwent disc aspiration 6/21 and cx grew MSSA. HE was not bacteremic. He was not an OPAT candidate  and chose to leave on 6/22 and was given 2 months of oral Duricef 500  mg bid.   He was seen in follow up on 8/22/22, was off abx and in drug rehab and doing well. No further ABX prescribed      He  was admitted on 2/10/2023 with complaints of back pain and bilateral LE paresthesias x 10 days. Temp on arrival 101, normotensive, mild tachycardia. WBC normal, CRP 8.4 up from 1.9 in June which was down. Renal function stable.     MRI w/wo contrast of T and L spine notes changes of prior discitis/OM at T9/10 with loss disc height and chronic bone changes; no convincing findings of infection. Moderate bilateral foraminal narrowing at T9/10 with disc protrusion at T10/11 without SC narrowing.    In the MRI L spine: notes extensive edema and enhancement in and around R L3 nerve as exits foramen.  Hardware from fusion L3-S1 notes with lucency around L4 screws and fusion rods seen in prior CT (presumably the June 2022 CT but there was no mention of this then) not as well visualized with MRI. No epidural mass or collection.     Severe spinal canal stenosis with complete effacement of the CSF, narrowing of    the AP diameter of the thecal sac to 2 mm, and impingement on all bilateral    descending L4, L5 and sacral nerve roots. There is also high-grade bilateral   foraminal stenosis at L3-L4 with evidence of mass effect on the exiting    bilateral L3 nerves. Edema and enhancement surrounding the L3-L4 facets is    favored to be degenerative given the severity of the arthropathy, but infection    of the hardware cannot be completely excluded. Neurology note that new symptoms all started after a fall. Was given a medrol dose pack without improvement. He  indicates his L elbow was very swollen after the injury but has been coming down in size. He indicates fever on and off at home to 101 for last 3 days. He has been seen by NS  indicate no surgical intervention. He has history of back injury as young man  with prior multilevel fusion. No antibiotics have been given today. Neurology notes reviewed and some concern that MRI changes are inflammatory from fall rather than infection. WBC up slightly today; admission blood cx 2/10/23 at close to 1 AM  are NGTD but appears go ABX on 2/9 before midnight so these were collected very shortly after or during infusion which may make them falsely negative. Patient is still living at [facility] and working addiction program. He denies any illicit substance abuse and states really did not want pain meds but took dilaudid in ED because the pain was so bad. Patient Active Problem List   Diagnosis    Chest pain    Discitis of thoracic region    Pleural effusion    Empyema lung (HCC)    Phlegmon    Chronic hepatitis C without hepatic coma (HCC)    Discitis    Paraspinal abscess (HCC)    Accidental overdose of heroin ([address]    Neuropathy    Spinal stenosis of lumbar region with neurogenic claudication     Past Medical History:   Diagnosis Date    Infectious disease     hep C      No family history on file.    Social History     Tobacco Use    Smoking status: Every Day    Smokeless tobacco: Not on file   Substance Use Topics    Alcohol use: Not Currently     Past Surgical History:   Procedure Laterality Date    ORTHOPEDIC SURGERY      spinal fusion      Prior to Admission medications    Medication Sig Start Date End Date Taking? Authorizing Provider   meloxicam (MOBIC) 15 MG tablet Take 1 tablet by mouth daily 2/1/23 3/3/23  JOANNA Meyer CNP   methylPREDNISolone (MEDROL DOSEPACK) 4 MG tablet Follow package instructions  Patient not taking: Reported on 2023   JOANNA Mancera CNP   ketorolac (TORADOL) 10 MG tablet Take 1 tablet by mouth every 6 hours as needed for Pain 22   Kiarra Anderson MD   pantoprazole (PROTONIX) 40 MG tablet Take 1 tablet by mouth 2 times daily (before meals)  Patient not taking: Reported on 2023   Kiarra Anderson MD   glecaprevir-pibrentasvir (MAVYRET) 100-40 MG TABS tablet Take 3 tablets by mouth once Pt takes at mid day with meals    Historical Provider, MD     Allergies   Allergen Reactions    Ethanol-Alcohol [Ethanol]           Review of Systems:  10 point Ros negative other than what mentioned in HPI. Pertinent items are noted in HPI. Objective:   Blood pressure (!) 121/90, pulse 83, temperature 97.3 °F (36.3 °C), temperature source Oral, resp. rate 16, height 6' (1.829 m), weight 250 lb (113.4 kg), SpO2 98 %.   Temp (24hrs), Av.9 °F (36.6 °C), Min:97.3 °F (36.3 °C), Max:98.6 °F (37 °C)       Lines:     Exam:     General: NC/AT, alert and cooperative, looks stated age, in NAD   HEENT: PERRL, non-icteric sclera, no splinter hemorrhages; edentulous   Neck: supple, symmetric, no masses or lymphadenopathy   Cardiac:Nl S1/S2, no murmurs/rubs/gallops/clicks, no LE edema   Pulmonary:clear bilaterally karin/wheezes, good air movement    Abdomen: soft, NT, non-distended, BS normal   Skin:no rashes, lesions or wounds   MSK:no enlarged or swollen joints in limbs or sternoclavicular area; L elbow bursal area is still a little swollen but not hot or red 2-3 minor scabs on top   Extremities: no cords/clots/cyanosis, pulses palpable and symmetric    Psychiatric: mood and affect appropriate to situation       Data Review:   Recent Results (from the past 24 hour(s))   C-Reactive Protein    Collection Time: 02/10/23  2:09 PM   Result Value Ref Range    CRP 8.4 (H) 0.0 - 0.9 mg/dL   Basic Metabolic Panel w/ Reflex to MG    Collection Time: 02/11/23  5:30 AM   Result Value Ref Range    Sodium 134 133 - 143 mmol/L    Potassium 4.4 3.5 - 5.1 mmol/L    Chloride 101 101 - 110 mmol/L    CO2 29 21 - 32 mmol/L    Anion Gap 4 2 - 11 mmol/L    Glucose 116 (H) 65 - 100 mg/dL    BUN 15 6 - 23 MG/DL    Creatinine 0.70 (L) 0.8 - 1.5 MG/DL    Est, Glom Filt Rate >60 >60 ml/min/1.73m2    Calcium 8.8 8.3 - 10.4 MG/DL   CBC with Auto Differential    Collection Time: 02/11/23  5:30 AM   Result Value Ref Range    WBC 11.6 (H) 4.3 - 11.1 K/uL    RBC 3.63 (L) 4.23 - 5.6 M/uL    Hemoglobin 11.9 (L) 13.6 - 17.2 g/dL    Hematocrit 34.8 (L) 41.1 - 50.3 %    MCV 95.9 82 - 102 FL    MCH 32.8 26.1 - 32.9 PG    MCHC 34.2 31.4 - 35.0 g/dL    RDW 12.9 11.9 - 14.6 %    Platelets 482 028 - 830 K/uL    MPV 9.0 (L) 9.4 - 12.3 FL    nRBC 0.00 0.0 - 0.2 K/uL    Differential Type AUTOMATED      Seg Neutrophils 72 43 - 78 %    Lymphocytes 16 13 - 44 %    Monocytes 11 4.0 - 12.0 %    Eosinophils % 0 (L) 0.5 - 7.8 %    Basophils 0 0.0 - 2.0 %    Immature Granulocytes 1 0.0 - 5.0 %    Segs Absolute 8.3 (H) 1.7 - 8.2 K/UL    Absolute Lymph # 1.9 0.5 - 4.6 K/UL    Absolute Mono # 1.2 0.1 - 1.3 K/UL    Absolute Eos # 0.0 0.0 - 0.8 K/UL    Basophils Absolute 0.0 0.0 - 0.2 K/UL    Absolute Immature Granulocyte 0.1 0.0 - 0.5 K/UL        Microbiology:  [unfilled]        Studies/Imaging:  personally reviewed      Signed By: Chary Dennis MD     February 11, 2023

## 2023-02-11 NOTE — PROGRESS NOTES
ACUTE PHYSICAL THERAPY GOALS:   (Developed with and agreed upon by patient and/or caregiver.)  STG:  (1.)Mr. Eliud Wilson will move from supine to sit and sit to supine  and roll side to side with INDEPENDENCE within 5 treatment day(s). (2.)Mr. Eliud Wilson will transfer from bed to chair and chair to bed with INDEPENDENT using the least restrictive device within 5 treatment day(s). (3.)Mr. Eliud Wilson will ambulate with INDEPENDENCE for 650 feet with the least restrictive device within 5 treatment day(s). (4.)Pt. will climb up/down 5 steps with rail independently within 5 days  (5.)Pt. will demonstrate a decrease in pain and radiating symptoms and centralize to lumbar spine with a rating of 3/10      ________________________________________________________________________________________________     PHYSICAL THERAPY Initial Assessment and AM  (Link to Caseload Tracking: PT Visit Days : 1  Acknowledge Orders  Time In/Out  PT Charge Capture  Rehab Caseload Tracker    Jose Carlos Choe is a 38 y.o. male   PRIMARY DIAGNOSIS: Spinal stenosis of lumbar region with neurogenic claudication  Spinal stenosis of lumbar region with neurogenic claudication [M48.062]       Reason for Referral: Other abnormalities of gait and mobility (R26.89)  History of falling (Z91.81)  Low Back Pain (M54.5)  Inpatient: Payor: /     ASSESSMENT:     REHAB RECOMMENDATIONS:   Recommendation to date pending progress:  Setting:  Outpatient Therapy    Equipment:     Don't feel like he needs a device, but will see how he progresses     ASSESSMENT:  Mr. Eliud Wilson is admitted with increase in low back pain and numbness and tingling in both LEs. He has a hx of L3-S1 fusion and is experiencing new symptoms. He was evaluated at [address] and transferred down here due to desire for neuro consult. He is uncomfortable when sitting, standing, walking and finds comfort in L sidelying. He is complaining of generalized pain with intermittent pain down the RLE.  He continues to be supervision for bed mobility and gait of 100' with an antalgic gait pattern and slight forward trunk flexion. He keeps his arms out to the side, occasionally reaching for tactile support with ambulation. Pt will continue to benefit from skilled acute care PT to address current mobility deficits and may benefit from OPPT for LBP upon DC.       [facility] [address] [address] AM-PAC 6 Clicks Basic Mobility Inpatient Short Form  AM-PAC Basic Mobility - Inpatient   How much help is needed turning from your back to your side while in a flat bed without using bedrails?: None  How much help is needed moving from lying on your back to sitting on the side of a flat bed without using bedrails?: None  How much help is needed moving to and from a bed to a chair?: None  How much help is needed standing up from a chair using your arms?: None  How much help is needed walking in hospital room?: None  How much help is needed climbing 3-5 steps with a railing?: None  AM-PAC Inpatient Mobility Raw Score : 24  AM-PAC Inpatient T-Scale Score : 61.14  Mobility Inpatient CMS 0-100% Score: 0  Mobility Inpatient CMS G-Code Modifier : CH    SUBJECTIVE:   Mr. Cleveland Little states, \"I can turn around whenever you're ready\"     Social/Functional Lives With: Friend(s)  Type of Home: House  Receives Help From: Friend(s)  ADL Assistance: Independent  Ambulation Assistance: Independent  Transfer Assistance: Independent  Active : Yes  Mode of Transportation: Car    OBJECTIVE:     PAIN: Jovi Lerma / O2: PRECAUTION / [place] / [address]:   Pre Treatment:          Post Treatment: 5, sidelying with pillow between legs Vitals        Oxygen      None    RESTRICTIONS/PRECAUTIONS:                    GROSS EVALUATION: Intact Impaired (Comments):   AROM [x]  B LEs   PROM []    Strength []     Balance []  Slight decrease standing dynamic   Posture [] Rounded Shoulders  Painful to stand erect, flexed at hips   Sensation [x]     Coordination [x]   B LEs   Tone []  Decreased at L ankle due to footdrop from prior back fusion   Edema []    Activity Tolerance [] Patient limited by fatigue, Patient limited by pain    []      COGNITION/  PERCEPTION: Intact Impaired (Comments):   Orientation [x]     Vision [x]     Hearing [x]     Cognition  [x]       MOBILITY: I Mod I S SBA CGA Min Mod Max Total  NT x2 Comments:   Bed Mobility    Rolling [] [] [x] [] [] [] [] [] [] [] []    Supine to Sit [] [] [x] [] [] [] [] [] [] [] []    Scooting [] [] [x] [] [] [] [] [] [] [] []    Sit to Supine [] [] [x] [] [] [] [] [] [] [] []    Transfers    Sit to Stand [] [] [x] [] [] [] [] [] [] [] []    Bed to Chair [] [] [] [] [] [] [] [] [] [] []    Stand to Sit [] [] [x] [] [] [] [] [] [] [] []    SPT [] [] [] [] [] [] [] [] [] [] []    I=Independent, Mod I=Modified Independent, S=Supervision, SBA=Standby Assistance, CGA=Contact Guard Assistance,   Min=Minimal Assistance, Mod=Moderate Assistance, Max=Maximal Assistance, Total=Total Assistance, NT=Not Tested    GAIT: I Mod I S SBA CGA Min Mod Max Total  NT x2 Comments:   Level of Assistance [] [] [x] [] [] [] [] [] [] [] []    Distance 100 feet    DME None    Gait Quality Antalgic, Decreased pierre , Decreased step clearance, Decreased step length, Decreased stance, and Trunk flexion    Weightbearing Status      Stairs      I=Independent, Mod I=Modified Independent, S=Supervision, SBA=Standby Assistance, CGA=Contact Guard Assistance,   Min=Minimal Assistance, Mod=Moderate Assistance, Max=Maximal Assistance, Total=Total Assistance, NT=Not Tested    PLAN:   FREQUENCY AND DURATION: 3 times/week for duration of hospital stay or until stated goals are met, whichever comes first.    THERAPY PROGNOSIS: Fair    PROBLEM LIST:   (Skilled intervention is medically necessary to address:)  Decreased ADL/Functional Activities  Decreased Activity Tolerance  Decreased Gait Ability  Decreased Strength  Decreased Transfer Abilities  Increased Pain INTERVENTIONS PLANNED:   (Benefits and precautions of physical therapy have been discussed with the patient.)  Self Care Training  Therapeutic Activity  Therapeutic Exercise/HEP  Gait Training  Modalities  Education       TREATMENT:   EVALUATION: RE-EVALUATION (Untimed Charge)    TREATMENT:   NA    TREATMENT GRID:  N/A    AFTER TREATMENT PRECAUTIONS: Bed, Bed/Chair Locked, Call light within reach, and Needs within reach    INTERDISCIPLINARY COLLABORATION:  RN/ PCT, PT/ PTA, and OT/ GRIJALVA    EDUCATION: Education Given To: Patient  Education Provided: Role of Therapy;Plan of Care  Education Method: Verbal  Barriers to Learning: None  Education Outcome: Verbalized understanding    TIME IN/OUT:  Time In: 0906  Time Out: Mario Butt 45  Minutes: [address], PT

## 2023-02-12 LAB
BACTERIA SPEC CULT: NORMAL
BACTERIA SPEC CULT: NORMAL
CRP SERPL-MCNC: 8.1 MG/DL (ref 0–0.9)
SERVICE CMNT-IMP: NORMAL
SERVICE CMNT-IMP: NORMAL

## 2023-02-12 PROCEDURE — 36415 COLL VENOUS BLD VENIPUNCTURE: CPT

## 2023-02-12 PROCEDURE — 6370000000 HC RX 637 (ALT 250 FOR IP): Performed by: HOSPITALIST

## 2023-02-12 PROCEDURE — 2580000003 HC RX 258: Performed by: INTERNAL MEDICINE

## 2023-02-12 PROCEDURE — 6370000000 HC RX 637 (ALT 250 FOR IP): Performed by: PSYCHIATRY & NEUROLOGY

## 2023-02-12 PROCEDURE — 6360000002 HC RX W HCPCS: Performed by: INTERNAL MEDICINE

## 2023-02-12 PROCEDURE — 1100000000 HC RM PRIVATE

## 2023-02-12 PROCEDURE — 6370000000 HC RX 637 (ALT 250 FOR IP): Performed by: INTERNAL MEDICINE

## 2023-02-12 PROCEDURE — 86140 C-REACTIVE PROTEIN: CPT

## 2023-02-12 PROCEDURE — 87040 BLOOD CULTURE FOR BACTERIA: CPT

## 2023-02-12 RX ORDER — HYDROCODONE BITARTRATE AND ACETAMINOPHEN 7.5; 325 MG/1; MG/1
1 TABLET ORAL EVERY 4 HOURS PRN
Status: DISCONTINUED | OUTPATIENT
Start: 2023-02-12 | End: 2023-02-13 | Stop reason: HOSPADM

## 2023-02-12 RX ADMIN — BACLOFEN 10 MG: 10 TABLET ORAL at 10:56

## 2023-02-12 RX ADMIN — HYDROCODONE BITARTRATE AND ACETAMINOPHEN 1 TABLET: 7.5; 325 TABLET ORAL at 21:31

## 2023-02-12 RX ADMIN — KETOROLAC TROMETHAMINE 15 MG: 30 INJECTION, SOLUTION INTRAMUSCULAR at 18:34

## 2023-02-12 RX ADMIN — HYDROCODONE BITARTRATE AND ACETAMINOPHEN 1 TABLET: 7.5; 325 TABLET ORAL at 17:21

## 2023-02-12 RX ADMIN — PANTOPRAZOLE SODIUM 40 MG: 40 TABLET, DELAYED RELEASE ORAL at 17:22

## 2023-02-12 RX ADMIN — KETOROLAC TROMETHAMINE 15 MG: 30 INJECTION, SOLUTION INTRAMUSCULAR at 02:08

## 2023-02-12 RX ADMIN — BACLOFEN 10 MG: 10 TABLET ORAL at 14:32

## 2023-02-12 RX ADMIN — BACLOFEN 10 MG: 10 TABLET ORAL at 21:31

## 2023-02-12 RX ADMIN — HYDROCODONE BITARTRATE AND ACETAMINOPHEN 1 TABLET: 7.5; 325 TABLET ORAL at 10:56

## 2023-02-12 RX ADMIN — PANTOPRAZOLE SODIUM 40 MG: 40 TABLET, DELAYED RELEASE ORAL at 06:39

## 2023-02-12 RX ADMIN — SODIUM CHLORIDE, PRESERVATIVE FREE 10 ML: 5 INJECTION INTRAVENOUS at 21:32

## 2023-02-12 RX ADMIN — KETOROLAC TROMETHAMINE 15 MG: 30 INJECTION, SOLUTION INTRAMUSCULAR at 06:39

## 2023-02-12 RX ADMIN — SODIUM CHLORIDE, PRESERVATIVE FREE 10 ML: 5 INJECTION INTRAVENOUS at 11:20

## 2023-02-12 ASSESSMENT — PAIN DESCRIPTION - DESCRIPTORS
DESCRIPTORS: ACHING
DESCRIPTORS: ACHING

## 2023-02-12 ASSESSMENT — PAIN - FUNCTIONAL ASSESSMENT
PAIN_FUNCTIONAL_ASSESSMENT: PREVENTS OR INTERFERES SOME ACTIVE ACTIVITIES AND ADLS
PAIN_FUNCTIONAL_ASSESSMENT: PREVENTS OR INTERFERES SOME ACTIVE ACTIVITIES AND ADLS

## 2023-02-12 ASSESSMENT — PAIN DESCRIPTION - ORIENTATION
ORIENTATION: POSTERIOR
ORIENTATION: POSTERIOR

## 2023-02-12 ASSESSMENT — PAIN DESCRIPTION - FREQUENCY
FREQUENCY: INTERMITTENT
FREQUENCY: INTERMITTENT

## 2023-02-12 ASSESSMENT — PAIN DESCRIPTION - PAIN TYPE: TYPE: ACUTE PAIN

## 2023-02-12 ASSESSMENT — PAIN DESCRIPTION - LOCATION
LOCATION: BACK
LOCATION: BACK

## 2023-02-12 ASSESSMENT — PAIN SCALES - GENERAL
PAINLEVEL_OUTOF10: 0
PAINLEVEL_OUTOF10: 0
PAINLEVEL_OUTOF10: 4
PAINLEVEL_OUTOF10: 5
PAINLEVEL_OUTOF10: 0
PAINLEVEL_OUTOF10: 0
PAINLEVEL_OUTOF10: 5

## 2023-02-12 ASSESSMENT — PAIN DESCRIPTION - ONSET
ONSET: GRADUAL
ONSET: GRADUAL

## 2023-02-12 NOTE — PROGRESS NOTES
Hospitalist   Admit Date:  2/10/2023  4:25 PM   Name:  Cathay Denver   Age:  38 y.o. Sex:  male  :  1980   MRN:  [id number]     History of Present Illness:     38 y.o. male with medical history of prior IVDA, hepatitis C who presented to ED with low back pain and BLE paresthesias. Patient reports back pain started about 10 days ago. He reports associated BLE \"tingling\" as well. He does have h/o spinal abscess. Upon ER evaluation, MRI was obtained which shows:  1. Extensive edema and enhancement in and around the right L3 nerve as it exits    the neural foramen and courses along the posterior aspect of the psoas muscle. The nerve is expanded and edematous and there is edema and enhancement    throughout the surrounding soft tissues including the posterior aspect of the    psoas and anterior aspect of the posterior paravertebral muscles. The etiology    is unclear. There is high-grade right L2-L3 foraminal stenosis and impingement    on the L3 nerve in the foramen, but the degree of inflammation and edema is    greater than expected from nerve impingement and raises the possibility of an    infection involving the nerve. No drainable fluid collection/abscess. 2. No evidence of discitis-osteomyelitis. 3. Prior posterior pedicle screw and preeti fusion of L3-S1 again noted. The    lucency surrounding the L4 screws and fusion rods seen on prior CT abdomen not    as well visualized with MRI. 4. Severe degenerative change at L3-L4 adjacent to the fusion, including a    moderate annular disc bulge and markedly severe bilateral facet arthrosis. Severe spinal canal stenosis with complete effacement of the CSF, narrowing of    the AP diameter of the thecal sac to 2 mm, and impingement on all bilateral    descending L4, L5 and sacral nerve roots. There is also high-grade bilateral    foraminal stenosis at L3-L4 with evidence of mass effect on the exiting    bilateral L3 nerves.  Edema and enhancement surrounding the L3-L4 facets is    favored to be degenerative given the severity of the arthropathy, but infection    of the hardware cannot be completely excluded. 5. No epidural mass or collection. There is clumping of the nerve roots below    the L3-L4 impingement, which is age indeterminant. No associated enhancement to    suggest acute arachnoiditis. Given concerning findings, Neurosurgery was consulted. Dr. Carlota Santana believes that this is likely non-surgical at this time and recommended Hospitalist to admit with consultation to Neurology. Today, afebrile, some back pain controlled w/ meds, nausea resolved    Assessment & Plan:     1- Spinal stenosis L3-4  2- L2-3 edema, traumatic. Has hx of epid abscess/ mrsa+    Rx:  Pain controlled w/ M. Relaxant and anti-inflammatory  No abx, follow blood CS, per ID  No intervention at this time, per NeuroSx  Follow Neurology output    Dispo: home when clear by Neurology    Objective:   Patient Vitals for the past 24 hrs:   Temp Pulse Resp BP SpO2   02/12/23 1126 98.2 °F (36.8 °C) 77 18 115/79 96 %   02/12/23 0756 99.3 °F (37.4 °C) 80 16 (!) 121/54 95 %   02/12/23 0434 97.7 °F (36.5 °C) 73 18 126/78 97 %   02/11/23 2329 97.9 °F (36.6 °C) 80 18 130/79 97 %   02/11/23 1948 98.6 °F (37 °C) 65 18 139/71 100 %   02/11/23 1540 98.2 °F (36.8 °C) 74 18 112/69 96 %            Estimated body mass index is 33.91 kg/m² as calculated from the following:    Height as of this encounter: 6' (1.829 m). Weight as of this encounter: 250 lb (113.4 kg). Intake/Output Summary (Last 24 hours) at 2/12/2023 1203  Last data filed at 2/12/2023 0856  Gross per 24 hour   Intake 840 ml   Output --   Net 840 ml           Physical Exam:  General:    Well nourished. Moderate distress  CV:   RRR. S1/S2 auscultated  Lungs:   CTAB. No wheezing, rhonchi, or rales. Appears even, unlabored  Abdomen: Bowel sounds present. Soft, nontender, nondistended.     Extremities: Warm and dry. No cyanosis or clubbing. No edema. Skin:     No rashes. Normal turgor. Normal coloration  Neuro:  grossly intact. Lower back midline moderate tenderness  Psych:  Normal mood and affect.   Alert and oriented x3    Data Ordered and Personally Reviewed:    Last 24hr Labs:  Recent Results (from the past 24 hour(s))   Culture, Blood 1    Collection Time: 02/11/23  1:40 PM    Specimen: Blood   Result Value Ref Range    Special Requests LEFT  Antecubital        Culture NO GROWTH AFTER 16 HOURS     C-Reactive Protein    Collection Time: 02/12/23  4:16 AM   Result Value Ref Range    CRP 8.1 (H) 0.0 - 0.9 mg/dL       Signed:  Annie Houser MD

## 2023-02-13 VITALS
SYSTOLIC BLOOD PRESSURE: 127 MMHG | DIASTOLIC BLOOD PRESSURE: 89 MMHG | WEIGHT: 250 LBS | OXYGEN SATURATION: 99 % | BODY MASS INDEX: 33.86 KG/M2 | RESPIRATION RATE: 18 BRPM | HEART RATE: 72 BPM | HEIGHT: 72 IN | TEMPERATURE: 97.7 F

## 2023-02-13 PROCEDURE — 2580000003 HC RX 258: Performed by: INTERNAL MEDICINE

## 2023-02-13 PROCEDURE — 6370000000 HC RX 637 (ALT 250 FOR IP): Performed by: PSYCHIATRY & NEUROLOGY

## 2023-02-13 PROCEDURE — 6360000002 HC RX W HCPCS: Performed by: INTERNAL MEDICINE

## 2023-02-13 PROCEDURE — 6370000000 HC RX 637 (ALT 250 FOR IP): Performed by: HOSPITALIST

## 2023-02-13 PROCEDURE — 6370000000 HC RX 637 (ALT 250 FOR IP): Performed by: INTERNAL MEDICINE

## 2023-02-13 RX ORDER — HYDROCODONE BITARTRATE AND ACETAMINOPHEN 7.5; 325 MG/1; MG/1
1 TABLET ORAL EVERY 6 HOURS PRN
Qty: 12 TABLET | Refills: 0 | Status: SHIPPED | OUTPATIENT
Start: 2023-02-13 | End: 2023-03-05

## 2023-02-13 RX ORDER — BACLOFEN 10 MG/1
10 TABLET ORAL 3 TIMES DAILY PRN
Qty: 30 TABLET | Refills: 0 | Status: SHIPPED | OUTPATIENT
Start: 2023-02-13

## 2023-02-13 RX ADMIN — KETOROLAC TROMETHAMINE 15 MG: 30 INJECTION, SOLUTION INTRAMUSCULAR at 14:13

## 2023-02-13 RX ADMIN — HYDROCODONE BITARTRATE AND ACETAMINOPHEN 1 TABLET: 7.5; 325 TABLET ORAL at 12:10

## 2023-02-13 RX ADMIN — SODIUM CHLORIDE, PRESERVATIVE FREE 5 ML: 5 INJECTION INTRAVENOUS at 08:43

## 2023-02-13 RX ADMIN — BACLOFEN 10 MG: 10 TABLET ORAL at 08:41

## 2023-02-13 RX ADMIN — BACLOFEN 10 MG: 10 TABLET ORAL at 14:13

## 2023-02-13 RX ADMIN — KETOROLAC TROMETHAMINE 15 MG: 30 INJECTION, SOLUTION INTRAMUSCULAR at 00:55

## 2023-02-13 RX ADMIN — PANTOPRAZOLE SODIUM 40 MG: 40 TABLET, DELAYED RELEASE ORAL at 06:17

## 2023-02-13 RX ADMIN — KETOROLAC TROMETHAMINE 15 MG: 30 INJECTION, SOLUTION INTRAMUSCULAR at 06:16

## 2023-02-13 ASSESSMENT — PAIN - FUNCTIONAL ASSESSMENT
PAIN_FUNCTIONAL_ASSESSMENT: PREVENTS OR INTERFERES SOME ACTIVE ACTIVITIES AND ADLS
PAIN_FUNCTIONAL_ASSESSMENT: ACTIVITIES ARE NOT PREVENTED

## 2023-02-13 ASSESSMENT — PAIN DESCRIPTION - ORIENTATION
ORIENTATION: RIGHT
ORIENTATION: RIGHT

## 2023-02-13 ASSESSMENT — PAIN SCALES - GENERAL
PAINLEVEL_OUTOF10: 0
PAINLEVEL_OUTOF10: 7
PAINLEVEL_OUTOF10: 5

## 2023-02-13 ASSESSMENT — PAIN DESCRIPTION - DESCRIPTORS
DESCRIPTORS: ACHING
DESCRIPTORS: ACHING

## 2023-02-13 ASSESSMENT — PAIN DESCRIPTION - LOCATION
LOCATION: BACK;LEG
LOCATION: BACK

## 2023-02-13 NOTE — DISCHARGE SUMMARY
[unfilled]    Physician Discharge Summary     Patient ID:  Guanako Hastings  [id number]  15 y.o.  1980    Admit date: 2/10/2023    Discharge date: 2-    Diagnosis:  1- Spinal stenosis L3-4  2- L2-3 edema, traumatic. Has history of epid abscess/ +ve MRSA. This time, infectious disease consulted and no treatment for infection recommended. Seen and will follow with Neurosurgery. Seen also by Neurology and treated symptomatically. Lumbar MRI w/ and wout contrast:  1. Extensive edema and enhancement in and around the right L3 nerve as it exits    the neural foramen and courses along the posterior aspect of the psoas muscle. The nerve is expanded and edematous and there is edema and enhancement    throughout the surrounding soft tissues including the posterior aspect of the    psoas and anterior aspect of the posterior paravertebral muscles. The etiology    is unclear. There is high-grade right L2-L3 foraminal stenosis and impingement    on the L3 nerve in the foramen, but the degree of inflammation and edema is    greater than expected from nerve impingement and raises the possibility of an    infection involving the nerve. No drainable fluid collection/abscess. 2. No evidence of discitis-osteomyelitis. 3. Prior posterior pedicle screw and preeti fusion of L3-S1 again noted. The    lucency surrounding the L4 screws and fusion rods seen on prior CT abdomen not    as well visualized with MRI. 4. Severe degenerative change at L3-L4 adjacent to the fusion, including a    moderate annular disc bulge and markedly severe bilateral facet arthrosis. Severe spinal canal stenosis with complete effacement of the CSF, narrowing of    the AP diameter of the thecal sac to 2 mm, and impingement on all bilateral    descending L4, L5 and sacral nerve roots. There is also high-grade bilateral    foraminal stenosis at L3-L4 with evidence of mass effect on the exiting    bilateral L3 nerves.  Edema and enhancement surrounding the L3-L4 facets is    favored to be degenerative given the severity of the arthropathy, but infection    of the hardware cannot be completely excluded. 5. No epidural mass or collection. There is clumping of the nerve roots below    the L3-L4 impingement, which is age indeterminant. No associated enhancement to    suggest acute arachnoiditis. Hospital course:   Per HPI:  38 y.o. male with medical history of prior IVDA, hepatitis C who presented to ED with low back pain and BLE paresthesias. Patient reports back pain started about 10 days ago. He reports associated BLE \"tingling\" as well. He does have h/o spinal abscess. Upon ER evaluation, MRI was obtained which shows:  1. Extensive edema and enhancement in and around the right L3 nerve as it exits    the neural foramen and courses along the posterior aspect of the psoas muscle. The nerve is expanded and edematous and there is edema and enhancement    throughout the surrounding soft tissues including the posterior aspect of the    psoas and anterior aspect of the posterior paravertebral muscles. The etiology    is unclear. There is high-grade right L2-L3 foraminal stenosis and impingement    on the L3 nerve in the foramen, but the degree of inflammation and edema is    greater than expected from nerve impingement and raises the possibility of an    infection involving the nerve. No drainable fluid collection/abscess. 2. No evidence of discitis-osteomyelitis. 3. Prior posterior pedicle screw and preeti fusion of L3-S1 again noted. The    lucency surrounding the L4 screws and fusion rods seen on prior CT abdomen not    as well visualized with MRI. 4. Severe degenerative change at L3-L4 adjacent to the fusion, including a    moderate annular disc bulge and markedly severe bilateral facet arthrosis.     Severe spinal canal stenosis with complete effacement of the CSF, narrowing of    the AP diameter of the thecal sac to 2 mm, and impingement on all bilateral    descending L4, L5 and sacral nerve roots. There is also high-grade bilateral    foraminal stenosis at L3-L4 with evidence of mass effect on the exiting    bilateral L3 nerves. Edema and enhancement surrounding the L3-L4 facets is    favored to be degenerative given the severity of the arthropathy, but infection    of the hardware cannot be completely excluded. 5. No epidural mass or collection. There is clumping of the nerve roots below    the L3-L4 impingement, which is age indeterminant. No associated enhancement to    suggest acute arachnoiditis. Given concerning findings, Neurosurgery was consulted. Dr. Fadia Lantigua believes that this is likely non-surgical at this time and recommended Hospitalist to admit with consultation to Neurology. Patient admitted and treated as above. On day of discharge, patient exam showed lower midline back tenderness, 6/10 but tolerable to patient.     PCP: On File Not (Inactive)    Patient Instructions:   Current Discharge Medication List        START taking these medications    Details   baclofen (LIORESAL) 10 MG tablet Take 1 tablet by mouth 3 times daily as needed (spasms)  Qty: 30 tablet, Refills: 0           CONTINUE these medications which have NOT CHANGED    Details   meloxicam (MOBIC) 15 MG tablet Take 1 tablet by mouth daily  Qty: 30 tablet, Refills: 0    Associated Diagnoses: Rupture of left triceps tendon, initial encounter      ketorolac (TORADOL) 10 MG tablet Take 1 tablet by mouth every 6 hours as needed for Pain  Qty: 20 tablet, Refills: 1      glecaprevir-pibrentasvir (MAVYRET) 100-40 MG TABS tablet Take 3 tablets by mouth once Pt takes at mid day with meals           STOP taking these medications       methylPREDNISolone (MEDROL DOSEPACK) 4 MG tablet Comments:   Reason for Stopping:         pantoprazole (PROTONIX) 40 MG tablet Comments:   Reason for Stopping:               Instructions:     PCP in 1 week. Diet and activity as tolerated, Neurosurgery follow up/ Dr Milind Braxton. Time 35 min  Please send copy to primary physician.     Signed:  Roopa Soler MD  2/13/2023  1:43 PM

## 2023-02-13 NOTE — PROGRESS NOTES
Infectious Disease Progress Note  Today's Date: 2023   Admit Date: 2/10/2023  CHIEF Complaint:    Impression:   Abnormal MRI T spine consistent with past discitis/OM/back pain--starting after a fall. Treated in summer but not with IV ABX and left hospital without completing. CRP 8  Changes MRI L spine that seem to note hardware lucency in prior fusion site; no epidural or paraspinal collection. History of IVDA: abstinent per patient. Still living in recovery  housing   History of treated Hep C  Plan:   Continue to observe off antbx  Follow blood cultures    Anti-infectives:   One dose Vanc and Cefepime on  at quarter to midnight    Subjective:   Sitting up bed, no fever since admission. Pain is persistent. Allergies   Allergen Reactions    Ethanol-Alcohol [Ethanol]           Review of Systems:  10 point Ros negative other than what mentioned in HPI. Pertinent items are noted in HPI. Objective:   Blood pressure 127/89, pulse 72, temperature 97.7 °F (36.5 °C), temperature source Oral, resp. rate 18, height 6' (1.829 m), weight 250 lb (113.4 kg), SpO2 99 %. Temp (24hrs), Av °F (36.7 °C), Min:97.5 °F (36.4 °C), Max:99.5 °F (37.5 °C)     Patient examined 2023, exam remains unchanged except as noted below:    General:  Alert, cooperative, no acute distress   Head:  Normocephalic, atraumatic    Eyes:  Anicteric, no drainage/not injected   Throat: Mucus membranes moist OP clear   Neck: Supple, symmetrical, trachea midline, no JVD   Lungs:   Clear throughout lung fields, no increased work of breathing   Heart:  Regular rate and rhythm, without murmur   Abdomen:   Soft, non-tender, no guarding, no distention, bowel sounds active   Extremities: Extremities without edema, pulses palpable   Skin: Skin without rash     Lines/Devices: PIV             Data Review:   No results found for this or any previous visit (from the past 24 hour(s)).        Microbiology:  reviewed         Studies/Imaging: reviewed      Signed By: Merlin Altman NP, APRN - CNP     February 13, 2023

## 2023-02-13 NOTE — PROGRESS NOTES
Hospitalist   Admit Date:  2/10/2023  4:25 PM   Name:  Gloria Gaspar   Age:  38 y.o. Sex:  male  :  1980   MRN:  [id number]     History of Present Illness:     38 y.o. male with medical history of prior IVDA, hepatitis C who presented to ED with low back pain and BLE paresthesias. Patient reports back pain started about 10 days ago. He reports associated BLE \"tingling\" as well. He does have h/o spinal abscess. Upon ER evaluation, MRI was obtained which shows:  1. Extensive edema and enhancement in and around the right L3 nerve as it exits    the neural foramen and courses along the posterior aspect of the psoas muscle. The nerve is expanded and edematous and there is edema and enhancement    throughout the surrounding soft tissues including the posterior aspect of the    psoas and anterior aspect of the posterior paravertebral muscles. The etiology    is unclear. There is high-grade right L2-L3 foraminal stenosis and impingement    on the L3 nerve in the foramen, but the degree of inflammation and edema is    greater than expected from nerve impingement and raises the possibility of an    infection involving the nerve. No drainable fluid collection/abscess. 2. No evidence of discitis-osteomyelitis. 3. Prior posterior pedicle screw and preeti fusion of L3-S1 again noted. The    lucency surrounding the L4 screws and fusion rods seen on prior CT abdomen not    as well visualized with MRI. 4. Severe degenerative change at L3-L4 adjacent to the fusion, including a    moderate annular disc bulge and markedly severe bilateral facet arthrosis. Severe spinal canal stenosis with complete effacement of the CSF, narrowing of    the AP diameter of the thecal sac to 2 mm, and impingement on all bilateral    descending L4, L5 and sacral nerve roots. There is also high-grade bilateral    foraminal stenosis at L3-L4 with evidence of mass effect on the exiting    bilateral L3 nerves.  Edema and enhancement surrounding the L3-L4 facets is    favored to be degenerative given the severity of the arthropathy, but infection    of the hardware cannot be completely excluded. 5. No epidural mass or collection. There is clumping of the nerve roots below    the L3-L4 impingement, which is age indeterminant. No associated enhancement to    suggest acute arachnoiditis. Given concerning findings, Neurosurgery was consulted. Dr. Sahil Velasco believes that this is likely non-surgical at this time and recommended Hospitalist to admit with consultation to Neurology. Today, afebrile, some back pain controlled w/ meds, 6/10 tolerable to patient,  nausea resolved    Assessment & Plan:     1- Spinal stenosis L3-4  2- L2-3 edema, traumatic. Has hx of epid abscess/ mrsa+    Rx:  Pain controlled w/ M. Relaxant and anti-inflammatory  No abx, follow blood CS, per ID  No intervention at this time, per NeuroSx  Follow Neurology output    Dispo: home when clear by Neurology  I contact neurology for recommendations    Objective:   Patient Vitals for the past 24 hrs:   Temp Pulse Resp BP SpO2   02/13/23 1210 -- -- 18 -- --   02/13/23 1115 97.7 °F (36.5 °C) 72 19 127/89 99 %   02/13/23 0723 97.5 °F (36.4 °C) 50 19 107/77 95 %   02/13/23 0443 97.7 °F (36.5 °C) 84 18 100/65 97 %   02/12/23 2349 99.5 °F (37.5 °C) 79 17 125/80 96 %   02/12/23 1924 97.9 °F (36.6 °C) 77 17 123/83 96 %   02/12/23 1520 97.5 °F (36.4 °C) 76 16 96/77 97 %            Estimated body mass index is 33.91 kg/m² as calculated from the following:    Height as of this encounter: 6' (1.829 m). Weight as of this encounter: 250 lb (113.4 kg). Intake/Output Summary (Last 24 hours) at 2/13/2023 1249  Last data filed at 2/13/2023 0843  Gross per 24 hour   Intake 485 ml   Output --   Net 485 ml           Physical Exam:  General:    Well nourished. Moderate distress  CV:   RRR. S1/S2 auscultated  Lungs:   CTAB. No wheezing, rhonchi, or rales.   Appears even, unlabored  Abdomen: Bowel sounds present. Soft, nontender, nondistended. Extremities: Warm and dry. No cyanosis or clubbing. No edema. Skin:     No rashes. Normal turgor. Normal coloration  Neuro:  grossly intact. Lower back midline moderate tenderness  Psych:  Normal mood and affect. Alert and oriented x3    Data Ordered and Personally Reviewed:    Last 24hr Labs:  No results found for this or any previous visit (from the past 24 hour(s)).       Signed:  Marlin Harvey MD

## 2023-02-13 NOTE — PLAN OF CARE
Problem: Discharge Planning  Goal: Discharge to home or other facility with appropriate resources  Outcome: Progressing     Problem: Skin/Tissue Integrity  Goal: Absence of new skin breakdown  Description: 1. Monitor for areas of redness and/or skin breakdown  2. Assess vascular access sites hourly  3. Every 4-6 hours minimum:  Change oxygen saturation probe site  4. Every 4-6 hours:  If on nasal continuous positive airway pressure, respiratory therapy assess nares and determine need for appliance change or resting period.   Outcome: Progressing     Problem: Pain  Goal: Verbalizes/displays adequate comfort level or baseline comfort level  Outcome: Progressing     Problem: Safety - Adult  Goal: Free from fall injury  Outcome: Progressing

## 2023-02-13 NOTE — PROGRESS NOTES
Patient given discharge instructions verbally and via handout; he is aware of medications, follow-ups, and general instructions. He will be discharged to home, self-care; he has all of his belongings; pt is arranging Adin Bhakta for transport.

## 2023-02-13 NOTE — CARE COORDINATION
Patient is medically ready for discharge. Previous CM note reviewed from [facility]. Therapy recommending OP OT only and no DME at discharge. No Worker's Comp information was obtained from previous CM. PCP referral to [facility] was not sent. Referral sent to to [facility] OP therapy for OP OT. Referral sent to [facility] for new PCP. No other CM needs noted at this time. 02/13/23 4766   Service Assessment   Patient Orientation Alert and Oriented   Cognition Alert   History Provided By Medical Record   Primary Caregiver Self   Accompanied By/Relationship N/A   Support Systems Friends/Neighbors   Patient's Healthcare Decision Maker is: Legal Next of Kin   PCP Verified by CM Yes  (Referral sent to [facility])   Prior Functional Level Independent in ADLs/IADLs   Current Functional Level Independent in ADLs/IADLs   Can patient return to prior living arrangement Yes   Ability to make needs known: Good   Family able to assist with home care needs: No   Would you like for me to discuss the discharge plan with any other family members/significant others, and if so, who? No   Financial Resources Other (Comment)  (W.C[employer]   Community Resources None   CM/SW Referral Other (see comment)  (N/A)   Discharge Planning   Potential Assistance Needed Outpatient PT/OT   DME Ordered? No   Patient expects to be discharged to: [facility]   Services At/After Discharge OT; Outpatient   Confirm Follow Up Transport Friends

## 2023-02-15 LAB
BACTERIA SPEC CULT: NORMAL
BACTERIA SPEC CULT: NORMAL
SERVICE CMNT-IMP: NORMAL
SERVICE CMNT-IMP: NORMAL

## 2023-02-16 ENCOUNTER — TELEPHONE (OUTPATIENT)
Dept: NEUROSURGERY | Age: 43
End: 2023-02-16

## 2023-02-16 NOTE — TELEPHONE ENCOUNTER
Pt was seen in the hospital. You were consulted. This is a WC case. Pt called and stated he was in extreme pain. Do you want to see pt and if so, does anything need to be ordered? Please advise. Thank you.

## 2023-02-17 ENCOUNTER — HOSPITAL ENCOUNTER (EMERGENCY)
Age: 43
Discharge: HOME | End: 2023-02-17
Attending: STUDENT IN AN ORGANIZED HEALTH CARE EDUCATION/TRAINING PROGRAM

## 2023-02-17 VITALS
TEMPERATURE: 98 F | DIASTOLIC BLOOD PRESSURE: 107 MMHG | WEIGHT: 250 LBS | OXYGEN SATURATION: 98 % | HEIGHT: 72 IN | SYSTOLIC BLOOD PRESSURE: 152 MMHG | BODY MASS INDEX: 33.86 KG/M2 | HEART RATE: 92 BPM | RESPIRATION RATE: 20 BRPM

## 2023-02-17 DIAGNOSIS — M54.50 LUMBAR BACK PAIN: Primary | ICD-10-CM

## 2023-02-17 PROCEDURE — 96372 THER/PROPH/DIAG INJ SC/IM: CPT

## 2023-02-17 PROCEDURE — 99284 EMERGENCY DEPT VISIT MOD MDM: CPT

## 2023-02-17 PROCEDURE — 6360000002 HC RX W HCPCS: Performed by: STUDENT IN AN ORGANIZED HEALTH CARE EDUCATION/TRAINING PROGRAM

## 2023-02-17 RX ORDER — LIDOCAINE 50 MG/G
1 PATCH TOPICAL DAILY
Qty: 10 PATCH | Refills: 0 | Status: SHIPPED | OUTPATIENT
Start: 2023-02-17 | End: 2023-02-27

## 2023-02-17 RX ORDER — METHOCARBAMOL 500 MG/1
500 TABLET, FILM COATED ORAL 4 TIMES DAILY
Qty: 20 TABLET | Refills: 0 | Status: SHIPPED | OUTPATIENT
Start: 2023-02-17 | End: 2023-02-22

## 2023-02-17 RX ORDER — DEXAMETHASONE SODIUM PHOSPHATE 10 MG/ML
10 INJECTION INTRAMUSCULAR; INTRAVENOUS ONCE
Status: COMPLETED | OUTPATIENT
Start: 2023-02-17 | End: 2023-02-17

## 2023-02-17 RX ADMIN — DEXAMETHASONE SODIUM PHOSPHATE 10 MG: 10 INJECTION, SOLUTION INTRAMUSCULAR; INTRAVENOUS at 22:52

## 2023-02-17 ASSESSMENT — PAIN - FUNCTIONAL ASSESSMENT: PAIN_FUNCTIONAL_ASSESSMENT: 0-10

## 2023-02-17 ASSESSMENT — LIFESTYLE VARIABLES
HOW MANY STANDARD DRINKS CONTAINING ALCOHOL DO YOU HAVE ON A TYPICAL DAY: PATIENT DOES NOT DRINK
HOW OFTEN DO YOU HAVE A DRINK CONTAINING ALCOHOL: NEVER

## 2023-02-17 ASSESSMENT — PAIN SCALES - GENERAL: PAINLEVEL_OUTOF10: 10

## 2023-02-17 NOTE — Clinical Note
Guanako Hastings was seen and treated in our emergency department on 2/17/2023. He may return to work on 02/20/2023. If you have any questions or concerns, please don't hesitate to call.       Carl General, DO

## 2023-02-17 NOTE — Clinical Note
Toni Bell was seen and treated in our emergency department on 2/17/2023. He may return to work on 02/20/2023. If you have any questions or concerns, please don't hesitate to call.       Carolyn Carias, DO

## 2023-02-18 NOTE — ED TRIAGE NOTES
Pt states that he was recently admitted for back pain that occurred with injury at work, c/o continued back pain that is unrelieved with toradol and lortab; c/o continued numbness and tingling, denies any incontinence

## 2023-02-18 NOTE — DISCHARGE INSTRUCTIONS
Take medication as prescribed for pain. Follow up with neurosurgery next week. Return to the ER for worsening or worrisome symptoms. We would love to help you get a primary care doctor for follow-up after your emergency department visit. Please call [phone] between 7AM - 6PM Monday to Friday. A care navigator will be able to assist you with setting up a doctor close to your home.

## 2023-02-18 NOTE — ED PROVIDER NOTES
Emergency Department Provider Note                   PCP:                On File Not (Inactive)               Age: 38 y.o. Sex: male       ICD-10-CM    1. Lumbar back pain  M54.50 [facility] - Aria Prince MD, Neurosurgery, [address] Decision To Discharge 02/17/2023 10:42:59 PM        Medical Decision Making  42M hx of back pain w/ prior spinal surgery presents back to the emergency department with continued discomfort. Will give IM Toradol. No cauda equina syndrome symptoms. Patient is afebrile and well-appearing. He states he is round of his Toradol. Will prescribe Lidoderm patches as well as Robaxin. He will alternate Tylenol and Motrin for this. Referral was made for neurosurgery to the patient can have close outpatient follow-up. Given strict return precautions. He voiced understanding and agreement. Amount and/or Complexity of Data Reviewed  External Data Reviewed: notes. Risk  Prescription drug management. Orders Placed This Encounter   Procedures    [facility] - Aria Prince MD, Neurosurgery, [place]        Medications   Decadron 10mg IM (10 mg IntraMUSCular Given 2/17/23 2252)       New Prescriptions    LIDOCAINE (LIDODERM) 5 %    Place 1 patch onto the skin daily for 10 days 12 hours on, 12 hours off. METHOCARBAMOL (ROBAXIN) 500 MG TABLET    Take 1 tablet by mouth 4 times daily for 5 days        Gloria Gaspar is a 38 y.o. male who presents to the Emergency Department with chief complaint of    Chief Complaint   Patient presents with    Back Pain      42M presents back to the ER after having continued back pain. He was recently admitted w/ back pain and fever. Had a normal work up at that time w/ negative blood cultures. He reports continued back pain and has been compliant w/ medications he was sent home w/.  He denies fecal or urinary retention or incontinence. No numbness or weakness in [place].            Review of Systems    Past Medical History:   Diagnosis Date    Infectious disease     hep C        Past Surgical History:   Procedure Laterality Date    ORTHOPEDIC SURGERY      spinal fusion        No family history on file. 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 Intended supply: 3 days. Take lowest dose possible to manage pain Max Daily Amount: 4 tablets    KETOROLAC (TORADOL) 10 MG TABLET    Take 1 tablet by mouth every 6 hours as needed for Pain    MELOXICAM (MOBIC) 15 MG TABLET    Take 1 tablet by mouth daily        Vitals signs and nursing note reviewed. Patient Vitals for the past 4 hrs:   Temp Pulse Resp BP SpO2   02/17/23 2237 98 °F (36.7 °C) 92 20 (!) 152/107 98 %          Physical Exam  Vitals and nursing note reviewed. Constitutional:       General: He is not in acute distress. Appearance: Normal appearance. HENT:      Head: Normocephalic. Nose: Nose normal.      Mouth/Throat:      Mouth: Mucous membranes are moist.   Eyes:      Extraocular Movements: Extraocular movements intact. Cardiovascular:      Rate and Rhythm: Normal rate and regular rhythm. Pulses: Normal pulses. Heart sounds: Normal heart sounds. Pulmonary:      Effort: Pulmonary effort is normal. No respiratory distress. Breath sounds: Normal breath sounds. Musculoskeletal:         General: Normal range of motion. Cervical back: Normal range of motion. No rigidity. Skin:     General: Skin is warm. Capillary Refill: Capillary refill takes less than 2 seconds. Findings: No rash. Neurological:      General: No focal deficit present. Mental Status: He is alert and oriented to person, place, and time. Psychiatric:         Mood and Affect: Mood normal.        Procedures    No results found for any visits on 02/17/23. No orders to display                       Voice dictation software was used during the making of this note. This software is not perfect and grammatical and other typographical errors may be present. This note has not been completely proofread for errors.         Pino , DO  02/17/23 3674

## 2023-02-18 NOTE — ED NOTES
I have reviewed discharge instructions with the patient. The patient verbalized understanding. Patient left ED via Discharge Method: ambulatory to Home with self. Opportunity for questions and clarification provided. Patient given 2 scripts. To continue your aftercare when you leave the hospital, you may receive an automated call from our care team to check in on how you are doing. This is a free service and part of our promise to provide the best care and service to meet your aftercare needs.  If you have questions, or wish to unsubscribe from this service please call [phone]. Thank you for Choosing our [employer] Emergency Department.       Pedro Pablo Chacon RN  02/17/23 8740

## 2023-02-20 ENCOUNTER — HOSPITAL ENCOUNTER (EMERGENCY)
Age: 43
Discharge: HOME | End: 2023-02-20
Attending: EMERGENCY MEDICINE

## 2023-02-20 VITALS
WEIGHT: 250 LBS | OXYGEN SATURATION: 96 % | DIASTOLIC BLOOD PRESSURE: 91 MMHG | BODY MASS INDEX: 33.86 KG/M2 | SYSTOLIC BLOOD PRESSURE: 148 MMHG | HEART RATE: 95 BPM | RESPIRATION RATE: 16 BRPM | HEIGHT: 72 IN | TEMPERATURE: 97.7 F

## 2023-02-20 DIAGNOSIS — G89.29 CHRONIC LOW BACK PAIN, UNSPECIFIED BACK PAIN LATERALITY, UNSPECIFIED WHETHER SCIATICA PRESENT: Primary | ICD-10-CM

## 2023-02-20 DIAGNOSIS — M54.50 CHRONIC LOW BACK PAIN, UNSPECIFIED BACK PAIN LATERALITY, UNSPECIFIED WHETHER SCIATICA PRESENT: Primary | ICD-10-CM

## 2023-02-20 PROCEDURE — 6360000002 HC RX W HCPCS: Performed by: EMERGENCY MEDICINE

## 2023-02-20 PROCEDURE — 99284 EMERGENCY DEPT VISIT MOD MDM: CPT

## 2023-02-20 PROCEDURE — 96372 THER/PROPH/DIAG INJ SC/IM: CPT

## 2023-02-20 RX ORDER — DEXAMETHASONE SODIUM PHOSPHATE 10 MG/ML
10 INJECTION INTRAMUSCULAR; INTRAVENOUS
Status: COMPLETED | OUTPATIENT
Start: 2023-02-20 | End: 2023-02-20

## 2023-02-20 RX ADMIN — DEXAMETHASONE SODIUM PHOSPHATE 10 MG: 10 INJECTION INTRAMUSCULAR; INTRAVENOUS at 14:42

## 2023-02-20 ASSESSMENT — PAIN DESCRIPTION - DESCRIPTORS: DESCRIPTORS: SHOOTING;SHARP

## 2023-02-20 ASSESSMENT — ENCOUNTER SYMPTOMS
ABDOMINAL PAIN: 0
SHORTNESS OF BREATH: 0
NAUSEA: 0
EYE DISCHARGE: 0
VOMITING: 0
DIARRHEA: 0
EYE ITCHING: 0
COUGH: 0
CHEST TIGHTNESS: 0
BACK PAIN: 1

## 2023-02-20 ASSESSMENT — PAIN DESCRIPTION - ORIENTATION: ORIENTATION: RIGHT

## 2023-02-20 ASSESSMENT — PAIN DESCRIPTION - LOCATION: LOCATION: BACK;LEG

## 2023-02-20 ASSESSMENT — PAIN SCALES - GENERAL: PAINLEVEL_OUTOF10: 7

## 2023-02-20 NOTE — Clinical Note
Shamar Mcarthur was seen and treated in our emergency department on 2/20/2023. He may return to work on 02/21/2023. If you have any questions or concerns, please don't hesitate to call.       Emiliana Tomlinson MD

## 2023-02-20 NOTE — DISCHARGE INSTRUCTIONS
Continue all your current medications    Follow-up with your employers Workmen's Comp. provider.   He should do this is soon as possible  I have provided our [address] contact information as well    No lifting, bending or other strenous activities    Call your doctor or the follow up doctor to set up appointment for recheck visit    Return to ER for any worsening symptoms or new problems which may arise

## 2023-02-20 NOTE — ED TRIAGE NOTES
Pt arrives via pov ambulatory c/o back that radiates down the right leg. Seen here recently for this after a fall at work. Pt requesting a steroid shot for relief.

## 2023-02-20 NOTE — ED NOTES
I have reviewed discharge instructions with the patient. The patient verbalized understanding. Patient left ED via Discharge Method: ambulatory to Home with self    Opportunity for questions and clarification provided. Patient given 0 scripts. To continue your aftercare when you leave the hospital, you may receive an automated call from our care team to check in on how you are doing. This is a free service and part of our promise to provide the best care and service to meet your aftercare needs.  If you have questions, or wish to unsubscribe from this service please call [phone]. Thank you for Choosing our [facility] Emergency Department.          Alireza Bates RN  02/20/23 3292

## 2023-02-20 NOTE — ED PROVIDER NOTES
Emergency Department Provider Note                   PCP:                On File Not (Inactive)               Age: 38 y.o. Sex: male       ICD-10-CM    1. Chronic low back pain, unspecified back pain laterality, unspecified whether sciatica present  M54.50     G89.29           DISPOSITION Decision To Discharge 02/20/2023 02:30:07 PM        Medical Decision Making  46-year-old male patient here with ongoing back pain  Had a fall at work which is triggered increasing pain  Was seen 3 days ago and put out of work x3 days but returns today due to ongoing pain  He is requesting a work note to stay out of work for longer    I discussed with the patient that we cannot continue to give the patient work excuses as this falls under [employer]. and other issues  Patient is directed to follow-up through his work with her [employer]. provider. I did provide the patient with our Workmen's Comp. service as well  He is also instructed to continue to push for an appointment with Dr. Amparo Pearl so that they can decide if further treatment is needed for his ongoing back pain    Risk  Prescription drug management. Risk Details: Elements of this note have been dictated via voice recognition software. Text and phrases may be limited by the accuracy of the software. The chart has been reviewed, but errors may still be present. Complexity of Problem: 1 stable, acute illness. (3)      I have reviewed records from an external source: provider visit notes from outside specialist.    Social determinant affecting care: Patient with a work-related injury having difficulties with his Workmen's Comp. and confirming an outpatient evaluation with neurosurgery      Patient was discharged risks and benefits of hospitalization were discussed. No orders of the defined types were placed in this encounter.        Medications   Decadron 10 mg IM (has no administration in time range)       New Prescriptions    No medications on file        Tish Queen is a 38 y.o. male who presents to the Emergency Department with chief complaint of    Chief Complaint   Patient presents with    Lower Back Pain      46-year-old male patient presents to the ER with complaints of low back pain  Patient has had a fairly convoluted history of prior surgery potential infections  Was recently admitted but his work-up for acute infection was negative per his report    Patient has had ongoing pain was seen here in ER 3 days ago and was given a Decadron injection which she reports did help his symptoms  He was discharged on lidocaine patches and Robaxin    Patient returns today requesting a work note to continue being out of work as well as another steroid shot    No fever vomiting or diarrhea reported  He has no new numbness or tingling  He denies any new weakness  No bowel or bladder symptoms reported    The history is provided by the patient. Back Pain  Location:  Lumbar spine  Quality:  Aching and shooting  Pain severity:  Moderate  Pain is:  Same all the time  Onset quality:  Gradual  Timing:  Constant  Progression:  Waxing and waning  Chronicity:  Chronic  Context: falling, recent injury and twisting    Context: not MCA and not MVA    Relieved by:  Nothing  Worsened by:  Bending, movement, palpation and standing  Ineffective treatments:  NSAIDs, narcotics and OTC medications  Associated symptoms: leg pain    Associated symptoms: no abdominal pain, no chest pain, no dysuria, no fever, no headaches, no perianal numbness, no tingling and no weakness    Risk factors: steroid use    Risk factors: no hx of cancer and no recent surgery        Review of Systems   Constitutional:  Negative for chills and fever. HENT:  Negative for hearing loss, sneezing and tinnitus. Eyes:  Negative for discharge and itching. Respiratory:  Negative for cough, chest tightness and shortness of breath. Cardiovascular:  Negative for chest pain and palpitations. Gastrointestinal:  Negative for abdominal pain, diarrhea, nausea and vomiting. Endocrine: Negative for cold intolerance, heat intolerance, polydipsia, polyphagia and polyuria. Genitourinary:  Negative for dysuria, hematuria and urgency. Musculoskeletal:  Positive for back pain. Negative for arthralgias and myalgias. Skin:  Negative for rash and wound. Allergic/Immunologic: Negative for immunocompromised state. Neurological:  Negative for tingling, seizures, syncope, weakness and headaches. Hematological: Negative. Psychiatric/Behavioral:  Negative for dysphoric mood and self-injury. The patient is not nervous/anxious. All other systems reviewed and are negative. Past Medical History:   Diagnosis Date    Infectious disease     hep C        Past Surgical History:   Procedure Laterality Date    ORTHOPEDIC SURGERY      spinal fusion        No family history on file. 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 Intended supply: 3 days. Take lowest dose possible to manage pain Max Daily Amount: 4 tablets    KETOROLAC (TORADOL) 10 MG TABLET    Take 1 tablet by mouth every 6 hours as needed for Pain    LIDOCAINE (LIDODERM) 5 %    Place 1 patch onto the skin daily for 10 days 12 hours on, 12 hours off.     MELOXICAM (MOBIC) 15 MG TABLET    Take 1 tablet by mouth daily    METHOCARBAMOL (ROBAXIN) 500 MG TABLET    Take 1 tablet by mouth 4 times daily for 5 days        Vitals signs and nursing note reviewed. Patient Vitals for the past 4 hrs:   Temp Pulse Resp BP SpO2   02/20/23 1327 97.7 °F (36.5 °C) 95 16 (!) 148/91 96 %          Physical Exam  Vitals and nursing note reviewed. Constitutional:       General: He is in acute distress. Appearance: Normal appearance. He is obese. HENT:      Head: Normocephalic and atraumatic. Left Ear: External ear normal.      Mouth/Throat:      Mouth: Mucous membranes are moist.      Pharynx: Oropharynx is clear. Eyes:      General: No scleral icterus. Extraocular Movements: Extraocular movements intact. Conjunctiva/sclera: Conjunctivae normal.      Pupils: Pupils are equal, round, and reactive to light. Cardiovascular:      Rate and Rhythm: Normal rate and regular rhythm. Pulses: Normal pulses. Heart sounds: Normal heart sounds. Pulmonary:      Effort: Pulmonary effort is normal. No respiratory distress. Breath sounds: Normal breath sounds. Abdominal:      General: Abdomen is flat. Bowel sounds are normal. There is no distension. Palpations: Abdomen is soft. Tenderness: There is no abdominal tenderness. Musculoskeletal:         General: Tenderness present. No deformity or signs of injury. Normal range of motion. Back:    Skin:     General: Skin is warm and dry. Capillary Refill: Capillary refill takes less than 2 seconds. Neurological:      General: No focal deficit present. Mental Status: He is alert and oriented to person, place, and time. Mental status is at baseline. Cranial Nerves: No cranial nerve deficit. Motor: No weakness. Psychiatric:         Mood and Affect: Mood normal.         Behavior: Behavior normal.         Thought Content:  Thought content normal.         Judgment: Judgment normal.        Procedures    No results found for any visits on 02/20/23. No orders to display                       Voice dictation software was used during the making of this note. This software is not perfect and grammatical and other typographical errors may be present. This note has not been completely proofread for errors.      Taylor Mireles MD  02/20/23 5635

## 2023-02-21 ENCOUNTER — CLINICAL DOCUMENTATION (OUTPATIENT)
Dept: NEUROSURGERY | Age: 43
End: 2023-02-21

## 2023-02-21 NOTE — PROGRESS NOTES
Pt is coming into to see Dr. Josemanuel Gerardo regarding his Lumbar, had several ED visit, is under [facility] for his elbow at this time , coverage wasn't created correctly so when im updating his chart for self pay I cant bc WC claim is now attached to it   -as a WC guarantor should of been created at the pts first visit with POA. I have correct the coverage/guarantor in registration -I did not check the claim portion -as of now pt is self pay with our office, pt does state that it is under review with his adjustor at this time, he was instructed to let me know if approved. He will come in to see Dr. Josemanuel Gerardo under self pay -unless [facility] approves this body part. I asked pt if he had been given our  information and he stated it he was given and they are working on that also. FYI.

## 2023-02-23 ENCOUNTER — OFFICE VISIT (OUTPATIENT)
Dept: NEUROSURGERY | Age: 43
End: 2023-02-23

## 2023-02-23 VITALS
BODY MASS INDEX: 35.89 KG/M2 | HEART RATE: 87 BPM | TEMPERATURE: 97.3 F | SYSTOLIC BLOOD PRESSURE: 120 MMHG | OXYGEN SATURATION: 95 % | WEIGHT: 265 LBS | HEIGHT: 72 IN | DIASTOLIC BLOOD PRESSURE: 82 MMHG

## 2023-02-23 DIAGNOSIS — M48.062 LUMBAR STENOSIS WITH NEUROGENIC CLAUDICATION: Primary | ICD-10-CM

## 2023-02-23 DIAGNOSIS — M21.372 LEFT FOOT DROP: ICD-10-CM

## 2023-02-23 PROCEDURE — 99204 OFFICE O/P NEW MOD 45 MIN: CPT | Performed by: NEUROLOGICAL SURGERY

## 2023-02-23 RX ORDER — DEXAMETHASONE 4 MG/1
4 TABLET ORAL 4 TIMES DAILY
Qty: 20 TABLET | Refills: 0 | Status: SHIPPED | OUTPATIENT
Start: 2023-02-23 | End: 2023-02-28

## 2023-02-23 NOTE — PROGRESS NOTES
History of Present Illness    Patient Words: 38 y.o. This patient is a 38 y.o. male who presents today for evaluation of a 4 to 6-week history of severe low back pain radiating to the legs right greater than left. He suffered a work-related injury where he fell and landed forcibly. The initial injury was severe to the elbow with torn ligaments and entirely bruised left upper extremity which was extremely painful. Several days later the patient experienced recurrent back pain severe enough to have him to come to the emergency room to be admitted to the hospital.  Previous L4-S1 fusion 20 years ago. MRI scan now shows severe spinal stenosis at L3-4. Did have a paraspinal abscess in the thoracic spine however MRIs of the thoracic and lumbar spine failed to show any infection and he was cleared by infectious disease while admitted to the hospital that he does not have active infection. He has chronic left foot drop of 20 years duration. The right lower extremity weakness is making it difficult for him to walk superimposed upon his chronic weakness. Past Medical History:   Diagnosis Date    Infectious disease     hep C        Allergies   Allergen Reactions    Ethanol-Alcohol [Ethanol]         No family history on file.      Social History     Socioeconomic History    Marital status:      Spouse name: Not on file    Number of children: Not on file    Years of education: Not on file    Highest education level: Not on file   Occupational History    Not on file   Tobacco Use    Smoking status: Every Day    Smokeless tobacco: Not on file   Vaping Use    Vaping Use: Never used   Substance and Sexual Activity    Alcohol use: Not Currently    Drug use: Not Currently     Types: IV     Comment: 3 months recovery from IVDU    Sexual activity: Not on file   Other Topics Concern    Not on file   Social History Narrative    Not on file     Social Determinants of Health     Financial Resource Strain: Not on file Food Insecurity: Not on file   Transportation Needs: Not on file   Physical Activity: Not on file   Stress: Not on file   Social Connections: Not on file   Intimate Partner Violence: Not on file   Housing Stability: Not on file       Current Outpatient Medications   Medication Sig Dispense Refill    Methocarbamol (ROBAXIN PO) Take by mouth      dexamethasone (DECADRON) 4 MG tablet Take 1 tablet by mouth 4 times daily for 5 days 20 tablet 0    lidocaine (LIDODERM) 5 % Place 1 patch onto the skin daily for 10 days 12 hours on, 12 hours off. 10 patch 0    baclofen (LIORESAL) 10 MG tablet Take 1 tablet by mouth 3 times daily as needed (spasms) 30 tablet 0    meloxicam (MOBIC) 15 MG tablet Take 1 tablet by mouth daily 30 tablet 0    HYDROcodone-acetaminophen (NORCO) 7.5-325 MG per tablet Take 1 tablet by mouth every 6 hours as needed for Pain for up to 20 days. Intended supply: 3 days. Take lowest dose possible to manage pain Max Daily Amount: 4 tablets (Patient not taking: Reported on 2/23/2023) 12 tablet 0    ketorolac (TORADOL) 10 MG tablet Take 1 tablet by mouth every 6 hours as needed for Pain (Patient not taking: Reported on 2/23/2023) 20 tablet 1    glecaprevir-pibrentasvir (MAVYRET) 100-40 MG TABS tablet Take 3 tablets by mouth once Pt takes at mid day with meals (Patient not taking: Reported on 2/23/2023)       No current facility-administered medications for this visit. Patient Active Problem List   Diagnosis    Chest pain    Discitis of thoracic region    Pleural effusion    Empyema lung (HCC)    Phlegmon    Chronic hepatitis C without hepatic coma (HCC)    Discitis    Paraspinal abscess (HCC)    Accidental overdose of heroin (HCC)    Neuropathy    Lumbar stenosis with neurogenic claudication    Left foot drop        Review of Systems: A complete ROS was done and as stated in the HPI or otherwise negative.     /82   Pulse 87   Temp 97.3 °F (36.3 °C) (Temporal)   Ht 6' (1.829 m)   Wt 265 lb (120.2 kg)   SpO2 95%   BMI 35.94 kg/m²        Physical Exam  The physical exam findings are as follows:      Cranial Nerves:   Intact visual fields. Fundi are benign. BINDU, EOM's full, no nystagmus, no ptosis. Facial sensation is normal. Corneal reflexes are intact. Facial movement is symmetric. Hearing is normal bilaterally. Palate is midline with normal sternocleidomastoid and trapezius muscles are normal. Tongue is midline.   Motor:  5/5 strength in upper and lower proximal and distal muscle except for 3/5 right quadriceps and 0/5 left dorsiflexor. Normal bulk and tone. No fasciculations.   Reflexes:   Deep tendon reflexes 2+/4 and symmetrical.   Sensory:   Normal to touch, pinprick and vibration.   Gait:  Normal gait.   Tremor:   No tremor noted.   Cerebellar:  No cerebellar signs present.              Assessment & Plan      ICD-10-CM    1. Lumbar stenosis with neurogenic claudication  M48.062 DC DME SUPPLY OR ACCESSORY, NOS      2. Left foot drop  M21.372          Work-related injury.  Aggravation of a pre-existing condition with worsening weakness in the right leg making it difficult for him to walk superimposed upon chronic left foot weakness.  Severe spinal stenosis L3-4.    Left AFO brace.  Flexion-extension lumbar spine x-rays if stable decompression L3-4 without fusion extension will be needed.  Decadron 4 mg 4 times daily to help with severe pain.  And weakness.  Remain off work.  Return visit after to schedule and discuss surgery.    JUANA WHATLEY MD

## 2023-02-24 ENCOUNTER — OFFICE VISIT (OUTPATIENT)
Dept: ORTHOPEDIC SURGERY | Age: 43
End: 2023-02-24

## 2023-02-24 DIAGNOSIS — S46.312A RUPTURE OF LEFT TRICEPS TENDON, INITIAL ENCOUNTER: Primary | ICD-10-CM

## 2023-02-24 DIAGNOSIS — M54.9 ACUTE BACK PAIN, UNSPECIFIED BACK LOCATION, UNSPECIFIED BACK PAIN LATERALITY: ICD-10-CM

## 2023-02-24 NOTE — PROGRESS NOTES
Orthopaedic Hand Clinic Note    Name: Shamar Mcarthur  Age: 38 y.o. YOB: 1980  Gender: male  MRN: [id number]      Follow up visit:   1. Rupture of left triceps tendon, initial encounter    2. Acute back pain, unspecified back location, unspecified back pain laterality        HPI: Shamar Mcarthur is a 38 y.o. male who is following up for left elbow pain, back pain. Patient reports that he continues to have back pain since the injury, he also reports ongoing pain in the left elbow but it is about 80% better since my last assessment. He is being evaluated by Dr. Marylen Diones for his back. ROS/Meds/PSH/PMH/FH/SH: I personally reviewed the patients standard intake form. Pertinents are discussed in the HPI    Physical Examination:  General: Awake and alert. HEENT: Normocephalic, atraumatic  CV/Pulm: Breathing even and unlabored  Skin: No obvious rashes noted. Lymphatic: No obvious evidence of lymphedema or lymphadenopathy    Musculoskeletal Examination:  Examination on the left upper extremity demonstrates cap refill < 5 seconds in all fingers, tenderness palpation of the triceps insertion, mild pain with resisted extension, no palpable defect in the triceps. Imaging / Electrodiagnostic Tests:     none    Assessment:   1. Rupture of left triceps tendon, initial encounter    2. Acute back pain, unspecified back location, unspecified back pain laterality        Plan:   We discussed the diagnosis and different treatment options. We discussed observation, therapy, antiinflammatory medications and other pertinent treatment modalities.     After discussing in detail the patient elects to proceed with slowly resume activities as tolerated, he may return to work with no lifting, pushing or pulling more than 5 pounds with the left hand for the next 2 weeks, after that he may resume work without restrictions, at this point patient feels he is about 80% better from the elbow standpoint, I do believe he had a Kwadwo triceps tear however, this may be very small and healing, I will reassess the patient in 6 to 8 weeks to ensure that this is fully healed so he can be at [facility] [address] [place] [address] and released at that point. If at any point he has worsening pain then we may obtain an MRI because partial tricep tears can become full tears. Patient voiced accordance and understanding of the information provided and the formulated plan. All questions were answered to the patient's satisfaction during the encounter.   Overall time of this visit taken to account reviewing the chart, face-to-face time with patient counseling on his condition as well as documentation after chart was over 30 minutes    Cornelia Garnica MD  Orthopaedic Surgery  02/24/23  7:57 AM

## 2023-03-02 ENCOUNTER — OFFICE VISIT (OUTPATIENT)
Dept: NEUROSURGERY | Age: 43
End: 2023-03-02

## 2023-03-02 ENCOUNTER — HOSPITAL ENCOUNTER (OUTPATIENT)
Dept: GENERAL RADIOLOGY | Age: 43
Discharge: HOME | End: 2023-03-02
Payer: COMMERCIAL

## 2023-03-02 ENCOUNTER — CLINICAL DOCUMENTATION (OUTPATIENT)
Dept: NEUROSURGERY | Age: 43
End: 2023-03-02

## 2023-03-02 ENCOUNTER — PREP FOR PROCEDURE (OUTPATIENT)
Dept: NEUROSURGERY | Age: 43
End: 2023-03-02

## 2023-03-02 VITALS
HEIGHT: 72 IN | OXYGEN SATURATION: 97 % | HEART RATE: 83 BPM | WEIGHT: 264 LBS | SYSTOLIC BLOOD PRESSURE: 142 MMHG | TEMPERATURE: 97 F | BODY MASS INDEX: 35.76 KG/M2 | DIASTOLIC BLOOD PRESSURE: 82 MMHG

## 2023-03-02 DIAGNOSIS — E66.9 OBESITY (BMI 30-39.9): ICD-10-CM

## 2023-03-02 DIAGNOSIS — M21.372 LEFT FOOT DROP: ICD-10-CM

## 2023-03-02 DIAGNOSIS — Z72.0 TOBACCO ABUSE: ICD-10-CM

## 2023-03-02 DIAGNOSIS — M48.062 LUMBAR STENOSIS WITH NEUROGENIC CLAUDICATION: Primary | ICD-10-CM

## 2023-03-02 DIAGNOSIS — M48.062 LUMBAR STENOSIS WITH NEUROGENIC CLAUDICATION: ICD-10-CM

## 2023-03-02 PROCEDURE — 72120 X-RAY BEND ONLY L-S SPINE: CPT

## 2023-03-02 RX ORDER — SODIUM CHLORIDE 9 MG/ML
INJECTION, SOLUTION INTRAVENOUS PRN
OUTPATIENT
Start: 2023-03-02

## 2023-03-02 RX ORDER — SODIUM CHLORIDE 0.9 % (FLUSH) 0.9 %
5-40 SYRINGE (ML) INJECTION PRN
OUTPATIENT
Start: 2023-03-02

## 2023-03-02 RX ORDER — SODIUM CHLORIDE 0.9 % (FLUSH) 0.9 %
5-40 SYRINGE (ML) INJECTION EVERY 12 HOURS SCHEDULED
OUTPATIENT
Start: 2023-03-02

## 2023-03-02 NOTE — PROGRESS NOTES
Patient was seen today by Dr. Ashly Shen and surgery was recommended. I spoke with the patient confirming that he was self-pay. Patient stated that he was workers comp. I explained to him that we had not received workers comp authorization for his back. Patient stated that he wanted to proceed with scheduling surgery due to the pain. I asked patient to contact [employer] comp and have them contact our office. Patient voiced understanding.

## 2023-03-02 NOTE — PROGRESS NOTES
History of Present Illness    Patient Words: 38 y.o. This patient is a 38 y.o. male who presents today for evaluation of a progressive history of greater than 6 months duration severe neurogenic claudication refractory to aggressive conservative measures. Previous lumbar fusion L4-S1. Lumbar MRI scan shows severe spinal stenosis L3-4. Flexion and extension lumbar spine x-rays show no instability at L3-4. He does have a history of obesity and tobacco abuse. Previous paraspinous abscess drained and treated successfully conservatively with IV antibiotics. Recent hospitalization was negative for infectious etiology of his symptoms. Spinal canal diameter is narrowed to 2 to 3 mm at L3-4. Past Medical History:   Diagnosis Date    Infectious disease     hep C        Allergies   Allergen Reactions    Ethanol-Alcohol [Ethanol]         No family history on file.      Social History     Socioeconomic History    Marital status:      Spouse name: Not on file    Number of children: Not on file    Years of education: Not on file    Highest education level: Not on file   Occupational History    Not on file   Tobacco Use    Smoking status: Every Day    Smokeless tobacco: Not on file   Vaping Use    Vaping Use: Never used   Substance and Sexual Activity    Alcohol use: Not Currently    Drug use: Not Currently     Types: IV     Comment: 3 months recovery from IVDU    Sexual activity: Not on file   Other Topics Concern    Not on file   Social History Narrative    Not on file     Social Determinants of Health     Financial Resource Strain: Not on file   Food Insecurity: Not on file   Transportation Needs: Not on file   Physical Activity: Not on file   Stress: Not on file   Social Connections: Not on file   Intimate Partner Violence: Not on file   Housing Stability: Not on file       Current Outpatient Medications   Medication Sig Dispense Refill    Methocarbamol (ROBAXIN PO) Take by mouth      baclofen (LIORESAL) 10 MG tablet Take 1 tablet by mouth 3 times daily as needed (spasms) 30 tablet 0    HYDROcodone-acetaminophen (NORCO) 7.5-325 MG per tablet Take 1 tablet by mouth every 6 hours as needed for Pain for up to 20 days. Intended supply: 3 days. Take lowest dose possible to manage pain Max Daily Amount: 4 tablets 12 tablet 0    meloxicam (MOBIC) 15 MG tablet Take 1 tablet by mouth daily 30 tablet 0    ketorolac (TORADOL) 10 MG tablet Take 1 tablet by mouth every 6 hours as needed for Pain 20 tablet 1    glecaprevir-pibrentasvir (MAVYRET) 100-40 MG TABS tablet Take 3 tablets by mouth once Pt takes at mid day with meals       No current facility-administered medications for this visit. Patient Active Problem List   Diagnosis    Chest pain    Discitis of thoracic region    Pleural effusion    Empyema lung (HCC)    Phlegmon    Chronic hepatitis C without hepatic coma (HCC)    Discitis    Paraspinal abscess (HCC)    Accidental overdose of heroin (HCC)    Neuropathy    Lumbar stenosis with neurogenic claudication    Left foot drop    Obesity (BMI 30-39. 9)    Tobacco abuse        Review of Systems: A complete ROS was done and as stated in the HPI or otherwise negative. BP (!) 142/82   Pulse 83   Temp 97 °F (36.1 °C) (Temporal)   Ht 6' (1.829 m)   Wt 264 lb (119.7 kg)   SpO2 97%   BMI 35.80 kg/m²        Physical Exam  The physical exam findings are as follows: Physical Exam:   General:  Alert, cooperative, no distress, appears stated age. Eyes:  Conjunctivae/corneas clear. PERRL, EOMs intact. Fundi benign   Ears:  Normal TMs and external ear canals both ears. Nose: Nares normal. Septum midline. Mucosa normal. No drainage or sinus tenderness. Mouth/Throat: Lips, mucosa, and tongue normal. Teeth and gums normal.   Neck: Supple, symmetrical, trachea midline, no adenopathy, thyroid: no enlargment/tenderness/nodules, no carotid bruit and no JVD. Back:   Symmetric, no curvature. ROM normal. No CVA tenderness. Lungs:   Clear to auscultation bilaterally. Heart:  Regular rate and rhythm, S1, S2 normal, no murmur, click, rub or gallop. Abdomen:   Soft, non-tender. Bowel sounds normal. No masses,  No organomegaly. Extremities: Extremities normal, atraumatic, no cyanosis or edema. Pulses: 2+ and symmetric all extremities. Skin: Skin color, texture, turgor normal. No rashes or lesions   Lymph nodes: Cervical, supraclavicular, and axillary nodes normal.   Appearance: The patient is well developed, well nourished, provides a coherent history and is in no acute distress. Cranial Nerves:   Intact visual fields. Fundi are benign. BINDU, EOM's full, no nystagmus, no ptosis. Facial sensation is normal. Corneal reflexes are intact. Facial movement is symmetric. Hearing is normal bilaterally. Palate is midline with normal sternocleidomastoid and trapezius muscles are normal. Tongue is midline. Motor:  5/5 strength in upper and lower proximal and distal muscles. Except 0/5 left dorsiflexor chronic normal bulk and tone. No fasciculations. Reflexes:   Deep tendon reflexes 2+/4 and symmetrical.   Sensory:   Normal to touch, pinprick and vibration. Gait:  Normal gait. Tremor:   No tremor noted. Cerebellar:  No cerebellar signs present. Assessment & Plan      ICD-10-CM    1. Lumbar stenosis with neurogenic claudication  M48.062       2. Left foot drop  M21.372       3. Obesity (BMI 30-39. 9)  E66.9       4. Tobacco abuse  Z72.0       Bilateral laminectomy and facetectomy L3-4. The fusion does not need to be extended. The risks are higher due to obesity tobacco abuse previous drug use and previous infection. Perioperative antibiotics. Patient and wife understand and agree to proceed. CSF leak is also a significant risk.   Lumbar Spinal Fusion Consent: The relative risks of complication include but are not limited to infection, bleeding, spinal fluid leak, major vascular injury, increased pain, weakness, numbness, paralysis, non-fusion, instrumentation failure, need for re-operation, incontinence, impotence, heart attack, stroke and death were discussed with patient and family members present. They have been provided the opportunity to ask any questions regarding the surgical procedure. The patient and family members present expressed understanding and agree to proceed with surgery.        Emy Rosa MD

## 2023-03-10 ENCOUNTER — HOSPITAL ENCOUNTER (OUTPATIENT)
Dept: PREADMISSION TESTING | Age: 43
End: 2023-03-10
Payer: COMMERCIAL

## 2023-03-10 VITALS
OXYGEN SATURATION: 97 % | HEIGHT: 72 IN | WEIGHT: 273.2 LBS | DIASTOLIC BLOOD PRESSURE: 92 MMHG | TEMPERATURE: 97.7 F | RESPIRATION RATE: 17 BRPM | SYSTOLIC BLOOD PRESSURE: 134 MMHG | BODY MASS INDEX: 37 KG/M2 | HEART RATE: 96 BPM

## 2023-03-10 LAB
BACTERIA SPEC CULT: NORMAL
SERVICE CMNT-IMP: NORMAL

## 2023-03-10 PROCEDURE — 87641 MR-STAPH DNA AMP PROBE: CPT

## 2023-03-10 NOTE — PRE-PROCEDURE INSTRUCTIONS
PLEASE CONTINUE TAKING ALL PRESCRIPTION MEDICATIONS UP TO THE DAY OF SURGERY UNLESS OTHERWISE DIRECTED BELOW. DISCONTINUE all vitamins and supplements 7 days prior to surgery. DISCONTINUE Non-Steriodal Anti-Inflammatory (NSAIDS) such as Advil and Aleve 5 days prior to surgery. Home Medications to take  the day of surgery   none            Home Medications   to Hold   Baclofen (Lioresal)  Methocarbamol (Roboxin)        Comments   Meloxicam (Mobic) stop 5 days prior to procedure 3/12/2023, Sunday. On the day before surgery please take Acetaminophen 1000mg in the morning and then again before bed. You may substitute for Tylenol 650 mg. Please do not bring home medications with you on the day of surgery unless otherwise directed by your nurse. If you are instructed to bring home medications, please give them to your nurse as they will be administered by the nursing staff. If you have any questions, please call [facility] [phone] or [address] [place] [address] [phone]. A copy of this note was provided to the patient for reference.

## 2023-03-10 NOTE — PRE-PROCEDURE INSTRUCTIONS
Patient verified name, , and surgery as listed in [facility]. Patient provided medical/health information and PTA medications to the best of their ability. TYPE  CASE: IB  Orders per surgeon: were received, PAT ortho  protocol initiated. Labs per surgeon: MRSA. Labs per anesthesia protocol: none. EKG:  none per protocol. MRSA/MSSA swab collected; pharmacy to review and dose antibiotic as appropriate. Patient provided with and instructed on education handouts including Guide to Surgery, blood transfusions, pain management, and hand hygiene for the family and community, and [facility] brochure. Hibiclens and instructions given per hospital policy. Original medication prescription bottles were not visualized during patient appointment. Patient teach back successful and patient demonstrates knowledge of instruction.

## 2023-03-16 ENCOUNTER — CLINICAL DOCUMENTATION (OUTPATIENT)
Dept: NEUROSURGERY | Age: 43
End: 2023-03-16

## 2023-03-16 NOTE — PROGRESS NOTES
Patient is authorized through workers comp for his lumbar spine.     The [address] [place] [address] [address], [address] [facility]  [facility]  [phone] Phone  Claim Number: [id number]  DOI: [id number]  Employer: [employer] [address]

## 2023-03-17 ENCOUNTER — ANESTHESIA EVENT (OUTPATIENT)
Dept: SURGERY | Age: 43
End: 2023-03-17
Payer: COMMERCIAL

## 2023-03-17 ENCOUNTER — ANESTHESIA (OUTPATIENT)
Dept: SURGERY | Age: 43
End: 2023-03-17
Payer: COMMERCIAL

## 2023-03-17 ENCOUNTER — APPOINTMENT (OUTPATIENT)
Dept: GENERAL RADIOLOGY | Age: 43
End: 2023-03-17
Attending: NEUROLOGICAL SURGERY
Payer: COMMERCIAL

## 2023-03-17 ENCOUNTER — HOSPITAL ENCOUNTER (OUTPATIENT)
Age: 43
Setting detail: OUTPATIENT SURGERY
Discharge: HOME | End: 2023-03-17
Attending: NEUROLOGICAL SURGERY | Admitting: NEUROLOGICAL SURGERY
Payer: COMMERCIAL

## 2023-03-17 VITALS
HEIGHT: 72 IN | HEART RATE: 86 BPM | OXYGEN SATURATION: 96 % | SYSTOLIC BLOOD PRESSURE: 118 MMHG | BODY MASS INDEX: 36.57 KG/M2 | DIASTOLIC BLOOD PRESSURE: 70 MMHG | WEIGHT: 270 LBS | TEMPERATURE: 98 F | RESPIRATION RATE: 15 BRPM

## 2023-03-17 DIAGNOSIS — M48.062 LUMBAR STENOSIS WITH NEUROGENIC CLAUDICATION: Primary | ICD-10-CM

## 2023-03-17 PROCEDURE — 6370000000 HC RX 637 (ALT 250 FOR IP): Performed by: NEUROLOGICAL SURGERY

## 2023-03-17 PROCEDURE — 7100000010 HC PHASE II RECOVERY - FIRST 15 MIN: Performed by: NEUROLOGICAL SURGERY

## 2023-03-17 PROCEDURE — 3700000000 HC ANESTHESIA ATTENDED CARE: Performed by: NEUROLOGICAL SURGERY

## 2023-03-17 PROCEDURE — 6360000002 HC RX W HCPCS: Performed by: NEUROLOGICAL SURGERY

## 2023-03-17 PROCEDURE — 2580000003 HC RX 258: Performed by: ANESTHESIOLOGY

## 2023-03-17 PROCEDURE — 7100000001 HC PACU RECOVERY - ADDTL 15 MIN: Performed by: NEUROLOGICAL SURGERY

## 2023-03-17 PROCEDURE — 3600000014 HC SURGERY LEVEL 4 ADDTL 15MIN: Performed by: NEUROLOGICAL SURGERY

## 2023-03-17 PROCEDURE — A4217 STERILE WATER/SALINE, 500 ML: HCPCS | Performed by: NEUROLOGICAL SURGERY

## 2023-03-17 PROCEDURE — 6360000002 HC RX W HCPCS: Performed by: NURSE ANESTHETIST, CERTIFIED REGISTERED

## 2023-03-17 PROCEDURE — 2580000003 HC RX 258: Performed by: NEUROLOGICAL SURGERY

## 2023-03-17 PROCEDURE — 2709999900 HC NON-CHARGEABLE SUPPLY: Performed by: NEUROLOGICAL SURGERY

## 2023-03-17 PROCEDURE — 3700000001 HC ADD 15 MINUTES (ANESTHESIA): Performed by: NEUROLOGICAL SURGERY

## 2023-03-17 PROCEDURE — 7100000011 HC PHASE II RECOVERY - ADDTL 15 MIN: Performed by: NEUROLOGICAL SURGERY

## 2023-03-17 PROCEDURE — 2500000003 HC RX 250 WO HCPCS: Performed by: NURSE ANESTHETIST, CERTIFIED REGISTERED

## 2023-03-17 PROCEDURE — 6360000002 HC RX W HCPCS: Performed by: ANESTHESIOLOGY

## 2023-03-17 PROCEDURE — 72020 X-RAY EXAM OF SPINE 1 VIEW: CPT

## 2023-03-17 PROCEDURE — 2720000010 HC SURG SUPPLY STERILE: Performed by: NEUROLOGICAL SURGERY

## 2023-03-17 PROCEDURE — 3600000004 HC SURGERY LEVEL 4 BASE: Performed by: NEUROLOGICAL SURGERY

## 2023-03-17 PROCEDURE — 6370000000 HC RX 637 (ALT 250 FOR IP): Performed by: ANESTHESIOLOGY

## 2023-03-17 PROCEDURE — 2500000003 HC RX 250 WO HCPCS: Performed by: NEUROLOGICAL SURGERY

## 2023-03-17 PROCEDURE — 7100000000 HC PACU RECOVERY - FIRST 15 MIN: Performed by: NEUROLOGICAL SURGERY

## 2023-03-17 DEVICE — DURASEAL® DURAL SEALANT SYSTEM 5ML 5 PACK
Type: IMPLANTABLE DEVICE | Site: SPINE LUMBAR | Status: FUNCTIONAL
Brand: DURASEAL®

## 2023-03-17 RX ORDER — SODIUM CHLORIDE 0.9 % (FLUSH) 0.9 %
5-40 SYRINGE (ML) INJECTION EVERY 12 HOURS SCHEDULED
Status: DISCONTINUED | OUTPATIENT
Start: 2023-03-17 | End: 2023-03-17 | Stop reason: HOSPADM

## 2023-03-17 RX ORDER — HALOPERIDOL 5 MG/ML
1 INJECTION INTRAMUSCULAR
Status: DISCONTINUED | OUTPATIENT
Start: 2023-03-17 | End: 2023-03-17 | Stop reason: HOSPADM

## 2023-03-17 RX ORDER — ROCURONIUM BROMIDE 10 MG/ML
INJECTION, SOLUTION INTRAVENOUS PRN
Status: DISCONTINUED | OUTPATIENT
Start: 2023-03-17 | End: 2023-03-17 | Stop reason: SDUPTHER

## 2023-03-17 RX ORDER — OXYCODONE HYDROCHLORIDE 5 MG/1
5 TABLET ORAL
Status: DISCONTINUED | OUTPATIENT
Start: 2023-03-17 | End: 2023-03-17 | Stop reason: HOSPADM

## 2023-03-17 RX ORDER — METHADONE HYDROCHLORIDE 5 MG/1
15 TABLET ORAL ONCE
Status: COMPLETED | OUTPATIENT
Start: 2023-03-17 | End: 2023-03-17

## 2023-03-17 RX ORDER — FENTANYL CITRATE 50 UG/ML
50 INJECTION, SOLUTION INTRAMUSCULAR; INTRAVENOUS EVERY 5 MIN PRN
Status: DISCONTINUED | OUTPATIENT
Start: 2023-03-17 | End: 2023-03-17 | Stop reason: HOSPADM

## 2023-03-17 RX ORDER — ONDANSETRON 2 MG/ML
INJECTION INTRAMUSCULAR; INTRAVENOUS PRN
Status: DISCONTINUED | OUTPATIENT
Start: 2023-03-17 | End: 2023-03-17 | Stop reason: SDUPTHER

## 2023-03-17 RX ORDER — MIDAZOLAM HYDROCHLORIDE 1 MG/ML
INJECTION INTRAMUSCULAR; INTRAVENOUS PRN
Status: DISCONTINUED | OUTPATIENT
Start: 2023-03-17 | End: 2023-03-17 | Stop reason: SDUPTHER

## 2023-03-17 RX ORDER — LIDOCAINE HYDROCHLORIDE 20 MG/ML
INJECTION, SOLUTION EPIDURAL; INFILTRATION; INTRACAUDAL; PERINEURAL PRN
Status: DISCONTINUED | OUTPATIENT
Start: 2023-03-17 | End: 2023-03-17 | Stop reason: SDUPTHER

## 2023-03-17 RX ORDER — GLYCOPYRROLATE 0.2 MG/ML
INJECTION INTRAMUSCULAR; INTRAVENOUS PRN
Status: DISCONTINUED | OUTPATIENT
Start: 2023-03-17 | End: 2023-03-17 | Stop reason: SDUPTHER

## 2023-03-17 RX ORDER — MIDAZOLAM HYDROCHLORIDE 2 MG/2ML
2 INJECTION, SOLUTION INTRAMUSCULAR; INTRAVENOUS
Status: DISCONTINUED | OUTPATIENT
Start: 2023-03-17 | End: 2023-03-17 | Stop reason: HOSPADM

## 2023-03-17 RX ORDER — HYDROMORPHONE HYDROCHLORIDE 2 MG/ML
0.5 INJECTION, SOLUTION INTRAMUSCULAR; INTRAVENOUS; SUBCUTANEOUS EVERY 10 MIN PRN
Status: DISCONTINUED | OUTPATIENT
Start: 2023-03-17 | End: 2023-03-17 | Stop reason: HOSPADM

## 2023-03-17 RX ORDER — SODIUM CHLORIDE, SODIUM LACTATE, POTASSIUM CHLORIDE, CALCIUM CHLORIDE 600; 310; 30; 20 MG/100ML; MG/100ML; MG/100ML; MG/100ML
INJECTION, SOLUTION INTRAVENOUS CONTINUOUS
Status: DISCONTINUED | OUTPATIENT
Start: 2023-03-17 | End: 2023-03-17 | Stop reason: HOSPADM

## 2023-03-17 RX ORDER — SODIUM CHLORIDE 9 MG/ML
INJECTION, SOLUTION INTRAVENOUS PRN
Status: DISCONTINUED | OUTPATIENT
Start: 2023-03-17 | End: 2023-03-17 | Stop reason: HOSPADM

## 2023-03-17 RX ORDER — SODIUM CHLORIDE 0.9 % (FLUSH) 0.9 %
5-40 SYRINGE (ML) INJECTION PRN
Status: DISCONTINUED | OUTPATIENT
Start: 2023-03-17 | End: 2023-03-17 | Stop reason: HOSPADM

## 2023-03-17 RX ORDER — CEFUROXIME AXETIL 500 MG/1
500 TABLET ORAL 2 TIMES DAILY
Qty: 20 TABLET | Refills: 0 | Status: SHIPPED | OUTPATIENT
Start: 2023-03-17 | End: 2023-03-27

## 2023-03-17 RX ORDER — IPRATROPIUM BROMIDE AND ALBUTEROL SULFATE 2.5; .5 MG/3ML; MG/3ML
1 SOLUTION RESPIRATORY (INHALATION)
Status: DISCONTINUED | OUTPATIENT
Start: 2023-03-17 | End: 2023-03-17 | Stop reason: HOSPADM

## 2023-03-17 RX ORDER — NEOSTIGMINE METHYLSULFATE 1 MG/ML
INJECTION, SOLUTION INTRAVENOUS PRN
Status: DISCONTINUED | OUTPATIENT
Start: 2023-03-17 | End: 2023-03-17 | Stop reason: SDUPTHER

## 2023-03-17 RX ORDER — ONDANSETRON 2 MG/ML
4 INJECTION INTRAMUSCULAR; INTRAVENOUS
Status: DISCONTINUED | OUTPATIENT
Start: 2023-03-17 | End: 2023-03-17 | Stop reason: HOSPADM

## 2023-03-17 RX ORDER — EPHEDRINE SULFATE/0.9% NACL/PF 50 MG/5 ML
SYRINGE (ML) INTRAVENOUS PRN
Status: DISCONTINUED | OUTPATIENT
Start: 2023-03-17 | End: 2023-03-17 | Stop reason: SDUPTHER

## 2023-03-17 RX ORDER — LIDOCAINE HYDROCHLORIDE AND EPINEPHRINE 10; 10 MG/ML; UG/ML
INJECTION, SOLUTION INFILTRATION; PERINEURAL PRN
Status: DISCONTINUED | OUTPATIENT
Start: 2023-03-17 | End: 2023-03-17 | Stop reason: HOSPADM

## 2023-03-17 RX ORDER — ACETAMINOPHEN 500 MG
1000 TABLET ORAL ONCE
Status: COMPLETED | OUTPATIENT
Start: 2023-03-17 | End: 2023-03-17

## 2023-03-17 RX ORDER — LIDOCAINE HYDROCHLORIDE 10 MG/ML
1 INJECTION, SOLUTION INFILTRATION; PERINEURAL
Status: DISCONTINUED | OUTPATIENT
Start: 2023-03-17 | End: 2023-03-17 | Stop reason: HOSPADM

## 2023-03-17 RX ORDER — OXYCODONE AND ACETAMINOPHEN 10; 325 MG/1; MG/1
1 TABLET ORAL EVERY 6 HOURS PRN
Qty: 28 TABLET | Refills: 0 | Status: SHIPPED | OUTPATIENT
Start: 2023-03-17 | End: 2023-03-24

## 2023-03-17 RX ORDER — PROPOFOL 10 MG/ML
INJECTION, EMULSION INTRAVENOUS PRN
Status: DISCONTINUED | OUTPATIENT
Start: 2023-03-17 | End: 2023-03-17 | Stop reason: SDUPTHER

## 2023-03-17 RX ORDER — DEXMEDETOMIDINE HYDROCHLORIDE 100 UG/ML
INJECTION, SOLUTION INTRAVENOUS PRN
Status: DISCONTINUED | OUTPATIENT
Start: 2023-03-17 | End: 2023-03-17 | Stop reason: SDUPTHER

## 2023-03-17 RX ADMIN — SODIUM CHLORIDE, SODIUM LACTATE, POTASSIUM CHLORIDE, AND CALCIUM CHLORIDE: 600; 310; 30; 20 INJECTION, SOLUTION INTRAVENOUS at 09:32

## 2023-03-17 RX ADMIN — Medication 3 MG: at 18:48

## 2023-03-17 RX ADMIN — DEXMEDETOMIDINE 12 MCG: 100 INJECTION, SOLUTION, CONCENTRATE INTRAVENOUS at 18:12

## 2023-03-17 RX ADMIN — FENTANYL CITRATE 25 MCG: 50 INJECTION INTRAMUSCULAR; INTRAVENOUS at 18:37

## 2023-03-17 RX ADMIN — SODIUM CHLORIDE, SODIUM LACTATE, POTASSIUM CHLORIDE, AND CALCIUM CHLORIDE: 600; 310; 30; 20 INJECTION, SOLUTION INTRAVENOUS at 16:36

## 2023-03-17 RX ADMIN — Medication 10 MG: at 17:11

## 2023-03-17 RX ADMIN — Medication 10 MG: at 17:28

## 2023-03-17 RX ADMIN — METHADONE HYDROCHLORIDE 15 MG: 5 TABLET ORAL at 12:07

## 2023-03-17 RX ADMIN — LIDOCAINE HYDROCHLORIDE 100 MG: 20 INJECTION, SOLUTION EPIDURAL; INFILTRATION; INTRACAUDAL; PERINEURAL at 15:49

## 2023-03-17 RX ADMIN — ACETAMINOPHEN 1000 MG: 500 TABLET, FILM COATED ORAL at 09:35

## 2023-03-17 RX ADMIN — Medication 15 MG: at 16:29

## 2023-03-17 RX ADMIN — ROCURONIUM BROMIDE 10 MG: 10 INJECTION, SOLUTION INTRAVENOUS at 16:53

## 2023-03-17 RX ADMIN — Medication 3000 MG: at 16:01

## 2023-03-17 RX ADMIN — Medication 3 AMPULE: at 09:40

## 2023-03-17 RX ADMIN — MIDAZOLAM 2 MG: 1 INJECTION INTRAMUSCULAR; INTRAVENOUS at 15:46

## 2023-03-17 RX ADMIN — ROCURONIUM BROMIDE 50 MG: 10 INJECTION, SOLUTION INTRAVENOUS at 15:49

## 2023-03-17 RX ADMIN — PROPOFOL 300 MG: 10 INJECTION, EMULSION INTRAVENOUS at 15:49

## 2023-03-17 RX ADMIN — VANCOMYCIN HYDROCHLORIDE 1750 MG: 100 INJECTION, POWDER, LYOPHILIZED, FOR SOLUTION INTRAVENOUS at 09:35

## 2023-03-17 RX ADMIN — ONDANSETRON 4 MG: 2 INJECTION INTRAMUSCULAR; INTRAVENOUS at 16:13

## 2023-03-17 RX ADMIN — Medication 10 MG: at 16:41

## 2023-03-17 RX ADMIN — GLYCOPYRROLATE 0.4 MG: 0.2 INJECTION INTRAMUSCULAR; INTRAVENOUS at 18:48

## 2023-03-17 RX ADMIN — DEXMEDETOMIDINE 8 MCG: 100 INJECTION, SOLUTION, CONCENTRATE INTRAVENOUS at 18:35

## 2023-03-17 RX ADMIN — FENTANYL CITRATE 25 MCG: 50 INJECTION INTRAMUSCULAR; INTRAVENOUS at 18:38

## 2023-03-17 RX ADMIN — HYDROMORPHONE HYDROCHLORIDE 0.5 MG: 2 INJECTION, SOLUTION INTRAMUSCULAR; INTRAVENOUS; SUBCUTANEOUS at 19:40

## 2023-03-17 RX ADMIN — ROCURONIUM BROMIDE 5 MG: 10 INJECTION, SOLUTION INTRAVENOUS at 17:33

## 2023-03-17 ASSESSMENT — PAIN - FUNCTIONAL ASSESSMENT
PAIN_FUNCTIONAL_ASSESSMENT: 0-10
PAIN_FUNCTIONAL_ASSESSMENT: 0-10
PAIN_FUNCTIONAL_ASSESSMENT: NONE - DENIES PAIN

## 2023-03-17 ASSESSMENT — PAIN SCALES - GENERAL
PAINLEVEL_OUTOF10: 6
PAINLEVEL_OUTOF10: 7

## 2023-03-17 ASSESSMENT — PAIN DESCRIPTION - LOCATION: LOCATION: BACK

## 2023-03-17 ASSESSMENT — LIFESTYLE VARIABLES: SMOKING_STATUS: 1

## 2023-03-17 NOTE — ANESTHESIA POSTPROCEDURE EVALUATION
Department of Anesthesiology  Postprocedure Note    Patient: Mike Roberts  MRN: [id number]  YOB: 1980  Date of evaluation: 3/17/2023      Procedure Summary     Date: 03/17/23 Room / Location: [facility] MAIN OR  / [facility] MAIN OR    Anesthesia Start: 1543 Anesthesia Stop: 1906    Procedure: Bilateral L3-4 Laminectomy and Facetectomy (Bilateral: Spine Lumbar) Diagnosis:       Lumbar stenosis with neurogenic claudication      Left foot drop      Obesity (BMI 30-39. 9)      Tobacco abuse      (Lumbar stenosis with neurogenic claudication [M48.062])      (Left foot drop [M21.372])      (Obesity (BMI 30-39. 9) [E66.9])      (Tobacco abuse [Z72.0])    Providers: Naima Crain MD Responsible Provider: Tacho Kaur MD    Anesthesia Type: general ASA Status: 2          Anesthesia Type: No value filed. Bing Phase I: Bing Score: 8    Bing Phase II:        Anesthesia Post Evaluation    Patient location during evaluation: PACU  Patient participation: complete - patient participated  Level of consciousness: awake and lethargic  Airway patency: patent  Nausea & Vomiting: nausea (controlled)  Cardiovascular status: hemodynamically stable  Respiratory status: acceptable  Hydration status: euvolemic  Comments: Pt wants to go home.

## 2023-03-17 NOTE — ANESTHESIA PROCEDURE NOTES
Airway  Date/Time: 3/17/2023 3:51 PM  Urgency: elective    Airway not difficult    General Information and Staff    Patient location during procedure: OR  Anesthesiologist: Clifford Soares MD  Resident/CRNA: JOANNA Xie - CRNA  Performed: resident/CRNA     Indications and Patient Condition  Indications for airway management: anesthesia and airway protection  Spontaneous Ventilation: absent  Sedation level: deep  Preoxygenated: yes  Patient position: sniffing  Mask difficulty assessment: vent by bag mask    Final Airway Details  Final airway type: endotracheal airway      Successful airway: ETT  Cuffed: yes   Successful intubation technique: direct laryngoscopy  Endotracheal tube insertion site: oral  Blade: Fam  Blade size: #4  ETT size (mm): 8.0  Cormack-Lehane Classification: grade I - full view of glottis  Placement verified by: chest auscultation and capnometry   Measured from: gums  Number of attempts at approach: 1  Ventilation between attempts: bag mask  Number of other approaches attempted: 0    no

## 2023-03-17 NOTE — DISCHARGE INSTRUCTIONS
[facility]  [address], [address] [facility], [address] [facility]  [phone]    Postoperative Home Instructions  Lumbar (Back) Surgery    Showering: You may shower the first day you are home with the dressing on. If the dressing becomes saturated, change it completely to a similar dressing. Leave the steri-strips or glue in place. After 3 days, you may take the dressing off and leave it off. If you have the brown aquacel dressing, leave it alone for 5 days and then you may remove the dressing. Do not soak in a tub, and use only soap and water on the wound. Wound Care: A small to moderate amount of reddish drainage on the dressing is normal the first 1-2 days after surgery. If the dressing becomes saturated, change it. Once the steri-strips begin to peel up on their own, you may gently take them off. Large amounts of clear, watery drainage is not normal and you should call our office immediately. Signs of Infection: Extreme tenderness at the wound, excessive redness and/or swelling, or ugly yellowish-greenish drainage from the wound. Fever greater than 100.5 may be present. If you think you have a wound infection, call our office immediately. Driving: You may not begin driving until after your visit to our office for a wound and suture check which is normally 7-10 days after you come home from the hospital.    Medications: You should take anti-inflammatory medications such as Motrin, Celebrex for 30 days after surgery, every day, on a regular schedule only if prescribed by your physician. The pain medicine prescribed may be taken as needed. You should take a stool softener (Colace) twice a day, drink lots of water and eat high fiber foods to avoid constipation (this is a common problem with pain medicine.)    Medication Interaction:  During your procedure you potentially received a medication or medications which may reduce the effectiveness of oral contraceptives.  Please consider other forms of contraception for 1 month following your procedure if you are currently using oral contraceptives as your primary form of birth control. In addition to this, we recommend continuing your oral contraceptive as prescribed, unless otherwise instructed by your physician, during this time. Deep Breathing Exercises: Continue to do your incentive spirometry and/or deep breathing exercises to prevent risk of pneumonia. Smoking: YOU MAY NOT SMOKE! Smoking will interfere with your healing. If you smoke, you may end up with having another surgery or more problems! Activity: No heavy lifting for 4 weeks after surgery. This means anything heavier than a coffee cup or newspaper. After 4 weeks, you may gradually begin lifting heavier things. Avoid bending, stooping, or twisting at the waist.  Do not lie on your stomach to sleep. You may remove your Jonathan hose when consistently walking. You may do steps and inclines in moderation. Sexual Relations: You may resume sexual relations 2 weeks after your surgery. Walking Program: You should begin walking every day on the first day after surgery. Start for short distances, then go a little farther each day. You should eventually walk 1-2 miles every day for the long term. This is very important in your recovery period because walking strengthens the spinal muscles and will help protect your disc and vertebrae. Symptoms after Surgery: Dont be alarmed if you still have some symptoms after surgery. The nerves often require time to heal after the pressure has been taken off. Be patient, you should see improvement with time. Follow-up: You will need to call our office for an appointment to see a nurse one week after surgery for a wound check. An appointment will be made then for you to see your surgeon about 4 weeks after surgery. Please call our office if you have any other questions or problems.   Listen to your body; it will tell you if you are overdoing it. Use common sense and take care of yourself! After general anesthesia or intravenous sedation, for 24 hours or while taking prescription Narcotics:  Limit your activities  A responsible adult needs to be with you for the next 24 hours  Do not drive and operate hazardous machinery  Do not make important personal or business decisions  Do not drink alcoholic beverages  If you have not urinated within 8 hours after discharge, and you are experiencing discomfort from urinary retention, please go to the nearest ED. If you have sleep apnea and have a CPAP machine, please use it for all naps and sleeping. Please use caution when taking narcotics and any of your home medications that may cause drowsiness. *  Please give a list of your current medications to your Primary Care Provider. *  Please update this list whenever your medications are discontinued, doses are      changed, or new medications (including over-the-counter products) are added. *  Please carry medication information at all times in case of emergency situations. These are general instructions for a healthy lifestyle:  No smoking/ No tobacco products/ Avoid exposure to second hand smoke  Surgeon General's Warning:  Quitting smoking now greatly reduces serious risk to your health. Obesity, smoking, and sedentary lifestyle greatly increases your risk for illness  A healthy diet, regular physical exercise & weight monitoring are important for maintaining a healthy lifestyle    You may be retaining fluid if you have a history of heart failure or if you experience any of the following symptoms:  Weight gain of 3 pounds or more overnight or 5 pounds in a week, increased swelling in our hands or feet or shortness of breath while lying flat in bed. Please call your doctor as soon as you notice any of these symptoms; do not wait until your next office visit.

## 2023-03-17 NOTE — BRIEF OP NOTE
Brief Postoperative Note      Patient: Beny Concepcion  YOB: 1980  MRN: [id number]    Date of Procedure: 3/17/2023    Pre-Op Diagnosis: Lumbar stenosis with neurogenic claudication [M48.062]  Left foot drop [M21.372]  Obesity (BMI 30-39. 9) [E66.9]  Tobacco abuse [Z72.0]    Post-Op Diagnosis: Same       Procedure(s):  Bilateral L3-4 Laminectomy and Facetectomy    Surgeon(s):  Brennan Hammer MD    Assistant:  * No surgical staff found *    Anesthesia: General    Estimated Blood Loss (mL): 203     Complications: None    Specimens:   * No specimens in log *    Implants:  Implant Name Type Inv.  Item Serial No.  Lot No. LRB No. Used Action   SYSTEM SEAL 5ML SPINE DURA FOR CRAN Juan Peggi Harriet [id number]  SYSTEM SEAL 5ML SPINE DURA FOR CRAN [facility] [employer] [id number]  2 Implanted         Drains:   Closed/Suction Drain Medial Back Bulb (Active)       Findings: stenosis    Electronically signed by Jordana Ann MD on 3/17/2023 at 6:20 PM

## 2023-03-17 NOTE — ANESTHESIA PRE PROCEDURE
Department of Anesthesiology  Preprocedure Note       Name:  Venkatesh Crzu   Age:  38 y.o.  :  1980                                          MRN:  [id number]         Date:  3/17/2023      Surgeon: Sri Jasso):  Arielle Velasquez MD    Procedure: Procedure(s):  Bilateral L3-4 Laminectomy and Facetectomy    Medications prior to admission:   Prior to Admission medications    Medication Sig Start Date End Date Taking? Authorizing Provider   DEXAMETHASONE PO Take by mouth Per patient taking it intermittently. Encouraged patient to take as prescribed and not to take it before surgery starting today.  Prescribed 2023    Historical Provider, MD   Methocarbamol (ROBAXIN PO) Take by mouth as needed    Historical Provider, MD   baclofen (LIORESAL) 10 MG tablet Take 1 tablet by mouth 3 times daily as needed (spasms) 23   Gavin Vasquez MD   meloxicam HERMILA RODRIGUEZ [facility]) 15 MG tablet Take 1 tablet by mouth daily 2/1/23 3/3/23  Dev Marcus, APRN - CNP       Current medications:    Current Facility-Administered Medications   Medication Dose Route Frequency Provider Last Rate Last Admin    lidocaine 1 % injection 1 mL  1 mL IntraDERmal Once PRN Jesus Manuel Banerjee MD        famotidine (PEPCID) 20 mg in sodium chloride (PF) 0.9 % 10 mL injection  20 mg IntraVENous Once PRN Jesus Manuel Banerjee MD        lactated ringers IV soln infusion   IntraVENous Continuous Jesus Manuel Banrejee  mL/hr at 23 0932 New Bag at 23 0932    sodium chloride flush 0.9 % injection 5-40 mL  5-40 mL IntraVENous 2 times per day Jesus Manuel Banerjee MD        sodium chloride flush 0.9 % injection 5-40 mL  5-40 mL IntraVENous PRN Jesus Manuel Banerjee MD        midazolam PF (VERSED) injection 2 mg  2 mg IntraVENous Once PRN Jesus Manuel Banerjee MD        ipratropium-albuterol (DUONEB) nebulizer solution 1 ampule  1 ampule Inhalation Once PRN Jesus Manuel Banerjee MD        sodium chloride flush 0.9 % injection 5-40 mL  5-40 mL IntraVENous 2 times per day Brennan Hammer MD        sodium chloride flush 0.9 % injection 5-40 mL  5-40 mL IntraVENous PRN Brennan Hammer MD        0.9 % sodium chloride infusion   IntraVENous PRN Brennan Hammer MD        ceFAZolin (ANCEF) 3000 mg in sterile water 30 mL IV syringe  3,000 mg IntraVENous On Call to Ramsey Ortiz MD        vancomycin [facility]) 1750 mg in sodium chloride 0.9 % 500 mL IVPB  1,750 mg IntraVENous On Call to Ramsey Ortiz  mL/hr at 03/17/23 0935 1,750 mg at 03/17/23 0935    methadone (DOLOPHINE) tablet 15 mg  15 mg Oral Once Brenden Mcpherson MD           Allergies: Allergies   Allergen Reactions    Ethanol-Alcohol [Ethanol]      Recovering alcoholic       Problem List:    Patient Active Problem List   Diagnosis Code    Chest pain R07.9    Discitis of thoracic region M46.44    Pleural effusion J90    Empyema lung ([address] J86.9    Phlegmon L02.91    Chronic hepatitis C without hepatic coma ([address] B18.2    Discitis M46.40    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 9) E66.9    Tobacco abuse Z72.0       Past Medical History:        Diagnosis Date    ADHD     Chronic hepatitis C ([address]     treated with antivirals finished @11/22    History of blood transfusion 2001    Hx of echocardiogram 04/05/2022    EF 55-65%    Kidney stone     Osteomyelitis ([address] 04/2022    right foot    Substance abuse ([address]     alcohol, opiates, in recovery per patient    Trauma 2001    intialy treated at [facility] [place] [place]       Past Surgical History:        Procedure Laterality Date    FEMUR FRACTURE SURGERY  2001    femur preeti   [address]    plate    ORTHOPEDIC SURGERY  2022    spinal fusion and then I&D, L4-S1    OTHER SURGICAL HISTORY  2001    chema filter    SPINAL FUSION  2001    TOE AMPUTATION Left 07/30/2017    2nd toe       Social History:    Social History     Tobacco Use    Smoking status: Every Day     Packs/day: 1.00     Types: Cigarettes    Smokeless tobacco: Not on file   Substance Use Topics    Alcohol use: Not Currently                                Ready to quit: Not Answered  Counseling given: Not Answered      Vital Signs (Current):   Vitals:    03/17/23 0928   BP: 124/87   Pulse: (!) 112   Resp: 16   Temp: 97.7 °F (36.5 °C)   TempSrc: Tympanic   SpO2: 96%   Weight: 270 lb (122.5 kg)   Height: 6' (1.829 m)                                              BP Readings from Last 3 Encounters:   03/17/23 124/87   03/10/23 (!) 134/92   03/02/23 (!) 142/82       NPO Status: Time of last liquid consumption: 2230                        Time of last solid consumption: 2230                        Date of last liquid consumption: 03/16/23                        Date of last solid food consumption: 03/16/23    BMI:   Wt Readings from Last 3 Encounters:   03/17/23 270 lb (122.5 kg)   03/10/23 273 lb 3.2 oz (123.9 kg)   03/02/23 264 lb (119.7 kg)     Body mass index is 36.62 kg/m².     CBC:   Lab Results   Component Value Date/Time    WBC 11.6 02/11/2023 05:30 AM    RBC 3.63 02/11/2023 05:30 AM    HGB 11.9 02/11/2023 05:30 AM    HCT 34.8 02/11/2023 05:30 AM    MCV 95.9 02/11/2023 05:30 AM    RDW 12.9 02/11/2023 05:30 AM     02/11/2023 05:30 AM       CMP:   Lab Results   Component Value Date/Time     02/11/2023 05:30 AM    K 4.4 02/11/2023 05:30 AM     02/11/2023 05:30 AM    CO2 29 02/11/2023 05:30 AM    BUN 15 02/11/2023 05:30 AM    CREATININE 0.70 02/11/2023 05:30 AM    GFRAA >60 07/17/2022 10:22 AM    AGRATIO 0.5 03/27/2022 12:22 AM    LABGLOM >60 02/11/2023 05:30 AM    GLUCOSE 116 02/11/2023 05:30 AM    PROT 8.6 02/10/2023 12:54 AM    CALCIUM 8.8 02/11/2023 05:30 AM    BILITOT 0.5 02/10/2023 12:54 AM    ALKPHOS 80 02/10/2023 12:54 AM    ALKPHOS 88 03/27/2022 12:22 AM    AST 20 02/10/2023 12:54 AM    ALT 31 02/10/2023 12:54 AM       POC Tests: No results for input(s): POCGLU, POCNA, POCK, POCCL, POCBUN, POCHEMO, POCHCT in the last 72 hours. Coags: No results found for: PROTIME, INR, APTT    HCG (If Applicable): No results found for: PREGTESTUR, PREGSERUM, HCG, HCGQUANT     ABGs: No results found for: PHART, PO2ART, TRD7LVT, JBY8MCC, BEART, L8SAVHGN     Type & Screen (If Applicable):  No results found for: LABABO, LABRH    Drug/Infectious Status (If Applicable):  Lab Results   Component Value Date/Time    HEPCAB >11.0 06/17/2022 09:56 AM       COVID-19 Screening (If Applicable):   Lab Results   Component Value Date/Time    COVID19 Not detected 02/09/2023 11:44 PM           Anesthesia Evaluation  Patient summary reviewed  Airway: Mallampati: II          Dental: normal exam         Pulmonary:Negative Pulmonary ROS breath sounds clear to auscultation  (+) sleep apnea (likely has this, not treated.):  current smoker                           Cardiovascular:  Exercise tolerance: good (>4 METS),       (-) past MI, murmur and peripheral edema      Rhythm: regular  Rate: normal                    Neuro/Psych:   Negative Neuro/Psych ROS  (+) psychiatric history:   (-) CVA           GI/Hepatic/Renal: Neg GI/Hepatic/Renal ROS  (+) hepatitis: C, liver disease:,          ROS comment: Obesity  . Endo/Other: Negative Endo/Other ROS                     ROS comment: H/o etoh and opioid abuse/dependance. In recovery. Plans to use opioids for a short time after surgery, discussed multmodal, limited use, and obstruction concerns. Abdominal:             Vascular: negative vascular ROS. Other Findings:           Anesthesia Plan      general     ASA 2     (GETA - methadone pre-op. No ketamine)  Induction: intravenous. Anesthetic plan and risks discussed with patient.                         Shaun Boswell MD   3/17/2023

## 2023-03-18 ENCOUNTER — HOSPITAL ENCOUNTER (EMERGENCY)
Age: 43
Discharge: HOME | End: 2023-03-18
Attending: STUDENT IN AN ORGANIZED HEALTH CARE EDUCATION/TRAINING PROGRAM
Payer: COMMERCIAL

## 2023-03-18 VITALS
DIASTOLIC BLOOD PRESSURE: 92 MMHG | OXYGEN SATURATION: 97 % | SYSTOLIC BLOOD PRESSURE: 132 MMHG | HEART RATE: 123 BPM | RESPIRATION RATE: 18 BRPM | TEMPERATURE: 98.1 F

## 2023-03-18 DIAGNOSIS — Z51.89 VISIT FOR WOUND CHECK: Primary | ICD-10-CM

## 2023-03-18 DIAGNOSIS — Z98.890 HISTORY OF LUMBAR LAMINECTOMY: ICD-10-CM

## 2023-03-18 PROCEDURE — 99282 EMERGENCY DEPT VISIT SF MDM: CPT | Performed by: STUDENT IN AN ORGANIZED HEALTH CARE EDUCATION/TRAINING PROGRAM

## 2023-03-18 ASSESSMENT — ENCOUNTER SYMPTOMS
NAUSEA: 0
ABDOMINAL PAIN: 0
VOMITING: 0
BACK PAIN: 1

## 2023-03-18 ASSESSMENT — PAIN - FUNCTIONAL ASSESSMENT: PAIN_FUNCTIONAL_ASSESSMENT: 0-10

## 2023-03-18 NOTE — OP NOTE
[address] [facility]  OPERATIVE REPORT    Name:  Naaman Claude  MR#:  [id number]  :  1980  ACCOUNT #:  [de-identified]  DATE OF SERVICE:  2023    PREOPERATIVE DIAGNOSIS:  Lumbar spinal stenosis with neurogenic claudication, L3-L4    POSTOPERATIVE DIAGNOSIS:  Lumbar spinal stenosis with neurogenic claudication, L3-L4    PROCEDURE PERFORMED:  1.  Bilateral laminectomy and medial facetectomy, L3-L4. 2.  Lysis of adhesions. SURGEON:  Jorgito Cornell MD    FIRST ASSISTANT:  None. ANESTHESIA:  General endotracheal.    COMPLICATIONS:  None. SPECIMENS REMOVED:  None. IMPLANTS:  None. ESTIMATED BLOOD LOSS:  200. PREPARATION:  ChloraPrep. HISTORY OF PRESENT ILLNESS:  A 38year-old gentleman status post L4 through S1 fusion many years ago who now presents with severe neurogenic claudication refractory to conservative measures. MRI scan was positive for adjacent segment disease at L3-L4 with severe stenosis with the spinal canal diameter down to 2 mm. Flexion and extension lumbar spine x-rays preoperatively showed no instability at L3-L4 and it was decided that a limited decompression at this level without fusion would suffice and prevent the patient from having multiple fusion surgeries in the future. OPERATIVE NOTE:  The patient was brought to the operating room, was carefully placed under general endotracheal anesthesia without complications, carefully turned prone on the Cloward frame and the posterior aspect of back was shaved and prepped in usual sterile fashion. The upper third of the existing incision was opened with a 15-blade and muscles, fascia, and scar tissue were dissected in the left and right subfascial planes with cautery and elevators and deep retractors were placed. Scar tissue was present inferior to the lamina of L3 and L4. Lateral lumbar spine x-ray confirmed the instrument pointing correctly at the L4-L5.   AP view showed instruments pointing in the spinal canal from *** left; therefore, additional tissue dissection was carried out on the right side to get to the midline. Next, bone was  from ligament with sharp curettes and 2- and 3-mm Kerrison rongeurs. Bony tissue was adherent to the dura which required tedious dissection to avoid dural leak and eventually the Invisible Connect drill was used to drill off the bone down to remaining ligamentum and scar tissue, which was then dissected with a 1- and 2-mm Kerrison rongeurs to expose the dura. A limited decompression was carried out to the facets with foraminotomy carried out bilaterally as well at that level. After completion, the ball-tip probe was passed easily along the dura rostrally and caudally without further compression. At this point, the wound was irrigated until clear. Thrombin-soaked Gelfoam and DuraSeal were placed for hemostasis. No further bleeding was encountered. A Efren-Scott drain was placed and brought out through a stab incision inferior and secured with 0 Vicryl suture and the wound was closed. The fascia was closed tightly with interrupted 0 Vicryl. Subcutaneous tissues were closed with interrupted 3-0 Vicryl. Cellerate was used for wound healing. Skin was closed with small staples. Pressure dressings were placed. The drain was hooked to a bulb suction. The patient tolerated the procedure well, was turned supine, awakened, extubated and taken to PACU in stable condition. The were no obvious complications.       MD SANTA [email or web address]  D:  03/17/2023 18:32  T:  03/18/2023 3:28  JOB #:  [id number]
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
## 2023-03-19 NOTE — ED TRIAGE NOTES
Pt arrives via POV post laminectomy with complains of decreased output in CAROLINA drain and is concerned because bandage is saturated with serous fluid.

## 2023-03-19 NOTE — DISCHARGE INSTRUCTIONS
Dr. Bri Stevens has placed a few more Tegaderm on your back. Follow-up with Dr. Taylor Grant on Tuesday. Keep taking pain medicine as prescribed.

## 2023-03-19 NOTE — ED NOTES
I have reviewed discharge instructions with the patient. The patient verbalized understanding. Patient left ED via Discharge Method: ambulatory to Home with (Family). Opportunity for questions and clarification provided. Patient given 0 scripts. To continue your aftercare when you leave the hospital, you may receive an automated call from our care team to check in on how you are doing. This is a free service and part of our promise to provide the best care and service to meet your aftercare needs.  If you have questions, or wish to unsubscribe from this service please call [phone]. Thank you for Choosing our [facility] Emergency Department.         Janie Melton RN  03/18/23 5482

## 2023-03-21 ENCOUNTER — NURSE ONLY (OUTPATIENT)
Dept: NEUROSURGERY | Age: 43
End: 2023-03-21

## 2023-03-21 VITALS
SYSTOLIC BLOOD PRESSURE: 125 MMHG | TEMPERATURE: 97.3 F | HEART RATE: 99 BPM | WEIGHT: 279.5 LBS | HEIGHT: 72 IN | DIASTOLIC BLOOD PRESSURE: 80 MMHG | OXYGEN SATURATION: 95 % | BODY MASS INDEX: 37.86 KG/M2

## 2023-03-21 DIAGNOSIS — M48.062 LUMBAR STENOSIS WITH NEUROGENIC CLAUDICATION: Primary | ICD-10-CM

## 2023-03-21 NOTE — PROGRESS NOTES
Subjective: Patient returns today for a drain removal approx 15 cc of blood tinged serous fluid the last 12 hrs Patient denies any leg pain but does have residual back pain. Consent: verbal consent obtained and given by the patient. Risks of removal include bleeding,infection,reopening and excessive scarring. Objective:  /80 (Site: Left Upper Arm)   Pulse 99   Temp 97.3 °F (36.3 °C) (Temporal)   Ht 6' (1.829 m)   Wt 279 lb 8 oz (126.8 kg)   SpO2 95%   BMI 37.91 kg/m²   Pain Scale: 6/10    General: patient is cooperative; mood and affect are appropriate; denies any fevers,chills or headache; denies any fatigue or weakness  HEENT:trachea is midline; no voice, visual or swallowing difficulty; good range of motion with neck; mucous membranes are normal  Neuro:alert and oriented; denies any dizziness; gait and coordination observed are tentative; ambulates without assistance  Wound:prior has a history of prior back surgery with scarring; staples intact to lumbar incision ,well approximated with no signs of infection; redness and tenderness around CAROLINA insertional site; incision cleansed with betadine, clipped CAROLINA suture and  suction released- d/asa drain with tip intact and no clot noted at the tip. Incision re cleansed with betadine, steri strips applied over drain hole and a Telfa dressing applied; patient tolerated well  Number of sutures/staples removed:one suture    Assessment:  Post op check: Patient is here for initial post-op check.  Dr. Junior Shoemaker   is the supervising physician in clinic today and approves of today's treatment plan      Plan:  Post-op instructions: strict 2-5 pound weight limit; frequent positional changes; no bending ,lifting or twisting;instructed on proper body mechanics; instructed on no driving until staple removal  Incisional care: not to saturate and pat dry;do not itch scratch or submerge; may remove dressings in 2 days  Management and expected pain:advised patient of expected

## 2023-03-21 NOTE — PATIENT INSTRUCTIONS
Postoperative Home Instructions Lumbar Surgery    Showering: Do not soak in bathtub, hot tub or swimming pool for 3 weeks. Use only soap and water on the incision and pat it dry. Do not scrub the incision. Wound Care: A small to moderate amount of reddish drainage on the dressing is normal for the first several days after surgery. Large amounts of clear, watery drainage is not normal and you should call doctors office immediately. Please call if any drainage noted to wound after suture visit. Signs of Infection: Please notify your physician for any of the following; a temperature greater than 100.5, extreme tenderness at the wound, excessive redness and/or swelling, or large amounts of drainage. Driving: You may begin driving after your first visit to the physicians office for a wound check. If you had a lumbar fusion with instrumentation, no driving until directed. Medication: You can take anti-inflammatory medications such as Motrin or Celebrex for 30 days after surgery only if prescribed by your physician. The pain medication prescribed may be taken as needed. You should take a stool softener twice a day, drink plenty of water and eat high fiber foods to avoid constipation. This is a common problem patients experience while taking pain medication. Deep Breathing Exercises: Continue to use your incentive spirometer. Smoking: Smoking will interfere with your healing. If you smoke you may end up having another surgery or more problems. Lumbar Corsette: If prescribed by your physician, wear your corsette when standing, sitting or walking for a long period of time. You do not have to wear your corsette to sleep at night. Activity: No heavy lifting. Your weight limit is 2-5 pounds until directed by your physician. Avoid bending, twisting at the waist or stretching above your head where incision is being pulled.  You should bend with your knees and keep your back straight if needing to pick

## 2023-03-22 ENCOUNTER — CLINICAL DOCUMENTATION (OUTPATIENT)
Dept: NEUROSURGERY | Age: 43
End: 2023-03-22

## 2023-03-22 NOTE — PROGRESS NOTES
Edita MAZARIEGOS with [employer]   Phone: [phone]  Fax: Irene Fernandez is Gabino Santos.

## 2023-03-24 ENCOUNTER — TELEPHONE (OUTPATIENT)
Dept: NEUROSURGERY | Age: 43
End: 2023-03-24

## 2023-03-24 NOTE — TELEPHONE ENCOUNTER
Pt needs call back, he cant remember if Bre told him to take his bandage off after 2 days and let it air dry or what. Needs wound care instructions.

## 2023-03-24 NOTE — TELEPHONE ENCOUNTER
Per Bre's note from 3/21/23 - \"Incisional care: not to saturate and pat dry;do not itch scratch or submerge; may remove dressings in 2 days\"    Patient is advised of the above and voiced understanding.

## 2023-04-04 ENCOUNTER — NURSE ONLY (OUTPATIENT)
Dept: NEUROSURGERY | Age: 43
End: 2023-04-04

## 2023-04-04 VITALS
WEIGHT: 278.5 LBS | TEMPERATURE: 97.4 F | HEIGHT: 72 IN | SYSTOLIC BLOOD PRESSURE: 121 MMHG | DIASTOLIC BLOOD PRESSURE: 92 MMHG | OXYGEN SATURATION: 97 % | HEART RATE: 83 BPM | BODY MASS INDEX: 37.72 KG/M2

## 2023-04-04 DIAGNOSIS — M48.062 LUMBAR STENOSIS WITH NEUROGENIC CLAUDICATION: Primary | ICD-10-CM

## 2023-04-04 RX ORDER — ACETAMINOPHEN 500 MG
500 TABLET ORAL EVERY 6 HOURS PRN
COMMUNITY

## 2023-04-04 RX ORDER — IBUPROFEN 200 MG
200 TABLET ORAL EVERY 6 HOURS PRN
COMMUNITY

## 2023-04-05 NOTE — PROGRESS NOTES
Subjective: Patient states he is very happy with surgical outcome and is pain free      Nathaniel Michaels is a 38 y.o. male who presents today for wound check. He has a surgical incision wound which is located on the lumbar. Current symptoms: none  wound healing as expected. Interventions to date: remains on strict restrictions. Suture/Staple removal consent:verbal consent obtained and given by the patient. Risks of removal include bleeding,infection,re-opening and excessive scarring    Dr. Karissa Hoyos    is the supervising physician in clinic today and approves of today's treatment plan. Objective:     BP (!) 121/92 (Site: Left Upper Arm)   Pulse 83   Temp 97.4 °F (36.3 °C) (Temporal)   Ht 6' (1.829 m)   Wt 278 lb 8 oz (126.3 kg)   SpO2 97%   BMI 37.77 kg/m²     Wound:   wound margins intact and healing well. No signs of infection. appears improved compared to last recheck     Assessment:     Wound check. Interventions today visual inspection  cleansing with chloraprep solution. Sprayed with ethyl chloride for comfort, removed all staples using a staple removal tool and intact. Re cleansed, benzoin applied and steri strips were placed    Plan:     1. Discussed appropriate home care of this wound. 2. Patient instructions were given. 3. Follow up: already scheduled.

## 2023-04-25 ENCOUNTER — OFFICE VISIT (OUTPATIENT)
Dept: NEUROSURGERY | Age: 43
End: 2023-04-25

## 2023-04-25 VITALS
HEART RATE: 93 BPM | OXYGEN SATURATION: 98 % | BODY MASS INDEX: 37.25 KG/M2 | HEIGHT: 72 IN | SYSTOLIC BLOOD PRESSURE: 126 MMHG | TEMPERATURE: 96.9 F | DIASTOLIC BLOOD PRESSURE: 84 MMHG | WEIGHT: 275 LBS

## 2023-04-25 DIAGNOSIS — M48.062 LUMBAR STENOSIS WITH NEUROGENIC CLAUDICATION: Primary | ICD-10-CM

## 2023-04-25 DIAGNOSIS — Z72.0 TOBACCO ABUSE: ICD-10-CM

## 2023-04-25 DIAGNOSIS — M21.372 LEFT FOOT DROP: ICD-10-CM

## 2023-04-25 DIAGNOSIS — E66.9 OBESITY (BMI 30-39.9): ICD-10-CM
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
## 2023-05-10 ENCOUNTER — OFFICE VISIT (OUTPATIENT)
Dept: ORTHOPEDIC SURGERY | Age: 43
End: 2023-05-10

## 2023-05-10 DIAGNOSIS — S46.312A RUPTURE OF LEFT TRICEPS TENDON, INITIAL ENCOUNTER: Primary | ICD-10-CM

## 2023-05-10 NOTE — PROGRESS NOTES
Orthopaedic Hand [facility] Note    Name: Asif Serrano  Age: 38 y.o. YOB: 1980  Gender: male  MRN: [id number]      Follow up visit:   1. Rupture of left triceps tendon, initial encounter        HPI: Asif Serrano is a 38 y.o. male who is following up for left elbow partial triceps injury, patient reports great improvement in motion, strength and swelling, he still has some aches but he is able to perform all work duties. ROS/Meds/PSH/PMH/FH/SH: I personally reviewed the patients standard intake form. Pertinents are discussed in the HPI    Physical Examination:  General: Awake and alert. HEENT: Normocephalic, atraumatic  CV/Pulm: Breathing even and unlabored  Skin: No obvious rashes noted. Lymphatic: No obvious evidence of lymphedema or lymphadenopathy    Musculoskeletal Examination:  Examination on the left upper extremity demonstrates cap refill < 5 seconds in all fingers, good elbow motion, mild swelling on the posterior aspect of the elbow, very mild tenderness palpation at the triceps insertion, good elbow extension strength although with some discomfort. Imaging / Electrodiagnostic Tests:     none    Assessment:   1. Rupture of left triceps tendon, initial encounter        Plan:   We discussed the diagnosis and different treatment options. We discussed observation, therapy, antiinflammatory medications and other pertinent treatment modalities. After discussing in detail the patient elects to proceed with activity as tolerated, he may resume work with no restrictions, he will follow-up on an as-needed basis but he understands he has up to 1 year after we closed the [employer]. case for him to reopen it and see me for the same issue, he has a partial triceps tear that may continue to progress to a full tear and in that case he may require surgery.   According to the [facility] guides to the evaluation permanent impairment sixth edition the patient has a left upper
Sore throat

## 2023-06-06 ENCOUNTER — OFFICE VISIT (OUTPATIENT)
Dept: NEUROSURGERY | Age: 43
End: 2023-06-06

## 2023-06-06 ENCOUNTER — TELEPHONE (OUTPATIENT)
Dept: NEUROSURGERY | Age: 43
End: 2023-06-06

## 2023-06-06 VITALS
SYSTOLIC BLOOD PRESSURE: 120 MMHG | DIASTOLIC BLOOD PRESSURE: 85 MMHG | HEIGHT: 71 IN | OXYGEN SATURATION: 96 % | BODY MASS INDEX: 35.84 KG/M2 | WEIGHT: 256 LBS | TEMPERATURE: 98.6 F | HEART RATE: 82 BPM

## 2023-06-06 DIAGNOSIS — M48.062 LUMBAR STENOSIS WITH NEUROGENIC CLAUDICATION: Primary | ICD-10-CM

## 2023-06-06 DIAGNOSIS — M21.372 LEFT FOOT DROP: ICD-10-CM

## 2023-06-06 DIAGNOSIS — E66.9 OBESITY (BMI 30-39.9): ICD-10-CM

## 2023-06-06 DIAGNOSIS — Z72.0 TOBACCO ABUSE: ICD-10-CM

## 2023-06-06 RX ORDER — PHENTERMINE HYDROCHLORIDE 37.5 MG/1
TABLET ORAL
COMMUNITY
Start: 2023-05-04

## 2023-06-06 NOTE — TELEPHONE ENCOUNTER
----- Message from Najma Preston sent at 6/6/2023  1:07 PM EDT -----  Regarding: AFO Brace - WC  Need auth from [facility] for brace. [facility] [facility]. Let me know if you need anything. I have everything printed out. Thanks!   Kelechi Foster

## 2023-06-06 NOTE — TELEPHONE ENCOUNTER
LVM for pts YAIR Danielle Anger to call back and let me know how she would like to proceed with the AFO order. ..

## 2023-06-06 NOTE — PROGRESS NOTES
Nerves:   Intact visual fields. Fundi are benign. BINDU, EOM's full, no nystagmus, no ptosis. Facial sensation is normal. Corneal reflexes are intact. Facial movement is symmetric. Hearing is normal bilaterally. Palate is midline with normal sternocleidomastoid and trapezius muscles are normal. Tongue is midline. Motor:  5/5 strength in upper and lower proximal and distal muscles except 2/5 left dorsiflexor weakness. Normal bulk and tone. No fasciculations. Reflexes:   Deep tendon reflexes 2+/4 and symmetrical.   Sensory:   Normal to touch, pinprick and vibration. Gait:  Normal gait. Tremor:   No tremor noted. Cerebellar:  No cerebellar signs present. Well-healed lumbar incisions          Assessment & Plan      ICD-10-CM    1. Lumbar stenosis with neurogenic claudication  M48.062       2. Left foot drop  M21.372       3. Obesity (BMI 30-39. 9)  E66.9       4. Tobacco abuse  Z72.0       Prescription for left AFO brace. Discharge and follow-up at this time. Will contact us in the future if any problems arise.       Parul Bowen MD

## 2023-11-06 ENCOUNTER — OFFICE VISIT (OUTPATIENT)
Dept: INTERNAL MEDICINE CLINIC | Facility: CLINIC | Age: 43
End: 2023-11-06
Payer: COMMERCIAL

## 2023-11-06 VITALS
DIASTOLIC BLOOD PRESSURE: 88 MMHG | SYSTOLIC BLOOD PRESSURE: 126 MMHG | HEIGHT: 71 IN | OXYGEN SATURATION: 97 % | BODY MASS INDEX: 34.1 KG/M2 | WEIGHT: 243.6 LBS | HEART RATE: 85 BPM | TEMPERATURE: 98.2 F

## 2023-11-06 DIAGNOSIS — E66.09 OBESITY DUE TO EXCESS CALORIES WITH SERIOUS COMORBIDITY, UNSPECIFIED CLASSIFICATION: ICD-10-CM

## 2023-11-06 DIAGNOSIS — Z89.422 HISTORY OF AMPUTATION OF LESSER TOE, LEFT (HCC): ICD-10-CM

## 2023-11-06 DIAGNOSIS — Z76.89 ESTABLISHING CARE WITH NEW DOCTOR, ENCOUNTER FOR: ICD-10-CM

## 2023-11-06 DIAGNOSIS — Z76.89 ESTABLISHING CARE WITH NEW DOCTOR, ENCOUNTER FOR: Primary | ICD-10-CM

## 2023-11-06 DIAGNOSIS — E55.9 VITAMIN D DEFICIENCY: ICD-10-CM

## 2023-11-06 DIAGNOSIS — M21.372 LEFT FOOT DROP: ICD-10-CM

## 2023-11-06 LAB
25(OH)D3 SERPL-MCNC: 31.4 NG/ML (ref 30–100)
ALBUMIN SERPL-MCNC: 3.9 G/DL (ref 3.5–5)
ALBUMIN/GLOB SERPL: 1.1 (ref 0.4–1.6)
ALP SERPL-CCNC: 92 U/L (ref 50–136)
ALT SERPL-CCNC: 27 U/L (ref 12–65)
ANION GAP SERPL CALC-SCNC: 3 MMOL/L (ref 2–11)
AST SERPL-CCNC: 19 U/L (ref 15–37)
BASOPHILS # BLD: 0 K/UL (ref 0–0.2)
BASOPHILS NFR BLD: 1 % (ref 0–2)
BILIRUB SERPL-MCNC: 0.4 MG/DL (ref 0.2–1.1)
BUN SERPL-MCNC: 27 MG/DL (ref 6–23)
CALCIUM SERPL-MCNC: 9 MG/DL (ref 8.3–10.4)
CHLORIDE SERPL-SCNC: 105 MMOL/L (ref 101–110)
CHOLEST SERPL-MCNC: 157 MG/DL
CO2 SERPL-SCNC: 31 MMOL/L (ref 21–32)
CREAT SERPL-MCNC: 0.7 MG/DL (ref 0.8–1.5)
DIFFERENTIAL METHOD BLD: ABNORMAL
EOSINOPHIL # BLD: 0.1 K/UL (ref 0–0.8)
EOSINOPHIL NFR BLD: 1 % (ref 0.5–7.8)
ERYTHROCYTE [DISTWIDTH] IN BLOOD BY AUTOMATED COUNT: 12.7 % (ref 11.9–14.6)
EST. AVERAGE GLUCOSE BLD GHB EST-MCNC: 100 MG/DL
GLOBULIN SER CALC-MCNC: 3.7 G/DL (ref 2.8–4.5)
GLUCOSE SERPL-MCNC: 97 MG/DL (ref 65–100)
HBA1C MFR BLD: 5.1 % (ref 4.8–5.6)
HCT VFR BLD AUTO: 40.7 % (ref 41.1–50.3)
HDLC SERPL-MCNC: 44 MG/DL (ref 40–60)
HDLC SERPL: 3.6
HGB BLD-MCNC: 13.2 G/DL (ref 13.6–17.2)
IMM GRANULOCYTES # BLD AUTO: 0 K/UL (ref 0–0.5)
IMM GRANULOCYTES NFR BLD AUTO: 0 % (ref 0–5)
LDLC SERPL CALC-MCNC: 87 MG/DL
LYMPHOCYTES # BLD: 3.2 K/UL (ref 0.5–4.6)
LYMPHOCYTES NFR BLD: 37 % (ref 13–44)
MCH RBC QN AUTO: 32.2 PG (ref 26.1–32.9)
MCHC RBC AUTO-ENTMCNC: 32.4 G/DL (ref 31.4–35)
MCV RBC AUTO: 99.3 FL (ref 82–102)
MONOCYTES # BLD: 0.7 K/UL (ref 0.1–1.3)
MONOCYTES NFR BLD: 8 % (ref 4–12)
NEUTS SEG # BLD: 4.5 K/UL (ref 1.7–8.2)
NEUTS SEG NFR BLD: 53 % (ref 43–78)
NRBC # BLD: 0 K/UL (ref 0–0.2)
PLATELET # BLD AUTO: 263 K/UL (ref 150–450)
PMV BLD AUTO: 9 FL (ref 9.4–12.3)
POTASSIUM SERPL-SCNC: 4.2 MMOL/L (ref 3.5–5.1)
PROT SERPL-MCNC: 7.6 G/DL (ref 6.3–8.2)
RBC # BLD AUTO: 4.1 M/UL (ref 4.23–5.6)
SODIUM SERPL-SCNC: 139 MMOL/L (ref 133–143)
TRIGL SERPL-MCNC: 130 MG/DL (ref 35–150)
VLDLC SERPL CALC-MCNC: 26 MG/DL (ref 6–23)
WBC # BLD AUTO: 8.5 K/UL (ref 4.3–11.1)

## 2023-11-06 PROCEDURE — 99214 OFFICE O/P EST MOD 30 MIN: CPT | Performed by: INTERNAL MEDICINE

## 2023-11-06 RX ORDER — CEPHALEXIN 500 MG/1
TABLET ORAL
COMMUNITY
Start: 2023-10-26

## 2023-11-06 RX ORDER — BUTYROSPERMUM PARKII(SHEA BUTTER), SIMMONDSIA CHINENSIS (JOJOBA) SEED OIL, ALOE BARBADENSIS LEAF EXTRACT .01; 1; 3.5 G/100G; G/100G; G/100G
5000 LIQUID TOPICAL DAILY
Qty: 90 CAPSULE | Refills: 5 | Status: SHIPPED | OUTPATIENT
Start: 2023-11-06

## 2023-11-06 SDOH — ECONOMIC STABILITY: INCOME INSECURITY: HOW HARD IS IT FOR YOU TO PAY FOR THE VERY BASICS LIKE FOOD, HOUSING, MEDICAL CARE, AND HEATING?: NOT HARD AT ALL

## 2023-11-06 SDOH — ECONOMIC STABILITY: HOUSING INSECURITY
IN THE LAST 12 MONTHS, WAS THERE A TIME WHEN YOU DID NOT HAVE A STEADY PLACE TO SLEEP OR SLEPT IN A SHELTER (INCLUDING NOW)?: NO

## 2023-11-06 SDOH — ECONOMIC STABILITY: FOOD INSECURITY: WITHIN THE PAST 12 MONTHS, THE FOOD YOU BOUGHT JUST DIDN'T LAST AND YOU DIDN'T HAVE MONEY TO GET MORE.: NEVER TRUE

## 2023-11-06 SDOH — ECONOMIC STABILITY: FOOD INSECURITY: WITHIN THE PAST 12 MONTHS, YOU WORRIED THAT YOUR FOOD WOULD RUN OUT BEFORE YOU GOT MONEY TO BUY MORE.: NEVER TRUE

## 2023-11-06 ASSESSMENT — PATIENT HEALTH QUESTIONNAIRE - PHQ9
1. LITTLE INTEREST OR PLEASURE IN DOING THINGS: 0
SUM OF ALL RESPONSES TO PHQ QUESTIONS 1-9: 0
SUM OF ALL RESPONSES TO PHQ QUESTIONS 1-9: 0
2. FEELING DOWN, DEPRESSED OR HOPELESS: 0
SUM OF ALL RESPONSES TO PHQ9 QUESTIONS 1 & 2: 0
SUM OF ALL RESPONSES TO PHQ QUESTIONS 1-9: 0
SUM OF ALL RESPONSES TO PHQ QUESTIONS 1-9: 0
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
## 2023-11-06 NOTE — ACP (ADVANCE CARE PLANNING)
Advance Care Planning   The patient has the following advanced directives on file:  Advance Directives       Power of [address] Will ACP-Advance Directive ACP-Power of     Not on File Not on File Not on File Not on File            The patient has appointed the following active healthcare agents:    Primary Decision Maker: Emelia Flynn - Domestic Partner - [phone]    The Patient has the following current code status:    Code Status: Prior    Visit Documentation:  I discussed Advance Care Planning with Alyssa Enciso today which included the importance of making their choices for care and treatment in the case of a health event that adversely affects their decision-making abilities. He has not completed the Advance Care Directives. He does not have an active health care agent at this time. Alyssa Enciso was encouraged to complete the declaration forms and provide a signed copy of his medical records.          Noel Holland  11/6/2023

## 2023-12-06 ENCOUNTER — OFFICE VISIT (OUTPATIENT)
Dept: INTERNAL MEDICINE CLINIC | Facility: CLINIC | Age: 43
End: 2023-12-06
Payer: COMMERCIAL

## 2023-12-06 VITALS
HEIGHT: 71 IN | SYSTOLIC BLOOD PRESSURE: 144 MMHG | DIASTOLIC BLOOD PRESSURE: 92 MMHG | OXYGEN SATURATION: 99 % | TEMPERATURE: 98.2 F | HEART RATE: 89 BPM | WEIGHT: 247 LBS | BODY MASS INDEX: 34.58 KG/M2

## 2023-12-06 DIAGNOSIS — B18.2 CHRONIC HEPATITIS C WITHOUT HEPATIC COMA (HCC): Primary | ICD-10-CM

## 2023-12-06 DIAGNOSIS — E55.9 VITAMIN D DEFICIENCY: ICD-10-CM

## 2023-12-06 DIAGNOSIS — M48.062 LUMBAR STENOSIS WITH NEUROGENIC CLAUDICATION: ICD-10-CM

## 2023-12-06 PROCEDURE — 99214 OFFICE O/P EST MOD 30 MIN: CPT | Performed by: INTERNAL MEDICINE

## 2023-12-06 ASSESSMENT — PATIENT HEALTH QUESTIONNAIRE - PHQ9
SUM OF ALL RESPONSES TO PHQ QUESTIONS 1-9: 0
SUM OF ALL RESPONSES TO PHQ QUESTIONS 1-9: 0
1. LITTLE INTEREST OR PLEASURE IN DOING THINGS: 0
SUM OF ALL RESPONSES TO PHQ9 QUESTIONS 1 & 2: 0
2. FEELING DOWN, DEPRESSED OR HOPELESS: 0
SUM OF ALL RESPONSES TO PHQ QUESTIONS 1-9: 0
SUM OF ALL RESPONSES TO PHQ QUESTIONS 1-9: 0

## 2023-12-06 NOTE — ACP (ADVANCE CARE PLANNING)
Advance Care Planning   The patient has the following advanced directives on file:  Advance Directives       Power of [address] Will ACP-Advance Directive ACP-Power of     Not on File Not on File Not on File Not on File            The patient has appointed the following active healthcare agents:    Primary Decision Maker: Yoly Mart - Domestic Partner - [phone]    The Patient has the following current code status:    Code Status: Prior    Visit Documentation:  I discussed Advance Care Planning with Yvrose Stanley today which included the importance of making their choices for care and treatment in the case of a health event that adversely affects their decision-making abilities. He has not completed the Advance Care Directives. He does not have an active health care agent at this time. Yvrose Stanley was encouraged to complete the declaration forms and provide a signed copy of his medical records.        Noel Holland  12/6/2023

## 2024-01-18 ENCOUNTER — OFFICE VISIT (OUTPATIENT)
Dept: INTERNAL MEDICINE CLINIC | Facility: CLINIC | Age: 44
End: 2024-01-18
Payer: COMMERCIAL

## 2024-01-18 ENCOUNTER — TELEPHONE (OUTPATIENT)
Dept: INTERNAL MEDICINE CLINIC | Facility: CLINIC | Age: 44
End: 2024-01-18

## 2024-01-18 VITALS
HEART RATE: 108 BPM | DIASTOLIC BLOOD PRESSURE: 76 MMHG | BODY MASS INDEX: 34.38 KG/M2 | WEIGHT: 253.8 LBS | SYSTOLIC BLOOD PRESSURE: 121 MMHG | HEIGHT: 72 IN | OXYGEN SATURATION: 99 % | TEMPERATURE: 98.6 F

## 2024-01-18 DIAGNOSIS — E55.9 VITAMIN D DEFICIENCY: ICD-10-CM

## 2024-01-18 DIAGNOSIS — M99.02 SOMATIC DYSFUNCTION OF THORACIC REGION: ICD-10-CM

## 2024-01-18 DIAGNOSIS — E66.9 OBESITY (BMI 30.0-34.9): ICD-10-CM

## 2024-01-18 DIAGNOSIS — M46.44 DISCITIS OF THORACIC REGION: Primary | ICD-10-CM

## 2024-01-18 DIAGNOSIS — M21.372 LEFT FOOT DROP: ICD-10-CM

## 2024-01-18 DIAGNOSIS — R53.82 CHRONIC FATIGUE: ICD-10-CM

## 2024-01-18 DIAGNOSIS — R29.818 SUSPECTED SLEEP APNEA: ICD-10-CM

## 2024-01-18 DIAGNOSIS — R09.81 SINUS CONGESTION: ICD-10-CM

## 2024-01-18 PROBLEM — E66.811 OBESITY (BMI 30.0-34.9): Status: ACTIVE | Noted: 2023-03-02

## 2024-01-18 LAB
ERYTHROCYTE [DISTWIDTH] IN BLOOD BY AUTOMATED COUNT: 12.7 % (ref 11.9–14.6)
HCT VFR BLD AUTO: 39.5 % (ref 41.1–50.3)
HGB BLD-MCNC: 13.3 G/DL (ref 13.6–17.2)
MCH RBC QN AUTO: 31.7 PG (ref 26.1–32.9)
MCHC RBC AUTO-ENTMCNC: 33.7 G/DL (ref 31.4–35)
MCV RBC AUTO: 94.3 FL (ref 82–102)
NRBC # BLD: 0 K/UL (ref 0–0.2)
PLATELET # BLD AUTO: 189 K/UL (ref 150–450)
PMV BLD AUTO: 8.9 FL (ref 9.4–12.3)
RBC # BLD AUTO: 4.19 M/UL (ref 4.23–5.6)
TSH W FREE THYROID IF ABNORMAL: 2.18 UIU/ML (ref 0.36–3.74)
WBC # BLD AUTO: 6.2 K/UL (ref 4.3–11.1)

## 2024-01-18 PROCEDURE — 99214 OFFICE O/P EST MOD 30 MIN: CPT | Performed by: INTERNAL MEDICINE

## 2024-01-18 ASSESSMENT — PATIENT HEALTH QUESTIONNAIRE - PHQ9
1. LITTLE INTEREST OR PLEASURE IN DOING THINGS: 0
SUM OF ALL RESPONSES TO PHQ9 QUESTIONS 1 & 2: 0
SUM OF ALL RESPONSES TO PHQ QUESTIONS 1-9: 0
2. FEELING DOWN, DEPRESSED OR HOPELESS: 0

## 2024-01-18 NOTE — TELEPHONE ENCOUNTER
Zepbound has been denied. Called and informed pt of denial. Pt would like to try and alternative medication. Also did you say that the pt can be out of work for 2 days? Ty

## 2024-01-18 NOTE — ACP (ADVANCE CARE PLANNING)
Advance Care Planning   The patient has the following advanced directives on file:  Advance Directives       Power of  Living Will ACP-Advance Directive ACP-Power of     Not on File Not on File Not on File Not on File            The patient has appointed the following active healthcare agents:    Primary Decision Maker: Elma Nolasco - Domestic Partner - [phone]    The Patient has the following current code status:    Code Status: Prior    Visit Documentation:  I discussed Advance Care Planning with Fabio Dee today which included the importance of making their choices for care and treatment in the case of a health event that adversely affects their decision-making abilities. He has not completed the Advance Care Directives. He does not have an active health care agent at this time.  Fabio Dee was encouraged to complete the declaration forms and provide a signed copy of his medical records.         Noel Holland  1/18/2024

## 2024-01-18 NOTE — PROGRESS NOTES
Fabio Dee (: 1980) is a 43 y.o. male, here for evaluation of the following chief complaint(s):  Weight Management (Pt is here to discuss recently coming off of weight loss medications.), Congestion (Pt states sx's started last night), Cough (Pt states sx's started last night. Dry.), and Sleep Apnea (Pt would like be referred to be evaluated for sleep apnea.)       ASSESSMENT/PLAN:  1. Discitis of thoracic region  2. Left foot drop  3. Vitamin D deficiency  4. Somatic dysfunction of thoracic region  5. Suspected sleep apnea  Comments:  Daytime sleepiness. Audible snoring. Dry mouth upon waking. Witnessed apneic events. Home testing  6. Sinus congestion  7. Obesity (BMI 30.0-34.9)  Comments:  Previosu use of   adipex. Elevated HR limiting use. Offering Tirzepatide.  The following orders have not been finalized:  Orders:  -     Tirzepatide (MOUNJARO) 2.5 MG/0.5ML SOPN SC injection  8. Chronic fatigue             SUBJECTIVE/OBJECTIVE:  HPI: Patient is a pleasant 43-year-old man with a medical history significant for empyema, chronic hepatitis C, neuropathy, discitis of the thoracic spine, vitamin D deficiency, lumbar stenosis, left foot drop presenting for follow-up visit.  Previous discussion regarding somatic dysfunction of thoracic spine.    Pulse elevated today. He feels ill though without fever. Will limit exposure to his wife.  Concern for fatigue and association with hypogonadism given his opiate history. Agreeable to morning testosterone on three subsequent occasions. Will check CBC and TSH.  Will check labs this morning and upon return visit in 6 weeks time will see patient following lab draw at that time.  Social History     Tobacco Use    Smoking status: Every Day     Current packs/day: 1.00     Average packs/day: 1 pack/day for 20.0 years (20.0 ttl pk-yrs)     Types: Cigarettes     Start date:     Smokeless tobacco: Never   Vaping Use    Vaping Use: Former    Substances:  CC: ob visit and u/s  HPI: pt presents for routine u/s and denies any issues. She notes active fetal movement. She denies reg ctx, vag bleeding or leaking fluid. She has not stopped smoking but reports she has decreased significantly  ROS:  Pulm: negative for cough or significant soa  GI: neg for n/v  : neg for reg ctx, vag bleeding or leaking fluid  O: FH= 31 cm ,FHT's 148, ext: no cords, tenderness or edema  U/s: growth appropriate at 3 lb 13 oz ( 46 %)  A/p: appropriate growth noted. F/u 2 weeks. Continue daily fmc's and PTL warnings    Advised smoking cessation    Elevated one hour: pt passed 3 hour, recommend she limit sweets/carbohydrates. Also noted increased wt gain. Recommend she watch diet; she notes she is exercising regularly    Declines flu shot and tdap

## 2024-01-19 LAB — TESTOST SERPL-MCNC: 87 NG/DL (ref 264–916)

## 2024-03-11 ENCOUNTER — TELEPHONE (OUTPATIENT)
Dept: INTERNAL MEDICINE CLINIC | Facility: CLINIC | Age: 44
End: 2024-03-11

## 2024-03-24 ENCOUNTER — APPOINTMENT (OUTPATIENT)
Dept: CT IMAGING | Age: 44
End: 2024-03-24
Payer: COMMERCIAL

## 2024-03-24 ENCOUNTER — HOSPITAL ENCOUNTER (EMERGENCY)
Age: 44
Discharge: HOME | End: 2024-03-25
Payer: COMMERCIAL

## 2024-03-24 VITALS
RESPIRATION RATE: 16 BRPM | HEART RATE: 95 BPM | BODY MASS INDEX: 35.89 KG/M2 | SYSTOLIC BLOOD PRESSURE: 133 MMHG | OXYGEN SATURATION: 95 % | HEIGHT: 72 IN | WEIGHT: 265 LBS | TEMPERATURE: 98.1 F | DIASTOLIC BLOOD PRESSURE: 82 MMHG

## 2024-03-24 DIAGNOSIS — G89.29 CHRONIC BILATERAL LOW BACK PAIN WITHOUT SCIATICA: Primary | ICD-10-CM

## 2024-03-24 DIAGNOSIS — R10.9 ABDOMINAL PAIN OF UNKNOWN CAUSE: ICD-10-CM

## 2024-03-24 DIAGNOSIS — M54.50 CHRONIC BILATERAL LOW BACK PAIN WITHOUT SCIATICA: Primary | ICD-10-CM

## 2024-03-24 LAB
ALBUMIN SERPL-MCNC: 3.6 G/DL (ref 3.5–5)
ALBUMIN/GLOB SERPL: 0.8 (ref 0.4–1.6)
ALP SERPL-CCNC: 79 U/L (ref 50–136)
ALT SERPL-CCNC: 27 U/L (ref 12–65)
ANION GAP SERPL CALC-SCNC: 3 MMOL/L (ref 2–11)
APPEARANCE UR: CLEAR
AST SERPL-CCNC: 18 U/L (ref 15–37)
BACTERIA URNS QL MICRO: 0 /HPF
BILIRUB SERPL-MCNC: 0.2 MG/DL (ref 0.2–1.1)
BILIRUB UR QL: NEGATIVE
BUN SERPL-MCNC: 23 MG/DL (ref 6–23)
CALCIUM SERPL-MCNC: 8.4 MG/DL (ref 8.3–10.4)
CHLORIDE SERPL-SCNC: 108 MMOL/L (ref 103–113)
CO2 SERPL-SCNC: 29 MMOL/L (ref 21–32)
COLOR UR: ABNORMAL
CREAT SERPL-MCNC: 0.8 MG/DL (ref 0.8–1.5)
ERYTHROCYTE [DISTWIDTH] IN BLOOD BY AUTOMATED COUNT: 12.8 % (ref 11.9–14.6)
GLOBULIN SER CALC-MCNC: 4.3 G/DL (ref 2.8–4.5)
GLUCOSE SERPL-MCNC: 107 MG/DL (ref 65–100)
GLUCOSE UR STRIP.AUTO-MCNC: NEGATIVE MG/DL
HCT VFR BLD AUTO: 37.1 % (ref 41.1–50.3)
HGB BLD-MCNC: 12.6 G/DL (ref 13.6–17.2)
HGB UR QL STRIP: NEGATIVE
KETONES UR QL STRIP.AUTO: NEGATIVE MG/DL
LEUKOCYTE ESTERASE UR QL STRIP.AUTO: NEGATIVE
MCH RBC QN AUTO: 31.7 PG (ref 26.1–32.9)
MCHC RBC AUTO-ENTMCNC: 34 G/DL (ref 31.4–35)
MCV RBC AUTO: 93.5 FL (ref 82–102)
NITRITE UR QL STRIP.AUTO: NEGATIVE
NRBC # BLD: 0 K/UL (ref 0–0.2)
PH UR STRIP: 6.5 (ref 5–9)
PLATELET # BLD AUTO: 224 K/UL (ref 150–450)
PMV BLD AUTO: 8.3 FL (ref 9.4–12.3)
POTASSIUM SERPL-SCNC: 3.9 MMOL/L (ref 3.5–5.1)
PROT SERPL-MCNC: 7.9 G/DL (ref 6.3–8.2)
PROT UR STRIP-MCNC: NEGATIVE MG/DL
RBC # BLD AUTO: 3.97 M/UL (ref 4.23–5.6)
SODIUM SERPL-SCNC: 140 MMOL/L (ref 136–146)
SP GR UR REFRACTOMETRY: 1.03 (ref 1–1.02)
UROBILINOGEN UR QL STRIP.AUTO: 1 EU/DL (ref 0.2–1)
WBC # BLD AUTO: 10 K/UL (ref 4.3–11.1)

## 2024-03-24 PROCEDURE — 85027 COMPLETE CBC AUTOMATED: CPT

## 2024-03-24 PROCEDURE — 6360000002 HC RX W HCPCS: Performed by: PHYSICIAN ASSISTANT

## 2024-03-24 PROCEDURE — 2580000003 HC RX 258: Performed by: PHYSICIAN ASSISTANT

## 2024-03-24 PROCEDURE — 96374 THER/PROPH/DIAG INJ IV PUSH: CPT

## 2024-03-24 PROCEDURE — 81003 URINALYSIS AUTO W/O SCOPE: CPT

## 2024-03-24 PROCEDURE — 74176 CT ABD & PELVIS W/O CONTRAST: CPT

## 2024-03-24 PROCEDURE — 80053 COMPREHEN METABOLIC PANEL: CPT

## 2024-03-24 PROCEDURE — 96376 TX/PRO/DX INJ SAME DRUG ADON: CPT

## 2024-03-24 PROCEDURE — 96375 TX/PRO/DX INJ NEW DRUG ADDON: CPT

## 2024-03-24 PROCEDURE — 99284 EMERGENCY DEPT VISIT MOD MDM: CPT

## 2024-03-24 RX ORDER — 0.9 % SODIUM CHLORIDE 0.9 %
1000 INTRAVENOUS SOLUTION INTRAVENOUS
Status: COMPLETED | OUTPATIENT
Start: 2024-03-24 | End: 2024-03-25

## 2024-03-24 RX ORDER — KETOROLAC TROMETHAMINE 15 MG/ML
15 INJECTION, SOLUTION INTRAMUSCULAR; INTRAVENOUS
Status: COMPLETED | OUTPATIENT
Start: 2024-03-24 | End: 2024-03-24

## 2024-03-24 RX ORDER — ONDANSETRON 2 MG/ML
4 INJECTION INTRAMUSCULAR; INTRAVENOUS
Status: COMPLETED | OUTPATIENT
Start: 2024-03-24 | End: 2024-03-24

## 2024-03-24 RX ADMIN — KETOROLAC TROMETHAMINE 15 MG: 15 INJECTION, SOLUTION INTRAMUSCULAR; INTRAVENOUS at 20:47

## 2024-03-24 RX ADMIN — ONDANSETRON 4 MG: 2 INJECTION INTRAMUSCULAR; INTRAVENOUS at 20:46

## 2024-03-24 RX ADMIN — SODIUM CHLORIDE 1000 ML: 9 INJECTION, SOLUTION INTRAVENOUS at 22:09

## 2024-03-24 RX ADMIN — KETOROLAC TROMETHAMINE 15 MG: 15 INJECTION, SOLUTION INTRAMUSCULAR; INTRAVENOUS at 22:07

## 2024-03-24 ASSESSMENT — PAIN SCALES - GENERAL
PAINLEVEL_OUTOF10: 10
PAINLEVEL_OUTOF10: 7
PAINLEVEL_OUTOF10: 8

## 2024-03-24 ASSESSMENT — PAIN DESCRIPTION - ORIENTATION: ORIENTATION: LOWER

## 2024-03-24 ASSESSMENT — PAIN DESCRIPTION - LOCATION: LOCATION: BACK

## 2024-03-24 ASSESSMENT — LIFESTYLE VARIABLES
HOW OFTEN DO YOU HAVE A DRINK CONTAINING ALCOHOL: NEVER
HOW MANY STANDARD DRINKS CONTAINING ALCOHOL DO YOU HAVE ON A TYPICAL DAY: PATIENT DOES NOT DRINK

## 2024-03-24 ASSESSMENT — PAIN - FUNCTIONAL ASSESSMENT: PAIN_FUNCTIONAL_ASSESSMENT: 0-10

## 2024-03-25 ENCOUNTER — APPOINTMENT (OUTPATIENT)
Dept: CT IMAGING | Age: 44
End: 2024-03-25
Payer: COMMERCIAL

## 2024-03-25 ENCOUNTER — HOSPITAL ENCOUNTER (EMERGENCY)
Age: 44
Discharge: HOME | End: 2024-03-25
Payer: COMMERCIAL

## 2024-03-25 VITALS
HEART RATE: 108 BPM | WEIGHT: 265 LBS | OXYGEN SATURATION: 92 % | HEIGHT: 72 IN | SYSTOLIC BLOOD PRESSURE: 99 MMHG | BODY MASS INDEX: 35.89 KG/M2 | RESPIRATION RATE: 18 BRPM | DIASTOLIC BLOOD PRESSURE: 60 MMHG | TEMPERATURE: 97.8 F

## 2024-03-25 DIAGNOSIS — G89.29 ACUTE EXACERBATION OF CHRONIC LOW BACK PAIN: Primary | ICD-10-CM

## 2024-03-25 DIAGNOSIS — R10.84 GENERALIZED ABDOMINAL PAIN: ICD-10-CM

## 2024-03-25 DIAGNOSIS — M54.50 ACUTE EXACERBATION OF CHRONIC LOW BACK PAIN: Primary | ICD-10-CM

## 2024-03-25 LAB
ALBUMIN SERPL-MCNC: 3.8 G/DL (ref 3.5–5)
ALBUMIN/GLOB SERPL: 0.8 (ref 0.4–1.6)
ALP SERPL-CCNC: 80 U/L (ref 50–136)
ALT SERPL-CCNC: 26 U/L (ref 12–65)
ANION GAP SERPL CALC-SCNC: 3 MMOL/L (ref 2–11)
APPEARANCE UR: CLEAR
AST SERPL-CCNC: 17 U/L (ref 15–37)
BACTERIA URNS QL MICRO: NEGATIVE /HPF
BASOPHILS # BLD: 0 K/UL (ref 0–0.2)
BASOPHILS NFR BLD: 0 % (ref 0–2)
BILIRUB SERPL-MCNC: 0.7 MG/DL (ref 0.2–1.1)
BILIRUB UR QL: NEGATIVE
BUN SERPL-MCNC: 14 MG/DL (ref 6–23)
CALCIUM SERPL-MCNC: 9.2 MG/DL (ref 8.3–10.4)
CASTS URNS QL MICRO: NORMAL /LPF
CHLORIDE SERPL-SCNC: 103 MMOL/L (ref 103–113)
CO2 SERPL-SCNC: 30 MMOL/L (ref 21–32)
COLOR UR: NORMAL
CREAT SERPL-MCNC: 0.75 MG/DL (ref 0.8–1.5)
DIFFERENTIAL METHOD BLD: ABNORMAL
EOSINOPHIL # BLD: 0 K/UL (ref 0–0.8)
EOSINOPHIL NFR BLD: 0 % (ref 0.5–7.8)
EPI CELLS #/AREA URNS HPF: NORMAL /HPF
ERYTHROCYTE [DISTWIDTH] IN BLOOD BY AUTOMATED COUNT: 12.9 % (ref 11.9–14.6)
GLOBULIN SER CALC-MCNC: 4.8 G/DL (ref 2.8–4.5)
GLUCOSE SERPL-MCNC: 113 MG/DL (ref 65–100)
GLUCOSE UR STRIP.AUTO-MCNC: NEGATIVE MG/DL
HCT VFR BLD AUTO: 41.9 % (ref 41.1–50.3)
HGB BLD-MCNC: 14.1 G/DL (ref 13.6–17.2)
HGB UR QL STRIP: NEGATIVE
IMM GRANULOCYTES # BLD AUTO: 0 K/UL (ref 0–0.5)
IMM GRANULOCYTES NFR BLD AUTO: 0 % (ref 0–5)
KETONES UR QL STRIP.AUTO: NEGATIVE MG/DL
LEUKOCYTE ESTERASE UR QL STRIP.AUTO: NEGATIVE
LIPASE SERPL-CCNC: 102 U/L (ref 73–393)
LYMPHOCYTES # BLD: 1.4 K/UL (ref 0.5–4.6)
LYMPHOCYTES NFR BLD: 10 % (ref 13–44)
MCH RBC QN AUTO: 31.2 PG (ref 26.1–32.9)
MCHC RBC AUTO-ENTMCNC: 33.7 G/DL (ref 31.4–35)
MCV RBC AUTO: 92.7 FL (ref 82–102)
MONOCYTES # BLD: 1.2 K/UL (ref 0.1–1.3)
MONOCYTES NFR BLD: 8 % (ref 4–12)
MUCOUS THREADS URNS QL MICRO: 0 /LPF
NEUTS SEG # BLD: 11 K/UL (ref 1.7–8.2)
NEUTS SEG NFR BLD: 81 % (ref 43–78)
NITRITE UR QL STRIP.AUTO: NEGATIVE
NRBC # BLD: 0 K/UL (ref 0–0.2)
PH UR STRIP: 6 (ref 5–9)
PLATELET # BLD AUTO: 210 K/UL (ref 150–450)
PMV BLD AUTO: 8.2 FL (ref 9.4–12.3)
POTASSIUM SERPL-SCNC: 3.6 MMOL/L (ref 3.5–5.1)
PROT SERPL-MCNC: 8.6 G/DL (ref 6.3–8.2)
PROT UR STRIP-MCNC: NEGATIVE MG/DL
RBC # BLD AUTO: 4.52 M/UL (ref 4.23–5.6)
RBC #/AREA URNS HPF: NORMAL /HPF
SODIUM SERPL-SCNC: 136 MMOL/L (ref 136–146)
SP GR UR REFRACTOMETRY: 1.02 (ref 1–1.02)
URINE CULTURE IF INDICATED: NORMAL
UROBILINOGEN UR QL STRIP.AUTO: 0.2 EU/DL (ref 0.2–1)
WBC # BLD AUTO: 13.7 K/UL (ref 4.3–11.1)
WBC URNS QL MICRO: NORMAL /HPF

## 2024-03-25 PROCEDURE — 6360000004 HC RX CONTRAST MEDICATION

## 2024-03-25 PROCEDURE — 83690 ASSAY OF LIPASE: CPT

## 2024-03-25 PROCEDURE — 81001 URINALYSIS AUTO W/SCOPE: CPT

## 2024-03-25 PROCEDURE — 85025 COMPLETE CBC W/AUTO DIFF WBC: CPT

## 2024-03-25 PROCEDURE — 74177 CT ABD & PELVIS W/CONTRAST: CPT

## 2024-03-25 PROCEDURE — 6360000002 HC RX W HCPCS

## 2024-03-25 PROCEDURE — 96375 TX/PRO/DX INJ NEW DRUG ADDON: CPT

## 2024-03-25 PROCEDURE — 80053 COMPREHEN METABOLIC PANEL: CPT

## 2024-03-25 PROCEDURE — 96374 THER/PROPH/DIAG INJ IV PUSH: CPT

## 2024-03-25 PROCEDURE — 99285 EMERGENCY DEPT VISIT HI MDM: CPT

## 2024-03-25 RX ORDER — KETOROLAC TROMETHAMINE 10 MG/1
10 TABLET, FILM COATED ORAL EVERY 6 HOURS PRN
Qty: 12 TABLET | Refills: 0 | Status: SHIPPED | OUTPATIENT
Start: 2024-03-25

## 2024-03-25 RX ORDER — CYCLOBENZAPRINE HCL 5 MG
5 TABLET ORAL 2 TIMES DAILY PRN
Qty: 14 TABLET | Refills: 0 | Status: SHIPPED | OUTPATIENT
Start: 2024-03-25 | End: 2024-04-01

## 2024-03-25 RX ORDER — DICLOFENAC SODIUM 75 MG/1
75 TABLET, DELAYED RELEASE ORAL 2 TIMES DAILY
Qty: 14 TABLET | Refills: 0 | Status: SHIPPED | OUTPATIENT
Start: 2024-03-25 | End: 2024-04-01

## 2024-03-25 RX ORDER — ONDANSETRON 2 MG/ML
4 INJECTION INTRAMUSCULAR; INTRAVENOUS
Status: COMPLETED | OUTPATIENT
Start: 2024-03-25 | End: 2024-03-25

## 2024-03-25 RX ORDER — MORPHINE SULFATE 4 MG/ML
4 INJECTION INTRAVENOUS ONCE
Status: COMPLETED | OUTPATIENT
Start: 2024-03-25 | End: 2024-03-25

## 2024-03-25 RX ADMIN — IOPAMIDOL 100 ML: 755 INJECTION, SOLUTION INTRAVENOUS at 13:59

## 2024-03-25 RX ADMIN — ONDANSETRON 4 MG: 2 INJECTION INTRAMUSCULAR; INTRAVENOUS at 13:24

## 2024-03-25 RX ADMIN — MORPHINE SULFATE 4 MG: 4 INJECTION, SOLUTION INTRAMUSCULAR; INTRAVENOUS at 13:26

## 2024-03-25 ASSESSMENT — PAIN SCALES - GENERAL
PAINLEVEL_OUTOF10: 10
PAINLEVEL_OUTOF10: 10
PAINLEVEL_OUTOF10: 4

## 2024-03-25 ASSESSMENT — PAIN DESCRIPTION - LOCATION
LOCATION: BACK
LOCATION: HIP

## 2024-03-25 ASSESSMENT — PAIN DESCRIPTION - DESCRIPTORS: DESCRIPTORS: SHARP

## 2024-03-25 ASSESSMENT — PAIN DESCRIPTION - ORIENTATION: ORIENTATION: LOWER

## 2024-03-25 NOTE — DISCHARGE INSTRUCTIONS
Work-up overall reassuring. Taking medications as prescribed. Return to ED as needed for new or worsening symptoms.

## 2024-03-25 NOTE — ED TRIAGE NOTES
Pt c/o lower back pain since last night, states he has had a spinal fusion and has had pain in the past but this time it feels different and is wrapping around to his groin area. He has been taking Tylenol and Motrin which he takes daily for chronic pain.

## 2024-03-25 NOTE — ED PROVIDER NOTES
Emergency Department Provider Note       PCP: Mumtaz Marroquin DO   Age: 43 y.o.   Sex: male     DISPOSITION Decision To Discharge 03/25/2024 03:28:59 PM       ICD-10-CM    1. Acute exacerbation of chronic low back pain  M54.50     G89.29       2. Generalized abdominal pain  R10.84           Medical Decision Making     43-year-old male presenting with concerns of acutely worsened chronic low back pain and abdominal pain.  Was evaluated in this department yesterday. At that time lab and CT imaging workup was obtained to rule out renal calculi versus other acute abdomen.  Advised that he was having an acute exacerbation of chronic back pain.  Was prescribed p.o. Voltaren and Flexeril therapy for symptomatic management yesterday.  Notably he has a history of spinal fusion surgery 2 years ago of T9-10 and L4 S1 and laminectomy at fusion site last year.    Patient reported to me that his pain was worsened in his abdomen and groin bilaterally.  Suspicion that symptomatology is likely musculoskeletal in etiology considering bilateral groin/hip pain especially with bilateral leg flexion at the hip on examination.  And given that is chronic in nature I have a low suspicion for acute abnormality of the spine.  Cannot rule out acute abdomen considering there is abdominal tenderness on examination and since patient perceived pain to have acutely worsened will pursue repeat CT imaging but with IV contrast at today's visit.  Patient was apprehensive about receiving narcotic medication for pain relief since he is in a drug rehab program but he is agreeable to it today considering his perceived pain level.  Will medicate and reassess.    Upon reassessment patient reports that this is the best he has felt in weeks and his pain is almost completely resolved.  No acute abnormalities noted on repeat CT imaging with IV contrast.  There is diffuse enhancement seen of the bladder wall but no bacteriuria or correlation seen on urinalysis.

## 2024-03-25 NOTE — ED NOTES
I have reviewed discharge instructions with the patient.  The patient verbalized understanding.    Patient left ED via Discharge Method: wheelchair to Home with (insert name of family/friend, self, transport via pov).    Opportunity for questions and clarification provided.       Patient given 1 scripts.         To continue your aftercare when you leave the hospital, you may receive an automated call from our care team to check in on how you are doing.  This is a free service and part of our promise to provide the best care and service to meet your aftercare needs.” If you have questions, or wish to unsubscribe from this service please call [phone].  Thank you for Choosing our [facility] Emergency Department.

## 2024-03-25 NOTE — ED PROVIDER NOTES
Measure due to: Infection is not suspected     HPI   Fabio Dee is a 43 y.o. male with a history of hepatitis C, renal calculi, substance abuse, who presents to the ED with complaint of bilateral lower back pain rating around to the groin.  He states that he has had fusion surgery on his back about 20 years ago and about a year ago had a laminectomy on that same fusion site.  He states he has long chronic back pain but states given his history of opiate abuse and narcotic abuse he just takes to Tylenol and Advil.  He states that this pain started yesterday and worsened as the day went on today.  He states he is not having any urinary symptoms.  No fevers.  He states he feels like he he is having trouble having bowel movements however he reports he last had a bowel movement today.  He states given his pain he felt he should come and be evaluated further.  No falls or recent injuries.  Denies any scrotal pain/swelling or penile pain.    ROS   Review of Systems   Constitutional:  Negative for fever.   Gastrointestinal:  Positive for abdominal pain (groin). Negative for blood in stool.   Genitourinary:  Negative for dysuria, frequency, hematuria, penile pain, penile swelling, scrotal swelling and urgency.   Musculoskeletal:  Positive for back pain.   All other systems reviewed and are negative.      History     Past Medical History:   Diagnosis Date    ADHD     Chronic hepatitis C (HCC)     treated with antivirals finished @11/22    History of blood transfusion 2001    Hx of echocardiogram 04/05/2022    EF 55-65%    Kidney stone     Osteomyelitis (HCC) 04/2022    right foot    Substance abuse (HCC)     alcohol, opiates, in recovery per patient    Trauma 2001    intialy treated at [place], [place]     Past Surgical History:   Procedure Laterality Date    FEMUR FRACTURE SURGERY  2001    femur preeti    HUMERUS SURGERY  2001    plate    LUMBAR SPINE SURGERY Bilateral 3/17/2023    Bilateral L3-4

## 2024-03-25 NOTE — ED NOTES
I have reviewed discharge instructions with the patient.  The patient verbalized understanding.    Patient left ED via Discharge Method: ambulatory to Home.    Opportunity for questions and clarification provided.       Patient given 2 scripts.         To continue your aftercare when you leave the hospital, you may receive an automated call from our care team to check in on how you are doing.  This is a free service and part of our promise to provide the best care and service to meet your aftercare needs.” If you have questions, or wish to unsubscribe from this service please call [phone].  Thank you for Choosing our [facility] Emergency Department.

## 2024-03-25 NOTE — DISCHARGE INSTRUCTIONS
There are no concerning findings on your labs or CT scan today in the emergency department.  Continue your current pain management program.  Return as needed or if you have new or worsening symptoms.

## 2024-03-27 ENCOUNTER — TELEPHONE (OUTPATIENT)
Dept: INTERNAL MEDICINE CLINIC | Facility: CLINIC | Age: 44
End: 2024-03-27

## 2024-03-27 ENCOUNTER — OFFICE VISIT (OUTPATIENT)
Dept: INTERNAL MEDICINE CLINIC | Facility: CLINIC | Age: 44
End: 2024-03-27
Payer: COMMERCIAL

## 2024-03-27 VITALS
HEIGHT: 72 IN | BODY MASS INDEX: 35.76 KG/M2 | SYSTOLIC BLOOD PRESSURE: 153 MMHG | HEART RATE: 119 BPM | TEMPERATURE: 99 F | WEIGHT: 264 LBS | OXYGEN SATURATION: 98 % | DIASTOLIC BLOOD PRESSURE: 92 MMHG

## 2024-03-27 DIAGNOSIS — R53.82 CHRONIC FATIGUE: ICD-10-CM

## 2024-03-27 DIAGNOSIS — M54.50 ACUTE BILATERAL LOW BACK PAIN WITHOUT SCIATICA: Primary | ICD-10-CM

## 2024-03-27 DIAGNOSIS — R10.2 PELVIC PAIN: ICD-10-CM

## 2024-03-27 LAB
BILIRUBIN, URINE, POC: NORMAL
BLOOD URINE, POC: NEGATIVE
GLUCOSE URINE, POC: NEGATIVE
KETONES, URINE, POC: NORMAL
LEUKOCYTE ESTERASE, URINE, POC: NEGATIVE
NITRITE, URINE, POC: NEGATIVE
PH, URINE, POC: 7 (ref 4.6–8)
PROTEIN,URINE, POC: NORMAL
SPECIFIC GRAVITY, URINE, POC: 1.01 (ref 1–1.03)
URINALYSIS CLARITY, POC: NORMAL
URINALYSIS COLOR, POC: NORMAL
UROBILINOGEN, POC: NORMAL

## 2024-03-27 PROCEDURE — 81003 URINALYSIS AUTO W/O SCOPE: CPT | Performed by: NURSE PRACTITIONER

## 2024-03-27 PROCEDURE — 99214 OFFICE O/P EST MOD 30 MIN: CPT | Performed by: NURSE PRACTITIONER

## 2024-03-27 ASSESSMENT — ENCOUNTER SYMPTOMS
COUGH: 0
SHORTNESS OF BREATH: 0
ABDOMINAL PAIN: 1
CHEST TIGHTNESS: 0
NAUSEA: 0
DIARRHEA: 0
ABDOMINAL DISTENTION: 0
BLOOD IN STOOL: 0
CONSTIPATION: 0
BACK PAIN: 1
VOMITING: 0

## 2024-03-27 NOTE — TELEPHONE ENCOUNTER
Called pt and informed him that [facility] has an opening today per William @ 3 pm or 3:30 pm for pt to arrive by 2 or 2:30 pm. Pt was offered and scheduled for the appt today @ 3 pm and was and asked to arrive by 2 pm. Pt agreed. Ty

## 2024-03-27 NOTE — TELEPHONE ENCOUNTER
Called pt to evaluate what is going on.pt stated that he's having severe back pain that started over the weekend. Pt was seen in the ER on 2 different occasions 3/24 and 3/25 and states that imaging was done and resulted normal. Pt states he was advised to follow up with his PCP.  Pt states that pain is shooting into his groin area and states he's unable to have a BM. Pt previously missed appointments for a lab and follow up visit in 2/2024. Pt states that is due to him switching jobs and  being  between insurances. Pt states he does have a new insurance now. Offered pt an appointment for tomorrow but he stated he wanted to see possibly an NP today to have his work excuse extended due to be pain being so bad he's almost unable to get out of the bed. Informed pt that I will reach out to our team here and see if anyone has any availability to see him today  and will call him back with that information. In the mean time pt has been added to the schedule for an acute care visit tomorrow @ 11:40 am. Ty

## 2024-03-27 NOTE — PROGRESS NOTES
icterus.  Cardiovascular:      Rate and Rhythm: Tachycardia present.   Pulmonary:      Effort: Pulmonary effort is normal. No respiratory distress.      Breath sounds: Normal breath sounds.   Abdominal:      General: There is no distension.   Musculoskeletal:      Cervical back: No rigidity.      Right lower leg: No edema.      Left lower leg: No edema.   Skin:     General: Skin is warm and dry.   Neurological:      Mental Status: He is alert and oriented to person, place, and time.   Psychiatric:         Mood and Affect: Mood normal.         Behavior: Behavior normal.                  Lab Results   Component Value Date    WBC 13.7 (H) 03/25/2024    HGB 14.1 03/25/2024    HCT 41.9 03/25/2024    MCV 92.7 03/25/2024     03/25/2024   ,   Lab Results   Component Value Date/Time     03/25/2024 01:18 PM    K 3.6 03/25/2024 01:18 PM     03/25/2024 01:18 PM    CO2 30 03/25/2024 01:18 PM    BUN 14 03/25/2024 01:18 PM    CREATININE 0.75 03/25/2024 01:18 PM    GLUCOSE 113 03/25/2024 01:18 PM    CALCIUM 9.2 03/25/2024 01:18 PM    LABGLOM >90 03/25/2024 01:18 PM    ,   Lab Results   Component Value Date    TSHELE 2.18 01/18/2024   ,   Lab Results   Component Value Date    ALT 26 03/25/2024    AST 17 03/25/2024    ALKPHOS 80 03/25/2024    BILITOT 0.7 03/25/2024   ,   Hemoglobin A1C   Date Value Ref Range Status   11/06/2023 5.1 4.8 - 5.6 % Final           1/18/2024     8:17 AM   PHQ-9    Little interest or pleasure in doing things 0   Feeling down, depressed, or hopeless 0   PHQ-2 Score 0   PHQ-9 Total Score 0        Assessment/Plan:      Health Maintenance Due   Topic Date Due    Hepatitis B vaccine (1 of 3 - 3-dose series) Never done    Varicella vaccine (1 of 2 - 2-dose childhood series) Never done    HIV screen  Never done    Hepatitis A vaccine (1 of 2 - Risk 2-dose series) Never done          Fabio was seen today for back pain.    Diagnoses and all orders for this visit:    Acute bilateral low back pain

## 2024-03-27 NOTE — TELEPHONE ENCOUNTER
Patient called to request an appointment with Dr Marroquin, he has been experiencing back pain for the past 4 days. Mr Dee has been to the  [facility] ER twice, 3-24 and 3-25-24 for chronic lower back pain. Mr Dee states that the haven't helped him very much.  Patient wants to see only Dr. Marroquin - please call patient at [phone] . Pain level is a 12/10   No

## 2024-03-28 ENCOUNTER — OFFICE VISIT (OUTPATIENT)
Dept: INTERNAL MEDICINE CLINIC | Facility: CLINIC | Age: 44
End: 2024-03-28
Payer: COMMERCIAL

## 2024-03-28 VITALS
WEIGHT: 263 LBS | DIASTOLIC BLOOD PRESSURE: 81 MMHG | HEIGHT: 72 IN | RESPIRATION RATE: 16 BRPM | HEART RATE: 113 BPM | SYSTOLIC BLOOD PRESSURE: 123 MMHG | BODY MASS INDEX: 35.62 KG/M2 | OXYGEN SATURATION: 100 % | TEMPERATURE: 97 F

## 2024-03-28 DIAGNOSIS — M54.50 ACUTE BILATERAL LOW BACK PAIN WITHOUT SCIATICA: Primary | ICD-10-CM

## 2024-03-28 DIAGNOSIS — K59.00 CONSTIPATION, UNSPECIFIED CONSTIPATION TYPE: ICD-10-CM

## 2024-03-28 PROCEDURE — 99213 OFFICE O/P EST LOW 20 MIN: CPT | Performed by: INTERNAL MEDICINE

## 2024-03-28 RX ORDER — SENNOSIDES A AND B 8.6 MG/1
1 TABLET, FILM COATED ORAL 2 TIMES DAILY
Qty: 60 TABLET | Refills: 11 | Status: SHIPPED | OUTPATIENT
Start: 2024-03-28 | End: 2025-03-28

## 2024-03-28 RX ORDER — PREDNISONE 20 MG/1
20 TABLET ORAL DAILY
Qty: 7 TABLET | Refills: 0 | Status: SHIPPED | OUTPATIENT
Start: 2024-03-28 | End: 2024-04-04

## 2024-03-28 RX ORDER — POLYETHYLENE GLYCOL 3350 17 G/17G
17 POWDER, FOR SOLUTION ORAL DAILY
Qty: 1530 G | Refills: 1 | Status: SHIPPED | OUTPATIENT
Start: 2024-03-28 | End: 2024-09-24

## 2024-03-28 NOTE — PROGRESS NOTES
Fabio Dee (: 1980) is a 43 y.o. male, here for evaluation of the following chief complaint(s):  Back Pain ( Pt  says Back pain started Saturday hurts when  walking and sitting pain level is 10)       ASSESSMENT/PLAN:  1. Acute bilateral low back pain without sciatica  -     MRI LUMBAR SPINE WO CONTRAST; Future  -     [facility] Gerson Priest MD, Pain Management, [place]  -     predniSONE (DELTASONE) 20 MG tablet; Take 1 tablet by mouth daily for 7 days, Disp-7 tablet, R-0Normal  -     polyethylene glycol (GLYCOLAX) 17 GM/SCOOP powder; Take 17 g by mouth daily, Disp-1530 g, R-1Normal  -     senna (SENOKOT) 8.6 MG tablet; Take 1 tablet by mouth 2 times daily, Disp-60 tablet, R-11Normal  2. 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 Increased pain in his back. Using Toradol, Tylenol, and Flexeril. Began moving some things around the house. Went to work the following day with abdominal pain and pelvic pain.   He has been to ER with two CT scans.  Has been in touch with Neurosurgery. XR's done yesterday.  Constipation on XR's  He has had a bowel movement today.  Patient with complex pain history. Referring urgently to pain medicine for consideration of further treatment options.  Ordering MRI of the lumbar spine for further evaluation.  Prednisone 20 mg for 7 days offered at this time.  Patient is an active member of the recovery community and is not interested in continued use of opiates.  Patient with previous neurosurgery through local neurosurgery team.  X-rays consistent with constipation which may be contributing to his symptoms.  Offering MiraLAX

## 2024-03-30 LAB
BACTERIA SPEC CULT: NORMAL
C TRACH RRNA UR QL NAA+PROBE: NEGATIVE
M GENITALIUM DNA SPEC QL NAA+PROBE: NEGATIVE
N GONORRHOEA RRNA UR QL NAA+PROBE: NEGATIVE
SERVICE CMNT-IMP: NORMAL
SPECIMEN SOURCE: NORMAL
TESTOST SERPL-MCNC: 58 NG/DL (ref 264–916)

## 2024-04-01 DIAGNOSIS — E29.1 HYPOGONADISM IN MALE: Primary | ICD-10-CM

## 2024-04-02 ENCOUNTER — OFFICE VISIT (OUTPATIENT)
Dept: NEUROSURGERY | Age: 44
End: 2024-04-02
Payer: COMMERCIAL

## 2024-04-02 VITALS
TEMPERATURE: 97.4 F | OXYGEN SATURATION: 97 % | BODY MASS INDEX: 35.76 KG/M2 | WEIGHT: 264 LBS | SYSTOLIC BLOOD PRESSURE: 140 MMHG | HEIGHT: 72 IN | HEART RATE: 87 BPM | DIASTOLIC BLOOD PRESSURE: 91 MMHG

## 2024-04-02 DIAGNOSIS — E66.9 OBESITY (BMI 30-39.9): ICD-10-CM

## 2024-04-02 DIAGNOSIS — M21.372 LEFT FOOT DROP: ICD-10-CM

## 2024-04-02 DIAGNOSIS — M48.062 LUMBAR STENOSIS WITH NEUROGENIC CLAUDICATION: Primary | ICD-10-CM

## 2024-04-02 DIAGNOSIS — Z72.0 TOBACCO ABUSE: ICD-10-CM

## 2024-04-02 PROCEDURE — 99204 OFFICE O/P NEW MOD 45 MIN: CPT | Performed by: NEUROLOGICAL SURGERY

## 2024-04-02 NOTE — PROGRESS NOTES
for this visit.       Patient Active Problem List   Diagnosis    Chest pain    Discitis of thoracic region    Pleural effusion    Empyema lung (HCC)    Phlegmon    Chronic hepatitis C without hepatic coma (HCC)    Discitis    Paraspinal abscess (HCC)    Accidental overdose of heroin (HCC)    Neuropathy    Lumbar stenosis with neurogenic claudication    Left foot drop    Obesity (BMI 30.0-34.9)    Tobacco abuse    Establishing care with new doctor, encounter for    Vitamin D deficiency    Sinus congestion    Obesity (BMI 30-39.9)        Review of Systems: A complete ROS was done and as stated in the HPI or otherwise negative.    BP (!) 140/91   Pulse 87   Temp 97.4 °F (36.3 °C) (Temporal)   Ht 1.829 m (6')   Wt 119.7 kg (264 lb)   SpO2 97%   BMI 35.80 kg/m²        Physical Exam  The physical exam findings are as follows:       Cranial Nerves:   Intact visual fields. BINDU, EOM's full, no nystagmus, no ptosis. Facial sensation is normal. Corneal reflexes are intact. Facial movement is symmetric. Hearing is normal bilaterally. Palate is midline with normal sternocleidomastoid and trapezius muscles are normal. Tongue is midline.   Motor:  5/5 strength in upper and lower proximal and distal muscles. Normal bulk and tone. No fasciculations.   Reflexes:   Deep tendon reflexes 2+/4 and symmetrical.   Sensory:   Normal to touch, pinprick    Gait:  Normal gait.   Tremor:   No tremor noted.   Cerebellar:  No cerebellar signs present.              Assessment & Plan      ICD-10-CM    1. Lumbar stenosis with neurogenic claudication  M48.062       2. Left foot drop  M21.372       3. Obesity (BMI 30-39.9)  E66.9       4. Tobacco abuse  Z72.0          MRI lumbar spine.  Return visit after for reevaluation.  The patient understands and agrees to proceed.    JUANA WHATLEY MD

## 2024-04-03 ENCOUNTER — TELEPHONE (OUTPATIENT)
Dept: NEUROSURGERY | Age: 44
End: 2024-04-03

## 2024-04-03 ENCOUNTER — PATIENT MESSAGE (OUTPATIENT)
Dept: INTERNAL MEDICINE CLINIC | Facility: CLINIC | Age: 44
End: 2024-04-03

## 2024-04-03 ENCOUNTER — TELEPHONE (OUTPATIENT)
Dept: INTERNAL MEDICINE CLINIC | Facility: CLINIC | Age: 44
End: 2024-04-03

## 2024-04-03 DIAGNOSIS — M54.50 ACUTE BILATERAL LOW BACK PAIN WITHOUT SCIATICA: Primary | ICD-10-CM

## 2024-04-03 RX ORDER — KETOROLAC TROMETHAMINE 10 MG/1
10 TABLET, FILM COATED ORAL EVERY 6 HOURS PRN
Qty: 20 TABLET | Refills: 0 | Status: ON HOLD | OUTPATIENT
Start: 2024-04-03 | End: 2025-04-03

## 2024-04-03 RX ORDER — PREDNISONE 20 MG/1
20 TABLET ORAL DAILY
Qty: 7 TABLET | Refills: 0 | Status: ON HOLD | OUTPATIENT
Start: 2024-04-03 | End: 2024-04-10

## 2024-04-03 NOTE — TELEPHONE ENCOUNTER
PT arrived in person to request to speak with [facility]/Dr. Marroquin. PT wanted to let Dr. Marroquin know he finished his medication for predniSONE (DELTASONE). PT also wanted to explain to Dr. Marroquin that the medication did work, but that it only works for \"10-12 hours\".     He wants to continue taking this medication and stated he has no problem with changing his medication to a half dose, per his last office with [facility]. He can be reached at [phone].

## 2024-04-03 NOTE — TELEPHONE ENCOUNTER
Patient was told an MRI was ordered by other offices, [facility] they told pt they didn't order MRI's  Pt states Dr Call needs one.    Pt was in recently.    Would you call pt when MRI sent?

## 2024-04-04 ENCOUNTER — CLINICAL DOCUMENTATION (OUTPATIENT)
Dept: NEUROSURGERY | Age: 44
End: 2024-04-04

## 2024-04-04 ENCOUNTER — OFFICE VISIT (OUTPATIENT)
Age: 44
End: 2024-04-04
Payer: COMMERCIAL

## 2024-04-04 ENCOUNTER — HOSPITAL ENCOUNTER (EMERGENCY)
Age: 44
Discharge: HOME | DRG: 872 | End: 2024-04-04
Attending: EMERGENCY MEDICINE
Payer: COMMERCIAL

## 2024-04-04 ENCOUNTER — APPOINTMENT (OUTPATIENT)
Dept: MRI IMAGING | Age: 44
DRG: 872 | End: 2024-04-04
Payer: COMMERCIAL

## 2024-04-04 VITALS
HEIGHT: 72 IN | TEMPERATURE: 97.5 F | BODY MASS INDEX: 35.76 KG/M2 | HEART RATE: 102 BPM | WEIGHT: 264 LBS | DIASTOLIC BLOOD PRESSURE: 79 MMHG | SYSTOLIC BLOOD PRESSURE: 112 MMHG | OXYGEN SATURATION: 98 % | RESPIRATION RATE: 18 BRPM

## 2024-04-04 DIAGNOSIS — M54.50 ACUTE MIDLINE LOW BACK PAIN, UNSPECIFIED WHETHER SCIATICA PRESENT: ICD-10-CM

## 2024-04-04 DIAGNOSIS — G89.29 CHRONIC BILATERAL LOW BACK PAIN WITH BILATERAL SCIATICA: Primary | ICD-10-CM

## 2024-04-04 DIAGNOSIS — M51.36 DEGENERATIVE DISC DISEASE, LUMBAR: Primary | ICD-10-CM

## 2024-04-04 DIAGNOSIS — M51.369 DEGENERATIVE DISC DISEASE, LUMBAR: Primary | ICD-10-CM

## 2024-04-04 DIAGNOSIS — M54.42 CHRONIC BILATERAL LOW BACK PAIN WITH BILATERAL SCIATICA: Primary | ICD-10-CM

## 2024-04-04 DIAGNOSIS — M54.41 CHRONIC BILATERAL LOW BACK PAIN WITH BILATERAL SCIATICA: Primary | ICD-10-CM

## 2024-04-04 LAB
ALBUMIN SERPL-MCNC: 3.2 G/DL (ref 3.5–5)
ALBUMIN/GLOB SERPL: 0.6 (ref 0.4–1.6)
ALP SERPL-CCNC: 87 U/L (ref 50–136)
ALT SERPL-CCNC: 43 U/L (ref 12–65)
ANION GAP SERPL CALC-SCNC: 3 MMOL/L (ref 2–11)
APPEARANCE UR: CLEAR
AST SERPL-CCNC: 11 U/L (ref 15–37)
BASOPHILS # BLD: 0.1 K/UL (ref 0–0.2)
BASOPHILS NFR BLD: 0 % (ref 0–2)
BILIRUB SERPL-MCNC: 0.3 MG/DL (ref 0.2–1.1)
BILIRUB UR QL: NEGATIVE
BUN SERPL-MCNC: 22 MG/DL (ref 6–23)
CALCIUM SERPL-MCNC: 9.2 MG/DL (ref 8.3–10.4)
CHLORIDE SERPL-SCNC: 103 MMOL/L (ref 103–113)
CO2 SERPL-SCNC: 28 MMOL/L (ref 21–32)
COLOR UR: NORMAL
CREAT SERPL-MCNC: 0.8 MG/DL (ref 0.8–1.5)
CRP SERPL-MCNC: 4.9 MG/DL (ref 0–0.9)
DIFFERENTIAL METHOD BLD: ABNORMAL
EOSINOPHIL # BLD: 0.1 K/UL (ref 0–0.8)
EOSINOPHIL NFR BLD: 0 % (ref 0.5–7.8)
ERYTHROCYTE [DISTWIDTH] IN BLOOD BY AUTOMATED COUNT: 12.9 % (ref 11.9–14.6)
ERYTHROCYTE [SEDIMENTATION RATE] IN BLOOD: 68 MM/HR (ref 0–20)
GLOBULIN SER CALC-MCNC: 5.2 G/DL (ref 2.8–4.5)
GLUCOSE SERPL-MCNC: 231 MG/DL (ref 65–100)
GLUCOSE UR STRIP.AUTO-MCNC: NEGATIVE MG/DL
HCT VFR BLD AUTO: 36.8 % (ref 41.1–50.3)
HGB BLD-MCNC: 12.4 G/DL (ref 13.6–17.2)
HGB UR QL STRIP: NEGATIVE
IMM GRANULOCYTES # BLD AUTO: 0.2 K/UL (ref 0–0.5)
IMM GRANULOCYTES NFR BLD AUTO: 2 % (ref 0–5)
KETONES UR QL STRIP.AUTO: NEGATIVE MG/DL
LACTATE SERPL-SCNC: 1.1 MMOL/L (ref 0.4–2)
LACTATE SERPL-SCNC: 2.2 MMOL/L (ref 0.4–2)
LEUKOCYTE ESTERASE UR QL STRIP.AUTO: NEGATIVE
LYMPHOCYTES # BLD: 2.4 K/UL (ref 0.5–4.6)
LYMPHOCYTES NFR BLD: 16 % (ref 13–44)
MCH RBC QN AUTO: 31 PG (ref 26.1–32.9)
MCHC RBC AUTO-ENTMCNC: 33.7 G/DL (ref 31.4–35)
MCV RBC AUTO: 92 FL (ref 82–102)
MONOCYTES # BLD: 0.9 K/UL (ref 0.1–1.3)
MONOCYTES NFR BLD: 6 % (ref 4–12)
NEUTS SEG # BLD: 11.5 K/UL (ref 1.7–8.2)
NEUTS SEG NFR BLD: 76 % (ref 43–78)
NITRITE UR QL STRIP.AUTO: NEGATIVE
NRBC # BLD: 0 K/UL (ref 0–0.2)
PH UR STRIP: 6.5 (ref 5–9)
PLATELET # BLD AUTO: 371 K/UL (ref 150–450)
PMV BLD AUTO: 7.8 FL (ref 9.4–12.3)
POTASSIUM SERPL-SCNC: 3.7 MMOL/L (ref 3.5–5.1)
PROCALCITONIN SERPL-MCNC: <0.05 NG/ML (ref 0–0.49)
PROT SERPL-MCNC: 8.4 G/DL (ref 6.3–8.2)
PROT UR STRIP-MCNC: NEGATIVE MG/DL
RBC # BLD AUTO: 4 M/UL (ref 4.23–5.6)
SODIUM SERPL-SCNC: 134 MMOL/L (ref 136–146)
SP GR UR REFRACTOMETRY: 1.02 (ref 1–1.02)
UROBILINOGEN UR QL STRIP.AUTO: 0.2 EU/DL (ref 0.2–1)
WBC # BLD AUTO: 15.1 K/UL (ref 4.3–11.1)

## 2024-04-04 PROCEDURE — 96367 TX/PROPH/DG ADDL SEQ IV INF: CPT

## 2024-04-04 PROCEDURE — 85025 COMPLETE CBC W/AUTO DIFF WBC: CPT

## 2024-04-04 PROCEDURE — 99285 EMERGENCY DEPT VISIT HI MDM: CPT

## 2024-04-04 PROCEDURE — 6360000002 HC RX W HCPCS: Performed by: PHYSICIAN ASSISTANT

## 2024-04-04 PROCEDURE — 83605 ASSAY OF LACTIC ACID: CPT

## 2024-04-04 PROCEDURE — 96376 TX/PRO/DX INJ SAME DRUG ADON: CPT

## 2024-04-04 PROCEDURE — 87154 CUL TYP ID BLD PTHGN 6+ TRGT: CPT

## 2024-04-04 PROCEDURE — 6370000000 HC RX 637 (ALT 250 FOR IP): Performed by: PHYSICIAN ASSISTANT

## 2024-04-04 PROCEDURE — 87205 SMEAR GRAM STAIN: CPT

## 2024-04-04 PROCEDURE — 99204 OFFICE O/P NEW MOD 45 MIN: CPT | Performed by: ANESTHESIOLOGY

## 2024-04-04 PROCEDURE — 87186 SC STD MICRODIL/AGAR DIL: CPT

## 2024-04-04 PROCEDURE — A4216 STERILE WATER/SALINE, 10 ML: HCPCS | Performed by: PHYSICIAN ASSISTANT

## 2024-04-04 PROCEDURE — 96365 THER/PROPH/DIAG IV INF INIT: CPT

## 2024-04-04 PROCEDURE — 80053 COMPREHEN METABOLIC PANEL: CPT

## 2024-04-04 PROCEDURE — 87077 CULTURE AEROBIC IDENTIFY: CPT

## 2024-04-04 PROCEDURE — 86140 C-REACTIVE PROTEIN: CPT

## 2024-04-04 PROCEDURE — 81003 URINALYSIS AUTO W/O SCOPE: CPT

## 2024-04-04 PROCEDURE — 2580000003 HC RX 258: Performed by: PHYSICIAN ASSISTANT

## 2024-04-04 PROCEDURE — 96368 THER/DIAG CONCURRENT INF: CPT

## 2024-04-04 PROCEDURE — 85652 RBC SED RATE AUTOMATED: CPT

## 2024-04-04 PROCEDURE — 6360000004 HC RX CONTRAST MEDICATION: Performed by: PHYSICIAN ASSISTANT

## 2024-04-04 PROCEDURE — 84145 PROCALCITONIN (PCT): CPT

## 2024-04-04 PROCEDURE — A9579 GAD-BASE MR CONTRAST NOS,1ML: HCPCS | Performed by: PHYSICIAN ASSISTANT

## 2024-04-04 PROCEDURE — 72158 MRI LUMBAR SPINE W/O & W/DYE: CPT

## 2024-04-04 PROCEDURE — 96375 TX/PRO/DX INJ NEW DRUG ADDON: CPT

## 2024-04-04 PROCEDURE — 87040 BLOOD CULTURE FOR BACTERIA: CPT

## 2024-04-04 RX ORDER — MORPHINE SULFATE 2 MG/ML
2 INJECTION, SOLUTION INTRAMUSCULAR; INTRAVENOUS
Status: COMPLETED | OUTPATIENT
Start: 2024-04-04 | End: 2024-04-04

## 2024-04-04 RX ORDER — MORPHINE SULFATE 4 MG/ML
4 INJECTION INTRAVENOUS ONCE
Status: COMPLETED | OUTPATIENT
Start: 2024-04-04 | End: 2024-04-04

## 2024-04-04 RX ORDER — GABAPENTIN 600 MG/1
600 TABLET ORAL 3 TIMES DAILY
Qty: 90 TABLET | Refills: 0 | Status: ON HOLD | OUTPATIENT
Start: 2024-04-04 | End: 2024-05-04

## 2024-04-04 RX ORDER — CELECOXIB 200 MG/1
200 CAPSULE ORAL 2 TIMES DAILY PRN
Qty: 60 CAPSULE | Refills: 1 | Status: ON HOLD | OUTPATIENT
Start: 2024-04-04 | End: 2024-06-03

## 2024-04-04 RX ORDER — METHOCARBAMOL 750 MG/1
750 TABLET, FILM COATED ORAL 3 TIMES DAILY PRN
Qty: 90 TABLET | Refills: 1 | Status: ON HOLD | OUTPATIENT
Start: 2024-04-04 | End: 2024-06-03

## 2024-04-04 RX ORDER — KETOROLAC TROMETHAMINE 15 MG/ML
15 INJECTION, SOLUTION INTRAMUSCULAR; INTRAVENOUS
Status: COMPLETED | OUTPATIENT
Start: 2024-04-04 | End: 2024-04-04

## 2024-04-04 RX ORDER — METRONIDAZOLE 500 MG/100ML
500 INJECTION, SOLUTION INTRAVENOUS
Status: COMPLETED | OUTPATIENT
Start: 2024-04-04 | End: 2024-04-04

## 2024-04-04 RX ORDER — 0.9 % SODIUM CHLORIDE 0.9 %
1000 INTRAVENOUS SOLUTION INTRAVENOUS
Status: COMPLETED | OUTPATIENT
Start: 2024-04-04 | End: 2024-04-04

## 2024-04-04 RX ORDER — LIDOCAINE 4 G/G
1 PATCH TOPICAL
Status: DISCONTINUED | OUTPATIENT
Start: 2024-04-04 | End: 2024-04-05 | Stop reason: HOSPADM

## 2024-04-04 RX ADMIN — SODIUM CHLORIDE 1000 ML: 9 INJECTION, SOLUTION INTRAVENOUS at 18:21

## 2024-04-04 RX ADMIN — MORPHINE SULFATE 2 MG: 2 INJECTION, SOLUTION INTRAMUSCULAR; INTRAVENOUS at 21:18

## 2024-04-04 RX ADMIN — MORPHINE SULFATE 4 MG: 4 INJECTION INTRAVENOUS at 18:20

## 2024-04-04 RX ADMIN — METRONIDAZOLE 500 MG: 500 INJECTION, SOLUTION INTRAVENOUS at 21:23

## 2024-04-04 RX ADMIN — CEFTRIAXONE SODIUM 2000 MG: 2 INJECTION, POWDER, FOR SOLUTION INTRAMUSCULAR; INTRAVENOUS at 19:36

## 2024-04-04 RX ADMIN — GADOTERIDOL 20 ML: 279.3 INJECTION, SOLUTION INTRAVENOUS at 20:50

## 2024-04-04 RX ADMIN — KETOROLAC TROMETHAMINE 15 MG: 15 INJECTION, SOLUTION INTRAMUSCULAR; INTRAVENOUS at 18:21

## 2024-04-04 RX ADMIN — SODIUM CHLORIDE 1 MG: 9 INJECTION INTRAMUSCULAR; INTRAVENOUS; SUBCUTANEOUS at 19:36

## 2024-04-04 RX ADMIN — Medication 2500 MG: at 21:21

## 2024-04-04 ASSESSMENT — PAIN SCALES - GENERAL
PAINLEVEL_OUTOF10: 10
PAINLEVEL_OUTOF10: 8
PAINLEVEL_OUTOF10: 10
PAINLEVEL_OUTOF10: 7

## 2024-04-04 ASSESSMENT — PAIN DESCRIPTION - LOCATION
LOCATION: GROIN;BACK
LOCATION: SCROTUM;BACK
LOCATION: BACK

## 2024-04-04 ASSESSMENT — ENCOUNTER SYMPTOMS
ABDOMINAL PAIN: 0
DIARRHEA: 0
COUGH: 0
VOMITING: 0
SORE THROAT: 0
NAUSEA: 0
EYE REDNESS: 0
SHORTNESS OF BREATH: 0

## 2024-04-04 ASSESSMENT — PAIN - FUNCTIONAL ASSESSMENT: PAIN_FUNCTIONAL_ASSESSMENT: 0-10

## 2024-04-04 ASSESSMENT — PAIN DESCRIPTION - DESCRIPTORS: DESCRIPTORS: ACHING;DULL

## 2024-04-04 NOTE — PROGRESS NOTES
VANCO DAILY FOLLOW UP NOTE  [facility]   Pharmacy Pharmacokinetic Monitoring Service - Vancomycin    Consulting Provider: Olga Oleary PA    Indication: Central Nervous System Infection/concern for spinal abscess  Target Concentration: Goal AUC/LAURA 400-600 mg*hr/L  Day of Therapy: 1  Additional Antimicrobials: ceftriaxone, metronidazole    Pertinent Laboratory Values:   Wt Readings from Last 1 Encounters:   04/04/24 119.7 kg (264 lb)     Temp Readings from Last 1 Encounters:   04/04/24 97.5 °F (36.4 °C)     Recent Labs     04/04/24  1807   BUN 22   CREATININE 0.80   WBC 15.1*   PROCAL <0.05   LACTSEPSIS 2.2*     Estimated Creatinine Clearance: 162 mL/min (based on SCr of 0.8 mg/dL).    Lab Results   Component Value Date/Time    VANCORANDOM 9.9 06/17/2022 06:36 AM       MRSA Nasal Swab: N/A. Non-respiratory infection    Assessment:  Date/Time Dose Concentration AUC         Note: Serum concentrations collected for AUC dosing may appear elevated if collected in close proximity to the dose administered, this is not necessarily an indication of toxicity    Plan:  Dosing recommendations based on Bayesian software  Start vancomycin 2500 mg IV x 1 followed by vancomycin 2000 mg IV every 12 hours  Anticipated AUC of 556 and trough concentration of 14.7 at steady state  Renal labs as indicated   Vancomycin concentrations will be ordered as clinically appropriate   Pharmacy will continue to monitor patient and adjust therapy as indicated    Thank you for the consult,  Daly Dumas RPH

## 2024-04-04 NOTE — PROGRESS NOTES
Previous interventions:  Procedure Date Results   L3/4 Bilateral laminectomy, medial facetectomy (Jakub) 3/17/2023                                    Previous medication trials: Listed:  Class Medication Results   NSAIDs Diclofenac, Ibuprofen, Toradol    Oral steroids prednisone    Tylenol 500mg    Antiepileptics     Antidepressants     Relaxants Flexeril    Topicals/transdermaI patches     Narcotics       Previous tests/studies:  Study Date Results   XR Lumbar spine 3/27/24 EXAM: XR LUMBAR SPINE (2-3 VIEWS)  INDICATION: Low back pain, unspecified; Pelvic and perineal pain  COMPARISON: 3/17/2023.     TECHNIQUE: Lumbar Spine AP/LAT imaging was obtained.      FINDINGS:  Hardware is present at L4, L5 and S1. There is loss of disc height throughout  the spine with multilevel degenerative endplate changes. An IVC filter is in  place. There is a large amount of colonic stool.        IMPRESSION:  Hardware present from L 4 -S1.     Multilevel degeneration.     Findings suggest constipation.                              Previous therapies:  Type of Therapy Date Results   ***                                      
Risk Tool Score (low/mod/high}:    Opioid Risk Tool Female I Male   Family history of substance abuse     Alcohol 1 3   Illegal drugs 2 3   Prescription drugs 4 4   Personal history of substance abuse     Alcohol 3 3   Illegal drugs 4 4   Prescription drugs 5 5   Age between 16-45 years 1 1   History of preadolescent sexual abuse 3 0   Psychological disease     ADD, OCD, bipolar, schizophrenia 2 2   Depression 1 1   Total: 3 or less = low, 8 or higher= high    Controlled Substance Agreement signed on the following date(s}: Workmans Comp:  Lawsuit:    Past Medical History:   Diagnosis Date    ADHD     Chronic hepatitis C (HCC)     treated with antivirals finished @11/22    History of blood transfusion 2001    Hx of echocardiogram 04/05/2022    EF 55-65%    Kidney stone     Osteomyelitis (HCC) 04/2022    right foot    Substance abuse (HCC)     alcohol, opiates, in recovery per patient    Trauma 2001    intialy treated at [place], [place]        Past Surgical History:   Procedure Laterality Date    FEMUR FRACTURE SURGERY  2001    femur preeti    HUMERUS SURGERY  2001    plate    LUMBAR SPINE SURGERY Bilateral 3/17/2023    Bilateral L3-4 Laminectomy and Facetectomy performed by Gianluca Call MD at [facility] MAIN OR    ORTHOPEDIC SURGERY  2022    spinal fusion and then I&D, L4-S1    OTHER SURGICAL HISTORY  2001    chema filter    SPINAL FUSION  2001    TOE AMPUTATION Left 07/30/2017    2nd toe        No family history on file.        Social History     Socioeconomic History    Marital status:      Spouse name: Not on file    Number of children: Not on file    Years of education: Not on file    Highest education level: Not on file   Occupational History    Not on file   Tobacco Use    Smoking status: Every Day     Current packs/day: 1.00     Average packs/day: 1 pack/day for 20.1 years (20.1 ttl pk-yrs)     Types: Cigarettes     Start date: 2014    Smokeless tobacco: Never   Vaping Use    Vaping Use:

## 2024-04-04 NOTE — PROGRESS NOTES
This pt came in for a visit on 4/2 after having surgery and not having a visit since 6/23. He states he is WC but didn't let anyone know. I explained he will need to have WC send auth in writing prior to any more visits or his completion of his MRI unless he is paying oop.  States he's calling his  in which I stated was fine he needed wc auth sent here in writing prior to any f/u being schd (unless self pay)  That we can't just switch for ins to [facility] whenever its convient for him.

## 2024-04-04 NOTE — ED PROVIDER NOTES
CRITICAL RESULT NOT CALLED DUE TO PREVIOUS NOTIFICATION OF CRITICAL RESULT WITHIN THE LAST 24 HOURS.    Culture CULTURE IN PROGRESS,FURTHER UPDATES TO FOLLOW     Culture, Blood, PCR ID Panel    Specimen: Blood   Result Value Ref Range    Accession Number [id number]     Enterococcus faecalis by PCR NOT DETECTED NOTDET      Enterococcus faecium by PCR NOT DETECTED NOTDET      Listeria monocytogenes by PCR NOT DETECTED NOTDET      STAPHYLOCOCCUS Detected (A) NOTDET      Staphylococcus Aureus Detected (A) NOTDET      Staphylococcus epidermidis by PCR NOT DETECTED NOTDET      Staphylococcus lugdunensis by PCR NOT DETECTED NOTDET      STREPTOCOCCUS NOT DETECTED NOTDET      Streptococcus agalactiae (Group B) NOT DETECTED NOTDET      Strep pneumoniae NOT DETECTED NOTDET      Strep pyogenes,(Grp. A) NOT DETECTED NOTDET      Acinetobacter calcoac baumannii complex by PCR NOT DETECTED NOTDET      Bacteroides fragilis by PCR NOT DETECTED NOTDET      Enterobacteriaceae by PCR NOT DETECTED NOTDET      Enterobacter cloacae complex by PCR NOT DETECTED NOTDET      Escherichia Coli NOT DETECTED NOTDET      Klebsiella aerogenes by PCR NOT DETECTED NOTDET      Klebsiella oxytoca by PCR NOT DETECTED NOTDET      Klebsiella pneumoniae group by PCR NOT DETECTED NOTDET      Proteus by PCR NOT DETECTED NOTDET      Salmonella species by PCR NOT DETECTED NOTDET      Serratia marcescens by PCR NOT DETECTED NOTDET      Haemophilus Influenzae by PCR NOT DETECTED NOTDET      Neisseria meningitidis by PCR NOT DETECTED NOTDET      Pseudomonas aeruginosa NOT DETECTED NOTDET      Stenotrophomonas maltophilia by PCR NOT DETECTED NOTDET      Candida albicans by PCR NOT DETECTED NOTDET      Candida auris by PCR NOT DETECTED NOTDET      Candida glabrata NOT DETECTED NOTDET      Candida krusei by PCR NOT DETECTED NOTDET      Candida parapsilosis by PCR NOT DETECTED NOTDET      Candida tropicalis by PCR NOT DETECTED NOTDET      Cryptococcus neoformans/gattii

## 2024-04-04 NOTE — ED NOTES
Delay in antibiotic administration due to patient going to MRI. Errol Zamora, SONIA  04/04/24 1943

## 2024-04-04 NOTE — ED TRIAGE NOTES
Patient arrives with c/o chronic back and scrotal pain that has worsened in the last 2 weeks. Patient currently being seen by pain management.  Patient had laminectomy last year.  Pt seen at [facility] 2 weeks ago for abd pain, with workup unremarkable.   Pt states he has high WBC per PCP, that was drawn a week ago.

## 2024-04-05 ENCOUNTER — APPOINTMENT (OUTPATIENT)
Dept: GENERAL RADIOLOGY | Age: 44
DRG: 872 | End: 2024-04-05
Payer: COMMERCIAL

## 2024-04-05 ENCOUNTER — HOSPITAL ENCOUNTER (INPATIENT)
Age: 44
LOS: 6 days | Discharge: HOME | DRG: 872 | End: 2024-04-11
Attending: INTERNAL MEDICINE | Admitting: INTERNAL MEDICINE
Payer: COMMERCIAL

## 2024-04-05 DIAGNOSIS — R78.81 STAPHYLOCOCCUS AUREUS BACTEREMIA: ICD-10-CM

## 2024-04-05 DIAGNOSIS — M54.41 CHRONIC BILATERAL LOW BACK PAIN WITH BILATERAL SCIATICA: ICD-10-CM

## 2024-04-05 DIAGNOSIS — B95.61 STAPHYLOCOCCUS AUREUS BACTEREMIA: ICD-10-CM

## 2024-04-05 DIAGNOSIS — M54.42 CHRONIC BILATERAL LOW BACK PAIN WITH BILATERAL SCIATICA: ICD-10-CM

## 2024-04-05 DIAGNOSIS — M86.9 OSTEOMYELITIS OF SYMPHYSIS PUBIS (HCC): ICD-10-CM

## 2024-04-05 DIAGNOSIS — J18.9 PNEUMONIA OF BOTH LUNGS DUE TO INFECTIOUS ORGANISM, UNSPECIFIED PART OF LUNG: Primary | ICD-10-CM

## 2024-04-05 DIAGNOSIS — G89.29 CHRONIC BILATERAL LOW BACK PAIN WITH BILATERAL SCIATICA: ICD-10-CM

## 2024-04-05 DIAGNOSIS — E66.9 OBESITY (BMI 30.0-34.9): ICD-10-CM

## 2024-04-05 DIAGNOSIS — M54.50 ACUTE MIDLINE LOW BACK PAIN WITHOUT SCIATICA: ICD-10-CM

## 2024-04-05 PROBLEM — R73.9 HYPERGLYCEMIA: Status: ACTIVE | Noted: 2024-04-05

## 2024-04-05 PROBLEM — A41.9 SEPSIS (HCC): Status: ACTIVE | Noted: 2024-04-05

## 2024-04-05 PROBLEM — R91.8 PULMONARY INFILTRATES: Status: ACTIVE | Noted: 2024-04-05

## 2024-04-05 PROBLEM — D64.9 ANEMIA: Status: ACTIVE | Noted: 2024-04-05

## 2024-04-05 PROBLEM — E87.1 HYPONATREMIA: Status: ACTIVE | Noted: 2024-04-05

## 2024-04-05 LAB
ACCESSION NUMBER, LLC1M: ABNORMAL
ACINETOBACTER CALCOAC BAUMANNII COMPLEX BY PCR: NOT DETECTED
ALBUMIN SERPL-MCNC: 3 G/DL (ref 3.5–5)
ALBUMIN/GLOB SERPL: 0.6 (ref 0.4–1.6)
ALP SERPL-CCNC: 80 U/L (ref 50–136)
ALT SERPL-CCNC: 40 U/L (ref 12–65)
ANION GAP SERPL CALC-SCNC: 5 MMOL/L (ref 2–11)
APPEARANCE UR: CLEAR
AST SERPL-CCNC: 14 U/L (ref 15–37)
B FRAGILIS DNA BLD POS QL NAA+NON-PROBE: NOT DETECTED
BASOPHILS # BLD: 0 K/UL (ref 0–0.2)
BASOPHILS NFR BLD: 0 % (ref 0–2)
BILIRUB SERPL-MCNC: 0.2 MG/DL (ref 0.2–1.1)
BILIRUB UR QL: NEGATIVE
BIOFIRE TEST COMMENT: ABNORMAL
BUN SERPL-MCNC: 14 MG/DL (ref 6–23)
C ALBICANS DNA BLD POS QL NAA+NON-PROBE: NOT DETECTED
C AURIS DNA BLD POS QL NAA+NON-PROBE: NOT DETECTED
C GATTII+NEOFOR DNA BLD POS QL NAA+N-PRB: NOT DETECTED
C GLABRATA DNA BLD POS QL NAA+NON-PROBE: NOT DETECTED
C KRUSEI DNA BLD POS QL NAA+NON-PROBE: NOT DETECTED
C PARAP DNA BLD POS QL NAA+NON-PROBE: NOT DETECTED
C TROPICLS DNA BLD POS QL NAA+NON-PROBE: NOT DETECTED
CALCIUM SERPL-MCNC: 8.9 MG/DL (ref 8.3–10.4)
CHLORIDE SERPL-SCNC: 103 MMOL/L (ref 103–113)
CO2 SERPL-SCNC: 26 MMOL/L (ref 21–32)
COLOR UR: NORMAL
CREAT SERPL-MCNC: 0.6 MG/DL (ref 0.8–1.5)
DIFFERENTIAL METHOD BLD: ABNORMAL
E CLOAC COMP DNA BLD POS NAA+NON-PROBE: NOT DETECTED
E COLI DNA BLD POS QL NAA+NON-PROBE: NOT DETECTED
E FAECALIS DNA BLD POS QL NAA+NON-PROBE: NOT DETECTED
E FAECIUM DNA BLD POS QL NAA+NON-PROBE: NOT DETECTED
ENTEROBACTERALES DNA BLD POS NAA+N-PRB: NOT DETECTED
EOSINOPHIL # BLD: 0 K/UL (ref 0–0.8)
EOSINOPHIL NFR BLD: 0 % (ref 0.5–7.8)
ERYTHROCYTE [DISTWIDTH] IN BLOOD BY AUTOMATED COUNT: 12.6 % (ref 11.9–14.6)
EST. AVERAGE GLUCOSE BLD GHB EST-MCNC: 108 MG/DL
GLOBULIN SER CALC-MCNC: 5.4 G/DL (ref 2.8–4.5)
GLUCOSE BLD STRIP.AUTO-MCNC: 206 MG/DL (ref 65–100)
GLUCOSE SERPL-MCNC: 245 MG/DL (ref 65–100)
GLUCOSE UR STRIP.AUTO-MCNC: NEGATIVE MG/DL
GP B STREP DNA BLD POS QL NAA+NON-PROBE: NOT DETECTED
HAEM INFLU DNA BLD POS QL NAA+NON-PROBE: NOT DETECTED
HBA1C MFR BLD: 5.4 % (ref 4.8–5.6)
HCT VFR BLD AUTO: 36.4 % (ref 41.1–50.3)
HGB BLD-MCNC: 11.9 G/DL (ref 13.6–17.2)
HGB UR QL STRIP: NEGATIVE
IMM GRANULOCYTES # BLD AUTO: 0.2 K/UL (ref 0–0.5)
IMM GRANULOCYTES NFR BLD AUTO: 2 % (ref 0–5)
K OXYTOCA DNA BLD POS QL NAA+NON-PROBE: NOT DETECTED
KETONES UR QL STRIP.AUTO: NEGATIVE MG/DL
KLEBSIELLA SP DNA BLD POS QL NAA+NON-PRB: NOT DETECTED
KLEBSIELLA SP DNA BLD POS QL NAA+NON-PRB: NOT DETECTED
L MONOCYTOG DNA BLD POS QL NAA+NON-PROBE: NOT DETECTED
LACTATE SERPL-SCNC: 1.3 MMOL/L (ref 0.4–2)
LACTATE SERPL-SCNC: 1.5 MMOL/L (ref 0.4–2)
LACTATE SERPL-SCNC: 2.1 MMOL/L (ref 0.4–2)
LEUKOCYTE ESTERASE UR QL STRIP.AUTO: NEGATIVE
LYMPHOCYTES # BLD: 1.7 K/UL (ref 0.5–4.6)
LYMPHOCYTES NFR BLD: 12 % (ref 13–44)
MCH RBC QN AUTO: 30.3 PG (ref 26.1–32.9)
MCHC RBC AUTO-ENTMCNC: 32.7 G/DL (ref 31.4–35)
MCV RBC AUTO: 92.6 FL (ref 82–102)
MECA+MECC+MREJ ISLT/SPM QL: NOT DETECTED
MONOCYTES # BLD: 0.9 K/UL (ref 0.1–1.3)
MONOCYTES NFR BLD: 7 % (ref 4–12)
N MEN DNA BLD POS QL NAA+NON-PROBE: NOT DETECTED
NEUTS SEG # BLD: 11.3 K/UL (ref 1.7–8.2)
NEUTS SEG NFR BLD: 79 % (ref 43–78)
NITRITE UR QL STRIP.AUTO: NEGATIVE
NRBC # BLD: 0 K/UL (ref 0–0.2)
P AERUGINOSA DNA BLD POS NAA+NON-PROBE: NOT DETECTED
PH UR STRIP: 7 (ref 5–9)
PLATELET # BLD AUTO: 352 K/UL (ref 150–450)
PMV BLD AUTO: 7.8 FL (ref 9.4–12.3)
POTASSIUM SERPL-SCNC: 4.1 MMOL/L (ref 3.5–5.1)
PROCALCITONIN SERPL-MCNC: <0.05 NG/ML (ref 0–0.49)
PROT SERPL-MCNC: 8.4 G/DL (ref 6.3–8.2)
PROT UR STRIP-MCNC: NEGATIVE MG/DL
PROTEUS SP DNA BLD POS QL NAA+NON-PROBE: NOT DETECTED
RBC # BLD AUTO: 3.93 M/UL (ref 4.23–5.6)
RESISTANT GENE TARGETS: ABNORMAL
S AUREUS DNA BLD POS QL NAA+NON-PROBE: DETECTED
S AUREUS+CONS DNA BLD POS NAA+NON-PROBE: DETECTED
S EPIDERMIDIS DNA BLD POS QL NAA+NON-PRB: NOT DETECTED
S LUGDUNENSIS DNA BLD POS QL NAA+NON-PRB: NOT DETECTED
S MALTOPHILIA DNA BLD POS QL NAA+NON-PRB: NOT DETECTED
S MARCESCENS DNA BLD POS NAA+NON-PROBE: NOT DETECTED
S PNEUM DNA BLD POS QL NAA+NON-PROBE: NOT DETECTED
S PYO DNA BLD POS QL NAA+NON-PROBE: NOT DETECTED
SALMONELLA DNA BLD POS QL NAA+NON-PROBE: NOT DETECTED
SERVICE CMNT-IMP: ABNORMAL
SODIUM SERPL-SCNC: 134 MMOL/L (ref 136–146)
SP GR UR REFRACTOMETRY: 1.01 (ref 1–1.02)
STREPTOCOCCUS DNA BLD POS NAA+NON-PROBE: NOT DETECTED
UROBILINOGEN UR QL STRIP.AUTO: 0.2 EU/DL (ref 0.2–1)
WBC # BLD AUTO: 14.2 K/UL (ref 4.3–11.1)

## 2024-04-05 PROCEDURE — 96375 TX/PRO/DX INJ NEW DRUG ADDON: CPT

## 2024-04-05 PROCEDURE — 84145 PROCALCITONIN (PCT): CPT

## 2024-04-05 PROCEDURE — 85025 COMPLETE CBC W/AUTO DIFF WBC: CPT

## 2024-04-05 PROCEDURE — 71045 X-RAY EXAM CHEST 1 VIEW: CPT

## 2024-04-05 PROCEDURE — 6360000002 HC RX W HCPCS: Performed by: PHYSICIAN ASSISTANT

## 2024-04-05 PROCEDURE — 6370000000 HC RX 637 (ALT 250 FOR IP): Performed by: INTERNAL MEDICINE

## 2024-04-05 PROCEDURE — 96365 THER/PROPH/DIAG IV INF INIT: CPT

## 2024-04-05 PROCEDURE — 6360000002 HC RX W HCPCS: Performed by: INTERNAL MEDICINE

## 2024-04-05 PROCEDURE — 80053 COMPREHEN METABOLIC PANEL: CPT

## 2024-04-05 PROCEDURE — 2580000003 HC RX 258: Performed by: INTERNAL MEDICINE

## 2024-04-05 PROCEDURE — 81003 URINALYSIS AUTO W/O SCOPE: CPT

## 2024-04-05 PROCEDURE — 87040 BLOOD CULTURE FOR BACTERIA: CPT

## 2024-04-05 PROCEDURE — 82962 GLUCOSE BLOOD TEST: CPT

## 2024-04-05 PROCEDURE — 83605 ASSAY OF LACTIC ACID: CPT

## 2024-04-05 PROCEDURE — 2580000003 HC RX 258: Performed by: PHYSICIAN ASSISTANT

## 2024-04-05 PROCEDURE — 36415 COLL VENOUS BLD VENIPUNCTURE: CPT

## 2024-04-05 PROCEDURE — 83036 HEMOGLOBIN GLYCOSYLATED A1C: CPT

## 2024-04-05 PROCEDURE — 74019 RADEX ABDOMEN 2 VIEWS: CPT

## 2024-04-05 PROCEDURE — 1100000003 HC PRIVATE W/ TELEMETRY

## 2024-04-05 PROCEDURE — 99285 EMERGENCY DEPT VISIT HI MDM: CPT

## 2024-04-05 RX ORDER — DEXTROSE MONOHYDRATE 100 MG/ML
INJECTION, SOLUTION INTRAVENOUS CONTINUOUS PRN
Status: DISCONTINUED | OUTPATIENT
Start: 2024-04-05 | End: 2024-04-11 | Stop reason: HOSPADM

## 2024-04-05 RX ORDER — INSULIN LISPRO 100 [IU]/ML
0-4 INJECTION, SOLUTION INTRAVENOUS; SUBCUTANEOUS NIGHTLY
Status: DISCONTINUED | OUTPATIENT
Start: 2024-04-05 | End: 2024-04-07

## 2024-04-05 RX ORDER — ENOXAPARIN SODIUM 100 MG/ML
30 INJECTION SUBCUTANEOUS 2 TIMES DAILY
Status: DISCONTINUED | OUTPATIENT
Start: 2024-04-06 | End: 2024-04-11 | Stop reason: HOSPADM

## 2024-04-05 RX ORDER — 0.9 % SODIUM CHLORIDE 0.9 %
30 INTRAVENOUS SOLUTION INTRAVENOUS
Status: COMPLETED | OUTPATIENT
Start: 2024-04-05 | End: 2024-04-05

## 2024-04-05 RX ORDER — MORPHINE SULFATE 2 MG/ML
2 INJECTION, SOLUTION INTRAMUSCULAR; INTRAVENOUS EVERY 4 HOURS PRN
Status: DISCONTINUED | OUTPATIENT
Start: 2024-04-05 | End: 2024-04-08

## 2024-04-05 RX ORDER — ACETAMINOPHEN 650 MG/1
650 SUPPOSITORY RECTAL EVERY 6 HOURS PRN
Status: DISCONTINUED | OUTPATIENT
Start: 2024-04-05 | End: 2024-04-09

## 2024-04-05 RX ORDER — SODIUM CHLORIDE 9 MG/ML
INJECTION, SOLUTION INTRAVENOUS PRN
Status: DISCONTINUED | OUTPATIENT
Start: 2024-04-05 | End: 2024-04-11 | Stop reason: HOSPADM

## 2024-04-05 RX ORDER — ACETAMINOPHEN 325 MG/1
650 TABLET ORAL EVERY 6 HOURS PRN
Status: DISCONTINUED | OUTPATIENT
Start: 2024-04-05 | End: 2024-04-09

## 2024-04-05 RX ORDER — MORPHINE SULFATE 4 MG/ML
4 INJECTION, SOLUTION INTRAMUSCULAR; INTRAVENOUS
Status: COMPLETED | OUTPATIENT
Start: 2024-04-05 | End: 2024-04-05

## 2024-04-05 RX ORDER — SODIUM CHLORIDE 0.9 % (FLUSH) 0.9 %
5-40 SYRINGE (ML) INJECTION PRN
Status: DISCONTINUED | OUTPATIENT
Start: 2024-04-05 | End: 2024-04-11 | Stop reason: HOSPADM

## 2024-04-05 RX ORDER — SODIUM CHLORIDE 0.9 % (FLUSH) 0.9 %
5-40 SYRINGE (ML) INJECTION EVERY 12 HOURS SCHEDULED
Status: DISCONTINUED | OUTPATIENT
Start: 2024-04-05 | End: 2024-04-11 | Stop reason: HOSPADM

## 2024-04-05 RX ORDER — INSULIN LISPRO 100 [IU]/ML
0-4 INJECTION, SOLUTION INTRAVENOUS; SUBCUTANEOUS
Status: DISCONTINUED | OUTPATIENT
Start: 2024-04-05 | End: 2024-04-07

## 2024-04-05 RX ORDER — IBUPROFEN 600 MG/1
1 TABLET ORAL PRN
Status: DISCONTINUED | OUTPATIENT
Start: 2024-04-05 | End: 2024-04-11 | Stop reason: HOSPADM

## 2024-04-05 RX ORDER — GABAPENTIN 300 MG/1
600 CAPSULE ORAL 3 TIMES DAILY
Status: DISCONTINUED | OUTPATIENT
Start: 2024-04-05 | End: 2024-04-11 | Stop reason: HOSPADM

## 2024-04-05 RX ORDER — SODIUM CHLORIDE 9 MG/ML
INJECTION, SOLUTION INTRAVENOUS CONTINUOUS
Status: DISCONTINUED | OUTPATIENT
Start: 2024-04-05 | End: 2024-04-06

## 2024-04-05 RX ADMIN — CEFEPIME 2000 MG: 2 INJECTION, POWDER, FOR SOLUTION INTRAVENOUS at 19:56

## 2024-04-05 RX ADMIN — SODIUM CHLORIDE: 9 INJECTION, SOLUTION INTRAVENOUS at 22:05

## 2024-04-05 RX ADMIN — SODIUM CHLORIDE, PRESERVATIVE FREE 10 ML: 5 INJECTION INTRAVENOUS at 20:22

## 2024-04-05 RX ADMIN — GABAPENTIN 600 MG: 300 CAPSULE ORAL at 20:41

## 2024-04-05 RX ADMIN — MORPHINE SULFATE 4 MG: 4 INJECTION INTRAVENOUS at 16:46

## 2024-04-05 RX ADMIN — VANCOMYCIN HYDROCHLORIDE 1750 MG: 10 INJECTION, POWDER, LYOPHILIZED, FOR SOLUTION INTRAVENOUS at 19:54

## 2024-04-05 RX ADMIN — MORPHINE SULFATE 2 MG: 2 INJECTION, SOLUTION INTRAMUSCULAR; INTRAVENOUS at 20:24

## 2024-04-05 RX ADMIN — PIPERACILLIN AND TAZOBACTAM 4500 MG: 4; .5 INJECTION, POWDER, FOR SOLUTION INTRAVENOUS at 16:24

## 2024-04-05 RX ADMIN — SODIUM CHLORIDE 3579 ML: 9 INJECTION, SOLUTION INTRAVENOUS at 16:20

## 2024-04-05 ASSESSMENT — PAIN SCALES - GENERAL
PAINLEVEL_OUTOF10: 9
PAINLEVEL_OUTOF10: 2

## 2024-04-05 ASSESSMENT — PAIN DESCRIPTION - LOCATION: LOCATION: SCROTUM

## 2024-04-05 ASSESSMENT — PAIN - FUNCTIONAL ASSESSMENT: PAIN_FUNCTIONAL_ASSESSMENT: PREVENTS OR INTERFERES SOME ACTIVE ACTIVITIES AND ADLS

## 2024-04-05 ASSESSMENT — PAIN DESCRIPTION - FREQUENCY: FREQUENCY: CONTINUOUS

## 2024-04-05 ASSESSMENT — PAIN DESCRIPTION - DESCRIPTORS: DESCRIPTORS: ACHING;DISCOMFORT

## 2024-04-05 ASSESSMENT — PAIN DESCRIPTION - ORIENTATION: ORIENTATION: RIGHT;LEFT

## 2024-04-05 ASSESSMENT — PAIN DESCRIPTION - PAIN TYPE: TYPE: ACUTE PAIN

## 2024-04-05 ASSESSMENT — PAIN DESCRIPTION - ONSET: ONSET: ON-GOING

## 2024-04-05 NOTE — ED PROVIDER NOTES
Emergency Department Provider Note       PCP: Mumtaz Marroquin DO   Age: 43 y.o.   Sex: male     DISPOSITION       No diagnosis found.    Medical Decision Making     43-year-old male with positive blood cultures returns to ER for reevaluation.  Patient has a history of chronic back pain and back surgeries.  Also diabetes.  He has no fever but is tachycardic white blood cell count still elevated at 14 point 2 repeat lactic 2.1. blood sugar 245,   infiltrates seen on chest x-ray this could be the source of his infection but does not answer why his back is hurting more.  He did have a negative MRI last night which are reassuring for no discitis or abscess.  Urinalysis today was negative.  Patient given fluid bolus and Zosyn IV patient discussed with Dr. Vargas and hospitalist for admission     1 or more acute illnesses that pose a threat to life or bodily function.   Shared medical decision making was utilized in creating the patients health plan today.    I independently ordered and reviewed each unique test.  I reviewed external records: provider visit note from outside specialist.     I interpreted the X-rays past x-ray with mild bilateral infiltrates.    The patient was admitted and I have discussed patient management with the admitting provider.          History     Patient returns to ER due to positive blood cultures.  Patient seen at this facility last night for increased lower back pain patient has a history of back surgery and chronic pain.  White count was elevated and lactic was also.  He was given fluids but no antibiotics MRI of the lumbar spine showed no evidence of discitis or abscess.  Patient states he still has extreme back pain and pain in the pelvic area.  He states he has a history of staph infections in the past and has had his toes amputated due to infection.  Patient denies any open wounds no cough no urinary symptoms as far as burning with urination.    Past Medical History:  No date: ADHD  No

## 2024-04-05 NOTE — ED NOTES
Patient mobility status  with difficulty, uses a Wheelchair . Provider aware     I have reviewed discharge instructions with the patient.  The patient verbalized understanding.    Patient left ED via Discharge Method: wheelchair to Home with Spouse.    Opportunity for questions and clarification provided.     Patient given 0 scripts.           Errol Lopez RN  04/04/24 4338

## 2024-04-05 NOTE — ED NOTES
Antibiotics stopped due to patient discharge. [facility] said to stop before finishing due to no infection found            Errol Lopez RN  04/04/24 1317

## 2024-04-05 NOTE — ED TRIAGE NOTES
Patient is here related to positive blood cultures- indicating \"staph\"- patient was encouraged to return to the ER.

## 2024-04-05 NOTE — DISCHARGE INSTRUCTIONS
The MRI of your back did not show any infection in your spinal cord.  Does show holes and discs in the L2-L3 and L3-L4 region.  Recommend follow-up with pain management and Dr. Call for further evaluation and treatment.

## 2024-04-05 NOTE — ED NOTES
Lab contacted for 2nd set of cultures after failed 2nd attempt     Nasim Mcgee, RN  04/05/24 3681
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
## 2024-04-06 ENCOUNTER — APPOINTMENT (OUTPATIENT)
Dept: CT IMAGING | Age: 44
DRG: 872 | End: 2024-04-06
Payer: COMMERCIAL

## 2024-04-06 ENCOUNTER — APPOINTMENT (OUTPATIENT)
Dept: ULTRASOUND IMAGING | Age: 44
DRG: 872 | End: 2024-04-06
Payer: COMMERCIAL

## 2024-04-06 LAB
AMPHET UR QL SCN: NEGATIVE
ANION GAP SERPL CALC-SCNC: 4 MMOL/L (ref 2–11)
APPEARANCE UR: CLEAR
BACTERIA URNS QL MICRO: NEGATIVE /HPF
BARBITURATES UR QL SCN: NEGATIVE
BASOPHILS # BLD: 0.1 K/UL (ref 0–0.2)
BASOPHILS NFR BLD: 1 % (ref 0–2)
BENZODIAZ UR QL: NEGATIVE
BILIRUB UR QL: NEGATIVE
BUN SERPL-MCNC: 14 MG/DL (ref 6–23)
CALCIUM SERPL-MCNC: 8.6 MG/DL (ref 8.3–10.4)
CANNABINOIDS UR QL SCN: NEGATIVE
CASTS URNS QL MICRO: NORMAL /LPF
CHLORIDE SERPL-SCNC: 108 MMOL/L (ref 103–113)
CO2 SERPL-SCNC: 26 MMOL/L (ref 21–32)
COCAINE UR QL SCN: NEGATIVE
COLOR UR: NORMAL
CREAT SERPL-MCNC: 0.6 MG/DL (ref 0.8–1.5)
DIFFERENTIAL METHOD BLD: ABNORMAL
EOSINOPHIL # BLD: 0.2 K/UL (ref 0–0.8)
EOSINOPHIL NFR BLD: 1 % (ref 0.5–7.8)
EPI CELLS #/AREA URNS HPF: NORMAL /HPF
ERYTHROCYTE [DISTWIDTH] IN BLOOD BY AUTOMATED COUNT: 12.8 % (ref 11.9–14.6)
GLUCOSE BLD STRIP.AUTO-MCNC: 110 MG/DL (ref 65–100)
GLUCOSE BLD STRIP.AUTO-MCNC: 137 MG/DL (ref 65–100)
GLUCOSE BLD STRIP.AUTO-MCNC: 167 MG/DL (ref 65–100)
GLUCOSE BLD STRIP.AUTO-MCNC: 99 MG/DL (ref 65–100)
GLUCOSE SERPL-MCNC: 97 MG/DL (ref 65–100)
GLUCOSE UR STRIP.AUTO-MCNC: NEGATIVE MG/DL
HCG SERPL-ACNC: <1 MIU/ML (ref 0–2)
HCT VFR BLD AUTO: 34.9 % (ref 41.1–50.3)
HGB BLD-MCNC: 11.3 G/DL (ref 13.6–17.2)
HGB UR QL STRIP: NEGATIVE
IMM GRANULOCYTES # BLD AUTO: 0.3 K/UL (ref 0–0.5)
IMM GRANULOCYTES NFR BLD AUTO: 2 % (ref 0–5)
KETONES UR QL STRIP.AUTO: NEGATIVE MG/DL
LDH SERPL L TO P-CCNC: 164 U/L (ref 100–190)
LEUKOCYTE ESTERASE UR QL STRIP.AUTO: NEGATIVE
LYMPHOCYTES # BLD: 3 K/UL (ref 0.5–4.6)
LYMPHOCYTES NFR BLD: 24 % (ref 13–44)
MCH RBC QN AUTO: 31 PG (ref 26.1–32.9)
MCHC RBC AUTO-ENTMCNC: 32.4 G/DL (ref 31.4–35)
MCV RBC AUTO: 95.6 FL (ref 82–102)
METHADONE UR QL: NEGATIVE
MONOCYTES # BLD: 1 K/UL (ref 0.1–1.3)
MONOCYTES NFR BLD: 8 % (ref 4–12)
MUCOUS THREADS URNS QL MICRO: 0 /LPF
NEUTS SEG # BLD: 8.1 K/UL (ref 1.7–8.2)
NEUTS SEG NFR BLD: 64 % (ref 43–78)
NITRITE UR QL STRIP.AUTO: NEGATIVE
NRBC # BLD: 0 K/UL (ref 0–0.2)
OPIATES UR QL: POSITIVE
PCP UR QL: NEGATIVE
PH UR STRIP: 6.5 (ref 5–9)
PLATELET # BLD AUTO: 294 K/UL (ref 150–450)
PMV BLD AUTO: 7.9 FL (ref 9.4–12.3)
POTASSIUM SERPL-SCNC: 3.9 MMOL/L (ref 3.5–5.1)
PROT UR STRIP-MCNC: NEGATIVE MG/DL
RBC # BLD AUTO: 3.65 M/UL (ref 4.23–5.6)
RBC #/AREA URNS HPF: NORMAL /HPF
SERVICE CMNT-IMP: ABNORMAL
SERVICE CMNT-IMP: NORMAL
SODIUM SERPL-SCNC: 138 MMOL/L (ref 136–146)
SP GR UR REFRACTOMETRY: 1.01 (ref 1–1.02)
URINE CULTURE IF INDICATED: NORMAL
UROBILINOGEN UR QL STRIP.AUTO: 0.2 EU/DL (ref 0.2–1)
WBC # BLD AUTO: 12.6 K/UL (ref 4.3–11.1)
WBC URNS QL MICRO: NORMAL /HPF

## 2024-04-06 PROCEDURE — 6370000000 HC RX 637 (ALT 250 FOR IP): Performed by: PHYSICIAN ASSISTANT

## 2024-04-06 PROCEDURE — 2500000003 HC RX 250 WO HCPCS: Performed by: PHYSICIAN ASSISTANT

## 2024-04-06 PROCEDURE — 1100000003 HC PRIVATE W/ TELEMETRY

## 2024-04-06 PROCEDURE — 2580000003 HC RX 258: Performed by: INTERNAL MEDICINE

## 2024-04-06 PROCEDURE — 82105 ALPHA-FETOPROTEIN SERUM: CPT

## 2024-04-06 PROCEDURE — 6370000000 HC RX 637 (ALT 250 FOR IP): Performed by: NURSE PRACTITIONER

## 2024-04-06 PROCEDURE — 80307 DRUG TEST PRSMV CHEM ANLYZR: CPT

## 2024-04-06 PROCEDURE — 99254 IP/OBS CNSLTJ NEW/EST MOD 60: CPT | Performed by: NEUROLOGICAL SURGERY

## 2024-04-06 PROCEDURE — 72193 CT PELVIS W/DYE: CPT

## 2024-04-06 PROCEDURE — 76870 US EXAM SCROTUM: CPT

## 2024-04-06 PROCEDURE — 6360000002 HC RX W HCPCS: Performed by: PHYSICIAN ASSISTANT

## 2024-04-06 PROCEDURE — 82962 GLUCOSE BLOOD TEST: CPT

## 2024-04-06 PROCEDURE — 83615 LACTATE (LD) (LDH) ENZYME: CPT

## 2024-04-06 PROCEDURE — 87591 N.GONORRHOEAE DNA AMP PROB: CPT

## 2024-04-06 PROCEDURE — 81001 URINALYSIS AUTO W/SCOPE: CPT

## 2024-04-06 PROCEDURE — 87491 CHLMYD TRACH DNA AMP PROBE: CPT

## 2024-04-06 PROCEDURE — 80048 BASIC METABOLIC PNL TOTAL CA: CPT

## 2024-04-06 PROCEDURE — 6370000000 HC RX 637 (ALT 250 FOR IP): Performed by: INTERNAL MEDICINE

## 2024-04-06 PROCEDURE — 84702 CHORIONIC GONADOTROPIN TEST: CPT

## 2024-04-06 PROCEDURE — 99222 1ST HOSP IP/OBS MODERATE 55: CPT | Performed by: UROLOGY

## 2024-04-06 PROCEDURE — 6360000002 HC RX W HCPCS: Performed by: INTERNAL MEDICINE

## 2024-04-06 PROCEDURE — 36415 COLL VENOUS BLD VENIPUNCTURE: CPT

## 2024-04-06 PROCEDURE — 6360000004 HC RX CONTRAST MEDICATION: Performed by: PHYSICIAN ASSISTANT

## 2024-04-06 PROCEDURE — 85025 COMPLETE CBC W/AUTO DIFF WBC: CPT

## 2024-04-06 RX ORDER — KETOROLAC TROMETHAMINE 30 MG/ML
15 INJECTION, SOLUTION INTRAMUSCULAR; INTRAVENOUS EVERY 6 HOURS PRN
Status: DISCONTINUED | OUTPATIENT
Start: 2024-04-06 | End: 2024-04-08

## 2024-04-06 RX ORDER — HYDROCODONE BITARTRATE AND ACETAMINOPHEN 5; 325 MG/1; MG/1
1 TABLET ORAL EVERY 6 HOURS PRN
Status: DISCONTINUED | OUTPATIENT
Start: 2024-04-06 | End: 2024-04-11 | Stop reason: HOSPADM

## 2024-04-06 RX ORDER — POLYETHYLENE GLYCOL 3350 17 G/17G
17 POWDER, FOR SOLUTION ORAL DAILY
Status: DISCONTINUED | OUTPATIENT
Start: 2024-04-06 | End: 2024-04-07

## 2024-04-06 RX ORDER — HYDROMORPHONE HYDROCHLORIDE 1 MG/ML
0.25 INJECTION, SOLUTION INTRAMUSCULAR; INTRAVENOUS; SUBCUTANEOUS EVERY 4 HOURS PRN
Status: DISCONTINUED | OUTPATIENT
Start: 2024-04-06 | End: 2024-04-07

## 2024-04-06 RX ORDER — NICOTINE 21 MG/24HR
1 PATCH, TRANSDERMAL 24 HOURS TRANSDERMAL DAILY
Status: DISCONTINUED | OUTPATIENT
Start: 2024-04-06 | End: 2024-04-11 | Stop reason: HOSPADM

## 2024-04-06 RX ORDER — HYDROMORPHONE HYDROCHLORIDE 1 MG/ML
1 INJECTION, SOLUTION INTRAMUSCULAR; INTRAVENOUS; SUBCUTANEOUS ONCE
Status: COMPLETED | OUTPATIENT
Start: 2024-04-06 | End: 2024-04-06

## 2024-04-06 RX ORDER — DOXYCYCLINE HYCLATE 100 MG/1
100 CAPSULE ORAL EVERY 12 HOURS SCHEDULED
Status: DISCONTINUED | OUTPATIENT
Start: 2024-04-06 | End: 2024-04-09

## 2024-04-06 RX ADMIN — HYDROMORPHONE HYDROCHLORIDE 1 MG: 1 INJECTION, SOLUTION INTRAMUSCULAR; INTRAVENOUS; SUBCUTANEOUS at 12:06

## 2024-04-06 RX ADMIN — IOPAMIDOL 100 ML: 755 INJECTION, SOLUTION INTRAVENOUS at 13:24

## 2024-04-06 RX ADMIN — DOXYCYCLINE HYCLATE 100 MG: 100 CAPSULE ORAL at 20:31

## 2024-04-06 RX ADMIN — HYDROCODONE BITARTRATE AND ACETAMINOPHEN 1 TABLET: 5; 325 TABLET ORAL at 17:55

## 2024-04-06 RX ADMIN — VANCOMYCIN HYDROCHLORIDE 1750 MG: 10 INJECTION, POWDER, LYOPHILIZED, FOR SOLUTION INTRAVENOUS at 05:31

## 2024-04-06 RX ADMIN — MORPHINE SULFATE 2 MG: 2 INJECTION, SOLUTION INTRAMUSCULAR; INTRAVENOUS at 03:39

## 2024-04-06 RX ADMIN — POLYETHYLENE GLYCOL 3350 17 G: 17 POWDER, FOR SOLUTION ORAL at 16:12

## 2024-04-06 RX ADMIN — GABAPENTIN 600 MG: 300 CAPSULE ORAL at 08:26

## 2024-04-06 RX ADMIN — KETOROLAC TROMETHAMINE 15 MG: 30 INJECTION, SOLUTION INTRAMUSCULAR at 10:20

## 2024-04-06 RX ADMIN — MORPHINE SULFATE 2 MG: 2 INJECTION, SOLUTION INTRAMUSCULAR; INTRAVENOUS at 00:00

## 2024-04-06 RX ADMIN — ENOXAPARIN SODIUM 30 MG: 100 INJECTION SUBCUTANEOUS at 20:31

## 2024-04-06 RX ADMIN — CEFAZOLIN SODIUM 2000 MG: 100 INJECTION, POWDER, LYOPHILIZED, FOR SOLUTION INTRAVENOUS at 17:24

## 2024-04-06 RX ADMIN — KETOROLAC TROMETHAMINE 15 MG: 30 INJECTION, SOLUTION INTRAMUSCULAR at 21:14

## 2024-04-06 RX ADMIN — MORPHINE SULFATE 2 MG: 2 INJECTION, SOLUTION INTRAMUSCULAR; INTRAVENOUS at 08:30

## 2024-04-06 RX ADMIN — HYDROCODONE BITARTRATE AND ACETAMINOPHEN 1 TABLET: 5; 325 TABLET ORAL at 11:44

## 2024-04-06 RX ADMIN — CEFEPIME 2000 MG: 2 INJECTION, POWDER, FOR SOLUTION INTRAVENOUS at 03:34

## 2024-04-06 RX ADMIN — CEFAZOLIN SODIUM 2000 MG: 100 INJECTION, POWDER, LYOPHILIZED, FOR SOLUTION INTRAVENOUS at 10:20

## 2024-04-06 RX ADMIN — ENOXAPARIN SODIUM 30 MG: 100 INJECTION SUBCUTANEOUS at 08:26

## 2024-04-06 RX ADMIN — GABAPENTIN 600 MG: 300 CAPSULE ORAL at 20:31

## 2024-04-06 RX ADMIN — GABAPENTIN 600 MG: 300 CAPSULE ORAL at 14:20

## 2024-04-06 RX ADMIN — HYDROMORPHONE HYDROCHLORIDE 0.25 MG: 1 INJECTION, SOLUTION INTRAMUSCULAR; INTRAVENOUS; SUBCUTANEOUS at 19:54

## 2024-04-06 RX ADMIN — SODIUM CHLORIDE, PRESERVATIVE FREE 10 ML: 5 INJECTION INTRAVENOUS at 21:44

## 2024-04-06 RX ADMIN — HYDROMORPHONE HYDROCHLORIDE 0.25 MG: 1 INJECTION, SOLUTION INTRAMUSCULAR; INTRAVENOUS; SUBCUTANEOUS at 23:58

## 2024-04-06 RX ADMIN — SODIUM CHLORIDE: 9 INJECTION, SOLUTION INTRAVENOUS at 05:30

## 2024-04-06 RX ADMIN — SODIUM CHLORIDE, PRESERVATIVE FREE 10 ML: 5 INJECTION INTRAVENOUS at 08:27

## 2024-04-06 ASSESSMENT — PAIN DESCRIPTION - LOCATION
LOCATION: BUTTOCKS;SCROTUM
LOCATION: CHEST
LOCATION: SCROTUM
LOCATION: GROIN
LOCATION: GROIN
LOCATION: SACRUM;GROIN
LOCATION: GROIN
LOCATION: SCROTUM
LOCATION: BUTTOCKS;SCROTUM
LOCATION: GROIN

## 2024-04-06 ASSESSMENT — PAIN SCALES - GENERAL
PAINLEVEL_OUTOF10: 6
PAINLEVEL_OUTOF10: 1
PAINLEVEL_OUTOF10: 1
PAINLEVEL_OUTOF10: 9
PAINLEVEL_OUTOF10: 2
PAINLEVEL_OUTOF10: 6
PAINLEVEL_OUTOF10: 1
PAINLEVEL_OUTOF10: 7
PAINLEVEL_OUTOF10: 8
PAINLEVEL_OUTOF10: 6
PAINLEVEL_OUTOF10: 8
PAINLEVEL_OUTOF10: 10
PAINLEVEL_OUTOF10: 10
PAINLEVEL_OUTOF10: 9
PAINLEVEL_OUTOF10: 5
PAINLEVEL_OUTOF10: 8

## 2024-04-06 ASSESSMENT — PAIN DESCRIPTION - FREQUENCY
FREQUENCY: CONTINUOUS

## 2024-04-06 ASSESSMENT — PAIN DESCRIPTION - ORIENTATION
ORIENTATION: MID
ORIENTATION: RIGHT;LEFT
ORIENTATION: LOWER
ORIENTATION: RIGHT;LEFT

## 2024-04-06 ASSESSMENT — PAIN DESCRIPTION - ONSET
ONSET: ON-GOING

## 2024-04-06 ASSESSMENT — PAIN DESCRIPTION - PAIN TYPE
TYPE: ACUTE PAIN

## 2024-04-06 ASSESSMENT — PAIN DESCRIPTION - DESCRIPTORS
DESCRIPTORS: DISCOMFORT
DESCRIPTORS: ACHING
DESCRIPTORS: DISCOMFORT
DESCRIPTORS: DISCOMFORT
DESCRIPTORS: ACHING
DESCRIPTORS: DISCOMFORT
DESCRIPTORS: ACHING;DISCOMFORT;PRESSURE
DESCRIPTORS: ACHING
DESCRIPTORS: ACHING;DISCOMFORT
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
## 2024-04-07 ENCOUNTER — APPOINTMENT (OUTPATIENT)
Dept: MRI IMAGING | Age: 44
DRG: 872 | End: 2024-04-07
Payer: COMMERCIAL

## 2024-04-07 ENCOUNTER — APPOINTMENT (OUTPATIENT)
Dept: NON INVASIVE DIAGNOSTICS | Age: 44
DRG: 872 | End: 2024-04-07
Attending: INTERNAL MEDICINE
Payer: COMMERCIAL

## 2024-04-07 PROBLEM — N50.89 MASS OF RIGHT TESTICLE: Status: ACTIVE | Noted: 2024-04-07

## 2024-04-07 PROBLEM — N50.812 PAIN IN BOTH TESTICLES: Status: ACTIVE | Noted: 2024-04-07

## 2024-04-07 PROBLEM — N50.811 PAIN IN BOTH TESTICLES: Status: ACTIVE | Noted: 2024-04-07

## 2024-04-07 LAB
ANION GAP SERPL CALC-SCNC: 2 MMOL/L (ref 2–11)
BACTERIA SPEC CULT: ABNORMAL
BUN SERPL-MCNC: 12 MG/DL (ref 6–23)
CALCIUM SERPL-MCNC: 8.5 MG/DL (ref 8.3–10.4)
CHLORIDE SERPL-SCNC: 104 MMOL/L (ref 103–113)
CO2 SERPL-SCNC: 29 MMOL/L (ref 21–32)
CREAT SERPL-MCNC: 0.7 MG/DL (ref 0.8–1.5)
ECHO AO ASC DIAM: 3.6 CM
ECHO AO ASCENDING AORTA INDEX: 1.51 CM/M2
ECHO AO ROOT DIAM: 3.5 CM
ECHO AO ROOT INDEX: 1.46 CM/M2
ECHO AV AREA PEAK VELOCITY: 3.2 CM2
ECHO AV AREA VTI: 3.4 CM2
ECHO AV AREA/BSA PEAK VELOCITY: 1.3 CM2/M2
ECHO AV AREA/BSA VTI: 1.4 CM2/M2
ECHO AV MEAN GRADIENT: 6 MMHG
ECHO AV MEAN VELOCITY: 1.2 M/S
ECHO AV PEAK GRADIENT: 10 MMHG
ECHO AV PEAK VELOCITY: 1.6 M/S
ECHO AV PEAK VELOCITY: 1.6 M/S
ECHO AV VTI: 29.6 CM
ECHO BSA: 2.46 M2
ECHO EST RA PRESSURE: 3 MMHG
ECHO IVC PROX: 2.3 CM
ECHO LA AREA 2C: 19.3 CM2
ECHO LA AREA 4C: 22.5 CM2
ECHO LA DIAMETER INDEX: 1.55 CM/M2
ECHO LA DIAMETER: 3.7 CM
ECHO LA MAJOR AXIS: 6.1 CM
ECHO LA MINOR AXIS: 5.8 CM
ECHO LA TO AORTIC ROOT RATIO: 1.06
ECHO LA VOL BP: 61 ML (ref 18–58)
ECHO LA VOL MOD A2C: 54 ML (ref 18–58)
ECHO LA VOL MOD A4C: 66 ML (ref 18–58)
ECHO LA VOL/BSA BIPLANE: 26 ML/M2 (ref 16–34)
ECHO LA VOLUME INDEX MOD A2C: 23 ML/M2 (ref 16–34)
ECHO LA VOLUME INDEX MOD A4C: 28 ML/M2 (ref 16–34)
ECHO LV E' LATERAL VELOCITY: 14 CM/S
ECHO LV E' SEPTAL VELOCITY: 7 CM/S
ECHO LV EDV A2C: 91 ML
ECHO LV EDV A4C: 109 ML
ECHO LV EDV INDEX A4C: 46 ML/M2
ECHO LV EDV NDEX A2C: 38 ML/M2
ECHO LV EJECTION FRACTION A2C: 51 %
ECHO LV EJECTION FRACTION A4C: 54 %
ECHO LV EJECTION FRACTION BIPLANE: 53 % (ref 55–100)
ECHO LV ESV A2C: 45 ML
ECHO LV ESV A4C: 50 ML
ECHO LV ESV INDEX A2C: 19 ML/M2
ECHO LV ESV INDEX A4C: 21 ML/M2
ECHO LV FRACTIONAL SHORTENING: 34 % (ref 28–44)
ECHO LV INTERNAL DIMENSION DIASTOLE INDEX: 1.84 CM/M2
ECHO LV INTERNAL DIMENSION DIASTOLIC: 4.4 CM (ref 4.2–5.9)
ECHO LV INTERNAL DIMENSION SYSTOLIC INDEX: 1.21 CM/M2
ECHO LV INTERNAL DIMENSION SYSTOLIC: 2.9 CM
ECHO LV IVSD: 1 CM (ref 0.6–1)
ECHO LV MASS 2D: 147.8 G (ref 88–224)
ECHO LV MASS INDEX 2D: 61.9 G/M2 (ref 49–115)
ECHO LV POSTERIOR WALL DIASTOLIC: 1 CM (ref 0.6–1)
ECHO LV RELATIVE WALL THICKNESS RATIO: 0.45
ECHO LVOT AREA: 3.8 CM2
ECHO LVOT AV VTI INDEX: 0.9
ECHO LVOT DIAM: 2.2 CM
ECHO LVOT MEAN GRADIENT: 4 MMHG
ECHO LVOT PEAK GRADIENT: 8 MMHG
ECHO LVOT PEAK VELOCITY: 1.4 M/S
ECHO LVOT STROKE VOLUME INDEX: 42.4 ML/M2
ECHO LVOT SV: 101.4 ML
ECHO LVOT VTI: 26.7 CM
ECHO MV A VELOCITY: 0.64 M/S
ECHO MV E DECELERATION TIME (DT): 199 MS
ECHO MV E VELOCITY: 0.75 M/S
ECHO MV E/A RATIO: 1.17
ECHO MV E/E' LATERAL: 5.36
ECHO MV E/E' RATIO (AVERAGED): 8.04
ECHO PV MAX VELOCITY: 1 M/S
ECHO PV PEAK GRADIENT: 4 MMHG
ECHO RIGHT VENTRICULAR SYSTOLIC PRESSURE (RVSP): 25 MMHG
ECHO RV BASAL DIMENSION: 4.2 CM
ECHO RV TAPSE: 2.5 CM (ref 1.7–?)
ECHO TV REGURGITANT MAX VELOCITY: 2.35 M/S
ECHO TV REGURGITANT PEAK GRADIENT: 22 MMHG
ERYTHROCYTE [DISTWIDTH] IN BLOOD BY AUTOMATED COUNT: 12.8 % (ref 11.9–14.6)
GLUCOSE BLD STRIP.AUTO-MCNC: 117 MG/DL (ref 65–100)
GLUCOSE BLD STRIP.AUTO-MCNC: 139 MG/DL (ref 65–100)
GLUCOSE BLD STRIP.AUTO-MCNC: 169 MG/DL (ref 65–100)
GLUCOSE BLD STRIP.AUTO-MCNC: 187 MG/DL (ref 65–100)
GLUCOSE SERPL-MCNC: 134 MG/DL (ref 65–100)
GRAM STN SPEC: ABNORMAL
HCT VFR BLD AUTO: 34.4 % (ref 41.1–50.3)
HGB BLD-MCNC: 11.1 G/DL (ref 13.6–17.2)
MCH RBC QN AUTO: 30.9 PG (ref 26.1–32.9)
MCHC RBC AUTO-ENTMCNC: 32.3 G/DL (ref 31.4–35)
MCV RBC AUTO: 95.8 FL (ref 82–102)
NRBC # BLD: 0 K/UL (ref 0–0.2)
PLATELET # BLD AUTO: 251 K/UL (ref 150–450)
PMV BLD AUTO: 7.8 FL (ref 9.4–12.3)
POTASSIUM SERPL-SCNC: 3.9 MMOL/L (ref 3.5–5.1)
RBC # BLD AUTO: 3.59 M/UL (ref 4.23–5.6)
SERVICE CMNT-IMP: ABNORMAL
SODIUM SERPL-SCNC: 135 MMOL/L (ref 136–146)
WBC # BLD AUTO: 10.1 K/UL (ref 4.3–11.1)

## 2024-04-07 PROCEDURE — 1100000000 HC RM PRIVATE

## 2024-04-07 PROCEDURE — 97161 PT EVAL LOW COMPLEX 20 MIN: CPT

## 2024-04-07 PROCEDURE — 82962 GLUCOSE BLOOD TEST: CPT

## 2024-04-07 PROCEDURE — 2580000003 HC RX 258: Performed by: INTERNAL MEDICINE

## 2024-04-07 PROCEDURE — 93306 TTE W/DOPPLER COMPLETE: CPT

## 2024-04-07 PROCEDURE — 6370000000 HC RX 637 (ALT 250 FOR IP): Performed by: NURSE PRACTITIONER

## 2024-04-07 PROCEDURE — 6360000002 HC RX W HCPCS: Performed by: PHYSICIAN ASSISTANT

## 2024-04-07 PROCEDURE — A9579 GAD-BASE MR CONTRAST NOS,1ML: HCPCS | Performed by: PHYSICIAN ASSISTANT

## 2024-04-07 PROCEDURE — 36415 COLL VENOUS BLD VENIPUNCTURE: CPT

## 2024-04-07 PROCEDURE — 6360000004 HC RX CONTRAST MEDICATION: Performed by: PHYSICIAN ASSISTANT

## 2024-04-07 PROCEDURE — 6370000000 HC RX 637 (ALT 250 FOR IP): Performed by: INTERNAL MEDICINE

## 2024-04-07 PROCEDURE — 2580000003 HC RX 258: Performed by: PHYSICIAN ASSISTANT

## 2024-04-07 PROCEDURE — 93306 TTE W/DOPPLER COMPLETE: CPT | Performed by: INTERNAL MEDICINE

## 2024-04-07 PROCEDURE — 6360000002 HC RX W HCPCS: Performed by: INTERNAL MEDICINE

## 2024-04-07 PROCEDURE — 6370000000 HC RX 637 (ALT 250 FOR IP): Performed by: PHYSICIAN ASSISTANT

## 2024-04-07 PROCEDURE — A4216 STERILE WATER/SALINE, 10 ML: HCPCS | Performed by: PHYSICIAN ASSISTANT

## 2024-04-07 PROCEDURE — 2500000003 HC RX 250 WO HCPCS: Performed by: PHYSICIAN ASSISTANT

## 2024-04-07 PROCEDURE — 85027 COMPLETE CBC AUTOMATED: CPT

## 2024-04-07 PROCEDURE — 72196 MRI PELVIS W/DYE: CPT

## 2024-04-07 PROCEDURE — 80048 BASIC METABOLIC PNL TOTAL CA: CPT

## 2024-04-07 PROCEDURE — 99232 SBSQ HOSP IP/OBS MODERATE 35: CPT | Performed by: UROLOGY

## 2024-04-07 PROCEDURE — 97165 OT EVAL LOW COMPLEX 30 MIN: CPT

## 2024-04-07 PROCEDURE — 72157 MRI CHEST SPINE W/O & W/DYE: CPT

## 2024-04-07 RX ORDER — METHOCARBAMOL 750 MG/1
750 TABLET, FILM COATED ORAL 3 TIMES DAILY PRN
Status: DISCONTINUED | OUTPATIENT
Start: 2024-04-07 | End: 2024-04-11 | Stop reason: HOSPADM

## 2024-04-07 RX ORDER — SODIUM CHLORIDE 0.9 % (FLUSH) 0.9 %
10 SYRINGE (ML) INJECTION AS NEEDED
Status: DISCONTINUED | OUTPATIENT
Start: 2024-04-07 | End: 2024-04-11 | Stop reason: HOSPADM

## 2024-04-07 RX ORDER — HYDROMORPHONE HYDROCHLORIDE 1 MG/ML
1 INJECTION, SOLUTION INTRAMUSCULAR; INTRAVENOUS; SUBCUTANEOUS EVERY 4 HOURS PRN
Status: DISCONTINUED | OUTPATIENT
Start: 2024-04-07 | End: 2024-04-11 | Stop reason: HOSPADM

## 2024-04-07 RX ORDER — SENNA AND DOCUSATE SODIUM 50; 8.6 MG/1; MG/1
2 TABLET, FILM COATED ORAL DAILY PRN
Status: DISCONTINUED | OUTPATIENT
Start: 2024-04-07 | End: 2024-04-11 | Stop reason: HOSPADM

## 2024-04-07 RX ORDER — POLYETHYLENE GLYCOL 3350 17 G/17G
17 POWDER, FOR SOLUTION ORAL 2 TIMES DAILY
Status: DISCONTINUED | OUTPATIENT
Start: 2024-04-07 | End: 2024-04-11 | Stop reason: HOSPADM

## 2024-04-07 RX ORDER — CELECOXIB 100 MG/1
200 CAPSULE ORAL 2 TIMES DAILY
Status: DISCONTINUED | OUTPATIENT
Start: 2024-04-07 | End: 2024-04-08

## 2024-04-07 RX ADMIN — HYDROCODONE BITARTRATE AND ACETAMINOPHEN 1 TABLET: 5; 325 TABLET ORAL at 20:22

## 2024-04-07 RX ADMIN — DOXYCYCLINE HYCLATE 100 MG: 100 CAPSULE ORAL at 20:22

## 2024-04-07 RX ADMIN — ENOXAPARIN SODIUM 30 MG: 100 INJECTION SUBCUTANEOUS at 08:31

## 2024-04-07 RX ADMIN — HYDROMORPHONE HYDROCHLORIDE 0.25 MG: 1 INJECTION, SOLUTION INTRAMUSCULAR; INTRAVENOUS; SUBCUTANEOUS at 05:42

## 2024-04-07 RX ADMIN — GABAPENTIN 600 MG: 300 CAPSULE ORAL at 15:07

## 2024-04-07 RX ADMIN — HYDROMORPHONE HYDROCHLORIDE 1 MG: 1 INJECTION, SOLUTION INTRAMUSCULAR; INTRAVENOUS; SUBCUTANEOUS at 17:28

## 2024-04-07 RX ADMIN — SODIUM CHLORIDE 1 MG: 9 INJECTION INTRAMUSCULAR; INTRAVENOUS; SUBCUTANEOUS at 12:26

## 2024-04-07 RX ADMIN — SODIUM CHLORIDE, PRESERVATIVE FREE 10 ML: 5 INJECTION INTRAVENOUS at 09:01

## 2024-04-07 RX ADMIN — POLYETHYLENE GLYCOL 3350 17 G: 17 POWDER, FOR SOLUTION ORAL at 08:32

## 2024-04-07 RX ADMIN — CEFAZOLIN SODIUM 2000 MG: 100 INJECTION, POWDER, LYOPHILIZED, FOR SOLUTION INTRAVENOUS at 11:59

## 2024-04-07 RX ADMIN — SODIUM CHLORIDE, PRESERVATIVE FREE 10 ML: 5 INJECTION INTRAVENOUS at 20:23

## 2024-04-07 RX ADMIN — POLYETHYLENE GLYCOL 3350 17 G: 17 POWDER, FOR SOLUTION ORAL at 20:22

## 2024-04-07 RX ADMIN — CELECOXIB 200 MG: 100 CAPSULE ORAL at 20:22

## 2024-04-07 RX ADMIN — ENOXAPARIN SODIUM 30 MG: 100 INJECTION SUBCUTANEOUS at 20:22

## 2024-04-07 RX ADMIN — HYDROCODONE BITARTRATE AND ACETAMINOPHEN 1 TABLET: 5; 325 TABLET ORAL at 08:32

## 2024-04-07 RX ADMIN — HYDROMORPHONE HYDROCHLORIDE 1 MG: 1 INJECTION, SOLUTION INTRAMUSCULAR; INTRAVENOUS; SUBCUTANEOUS at 12:06

## 2024-04-07 RX ADMIN — CEFAZOLIN SODIUM 2000 MG: 100 INJECTION, POWDER, LYOPHILIZED, FOR SOLUTION INTRAVENOUS at 01:32

## 2024-04-07 RX ADMIN — GADOTERIDOL 24 ML: 279.3 INJECTION, SOLUTION INTRAVENOUS at 14:17

## 2024-04-07 RX ADMIN — CEFAZOLIN SODIUM 2000 MG: 100 INJECTION, POWDER, LYOPHILIZED, FOR SOLUTION INTRAVENOUS at 17:29

## 2024-04-07 RX ADMIN — GABAPENTIN 600 MG: 300 CAPSULE ORAL at 20:22

## 2024-04-07 RX ADMIN — HYDROCODONE BITARTRATE AND ACETAMINOPHEN 1 TABLET: 5; 325 TABLET ORAL at 01:43

## 2024-04-07 RX ADMIN — CELECOXIB 200 MG: 100 CAPSULE ORAL at 12:06

## 2024-04-07 RX ADMIN — HYDROMORPHONE HYDROCHLORIDE 1 MG: 1 INJECTION, SOLUTION INTRAMUSCULAR; INTRAVENOUS; SUBCUTANEOUS at 23:03

## 2024-04-07 RX ADMIN — GABAPENTIN 600 MG: 300 CAPSULE ORAL at 08:33

## 2024-04-07 RX ADMIN — KETOROLAC TROMETHAMINE 15 MG: 30 INJECTION, SOLUTION INTRAMUSCULAR at 10:16

## 2024-04-07 RX ADMIN — DOXYCYCLINE HYCLATE 100 MG: 100 CAPSULE ORAL at 08:33

## 2024-04-07 ASSESSMENT — PAIN DESCRIPTION - LOCATION
LOCATION: GENERALIZED
LOCATION: GROIN
LOCATION: SCROTUM;BUTTOCKS
LOCATION: BACK;SCROTUM

## 2024-04-07 ASSESSMENT — PAIN SCALES - GENERAL
PAINLEVEL_OUTOF10: 1
PAINLEVEL_OUTOF10: 1
PAINLEVEL_OUTOF10: 9
PAINLEVEL_OUTOF10: 6
PAINLEVEL_OUTOF10: 5
PAINLEVEL_OUTOF10: 8
PAINLEVEL_OUTOF10: 6
PAINLEVEL_OUTOF10: 8

## 2024-04-07 ASSESSMENT — PAIN DESCRIPTION - DESCRIPTORS
DESCRIPTORS: DISCOMFORT
DESCRIPTORS: ACHING
DESCRIPTORS: DISCOMFORT
DESCRIPTORS: DISCOMFORT
DESCRIPTORS: ACHING

## 2024-04-07 ASSESSMENT — PAIN - FUNCTIONAL ASSESSMENT
PAIN_FUNCTIONAL_ASSESSMENT: ACTIVITIES ARE NOT PREVENTED
PAIN_FUNCTIONAL_ASSESSMENT: PREVENTS OR INTERFERES SOME ACTIVE ACTIVITIES AND ADLS
PAIN_FUNCTIONAL_ASSESSMENT: ACTIVITIES ARE NOT PREVENTED

## 2024-04-07 ASSESSMENT — PAIN DESCRIPTION - ORIENTATION
ORIENTATION: MID;LOWER
ORIENTATION: LOWER

## 2024-04-07 ASSESSMENT — PAIN DESCRIPTION - PAIN TYPE: TYPE: ACUTE PAIN

## 2024-04-07 ASSESSMENT — PAIN DESCRIPTION - FREQUENCY: FREQUENCY: CONTINUOUS

## 2024-04-07 ASSESSMENT — PAIN DESCRIPTION - ONSET: ONSET: ON-GOING

## 2024-04-07 NOTE — PLAN OF CARE
Problem: Discharge Planning  Goal: Discharge to home or other facility with appropriate resources  4/7/2024 1923 by Sue Ruiz, RN  Outcome: Progressing  Flowsheets (Taken 4/7/2024 1921)  Discharge to home or other facility with appropriate resources:   Identify barriers to discharge with patient and caregiver   Arrange for needed discharge resources and transportation as appropriate   Identify discharge learning needs (meds, wound care, etc)   Arrange for interpreters to assist at discharge as needed  4/7/2024 1820 by Sue Ruiz, RN  Outcome: Progressing     Problem: Pain  Goal: Verbalizes/displays adequate comfort level or baseline comfort level  4/7/2024 1923 by Sue Ruiz, RN  Outcome: Progressing  4/7/2024 1820 by Sue Ruiz, RN  Outcome: Progressing

## 2024-04-08 ENCOUNTER — APPOINTMENT (OUTPATIENT)
Dept: CT IMAGING | Age: 44
DRG: 872 | End: 2024-04-08
Payer: COMMERCIAL

## 2024-04-08 LAB
AFP-TM SERPL-MCNC: 1.1 NG/ML
ANION GAP SERPL CALC-SCNC: 1 MMOL/L (ref 2–11)
BUN SERPL-MCNC: 17 MG/DL (ref 6–23)
CALCIUM SERPL-MCNC: 8.7 MG/DL (ref 8.3–10.4)
CHLORIDE SERPL-SCNC: 106 MMOL/L (ref 103–113)
CO2 SERPL-SCNC: 29 MMOL/L (ref 21–32)
CREAT SERPL-MCNC: 0.6 MG/DL (ref 0.8–1.5)
CRP SERPL-MCNC: 5.5 MG/DL (ref 0–0.9)
ERYTHROCYTE [DISTWIDTH] IN BLOOD BY AUTOMATED COUNT: 12.7 % (ref 11.9–14.6)
GLUCOSE BLD STRIP.AUTO-MCNC: 118 MG/DL (ref 65–100)
GLUCOSE BLD STRIP.AUTO-MCNC: 120 MG/DL (ref 65–100)
GLUCOSE BLD STRIP.AUTO-MCNC: 133 MG/DL (ref 65–100)
GLUCOSE BLD STRIP.AUTO-MCNC: 88 MG/DL (ref 65–100)
GLUCOSE SERPL-MCNC: 122 MG/DL (ref 65–100)
HCT VFR BLD AUTO: 34.1 % (ref 41.1–50.3)
HGB BLD-MCNC: 11.1 G/DL (ref 13.6–17.2)
MCH RBC QN AUTO: 31 PG (ref 26.1–32.9)
MCHC RBC AUTO-ENTMCNC: 32.6 G/DL (ref 31.4–35)
MCV RBC AUTO: 95.3 FL (ref 82–102)
MRSA DNA SPEC QL NAA+PROBE: NOT DETECTED
NRBC # BLD: 0 K/UL (ref 0–0.2)
PLATELET # BLD AUTO: 265 K/UL (ref 150–450)
PMV BLD AUTO: 7.8 FL (ref 9.4–12.3)
POTASSIUM SERPL-SCNC: 4.2 MMOL/L (ref 3.5–5.1)
PSA SERPL-MCNC: 0.1 NG/ML
RBC # BLD AUTO: 3.58 M/UL (ref 4.23–5.6)
S AUREUS CPE NOSE QL NAA+PROBE: NOT DETECTED
SERVICE CMNT-IMP: ABNORMAL
SERVICE CMNT-IMP: NORMAL
SODIUM SERPL-SCNC: 136 MMOL/L (ref 136–146)
WBC # BLD AUTO: 9.2 K/UL (ref 4.3–11.1)

## 2024-04-08 PROCEDURE — 84153 ASSAY OF PSA TOTAL: CPT

## 2024-04-08 PROCEDURE — 6360000002 HC RX W HCPCS: Performed by: PHYSICIAN ASSISTANT

## 2024-04-08 PROCEDURE — 6370000000 HC RX 637 (ALT 250 FOR IP): Performed by: INTERNAL MEDICINE

## 2024-04-08 PROCEDURE — 2500000003 HC RX 250 WO HCPCS: Performed by: PHYSICIAN ASSISTANT

## 2024-04-08 PROCEDURE — 6370000000 HC RX 637 (ALT 250 FOR IP): Performed by: HOSPITALIST

## 2024-04-08 PROCEDURE — 82962 GLUCOSE BLOOD TEST: CPT

## 2024-04-08 PROCEDURE — 2580000003 HC RX 258: Performed by: INTERNAL MEDICINE

## 2024-04-08 PROCEDURE — 87641 MR-STAPH DNA AMP PROBE: CPT

## 2024-04-08 PROCEDURE — 6370000000 HC RX 637 (ALT 250 FOR IP): Performed by: PHYSICIAN ASSISTANT

## 2024-04-08 PROCEDURE — 1100000000 HC RM PRIVATE

## 2024-04-08 PROCEDURE — 6370000000 HC RX 637 (ALT 250 FOR IP): Performed by: NURSE PRACTITIONER

## 2024-04-08 PROCEDURE — 6360000002 HC RX W HCPCS: Performed by: INTERNAL MEDICINE

## 2024-04-08 PROCEDURE — 86140 C-REACTIVE PROTEIN: CPT

## 2024-04-08 PROCEDURE — 71250 CT THORAX DX C-: CPT

## 2024-04-08 PROCEDURE — 80048 BASIC METABOLIC PNL TOTAL CA: CPT

## 2024-04-08 PROCEDURE — 85027 COMPLETE CBC AUTOMATED: CPT

## 2024-04-08 PROCEDURE — 36415 COLL VENOUS BLD VENIPUNCTURE: CPT

## 2024-04-08 RX ORDER — KETOROLAC TROMETHAMINE 30 MG/ML
15 INJECTION, SOLUTION INTRAMUSCULAR; INTRAVENOUS EVERY 6 HOURS
Status: DISCONTINUED | OUTPATIENT
Start: 2024-04-08 | End: 2024-04-11 | Stop reason: HOSPADM

## 2024-04-08 RX ORDER — LANOLIN ALCOHOL/MO/W.PET/CERES
6 CREAM (GRAM) TOPICAL NIGHTLY
Status: DISCONTINUED | OUTPATIENT
Start: 2024-04-08 | End: 2024-04-11 | Stop reason: HOSPADM

## 2024-04-08 RX ORDER — BISACODYL 10 MG
10 SUPPOSITORY, RECTAL RECTAL DAILY PRN
Status: DISCONTINUED | OUTPATIENT
Start: 2024-04-08 | End: 2024-04-11 | Stop reason: HOSPADM

## 2024-04-08 RX ORDER — LIDOCAINE 4 G/G
1 PATCH TOPICAL DAILY
Status: DISCONTINUED | OUTPATIENT
Start: 2024-04-08 | End: 2024-04-11 | Stop reason: HOSPADM

## 2024-04-08 RX ORDER — GUAIFENESIN 600 MG/1
1200 TABLET, EXTENDED RELEASE ORAL 2 TIMES DAILY
Status: DISCONTINUED | OUTPATIENT
Start: 2024-04-08 | End: 2024-04-11 | Stop reason: HOSPADM

## 2024-04-08 RX ADMIN — HYDROMORPHONE HYDROCHLORIDE 1 MG: 1 INJECTION, SOLUTION INTRAMUSCULAR; INTRAVENOUS; SUBCUTANEOUS at 11:12

## 2024-04-08 RX ADMIN — GABAPENTIN 600 MG: 300 CAPSULE ORAL at 09:53

## 2024-04-08 RX ADMIN — GABAPENTIN 600 MG: 300 CAPSULE ORAL at 13:49

## 2024-04-08 RX ADMIN — SODIUM CHLORIDE, PRESERVATIVE FREE 10 ML: 5 INJECTION INTRAVENOUS at 13:54

## 2024-04-08 RX ADMIN — KETOROLAC TROMETHAMINE 15 MG: 30 INJECTION, SOLUTION INTRAMUSCULAR at 23:39

## 2024-04-08 RX ADMIN — DOXYCYCLINE HYCLATE 100 MG: 100 CAPSULE ORAL at 09:53

## 2024-04-08 RX ADMIN — HYDROMORPHONE HYDROCHLORIDE 1 MG: 1 INJECTION, SOLUTION INTRAMUSCULAR; INTRAVENOUS; SUBCUTANEOUS at 06:18

## 2024-04-08 RX ADMIN — CELECOXIB 200 MG: 100 CAPSULE ORAL at 09:53

## 2024-04-08 RX ADMIN — POLYETHYLENE GLYCOL 3350 17 G: 17 POWDER, FOR SOLUTION ORAL at 09:52

## 2024-04-08 RX ADMIN — HYDROMORPHONE HYDROCHLORIDE 1 MG: 1 INJECTION, SOLUTION INTRAMUSCULAR; INTRAVENOUS; SUBCUTANEOUS at 15:42

## 2024-04-08 RX ADMIN — SODIUM CHLORIDE, PRESERVATIVE FREE 10 ML: 5 INJECTION INTRAVENOUS at 21:08

## 2024-04-08 RX ADMIN — Medication 6 MG: at 21:08

## 2024-04-08 RX ADMIN — KETOROLAC TROMETHAMINE 15 MG: 30 INJECTION, SOLUTION INTRAMUSCULAR at 04:47

## 2024-04-08 RX ADMIN — CEFAZOLIN SODIUM 2000 MG: 100 INJECTION, POWDER, LYOPHILIZED, FOR SOLUTION INTRAVENOUS at 18:49

## 2024-04-08 RX ADMIN — CEFAZOLIN SODIUM 2000 MG: 100 INJECTION, POWDER, LYOPHILIZED, FOR SOLUTION INTRAVENOUS at 09:52

## 2024-04-08 RX ADMIN — KETOROLAC TROMETHAMINE 15 MG: 30 INJECTION, SOLUTION INTRAMUSCULAR at 17:04

## 2024-04-08 RX ADMIN — HYDROCODONE BITARTRATE AND ACETAMINOPHEN 1 TABLET: 5; 325 TABLET ORAL at 09:53

## 2024-04-08 RX ADMIN — DOXYCYCLINE HYCLATE 100 MG: 100 CAPSULE ORAL at 21:05

## 2024-04-08 RX ADMIN — HYDROMORPHONE HYDROCHLORIDE 1 MG: 1 INJECTION, SOLUTION INTRAMUSCULAR; INTRAVENOUS; SUBCUTANEOUS at 19:58

## 2024-04-08 RX ADMIN — POLYETHYLENE GLYCOL 3350 17 G: 17 POWDER, FOR SOLUTION ORAL at 19:51

## 2024-04-08 RX ADMIN — MORPHINE SULFATE 2 MG: 2 INJECTION, SOLUTION INTRAMUSCULAR; INTRAVENOUS at 00:38

## 2024-04-08 RX ADMIN — ENOXAPARIN SODIUM 30 MG: 100 INJECTION SUBCUTANEOUS at 09:52

## 2024-04-08 RX ADMIN — METHOCARBAMOL TABLETS 750 MG: 750 TABLET, COATED ORAL at 13:53

## 2024-04-08 RX ADMIN — CEFAZOLIN SODIUM 2000 MG: 100 INJECTION, POWDER, LYOPHILIZED, FOR SOLUTION INTRAVENOUS at 01:03

## 2024-04-08 RX ADMIN — METHOCARBAMOL TABLETS 750 MG: 750 TABLET, COATED ORAL at 00:38

## 2024-04-08 RX ADMIN — GABAPENTIN 600 MG: 300 CAPSULE ORAL at 21:06

## 2024-04-08 RX ADMIN — ENOXAPARIN SODIUM 30 MG: 100 INJECTION SUBCUTANEOUS at 21:05

## 2024-04-08 RX ADMIN — Medication 6 MG: at 01:02

## 2024-04-08 ASSESSMENT — PAIN DESCRIPTION - LOCATION
LOCATION: GROIN
LOCATION: BACK
LOCATION: GROIN

## 2024-04-08 ASSESSMENT — PAIN SCALES - GENERAL
PAINLEVEL_OUTOF10: 10
PAINLEVEL_OUTOF10: 9
PAINLEVEL_OUTOF10: 5
PAINLEVEL_OUTOF10: 7
PAINLEVEL_OUTOF10: 7
PAINLEVEL_OUTOF10: 8
PAINLEVEL_OUTOF10: 7
PAINLEVEL_OUTOF10: 6

## 2024-04-08 ASSESSMENT — PAIN DESCRIPTION - DESCRIPTORS
DESCRIPTORS: ACHING
DESCRIPTORS: ACHING

## 2024-04-08 ASSESSMENT — PAIN DESCRIPTION - ORIENTATION: ORIENTATION: LOWER

## 2024-04-09 PROBLEM — M60.9: Status: ACTIVE | Noted: 2024-04-09

## 2024-04-09 PROBLEM — M86.9 OSTEOMYELITIS OF SYMPHYSIS PUBIS (HCC): Status: ACTIVE | Noted: 2024-04-09

## 2024-04-09 LAB
ANION GAP SERPL CALC-SCNC: 2 MMOL/L (ref 2–11)
BUN SERPL-MCNC: 14 MG/DL (ref 6–23)
CALCIUM SERPL-MCNC: 8.6 MG/DL (ref 8.3–10.4)
CHLORIDE SERPL-SCNC: 103 MMOL/L (ref 103–113)
CO2 SERPL-SCNC: 29 MMOL/L (ref 21–32)
CREAT SERPL-MCNC: 0.5 MG/DL (ref 0.8–1.5)
ERYTHROCYTE [DISTWIDTH] IN BLOOD BY AUTOMATED COUNT: 12.7 % (ref 11.9–14.6)
GLUCOSE BLD STRIP.AUTO-MCNC: 111 MG/DL (ref 65–100)
GLUCOSE BLD STRIP.AUTO-MCNC: 154 MG/DL (ref 65–100)
GLUCOSE BLD STRIP.AUTO-MCNC: 176 MG/DL (ref 65–100)
GLUCOSE BLD STRIP.AUTO-MCNC: 201 MG/DL (ref 65–100)
GLUCOSE SERPL-MCNC: 100 MG/DL (ref 65–100)
HCT VFR BLD AUTO: 33.3 % (ref 41.1–50.3)
HGB BLD-MCNC: 10.9 G/DL (ref 13.6–17.2)
MCH RBC QN AUTO: 31.1 PG (ref 26.1–32.9)
MCHC RBC AUTO-ENTMCNC: 32.7 G/DL (ref 31.4–35)
MCV RBC AUTO: 94.9 FL (ref 82–102)
NRBC # BLD: 0 K/UL (ref 0–0.2)
PLATELET # BLD AUTO: 234 K/UL (ref 150–450)
PMV BLD AUTO: 7.9 FL (ref 9.4–12.3)
POTASSIUM SERPL-SCNC: 3.9 MMOL/L (ref 3.5–5.1)
RBC # BLD AUTO: 3.51 M/UL (ref 4.23–5.6)
SERVICE CMNT-IMP: ABNORMAL
SODIUM SERPL-SCNC: 134 MMOL/L (ref 136–146)
WBC # BLD AUTO: 8.3 K/UL (ref 4.3–11.1)

## 2024-04-09 PROCEDURE — 80048 BASIC METABOLIC PNL TOTAL CA: CPT

## 2024-04-09 PROCEDURE — 6370000000 HC RX 637 (ALT 250 FOR IP): Performed by: NURSE PRACTITIONER

## 2024-04-09 PROCEDURE — 1100000000 HC RM PRIVATE

## 2024-04-09 PROCEDURE — 6360000002 HC RX W HCPCS: Performed by: INTERNAL MEDICINE

## 2024-04-09 PROCEDURE — 6370000000 HC RX 637 (ALT 250 FOR IP): Performed by: INTERNAL MEDICINE

## 2024-04-09 PROCEDURE — 82962 GLUCOSE BLOOD TEST: CPT

## 2024-04-09 PROCEDURE — 36415 COLL VENOUS BLD VENIPUNCTURE: CPT

## 2024-04-09 PROCEDURE — 6370000000 HC RX 637 (ALT 250 FOR IP): Performed by: PHYSICIAN ASSISTANT

## 2024-04-09 PROCEDURE — 2580000003 HC RX 258: Performed by: INTERNAL MEDICINE

## 2024-04-09 PROCEDURE — 6370000000 HC RX 637 (ALT 250 FOR IP): Performed by: HOSPITALIST

## 2024-04-09 PROCEDURE — 6360000002 HC RX W HCPCS: Performed by: PHYSICIAN ASSISTANT

## 2024-04-09 PROCEDURE — 2500000003 HC RX 250 WO HCPCS: Performed by: PHYSICIAN ASSISTANT

## 2024-04-09 PROCEDURE — 85027 COMPLETE CBC AUTOMATED: CPT

## 2024-04-09 RX ORDER — ACETAMINOPHEN 325 MG/1
650 TABLET ORAL EVERY 8 HOURS SCHEDULED
Status: DISCONTINUED | OUTPATIENT
Start: 2024-04-09 | End: 2024-04-11 | Stop reason: HOSPADM

## 2024-04-09 RX ADMIN — CEFAZOLIN SODIUM 2000 MG: 100 INJECTION, POWDER, LYOPHILIZED, FOR SOLUTION INTRAVENOUS at 17:29

## 2024-04-09 RX ADMIN — GABAPENTIN 600 MG: 300 CAPSULE ORAL at 20:37

## 2024-04-09 RX ADMIN — HYDROMORPHONE HYDROCHLORIDE 1 MG: 1 INJECTION, SOLUTION INTRAMUSCULAR; INTRAVENOUS; SUBCUTANEOUS at 13:00

## 2024-04-09 RX ADMIN — HYDROMORPHONE HYDROCHLORIDE 1 MG: 1 INJECTION, SOLUTION INTRAMUSCULAR; INTRAVENOUS; SUBCUTANEOUS at 17:29

## 2024-04-09 RX ADMIN — SODIUM CHLORIDE, PRESERVATIVE FREE 10 ML: 5 INJECTION INTRAVENOUS at 07:58

## 2024-04-09 RX ADMIN — KETOROLAC TROMETHAMINE 15 MG: 30 INJECTION, SOLUTION INTRAMUSCULAR at 17:29

## 2024-04-09 RX ADMIN — HYDROMORPHONE HYDROCHLORIDE 1 MG: 1 INJECTION, SOLUTION INTRAMUSCULAR; INTRAVENOUS; SUBCUTANEOUS at 21:47

## 2024-04-09 RX ADMIN — POLYETHYLENE GLYCOL 3350 17 G: 17 POWDER, FOR SOLUTION ORAL at 20:37

## 2024-04-09 RX ADMIN — GABAPENTIN 600 MG: 300 CAPSULE ORAL at 13:54

## 2024-04-09 RX ADMIN — CEFAZOLIN SODIUM 2000 MG: 100 INJECTION, POWDER, LYOPHILIZED, FOR SOLUTION INTRAVENOUS at 10:46

## 2024-04-09 RX ADMIN — POLYETHYLENE GLYCOL 3350 17 G: 17 POWDER, FOR SOLUTION ORAL at 07:51

## 2024-04-09 RX ADMIN — KETOROLAC TROMETHAMINE 15 MG: 30 INJECTION, SOLUTION INTRAMUSCULAR at 03:46

## 2024-04-09 RX ADMIN — GUAIFENESIN 1200 MG: 600 TABLET ORAL at 07:51

## 2024-04-09 RX ADMIN — ENOXAPARIN SODIUM 30 MG: 100 INJECTION SUBCUTANEOUS at 20:37

## 2024-04-09 RX ADMIN — SODIUM CHLORIDE, PRESERVATIVE FREE 10 ML: 5 INJECTION INTRAVENOUS at 20:38

## 2024-04-09 RX ADMIN — HYDROMORPHONE HYDROCHLORIDE 1 MG: 1 INJECTION, SOLUTION INTRAMUSCULAR; INTRAVENOUS; SUBCUTANEOUS at 07:58

## 2024-04-09 RX ADMIN — ACETAMINOPHEN 650 MG: 325 TABLET ORAL at 20:37

## 2024-04-09 RX ADMIN — KETOROLAC TROMETHAMINE 15 MG: 30 INJECTION, SOLUTION INTRAMUSCULAR at 23:12

## 2024-04-09 RX ADMIN — GABAPENTIN 600 MG: 300 CAPSULE ORAL at 07:51

## 2024-04-09 RX ADMIN — KETOROLAC TROMETHAMINE 15 MG: 30 INJECTION, SOLUTION INTRAMUSCULAR at 10:46

## 2024-04-09 RX ADMIN — Medication 6 MG: at 20:37

## 2024-04-09 RX ADMIN — ACETAMINOPHEN 650 MG: 325 TABLET ORAL at 15:18

## 2024-04-09 RX ADMIN — ENOXAPARIN SODIUM 30 MG: 100 INJECTION SUBCUTANEOUS at 07:50

## 2024-04-09 RX ADMIN — CEFAZOLIN SODIUM 2000 MG: 100 INJECTION, POWDER, LYOPHILIZED, FOR SOLUTION INTRAVENOUS at 02:05

## 2024-04-09 RX ADMIN — DOXYCYCLINE HYCLATE 100 MG: 100 CAPSULE ORAL at 07:51

## 2024-04-09 ASSESSMENT — PAIN DESCRIPTION - LOCATION
LOCATION: BACK;SCROTUM
LOCATION: BACK
LOCATION: PELVIS
LOCATION: SCROTUM;BACK
LOCATION: PELVIS

## 2024-04-09 ASSESSMENT — PAIN SCALES - GENERAL
PAINLEVEL_OUTOF10: 10
PAINLEVEL_OUTOF10: 8
PAINLEVEL_OUTOF10: 9
PAINLEVEL_OUTOF10: 0
PAINLEVEL_OUTOF10: 0
PAINLEVEL_OUTOF10: 10
PAINLEVEL_OUTOF10: 10
PAINLEVEL_OUTOF10: 7
PAINLEVEL_OUTOF10: 2
PAINLEVEL_OUTOF10: 2

## 2024-04-09 ASSESSMENT — PAIN DESCRIPTION - DESCRIPTORS
DESCRIPTORS: PRESSURE
DESCRIPTORS: ACHING;SHARP
DESCRIPTORS: ACHING

## 2024-04-09 ASSESSMENT — PAIN DESCRIPTION - ORIENTATION
ORIENTATION: RIGHT;LEFT
ORIENTATION: RIGHT;LEFT

## 2024-04-09 ASSESSMENT — PAIN DESCRIPTION - PAIN TYPE: TYPE: ACUTE PAIN

## 2024-04-09 NOTE — CARE COORDINATION
CM referred IVAB orders and notes to [facility]. Pt is to have PICC placed. Possible discharge 2-3 days. CM will continue to follow for discharge needs/plan.

## 2024-04-10 ENCOUNTER — APPOINTMENT (OUTPATIENT)
Dept: GENERAL RADIOLOGY | Age: 44
DRG: 872 | End: 2024-04-10
Payer: COMMERCIAL

## 2024-04-10 LAB
ANION GAP SERPL CALC-SCNC: 4 MMOL/L (ref 2–11)
BACTERIA SPEC CULT: NORMAL
BACTERIA SPEC CULT: NORMAL
BUN SERPL-MCNC: 14 MG/DL (ref 6–23)
CALCIUM SERPL-MCNC: 8.8 MG/DL (ref 8.3–10.4)
CHLORIDE SERPL-SCNC: 104 MMOL/L (ref 103–113)
CO2 SERPL-SCNC: 28 MMOL/L (ref 21–32)
CREAT SERPL-MCNC: 0.6 MG/DL (ref 0.8–1.5)
ERYTHROCYTE [DISTWIDTH] IN BLOOD BY AUTOMATED COUNT: 12.7 % (ref 11.9–14.6)
GLUCOSE BLD STRIP.AUTO-MCNC: 115 MG/DL (ref 65–100)
GLUCOSE BLD STRIP.AUTO-MCNC: 123 MG/DL (ref 65–100)
GLUCOSE BLD STRIP.AUTO-MCNC: 216 MG/DL (ref 65–100)
GLUCOSE BLD STRIP.AUTO-MCNC: 273 MG/DL (ref 65–100)
GLUCOSE SERPL-MCNC: 169 MG/DL (ref 65–100)
HCT VFR BLD AUTO: 35.1 % (ref 41.1–50.3)
HGB BLD-MCNC: 11.3 G/DL (ref 13.6–17.2)
MCH RBC QN AUTO: 31 PG (ref 26.1–32.9)
MCHC RBC AUTO-ENTMCNC: 32.2 G/DL (ref 31.4–35)
MCV RBC AUTO: 96.4 FL (ref 82–102)
N GONORRHOEA RRNA SPEC QL NAA+PROBE: NEGATIVE
NRBC # BLD: 0 K/UL (ref 0–0.2)
PLATELET # BLD AUTO: 301 K/UL (ref 150–450)
PMV BLD AUTO: 8.5 FL (ref 9.4–12.3)
POTASSIUM SERPL-SCNC: 3.9 MMOL/L (ref 3.5–5.1)
RBC # BLD AUTO: 3.64 M/UL (ref 4.23–5.6)
SERVICE CMNT-IMP: ABNORMAL
SERVICE CMNT-IMP: NORMAL
SERVICE CMNT-IMP: NORMAL
SODIUM SERPL-SCNC: 136 MMOL/L (ref 136–146)
SPECIMEN SOURCE: NORMAL
WBC # BLD AUTO: 7.1 K/UL (ref 4.3–11.1)

## 2024-04-10 PROCEDURE — 2580000003 HC RX 258: Performed by: INTERNAL MEDICINE

## 2024-04-10 PROCEDURE — 6370000000 HC RX 637 (ALT 250 FOR IP): Performed by: HOSPITALIST

## 2024-04-10 PROCEDURE — 71045 X-RAY EXAM CHEST 1 VIEW: CPT

## 2024-04-10 PROCEDURE — 02HV33Z INSERTION OF INFUSION DEVICE INTO SUPERIOR VENA CAVA, PERCUTANEOUS APPROACH: ICD-10-PCS | Performed by: INTERNAL MEDICINE

## 2024-04-10 PROCEDURE — 2500000003 HC RX 250 WO HCPCS: Performed by: PHYSICIAN ASSISTANT

## 2024-04-10 PROCEDURE — 36573 INSJ PICC RS&I 5 YR+: CPT

## 2024-04-10 PROCEDURE — 85027 COMPLETE CBC AUTOMATED: CPT

## 2024-04-10 PROCEDURE — 6360000002 HC RX W HCPCS: Performed by: INTERNAL MEDICINE

## 2024-04-10 PROCEDURE — 36415 COLL VENOUS BLD VENIPUNCTURE: CPT

## 2024-04-10 PROCEDURE — 82962 GLUCOSE BLOOD TEST: CPT

## 2024-04-10 PROCEDURE — 1100000000 HC RM PRIVATE

## 2024-04-10 PROCEDURE — 80048 BASIC METABOLIC PNL TOTAL CA: CPT

## 2024-04-10 PROCEDURE — 6370000000 HC RX 637 (ALT 250 FOR IP): Performed by: INTERNAL MEDICINE

## 2024-04-10 PROCEDURE — 6360000002 HC RX W HCPCS: Performed by: PHYSICIAN ASSISTANT

## 2024-04-10 PROCEDURE — 6370000000 HC RX 637 (ALT 250 FOR IP): Performed by: PHYSICIAN ASSISTANT

## 2024-04-10 PROCEDURE — C1751 CATH, INF, PER/CENT/MIDLINE: HCPCS

## 2024-04-10 RX ORDER — SODIUM CHLORIDE 0.9 % (FLUSH) 0.9 %
5-40 SYRINGE (ML) INJECTION EVERY 12 HOURS SCHEDULED
Status: DISCONTINUED | OUTPATIENT
Start: 2024-04-10 | End: 2024-04-11 | Stop reason: HOSPADM

## 2024-04-10 RX ORDER — INSULIN LISPRO 100 [IU]/ML
0-4 INJECTION, SOLUTION INTRAVENOUS; SUBCUTANEOUS
Status: DISCONTINUED | OUTPATIENT
Start: 2024-04-10 | End: 2024-04-11 | Stop reason: HOSPADM

## 2024-04-10 RX ORDER — LABETALOL HYDROCHLORIDE 5 MG/ML
10 INJECTION, SOLUTION INTRAVENOUS EVERY 6 HOURS PRN
Status: DISCONTINUED | OUTPATIENT
Start: 2024-04-10 | End: 2024-04-11 | Stop reason: HOSPADM

## 2024-04-10 RX ORDER — INSULIN LISPRO 100 [IU]/ML
0-4 INJECTION, SOLUTION INTRAVENOUS; SUBCUTANEOUS NIGHTLY
Status: DISCONTINUED | OUTPATIENT
Start: 2024-04-10 | End: 2024-04-11 | Stop reason: HOSPADM

## 2024-04-10 RX ORDER — SODIUM CHLORIDE 9 MG/ML
25 INJECTION, SOLUTION INTRAVENOUS PRN
Status: DISCONTINUED | OUTPATIENT
Start: 2024-04-10 | End: 2024-04-11 | Stop reason: HOSPADM

## 2024-04-10 RX ORDER — SODIUM CHLORIDE 0.9 % (FLUSH) 0.9 %
5-40 SYRINGE (ML) INJECTION PRN
Status: DISCONTINUED | OUTPATIENT
Start: 2024-04-10 | End: 2024-04-11 | Stop reason: HOSPADM

## 2024-04-10 RX ORDER — FENTANYL 75 UG/1
1 PATCH TRANSDERMAL
Status: DISCONTINUED | OUTPATIENT
Start: 2024-04-10 | End: 2024-04-10

## 2024-04-10 RX ADMIN — HYDROMORPHONE HYDROCHLORIDE 1 MG: 1 INJECTION, SOLUTION INTRAMUSCULAR; INTRAVENOUS; SUBCUTANEOUS at 11:50

## 2024-04-10 RX ADMIN — SODIUM CHLORIDE, PRESERVATIVE FREE 10 ML: 5 INJECTION INTRAVENOUS at 08:46

## 2024-04-10 RX ADMIN — ENOXAPARIN SODIUM 30 MG: 100 INJECTION SUBCUTANEOUS at 20:41

## 2024-04-10 RX ADMIN — SODIUM CHLORIDE, PRESERVATIVE FREE 10 ML: 5 INJECTION INTRAVENOUS at 20:42

## 2024-04-10 RX ADMIN — CEFAZOLIN SODIUM 2000 MG: 100 INJECTION, POWDER, LYOPHILIZED, FOR SOLUTION INTRAVENOUS at 17:33

## 2024-04-10 RX ADMIN — GABAPENTIN 600 MG: 300 CAPSULE ORAL at 13:37

## 2024-04-10 RX ADMIN — CEFAZOLIN SODIUM 2000 MG: 100 INJECTION, POWDER, LYOPHILIZED, FOR SOLUTION INTRAVENOUS at 02:39

## 2024-04-10 RX ADMIN — GABAPENTIN 600 MG: 300 CAPSULE ORAL at 20:42

## 2024-04-10 RX ADMIN — KETOROLAC TROMETHAMINE 15 MG: 30 INJECTION, SOLUTION INTRAMUSCULAR at 23:12

## 2024-04-10 RX ADMIN — ACETAMINOPHEN 650 MG: 325 TABLET ORAL at 06:07

## 2024-04-10 RX ADMIN — ACETAMINOPHEN 650 MG: 325 TABLET ORAL at 20:42

## 2024-04-10 RX ADMIN — ACETAMINOPHEN 650 MG: 325 TABLET ORAL at 13:37

## 2024-04-10 RX ADMIN — GABAPENTIN 600 MG: 300 CAPSULE ORAL at 08:45

## 2024-04-10 RX ADMIN — HYDROMORPHONE HYDROCHLORIDE 1 MG: 1 INJECTION, SOLUTION INTRAMUSCULAR; INTRAVENOUS; SUBCUTANEOUS at 07:36

## 2024-04-10 RX ADMIN — Medication 6 MG: at 20:42

## 2024-04-10 RX ADMIN — ENOXAPARIN SODIUM 30 MG: 100 INJECTION SUBCUTANEOUS at 08:45

## 2024-04-10 RX ADMIN — KETOROLAC TROMETHAMINE 15 MG: 30 INJECTION, SOLUTION INTRAMUSCULAR at 17:33

## 2024-04-10 RX ADMIN — INSULIN LISPRO 1 UNITS: 100 INJECTION, SOLUTION INTRAVENOUS; SUBCUTANEOUS at 11:53

## 2024-04-10 RX ADMIN — CEFAZOLIN SODIUM 2000 MG: 100 INJECTION, POWDER, LYOPHILIZED, FOR SOLUTION INTRAVENOUS at 10:11

## 2024-04-10 RX ADMIN — KETOROLAC TROMETHAMINE 15 MG: 30 INJECTION, SOLUTION INTRAMUSCULAR at 10:11

## 2024-04-10 RX ADMIN — HYDROMORPHONE HYDROCHLORIDE 1 MG: 1 INJECTION, SOLUTION INTRAMUSCULAR; INTRAVENOUS; SUBCUTANEOUS at 16:10

## 2024-04-10 RX ADMIN — KETOROLAC TROMETHAMINE 15 MG: 30 INJECTION, SOLUTION INTRAMUSCULAR at 05:36

## 2024-04-10 RX ADMIN — POLYETHYLENE GLYCOL 3350 17 G: 17 POWDER, FOR SOLUTION ORAL at 20:42

## 2024-04-10 RX ADMIN — HYDROMORPHONE HYDROCHLORIDE 1 MG: 1 INJECTION, SOLUTION INTRAMUSCULAR; INTRAVENOUS; SUBCUTANEOUS at 02:39

## 2024-04-10 RX ADMIN — LABETALOL HYDROCHLORIDE 10 MG: 5 INJECTION, SOLUTION INTRAVENOUS at 17:15

## 2024-04-10 RX ADMIN — HYDROMORPHONE HYDROCHLORIDE 1 MG: 1 INJECTION, SOLUTION INTRAMUSCULAR; INTRAVENOUS; SUBCUTANEOUS at 20:41

## 2024-04-10 ASSESSMENT — PAIN DESCRIPTION - LOCATION
LOCATION: HIP
LOCATION: PELVIS
LOCATION: HIP
LOCATION: HIP

## 2024-04-10 ASSESSMENT — PAIN DESCRIPTION - DESCRIPTORS
DESCRIPTORS: SHARP;SHOOTING
DESCRIPTORS: SHARP;THROBBING
DESCRIPTORS: SHARP;THROBBING
DESCRIPTORS: THROBBING;SHARP
DESCRIPTORS: SHARP

## 2024-04-10 ASSESSMENT — PAIN SCALES - GENERAL
PAINLEVEL_OUTOF10: 6
PAINLEVEL_OUTOF10: 8
PAINLEVEL_OUTOF10: 8
PAINLEVEL_OUTOF10: 10
PAINLEVEL_OUTOF10: 9
PAINLEVEL_OUTOF10: 6
PAINLEVEL_OUTOF10: 0
PAINLEVEL_OUTOF10: 6
PAINLEVEL_OUTOF10: 8
PAINLEVEL_OUTOF10: 6

## 2024-04-10 ASSESSMENT — PAIN DESCRIPTION - ORIENTATION
ORIENTATION: LEFT;RIGHT
ORIENTATION: ANTERIOR;POSTERIOR
ORIENTATION: LOWER
ORIENTATION: LEFT;RIGHT

## 2024-04-10 ASSESSMENT — PAIN DESCRIPTION - PAIN TYPE
TYPE: ACUTE PAIN
TYPE: ACUTE PAIN

## 2024-04-10 NOTE — CARE COORDINATION
CM met with Mr. Dee in room 612 to discuss discharge planning.  He is inpatient status for bacteremia.      Prior to admit, he was independent with ADLs, living with his wife and dog Pickles in [place], [place].  He may need to be on home IV abx, so a referral was sent to [facility] yesterday, and CM messaged Christie Lowry, RN liaison to confirm.      If he needs home IV abx, CM will also set up home health.  CM to follow and assist.      04/10/24 0892   Service Assessment   Patient Orientation Alert and Oriented   Cognition Alert   History Provided By Patient   Primary Caregiver Self   Support Systems Spouse/Significant Other   PCP Verified by CM Yes   Prior Functional Level Independent in ADLs/IADLs   Current Functional Level Independent in ADLs/IADLs   Can patient return to prior living arrangement Yes   Ability to make needs known: Good   Family able to assist with home care needs: Yes   Would you like for me to discuss the discharge plan with any other family members/significant others, and if so, who? No   Condition of Participation: Discharge Planning   The Plan for Transition of Care is related to the following treatment goals: home when stable

## 2024-04-11 VITALS
TEMPERATURE: 97.5 F | OXYGEN SATURATION: 96 % | WEIGHT: 275.7 LBS | BODY MASS INDEX: 37.34 KG/M2 | DIASTOLIC BLOOD PRESSURE: 98 MMHG | HEART RATE: 100 BPM | RESPIRATION RATE: 16 BRPM | SYSTOLIC BLOOD PRESSURE: 140 MMHG | HEIGHT: 72 IN

## 2024-04-11 LAB
GLUCOSE BLD STRIP.AUTO-MCNC: 166 MG/DL (ref 65–100)
GLUCOSE BLD STRIP.AUTO-MCNC: 187 MG/DL (ref 65–100)
SERVICE CMNT-IMP: ABNORMAL
SERVICE CMNT-IMP: ABNORMAL

## 2024-04-11 PROCEDURE — 6360000002 HC RX W HCPCS: Performed by: PHYSICIAN ASSISTANT

## 2024-04-11 PROCEDURE — 6370000000 HC RX 637 (ALT 250 FOR IP): Performed by: PHYSICIAN ASSISTANT

## 2024-04-11 PROCEDURE — 2580000003 HC RX 258: Performed by: PHYSICIAN ASSISTANT

## 2024-04-11 PROCEDURE — 2500000003 HC RX 250 WO HCPCS: Performed by: PHYSICIAN ASSISTANT

## 2024-04-11 PROCEDURE — 6370000000 HC RX 637 (ALT 250 FOR IP): Performed by: FAMILY MEDICINE

## 2024-04-11 PROCEDURE — 2580000003 HC RX 258: Performed by: INTERNAL MEDICINE

## 2024-04-11 PROCEDURE — 6360000002 HC RX W HCPCS: Performed by: INTERNAL MEDICINE

## 2024-04-11 PROCEDURE — 82962 GLUCOSE BLOOD TEST: CPT

## 2024-04-11 PROCEDURE — 6370000000 HC RX 637 (ALT 250 FOR IP): Performed by: INTERNAL MEDICINE

## 2024-04-11 RX ORDER — OXYCODONE HYDROCHLORIDE 5 MG/1
5 TABLET ORAL EVERY 6 HOURS PRN
Qty: 28 TABLET | Refills: 0 | Status: SHIPPED | OUTPATIENT
Start: 2024-04-11 | End: 2024-04-18

## 2024-04-11 RX ORDER — AMLODIPINE BESYLATE 10 MG/1
10 TABLET ORAL DAILY
Status: DISCONTINUED | OUTPATIENT
Start: 2024-04-11 | End: 2024-04-11 | Stop reason: HOSPADM

## 2024-04-11 RX ORDER — FENTANYL 12.5 UG/1
1 PATCH TRANSDERMAL
Qty: 5 PATCH | Refills: 0 | Status: SHIPPED | OUTPATIENT
Start: 2024-04-11 | End: 2024-04-11

## 2024-04-11 RX ORDER — AMLODIPINE BESYLATE 5 MG/1
5 TABLET ORAL DAILY
Qty: 30 TABLET | Refills: 0 | Status: SHIPPED | OUTPATIENT
Start: 2024-04-11 | End: 2024-05-11

## 2024-04-11 RX ORDER — METHOCARBAMOL 750 MG/1
750 TABLET, FILM COATED ORAL 3 TIMES DAILY PRN
Qty: 90 TABLET | Refills: 0 | Status: SHIPPED | OUTPATIENT
Start: 2024-04-11 | End: 2024-05-11

## 2024-04-11 RX ORDER — CELECOXIB 200 MG/1
200 CAPSULE ORAL 2 TIMES DAILY PRN
Qty: 60 CAPSULE | Refills: 0 | Status: SHIPPED | OUTPATIENT
Start: 2024-04-11 | End: 2024-05-11

## 2024-04-11 RX ORDER — FENTANYL 12.5 UG/1
1 PATCH TRANSDERMAL
Qty: 5 PATCH | Refills: 0 | Status: SHIPPED | OUTPATIENT
Start: 2024-04-11 | End: 2024-04-26

## 2024-04-11 RX ADMIN — AMLODIPINE BESYLATE 10 MG: 10 TABLET ORAL at 10:55

## 2024-04-11 RX ADMIN — ACETAMINOPHEN 650 MG: 325 TABLET ORAL at 05:06

## 2024-04-11 RX ADMIN — GABAPENTIN 600 MG: 300 CAPSULE ORAL at 09:53

## 2024-04-11 RX ADMIN — KETOROLAC TROMETHAMINE 15 MG: 30 INJECTION, SOLUTION INTRAMUSCULAR at 09:52

## 2024-04-11 RX ADMIN — KETOROLAC TROMETHAMINE 15 MG: 30 INJECTION, SOLUTION INTRAMUSCULAR at 05:06

## 2024-04-11 RX ADMIN — HYDROMORPHONE HYDROCHLORIDE 1 MG: 1 INJECTION, SOLUTION INTRAMUSCULAR; INTRAVENOUS; SUBCUTANEOUS at 06:52

## 2024-04-11 RX ADMIN — HYDROMORPHONE HYDROCHLORIDE 1 MG: 1 INJECTION, SOLUTION INTRAMUSCULAR; INTRAVENOUS; SUBCUTANEOUS at 10:56

## 2024-04-11 RX ADMIN — SODIUM CHLORIDE, PRESERVATIVE FREE 10 ML: 5 INJECTION INTRAVENOUS at 09:52

## 2024-04-11 RX ADMIN — CEFAZOLIN SODIUM 2000 MG: 100 INJECTION, POWDER, LYOPHILIZED, FOR SOLUTION INTRAVENOUS at 09:52

## 2024-04-11 RX ADMIN — ENOXAPARIN SODIUM 30 MG: 100 INJECTION SUBCUTANEOUS at 09:53

## 2024-04-11 RX ADMIN — CEFAZOLIN SODIUM 2000 MG: 100 INJECTION, POWDER, LYOPHILIZED, FOR SOLUTION INTRAVENOUS at 01:05

## 2024-04-11 RX ADMIN — HYDROMORPHONE HYDROCHLORIDE 1 MG: 1 INJECTION, SOLUTION INTRAMUSCULAR; INTRAVENOUS; SUBCUTANEOUS at 01:06

## 2024-04-11 ASSESSMENT — PAIN SCALES - GENERAL
PAINLEVEL_OUTOF10: 7
PAINLEVEL_OUTOF10: 8
PAINLEVEL_OUTOF10: 5
PAINLEVEL_OUTOF10: 6
PAINLEVEL_OUTOF10: 8

## 2024-04-11 ASSESSMENT — PAIN DESCRIPTION - ORIENTATION: ORIENTATION: LEFT;RIGHT

## 2024-04-11 ASSESSMENT — PAIN DESCRIPTION - LOCATION
LOCATION: PELVIS
LOCATION: HIP

## 2024-04-11 ASSESSMENT — PAIN DESCRIPTION - DESCRIPTORS
DESCRIPTORS: SHARP
DESCRIPTORS: SHARP
DESCRIPTORS: THROBBING;SHOOTING

## 2024-04-11 ASSESSMENT — PAIN DESCRIPTION - PAIN TYPE: TYPE: ACUTE PAIN

## 2024-04-11 NOTE — PROGRESS NOTES
Multilevel degeneration.     Findings suggest constipation.   MRI LUMBAR W WO CONTRAST 4/4/24 Narrative & Impression  EXAM: MRI OF THE LUMBAR SPINE WITHOUT AND WITH IV CONTRAST CONTRAST     INDICATION: Midline lower back pain. Fever. History of previous laminectomy.  History of discitis and substance abuse.     COMPARISON: None.     TECHNIQUE: MRI sequences of the lumbar spine were obtained before and after  administration of mL ProHance intravenously.     FINDINGS:  Marrow signal is normal without suspicious lesions.  The intervertebral disks  are well-hydrated and vertebral body heights are maintained.  Alignment is  normal.  The conus terminates at an appropriate level. Previous posterior lumbar  fusion L4-S1.     L1-2: No significant spinal canal stenosis or neuroforaminal narrowing.  L2-3:     Mild broad-based disc bulge indents the anterior thecal sac causing  mild canal stenosis. Moderate bilateral facet arthropathy and moderate  hypertrophy of the ligamentum flavum is also noted. Mild to moderate bilateral  neural foraminal encroachment.  L3-4:     Mild broad-based disc bulge, moderate hypertrophy of the facet joints  cause moderate to significant canal stenosis with moderate bilateral neural  foraminal encroachment.  L4-5:     Evidence of previous laminectomy. Posterior lumbar fusion. No  significant canal stenosis or significant neural foraminal encroachment.  L5-S1: Posterior lumbar fusion without significant canal stenosis or significant  neural foraminal encroachment.     IMPRESSION:     1. Evidence of previous lumbar fusion hardware with previous laminectomies L4/L5  and L5/S1. No significant marrow signal abnormality or abnormal epidural signal  abnormality is appreciated. Additional details are provided within the body of  report. Please see the entire body of report.                         Previous therapies:  Type of Therapy Date Results   Heat/ice  No benefit   Unable to tolerate physical

## 2024-04-11 NOTE — PROGRESS NOTES
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 (NICODERM CQ) 14 MG/24HR 1 patch  1 patch TransDERmal Daily    sodium chloride flush 0.9 % injection 5-40 mL  5-40 mL IntraVENous 2 times per day    sodium chloride flush 0.9 % injection 5-40 mL  5-40 mL IntraVENous PRN    0.9 % sodium chloride infusion   IntraVENous PRN    enoxaparin Sodium (LOVENOX) injection 30 mg  30 mg SubCUTAneous BID    acetaminophen (TYLENOL) tablet 650 mg  650 mg Oral Q6H PRN    Or    acetaminophen (TYLENOL) suppository 650 mg  650 mg Rectal Q6H PRN    glucose chewable tablet 16 g  4 tablet Oral PRN    dextrose bolus 10% 125 mL  125 mL IntraVENous PRN    Or    dextrose bolus 10% 250 mL  250 mL IntraVENous PRN    Glucagon Emergency KIT 1 mg  1 mg SubCUTAneous PRN    dextrose 10 % infusion   IntraVENous Continuous PRN    gabapentin (NEURONTIN) capsule 600 mg  600 mg Oral TID       Signed:  OTF Gracia    Part of this note may have been written by using a voice dictation software.  The note has been proof read but may still contain some grammatical/other typographical errors.   
(NICODERM CQ) 14 MG/24HR 1 patch  1 patch TransDERmal Daily    sodium chloride flush 0.9 % injection 5-40 mL  5-40 mL IntraVENous 2 times per day    sodium chloride flush 0.9 % injection 5-40 mL  5-40 mL IntraVENous PRN    0.9 % sodium chloride infusion   IntraVENous PRN    enoxaparin Sodium (LOVENOX) injection 30 mg  30 mg SubCUTAneous BID    acetaminophen (TYLENOL) tablet 650 mg  650 mg Oral Q6H PRN    Or    acetaminophen (TYLENOL) suppository 650 mg  650 mg Rectal Q6H PRN    glucose chewable tablet 16 g  4 tablet Oral PRN    dextrose bolus 10% 125 mL  125 mL IntraVENous PRN    Or    dextrose bolus 10% 250 mL  250 mL IntraVENous PRN    Glucagon Emergency KIT 1 mg  1 mg SubCUTAneous PRN    dextrose 10 % infusion   IntraVENous Continuous PRN    gabapentin (NEURONTIN) capsule 600 mg  600 mg Oral TID       Signed:  OTF Gracia    Part of this note may have been written by using a voice dictation software.  The note has been proof read but may still contain some grammatical/other typographical errors.

## 2024-04-11 NOTE — DISCHARGE SUMMARY
[employer] test comment       False positive results may rarely occur. Correlate with clinical,epidemiologic, and other laboratory findings           Comment: Please see BCID Interpretation Guide in EPIC Links               All Labs from Last 24 Hrs:  Recent Results (from the past 24 hour(s))   POCT Glucose    Collection Time: 04/10/24  3:47 PM   Result Value Ref Range    POC Glucose 115 (H) 65 - 100 mg/dL    Performed by: Madison    POCT Glucose    Collection Time: 04/10/24  8:21 PM   Result Value Ref Range    POC Glucose 273 (H) 65 - 100 mg/dL    Performed by: Tiara    POCT Glucose    Collection Time: 04/11/24  7:07 AM   Result Value Ref Range    POC Glucose 166 (H) 65 - 100 mg/dL    Performed by: Bhavik    POCT Glucose    Collection Time: 04/11/24 11:14 AM   Result Value Ref Range    POC Glucose 187 (H) 65 - 100 mg/dL    Performed by: Bhavik        No results for input(s): \"COVID19\" in the last 72 hours.    Recent Vital Data:  Patient Vitals for the past 24 hrs:   Temp Pulse Resp BP SpO2   04/11/24 1211 -- 100 16 (!) 140/98 96 %   04/11/24 1126 -- -- 16 -- --   04/11/24 1055 -- -- 17 -- --   04/11/24 1039 -- 88 -- (!) 155/96 --   04/11/24 0728 97.5 °F (36.4 °C) 80 16 (!) 127/95 98 %   04/11/24 0334 98.1 °F (36.7 °C) 87 18 (!) 149/94 95 %   04/11/24 0100 -- 81 -- (!) 142/94 --   04/10/24 2020 97.7 °F (36.5 °C) 92 18 (!) 151/103 97 %   04/10/24 1759 -- 85 -- (!) 134/93 --   04/10/24 1653 -- 86 -- (!) 148/93 --   04/10/24 1650 -- 88 -- (!) 136/105 --   04/10/24 1640 -- -- 18 -- --   04/10/24 1610 -- -- 17 -- --   04/10/24 1546 97.7 °F (36.5 °C) 81 18 (!) 140/100 96 %       Oxygen Therapy  SpO2: 96 %  O2 Device: None (Room air)    Estimated body mass index is 37.39 kg/m² as calculated from the following:    Height as of this encounter: 1.829 m (6').    Weight as of this encounter: 125.1 kg (275 lb 11.2 oz).    Intake/Output Summary (Last 24 hours) at 4/11/2024 1227  Last data filed at

## 2024-04-11 NOTE — CARE COORDINATION
Discharge note:  Mr. Dee has a PICC in his left arm, with an extension set.  His next dose of IV abx is 18:00 today.  CM notified [facility] and they will deliver supplies prior to then.  [facility] ([address], [place] [address], [phone]) once per week for PICC dressing change and blood draws ordered by ID.  He will not receive home health.  No other discharge needs voiced or identified.       04/11/24 8237   Service Assessment   Patient Orientation Alert and Oriented   Services At/After Discharge   Services At/After Discharge IV Therapy  (IV abx from [facility], and f/u weekly at their OP infusion center for PICC dsg changes and blood draws for ID)

## 2024-04-12 ENCOUNTER — TELEPHONE (OUTPATIENT)
Dept: INTERNAL MEDICINE CLINIC | Facility: CLINIC | Age: 44
End: 2024-04-12

## 2024-04-12 NOTE — TELEPHONE ENCOUNTER
Care Transitions Initial Follow Up Call    Outreach made within 2 business days of discharge: Yes    Patient: Fabio Dee Patient : 1980   MRN: [id number]  Reason for Admission: There are no discharge diagnoses documented for the most recent discharge.  Discharge Date: 24       Spoke with: IFRAH for patient to return my call

## 2024-04-15 ENCOUNTER — TELEPHONE (OUTPATIENT)
Dept: INTERNAL MEDICINE CLINIC | Facility: CLINIC | Age: 44
End: 2024-04-15

## 2024-04-15 ENCOUNTER — TELEMEDICINE (OUTPATIENT)
Dept: INTERNAL MEDICINE CLINIC | Facility: CLINIC | Age: 44
End: 2024-04-15

## 2024-04-15 DIAGNOSIS — Z09 HOSPITAL DISCHARGE FOLLOW-UP: ICD-10-CM

## 2024-04-15 DIAGNOSIS — M86.9 OSTEOMYELITIS OF SYMPHYSIS PUBIS (HCC): Primary | ICD-10-CM

## 2024-04-15 PROBLEM — F11.21 OPIOID USE DISORDER, MODERATE, IN EARLY REMISSION (HCC): Status: RESOLVED | Noted: 2022-04-19 | Resolved: 2024-04-15

## 2024-04-15 PROBLEM — F17.210 NICOTINE DEPENDENCE, CIGARETTES, UNCOMPLICATED: Status: ACTIVE | Noted: 2022-03-04

## 2024-04-15 PROBLEM — R41.840 INATTENTION: Status: RESOLVED | Noted: 2022-07-05 | Resolved: 2024-04-15

## 2024-04-15 PROBLEM — G89.29 CHRONIC BILATERAL LOW BACK PAIN WITHOUT SCIATICA: Status: ACTIVE | Noted: 2022-02-10

## 2024-04-15 PROBLEM — B18.2 CHRONIC HEPATITIS C WITHOUT HEPATIC COMA (HCC): Status: ACTIVE | Noted: 2022-03-04

## 2024-04-15 PROBLEM — R23.8 IMPAIRED TISSUE INTEGRITY: Status: ACTIVE | Noted: 2024-04-15

## 2024-04-15 PROBLEM — F90.9 ATTENTION DEFICIT HYPERACTIVITY DISORDER: Status: ACTIVE | Noted: 2024-04-15

## 2024-04-15 PROBLEM — M46.45 DISCITIS OF THORACOLUMBAR REGION: Status: ACTIVE | Noted: 2022-06-16

## 2024-04-15 PROBLEM — F34.1 DYSTHYMIA: Status: ACTIVE | Noted: 2022-03-09

## 2024-04-15 PROBLEM — M54.50 CHRONIC BILATERAL LOW BACK PAIN WITHOUT SCIATICA: Status: ACTIVE | Noted: 2022-02-10

## 2024-04-15 PROBLEM — Z91.89 AT RISK FOR INFECTION: Status: ACTIVE | Noted: 2024-04-15

## 2024-04-15 NOTE — TELEPHONE ENCOUNTER
----- Message from Mumtaz Marroquin DO sent at 4/15/2024  9:18 AM EDT -----  Regarding: labs  Please contact the patient and let him know orders have been placed for a CBC with differential and a CMP. These orders were requested by the hospitalist at discharge and were not placed. We're monitoring his WBC count and kidney function with his use of two anti-inflammatories. They can be drawn at his convenience.

## 2024-04-15 NOTE — PROGRESS NOTES
Fabio Dee, was evaluated through a synchronous (real-time) audio-video encounter. The patient (or guardian if applicable) is aware that this is a billable service, which includes applicable co-pays. This Virtual Visit was conducted with patient's (and/or legal guardian's) consent. Patient identification was verified, and a caregiver was present when appropriate.   The patient was located at Home: [address] [place] [id number]  Provider was located at Facility (Appt Dept): [facility] [place] [address]  Confirm you are appropriately licensed, registered, or certified to deliver care in the state where the patient is located as indicated above. If you are not or unsure, please re-schedule the visit: Yes, I confirm.     Fabio Dee (:  1980) is a Established patient, presenting virtually for evaluation of the following:    Assessment & Plan   Below is the assessment and plan developed based on review of pertinent history, physical exam, labs, studies, and medications.  1. Osteomyelitis of symphysis pubis (HCC)  -     CBC with Auto Differential; Future  -     Comprehensive Metabolic Panel; Future  2. Hospital discharge follow-up  -     [place] DISCHARGE MEDS RECONCILED W/ CURRENT OUTPATIENT MED LIST    Return in 3 months (on 7/15/2024).       Subjective   Patient is a 43-year-old male with complex medical history presenting for virtual visit.  Patient was recently discharged from the hospital after significant bacteremia.  Patient was seen by infectious disease has been treated by IV antibiotics with PICC line.  Patient has follow-up visit with infectious disease in the near future.  Patient found to have pubic symphysis osteomyelitis with abdominal musculature edema.  Patient was referred to the emergency department by pain management.  MSSA bacteremia as well as bilateral pneumonia at admission.    ID recommendations as below:     · Continue Cefazolin 2 grams Q 8 hours

## 2024-04-24 ENCOUNTER — OFFICE VISIT (OUTPATIENT)
Age: 44
End: 2024-04-24
Payer: COMMERCIAL

## 2024-04-24 DIAGNOSIS — Z79.891 ENCOUNTER FOR LONG-TERM OPIATE ANALGESIC USE: ICD-10-CM

## 2024-04-24 DIAGNOSIS — G89.4 CHRONIC PAIN SYNDROME: ICD-10-CM

## 2024-04-24 DIAGNOSIS — M54.42 CHRONIC BILATERAL LOW BACK PAIN WITH BILATERAL SCIATICA: Primary | ICD-10-CM

## 2024-04-24 DIAGNOSIS — M54.41 CHRONIC BILATERAL LOW BACK PAIN WITH BILATERAL SCIATICA: Primary | ICD-10-CM

## 2024-04-24 DIAGNOSIS — G89.29 CHRONIC BILATERAL LOW BACK PAIN WITH BILATERAL SCIATICA: Primary | ICD-10-CM

## 2024-04-24 PROCEDURE — 99213 OFFICE O/P EST LOW 20 MIN: CPT | Performed by: ANESTHESIOLOGY

## 2024-04-24 RX ORDER — METHOCARBAMOL 750 MG/1
750 TABLET, FILM COATED ORAL 3 TIMES DAILY PRN
Qty: 90 TABLET | Refills: 2 | Status: SHIPPED | OUTPATIENT
Start: 2024-04-24 | End: 2024-07-23

## 2024-04-24 RX ORDER — ONDANSETRON 4 MG/1
4 TABLET, FILM COATED ORAL EVERY 12 HOURS PRN
Qty: 20 TABLET | Refills: 0 | Status: SHIPPED | OUTPATIENT
Start: 2024-04-24 | End: 2024-05-04

## 2024-04-24 RX ORDER — GABAPENTIN 600 MG/1
600 TABLET ORAL 3 TIMES DAILY
Qty: 90 TABLET | Refills: 2 | Status: SHIPPED | OUTPATIENT
Start: 2024-04-24 | End: 2024-07-23

## 2024-04-24 RX ORDER — CELECOXIB 200 MG/1
200 CAPSULE ORAL 2 TIMES DAILY PRN
Qty: 60 CAPSULE | Refills: 2 | Status: SHIPPED | OUTPATIENT
Start: 2024-04-24

## 2024-04-24 RX ORDER — BUPRENORPHINE 15 UG/H
1 PATCH TRANSDERMAL WEEKLY
Qty: 4 PATCH | Refills: 0 | Status: SHIPPED | OUTPATIENT
Start: 2024-04-24 | End: 2024-04-25

## 2024-04-25 DIAGNOSIS — G89.4 CHRONIC PAIN SYNDROME: ICD-10-CM

## 2024-04-25 DIAGNOSIS — Z79.891 ENCOUNTER FOR LONG-TERM OPIATE ANALGESIC USE: ICD-10-CM

## 2024-04-25 RX ORDER — BUPRENORPHINE 15 UG/H
1 PATCH TRANSDERMAL WEEKLY
Qty: 4 PATCH | Refills: 0 | Status: SHIPPED | OUTPATIENT
Start: 2024-04-25 | End: 2024-05-23

## 2024-04-25 NOTE — TELEPHONE ENCOUNTER
Pt asks that Butrans patches be transferred to [facility] at [address]. He states medication is out of stock at previous [facility]. He provided a number of [phone]

## 2024-04-26 LAB
CATHINONE, URINE: NORMAL
METHCATHINONE, URINE: NORMAL

## 2024-04-29 ENCOUNTER — TELEPHONE (OUTPATIENT)
Dept: NEUROSURGERY | Age: 44
End: 2024-04-29

## 2024-04-29 DIAGNOSIS — G89.4 CHRONIC PAIN SYNDROME: Primary | ICD-10-CM

## 2024-04-29 NOTE — TELEPHONE ENCOUNTER
BUPRENORPHINE 15 MCG/HR PATCH TDWK is not covered for this Member.    Please advise on next steps.  Pt did pay for the rx, out of pocket.

## 2024-04-29 NOTE — TELEPHONE ENCOUNTER
Pt called and wanted to make appt with you to discuss next surgery, he said josé had discussed a lami... I didn't see anything in your notes from hospital, I saw where Niall said non surgical.. Per pt Infectious disease said they wanted him to be on antibiotics when he has this surgery... Please advise. Also will need WC AUTH PRIOR

## 2024-05-08 ENCOUNTER — NURSE ONLY (OUTPATIENT)
Dept: INTERNAL MEDICINE CLINIC | Facility: CLINIC | Age: 44
End: 2024-05-08

## 2024-05-08 DIAGNOSIS — M86.9 OSTEOMYELITIS OF SYMPHYSIS PUBIS (HCC): ICD-10-CM

## 2024-05-08 DIAGNOSIS — E29.1 HYPOGONADISM IN MALE: ICD-10-CM

## 2024-05-08 LAB
ALBUMIN SERPL-MCNC: 3.5 G/DL (ref 3.5–5)
ALBUMIN/GLOB SERPL: 0.8 (ref 1–1.9)
ALP SERPL-CCNC: 84 U/L (ref 40–129)
ALT SERPL-CCNC: 19 U/L (ref 12–65)
ANION GAP SERPL CALC-SCNC: 11 MMOL/L (ref 9–18)
AST SERPL-CCNC: 27 U/L (ref 15–37)
BASOPHILS # BLD: 0.1 K/UL (ref 0–0.2)
BASOPHILS NFR BLD: 1 % (ref 0–2)
BILIRUB SERPL-MCNC: 0.3 MG/DL (ref 0–1.2)
BUN SERPL-MCNC: 18 MG/DL (ref 6–23)
CALCIUM SERPL-MCNC: 8.7 MG/DL (ref 8.8–10.2)
CHLORIDE SERPL-SCNC: 104 MMOL/L (ref 98–107)
CO2 SERPL-SCNC: 25 MMOL/L (ref 20–28)
CREAT SERPL-MCNC: 0.59 MG/DL (ref 0.8–1.3)
DIFFERENTIAL METHOD BLD: ABNORMAL
EOSINOPHIL # BLD: 0.3 K/UL (ref 0–0.8)
EOSINOPHIL NFR BLD: 5 % (ref 0.5–7.8)
ERYTHROCYTE [DISTWIDTH] IN BLOOD BY AUTOMATED COUNT: 13.3 % (ref 11.9–14.6)
GLOBULIN SER CALC-MCNC: 4.4 G/DL (ref 2.3–3.5)
GLUCOSE SERPL-MCNC: 71 MG/DL (ref 70–99)
HCT VFR BLD AUTO: 36.1 % (ref 41.1–50.3)
HGB BLD-MCNC: 11.7 G/DL (ref 13.6–17.2)
IMM GRANULOCYTES # BLD AUTO: 0 K/UL (ref 0–0.5)
IMM GRANULOCYTES NFR BLD AUTO: 1 % (ref 0–5)
LYMPHOCYTES # BLD: 1.6 K/UL (ref 0.5–4.6)
LYMPHOCYTES NFR BLD: 30 % (ref 13–44)
MCH RBC QN AUTO: 30.5 PG (ref 26.1–32.9)
MCHC RBC AUTO-ENTMCNC: 32.4 G/DL (ref 31.4–35)
MCV RBC AUTO: 94.3 FL (ref 82–102)
MONOCYTES # BLD: 0.7 K/UL (ref 0.1–1.3)
MONOCYTES NFR BLD: 13 % (ref 4–12)
NEUTS SEG # BLD: 2.7 K/UL (ref 1.7–8.2)
NEUTS SEG NFR BLD: 50 % (ref 43–78)
NRBC # BLD: 0 K/UL (ref 0–0.2)
PLATELET # BLD AUTO: 205 K/UL (ref 150–450)
PMV BLD AUTO: 8.8 FL (ref 9.4–12.3)
POTASSIUM SERPL-SCNC: 4 MMOL/L (ref 3.5–5.1)
PROLACTIN SERPL-MCNC: 63.7 NG/ML (ref 4–15.2)
PROT SERPL-MCNC: 7.9 G/DL (ref 6.3–8.2)
RBC # BLD AUTO: 3.83 M/UL (ref 4.23–5.6)
SODIUM SERPL-SCNC: 140 MMOL/L (ref 136–145)
TSH W FREE THYROID IF ABNORMAL: 4.05 UIU/ML (ref 0.27–4.2)
WBC # BLD AUTO: 5.3 K/UL (ref 4.3–11.1)

## 2024-05-11 LAB
SHBG SERPL-SCNC: 17.7 NMOL/L (ref 16.5–55.9)
TESTOST SERPL-MCNC: 67 NG/DL (ref 264–916)
TESTOST SERPL-MCNC: 67 NG/DL (ref 264–916)

## 2024-05-13 DIAGNOSIS — E29.1 HYPOGONADISM IN MALE: ICD-10-CM

## 2024-05-13 DIAGNOSIS — E22.1 HYPERPROLACTINEMIA (HCC): Primary | ICD-10-CM

## 2024-05-13 LAB
TESTOST FREE SERPL-MCNC: 1.7 PG/ML (ref 6.8–21.5)
TESTOST SERPL-MCNC: 67 NG/DL (ref 264–916)

## 2024-05-14 DIAGNOSIS — E29.1 HYPOGONADISM IN MALE: Primary | ICD-10-CM

## 2024-05-14 RX ORDER — TESTOSTERONE 16.2 MG/G
1 GEL TRANSDERMAL DAILY
Qty: 75 G | Refills: 1 | Status: SHIPPED | OUTPATIENT
Start: 2024-05-14 | End: 2024-09-11

## 2024-05-14 NOTE — TELEPHONE ENCOUNTER
----- Message from Mumtaz Marroquin DO sent at 5/13/2024  2:28 PM EDT -----  Recent lab works become available.  Testosterone continues to be low.  Most recent value was at 67.  Comprehensive metabolic panel with very good electrolytes, kidney function, and liver function.  Increased globulin values redemonstrated.  Prolactin significantly elevated concerning for the potential of a pituitary microadenoma.  Complete blood count within normal white cell line, and platelet line.  Continued slight decrease in hemoglobin levels.  Sex hormone binding globulin within normal range.  Thyroid-stimulating hormone within normal range.  Protein MRI of the pituitary to evaluate for potential pituitary adenoma.

## 2024-05-15 ENCOUNTER — TELEPHONE (OUTPATIENT)
Dept: INTERNAL MEDICINE CLINIC | Facility: CLINIC | Age: 44
End: 2024-05-15

## 2024-05-15 NOTE — TELEPHONE ENCOUNTER
Received notification that Androgel has been approved.    Approval Dates: 5/15/2024-5/15/2025  PA Case ID: [id number]      Will notify pt via My Chart of approval. Ty

## 2024-05-15 NOTE — TELEPHONE ENCOUNTER
He has a low prolactin, so we need to look at his pituitary. An MRI was ordered. He has re-=demonstrated low testosterone on several occasions and would benefit from increased testosterone. I'd like for him to use 1 pump of Androgel to both arms daily.          Spoke to patient, per Dr Marroquin, message above was relayed, understanding was expressed.  Patient stated that his insurance denied coverage for the Androgel,a  PA is needed. Patient would need to pay $400 plus a month for the medication.

## 2024-05-15 NOTE — TELEPHONE ENCOUNTER
----- Message from Mumtaz Marroquin DO sent at 5/14/2024  4:23 PM EDT -----  Regarding: low testosterone  He has a low prolactin, so we need to look at his pituitary. An MRI was ordered. He has re-=demonstrated low testosterone on several occasions and would benefit from increased testosterone. I'd like for him to use 1 pump of Androgel to both arms daily.   ----- Message -----  From: Costa Peralta Incoming [place] [facility]  Sent: 5/8/2024   8:27 PM EDT  To: Mumtaz Marroquin DO

## 2024-05-15 NOTE — TELEPHONE ENCOUNTER
Initiated PA for Androgel. Awaiting approval or denial of medication. Will notify pt once I receive notification. Ty

## 2024-05-17 ENCOUNTER — HOSPITAL ENCOUNTER (EMERGENCY)
Age: 44
Discharge: HOME | End: 2024-05-17
Payer: COMMERCIAL

## 2024-05-17 VITALS
TEMPERATURE: 97.3 F | RESPIRATION RATE: 18 BRPM | HEIGHT: 72 IN | OXYGEN SATURATION: 94 % | BODY MASS INDEX: 35.89 KG/M2 | DIASTOLIC BLOOD PRESSURE: 69 MMHG | HEART RATE: 73 BPM | SYSTOLIC BLOOD PRESSURE: 131 MMHG | WEIGHT: 265 LBS
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
## 2024-05-17 PROCEDURE — 99283 EMERGENCY DEPT VISIT LOW MDM: CPT

## 2024-05-17 PROCEDURE — 90471 IMMUNIZATION ADMIN: CPT

## 2024-05-17 PROCEDURE — 6370000000 HC RX 637 (ALT 250 FOR IP)

## 2024-05-17 PROCEDURE — 6360000002 HC RX W HCPCS

## 2024-05-17 PROCEDURE — 90714 TD VACC NO PRESV 7 YRS+ IM: CPT

## 2024-05-17 PROCEDURE — 12011 RPR F/E/E/N/L/M 2.5 CM/<: CPT

## 2024-05-17 PROCEDURE — 2500000003 HC RX 250 WO HCPCS

## 2024-05-17 RX ORDER — AMOXICILLIN AND CLAVULANATE POTASSIUM 875; 125 MG/1; MG/1
1 TABLET, FILM COATED ORAL 2 TIMES DAILY
Qty: 20 TABLET | Refills: 0 | Status: SHIPPED | OUTPATIENT
Start: 2024-05-17 | End: 2024-05-27

## 2024-05-17 RX ORDER — LIDOCAINE HYDROCHLORIDE 10 MG/ML
10 INJECTION, SOLUTION INFILTRATION; PERINEURAL
Status: COMPLETED | OUTPATIENT
Start: 2024-05-17 | End: 2024-05-17

## 2024-05-17 RX ORDER — AMOXICILLIN AND CLAVULANATE POTASSIUM 875; 125 MG/1; MG/1
1 TABLET, FILM COATED ORAL
Status: COMPLETED | OUTPATIENT
Start: 2024-05-17 | End: 2024-05-17

## 2024-05-17 RX ADMIN — LIDOCAINE HYDROCHLORIDE 10 ML: 10 INJECTION, SOLUTION INFILTRATION; PERINEURAL at 21:34

## 2024-05-17 RX ADMIN — Medication 3 ML: at 21:35

## 2024-05-17 RX ADMIN — AMOXICILLIN AND CLAVULANATE POTASSIUM 1 TABLET: 875; 125 TABLET, FILM COATED ORAL at 22:50

## 2024-05-17 RX ADMIN — CLOSTRIDIUM TETANI TOXOID ANTIGEN (FORMALDEHYDE INACTIVATED) AND CORYNEBACTERIUM DIPHTHERIAE TOXOID ANTIGEN (FORMALDEHYDE INACTIVATED) 0.5 ML: 5; 2 INJECTION, SUSPENSION INTRAMUSCULAR at 22:52

## 2024-05-17 ASSESSMENT — PAIN - FUNCTIONAL ASSESSMENT: PAIN_FUNCTIONAL_ASSESSMENT: NONE - DENIES PAIN

## 2024-05-17 ASSESSMENT — PAIN DESCRIPTION - PAIN TYPE: TYPE: ACUTE PAIN

## 2024-05-18 NOTE — ED TRIAGE NOTES
Patient presents with dog bite to right nostril from family dog, stated his dog is UTD with shots

## 2024-05-18 NOTE — DISCHARGE INSTRUCTIONS
Please begin taking the Augmentin as prescribed.  Keep the area clean with normal soap and water.  You can apply a thin layer of Neosporin dressing as well to the wound.    Continue to monitor closely for any surrounding redness, warmth, streaking, drainage, fevers, or signs of infection in which she should return immediately to the ED.    Follow-up with primary care in the next few days to ensure the wound is well-healing.     The stitch will need to be removed in the next 5 to 7 days.  You can return to ED or follow-up with PCP for this.

## 2024-05-18 NOTE — ED PROVIDER NOTES
Emergency Department Provider Note       PCP: Mumtaz Marroquin DO   Age: 43 y.o.   Sex: male     DISPOSITION Discharge - Pending Orders Complete 05/17/2024 10:23:01 PM       ICD-10-CM    1. Dog bite, initial encounter  W54.0XXA           Medical Decision Making     43-year-old male presents after his dog bit him just prior to arrival.  Patient states he was laying on the ground when his dog was licking him on his face and then randomly and bit him on his nose.  He states he has a small laceration present to the right nostril.     On presentation, patient is afebrile, vital signs are stable, and he is well-appearing in no acute distress.  He is about  0.25 cm linear laceration present to his right nostril medial nostril extending into the vestibule.  No evidence of septal hematoma, nasal bone tenderness to palpation.  No oral injury or dental tenderness.    The wound was intensely irrigated.  We discussed different methods of closure and did agree upon 1 suture to tack down the area and for cosmetic results.  The area was anesthetized using LET and 1% lidocaine without epi, once appropriate anesthesia was achieved the wound was intensely irrigated.  Then repaired using one 6-0 Prolene suture, patient tolerated the procedure well.  We discussed wound care at home.  After research it does not appear patient's Dur[employer] will cover for Pasteurella, will begin him on Augmentin as well for dog bite infection.  Tetanus shot was updated at today's visit.  He will follow-up with his primary care provider in the next 5 to 7 days for suture removal.  He was given strict return precautions and signs and symptoms to be concern for which she should return to the ED such as any redness, warmth, streaking, drainage, excetra.  Patient and his wife verbalized understanding with plan of care and left the facility in stable condition.     1 acute complicated illness or injury.  Prescription drug management performed.  Shared medical

## 2024-05-22 ENCOUNTER — OFFICE VISIT (OUTPATIENT)
Age: 44
End: 2024-05-22
Payer: COMMERCIAL

## 2024-05-22 ENCOUNTER — OFFICE VISIT (OUTPATIENT)
Dept: NEUROSURGERY | Age: 44
End: 2024-05-22
Payer: COMMERCIAL

## 2024-05-22 ENCOUNTER — HOSPITAL ENCOUNTER (EMERGENCY)
Age: 44
Discharge: HOME | End: 2024-05-22

## 2024-05-22 VITALS
DIASTOLIC BLOOD PRESSURE: 76 MMHG | TEMPERATURE: 97.7 F | HEIGHT: 72 IN | WEIGHT: 273 LBS | SYSTOLIC BLOOD PRESSURE: 111 MMHG | BODY MASS INDEX: 36.98 KG/M2 | HEART RATE: 88 BPM | RESPIRATION RATE: 18 BRPM | OXYGEN SATURATION: 96 %

## 2024-05-22 VITALS
SYSTOLIC BLOOD PRESSURE: 128 MMHG | WEIGHT: 273 LBS | OXYGEN SATURATION: 97 % | DIASTOLIC BLOOD PRESSURE: 78 MMHG | BODY MASS INDEX: 36.98 KG/M2 | TEMPERATURE: 97.3 F | HEIGHT: 72 IN | HEART RATE: 73 BPM

## 2024-05-22 DIAGNOSIS — G89.4 CHRONIC PAIN SYNDROME: ICD-10-CM

## 2024-05-22 DIAGNOSIS — M54.41 CHRONIC BILATERAL LOW BACK PAIN WITH BILATERAL SCIATICA: ICD-10-CM

## 2024-05-22 DIAGNOSIS — Z79.891 ENCOUNTER FOR LONG-TERM OPIATE ANALGESIC USE: ICD-10-CM

## 2024-05-22 DIAGNOSIS — M54.42 CHRONIC BILATERAL LOW BACK PAIN WITH BILATERAL SCIATICA: ICD-10-CM

## 2024-05-22 DIAGNOSIS — M48.061 SPINAL STENOSIS, LUMBAR REGION, WITHOUT NEUROGENIC CLAUDICATION: ICD-10-CM

## 2024-05-22 DIAGNOSIS — Z48.02 VISIT FOR SUTURE REMOVAL: Primary | ICD-10-CM

## 2024-05-22 DIAGNOSIS — G89.29 CHRONIC BILATERAL LOW BACK PAIN WITH BILATERAL SCIATICA: ICD-10-CM

## 2024-05-22 DIAGNOSIS — M79.18 MYALGIA, OTHER SITE: Primary | ICD-10-CM

## 2024-05-22 DIAGNOSIS — M54.59 LUMBAR TRIGGER POINT SYNDROME: Primary | ICD-10-CM

## 2024-05-22 PROCEDURE — 99214 OFFICE O/P EST MOD 30 MIN: CPT | Performed by: ANESTHESIOLOGY

## 2024-05-22 PROCEDURE — 20552 NJX 1/MLT TRIGGER POINT 1/2: CPT | Performed by: ANESTHESIOLOGY

## 2024-05-22 PROCEDURE — 99214 OFFICE O/P EST MOD 30 MIN: CPT | Performed by: NEUROLOGICAL SURGERY

## 2024-05-22 RX ORDER — METHOCARBAMOL 750 MG/1
750 TABLET, FILM COATED ORAL 3 TIMES DAILY PRN
Qty: 90 TABLET | Refills: 0 | Status: SHIPPED | OUTPATIENT
Start: 2024-05-22 | End: 2024-06-21

## 2024-05-22 RX ORDER — GABAPENTIN 600 MG/1
600 TABLET ORAL 3 TIMES DAILY
Qty: 90 TABLET | Refills: 0 | Status: SHIPPED | OUTPATIENT
Start: 2024-05-22 | End: 2024-06-21

## 2024-05-22 RX ORDER — BUPIVACAINE HYDROCHLORIDE 5 MG/ML
30 INJECTION, SOLUTION EPIDURAL; INTRACAUDAL ONCE
Status: COMPLETED | OUTPATIENT
Start: 2024-05-22 | End: 2024-05-22

## 2024-05-22 RX ORDER — BUPRENORPHINE 15 UG/H
1 PATCH TRANSDERMAL WEEKLY
Qty: 4 PATCH | Refills: 1 | Status: SHIPPED | OUTPATIENT
Start: 2024-05-23 | End: 2024-07-18

## 2024-05-22 RX ORDER — CEFADROXIL 1000 MG/1
1 TABLET ORAL 2 TIMES DAILY
COMMUNITY
Start: 2024-05-17 | End: 2024-08-15

## 2024-05-22 RX ADMIN — BUPIVACAINE HYDROCHLORIDE 150 MG: 5 INJECTION, SOLUTION EPIDURAL; INTRACAUDAL at 16:11

## 2024-05-22 ASSESSMENT — ENCOUNTER SYMPTOMS
ABDOMINAL PAIN: 0
PHOTOPHOBIA: 0
SHORTNESS OF BREATH: 0
COUGH: 0
TROUBLE SWALLOWING: 0
VOMITING: 0
FACIAL SWELLING: 0

## 2024-05-22 ASSESSMENT — PAIN - FUNCTIONAL ASSESSMENT: PAIN_FUNCTIONAL_ASSESSMENT: NONE - DENIES PAIN

## 2024-05-22 NOTE — PROGRESS NOTES
[facility] OFFICE NOTE:         CC: Back pain and history of osteomyelitis    History of Present Illness:  Fabio Dee (1980) is a 43 y.o.  male who has a very complicated history.  He had a L4-S1 fusion in 2001.  He presented with thoracic osteomyelitis and epidural abscess in February 2023.  Then in March 2023 had a laminectomy and decompression from L3-4.  He was admitted to the hospital in May of this year with severe pain in his hip and groin and pelvis states that this pain is completely different from what he had had before.  He was found to be septic and it was felt that he still had active thoracic osteomyelitis even though he has been on long-term antibiotics.  The MRI he had in the hospital demonstrated that he still had fairly significant stenosis at L3-4 still.  And some moderate stenosis at L2-3.  He returns today after his discharge from the hospital.  He still having significant pain.  He has an appoint with Dr. Priest later today.    HPI      Past Medical History:   Diagnosis Date    ADHD     Chronic hepatitis C (HCC)     treated with antivirals finished @11/22    History of blood transfusion 2001    Hx of echocardiogram 04/05/2022    EF 55-65%    Inattention 07/05/2022    Last Assessment & Plan:    Formatting of this note might be different from the original.   At this time I do not have previous documentation of ADHD and will hold off on placing official diagnosis in chart.  Given his high risk history of opioid use disorder, I would prefer psychiatry managing and making recommendations.  Did discuss low risk medications including atomoxetine which is unfortunatel    Kidney stone     Opioid use disorder, moderate, in early remission (HCC) 04/19/2022    Last Assessment & Plan:    Formatting of this note might be different from the original.   High risk.  He currently has a reason for severe back pain with his discitis and osteomyelitis.  Prescription

## 2024-05-22 NOTE — PROGRESS NOTES
NAME: Fabio Dee   ID:[id number]   :1980  DOS:2024    Procedure: Trigger Point Injections into: Right superior iliac spine    Pre-op Diagnosis: Myalgia    Post-op Diagnosis: Same     Anesthesia: Local only     Complications: None     Indication: Myalgia    Procedure note:  After informed written consent was obtained from the patient. The patient was placed in a seated position, leaning forward. The above muscle trigger points were identified with the patient's assistance and marked. A total of 1 sites were identified. Each site was prepped with chlorohexidine. Each site was injected using a 25 or 27-gauge needle after negative aspiration. Total of 1cc of 0.25% bupivacaine was injected at each site, needle manipulated and 10sec of pressure was applied. The patient tolerated the procedure well. There were no obvious complications were encountered. The patient was discharged in an awake and alert condition.      Pre/Post percentage relief: 50%      Plan: The patient will follow-up with me in approximately 3 to 4 weeks to determine efficacy as well as further treatment regimen.     Gerson Priest MD    
controlled and illicit substance abuse.  Patient's wife controlled medication.  Patient is unaware of location his medications.  Patient request to avoid oral medication as he feels this is the most likely to be abused.  He request patches only.  Informed him I do not write fentanyl patches for this reason.  But had a long discussion regarding restrictions limitations and the option for Butrans considering his condition and position he has found himself in after discharge.  (Patient requested his wife to dispose of discharge oxycodone as he has concerns that he will attempt to find and abuse this medication)    Was medication brought in? No  Medication buprenorphine (BUTRANS) 15 MCG/HR PTWK   Last Filled   Last Dose today is the last day  Remaining na      General Recommendations: The pain condition that the patient suffers from is best treated with a multidisciplinary approach that involves an increase in physical activity to prevent de-conditioning and worsening of the pain cycle, as well as psychological counseling (formal and/or informal) to address the co morbid psychological effects of pain. Treatment will often involve judicious use of pain medications and interventional procedures to decrease the pain, allowing the patient to participate in the physical activity that will ultimately produce long-lasting pain reductions. The goal of the multidisciplinary approach is to return the patient to a higher level of overall function and to restore their ability to perform activities of daily living.      Update since previous encounter: Patient returns for follow-up and treatment regarding chronic pain, low back radicular pain, postlaminectomy syndrome.  Patient presents today after following up with Dr. Rush and neurosurgery.  Upon evaluation patient is found to have significant myofascial tenderness at the right posterior iliac spine.  Pain is recreated to immense degree with palpation.  We therefore discussed

## 2024-05-22 NOTE — ED PROVIDER NOTES
55-65%    Inattention 07/05/2022    Last Assessment & Plan:    Formatting of this note might be different from the original.   At this time I do not have previous documentation of ADHD and will hold off on placing official diagnosis in chart.  Given his high risk history of opioid use disorder, I would prefer psychiatry managing and making recommendations.  Did discuss low risk medications including atomoxetine which is unfortunatel    Kidney stone     Opioid use disorder, moderate, in early remission (HCC) 04/19/2022    Last Assessment & Plan:    Formatting of this note might be different from the original.   High risk.  He currently has a reason for severe back pain with his discitis and osteomyelitis.  Prescription monitoring program reviewed before opioid prescription today.    Osteomyelitis ([facility]) 04/2022    right foot    Substance abuse ([facility])     alcohol, opiates, in recovery per patient    Trauma 2001    intialy treated at [place], [place]        Past Surgical History:   Procedure Laterality Date    FEMUR FRACTURE SURGERY  2001    femur preeti    HUMERUS SURGERY  2001    plate    LUMBAR SPINE SURGERY Bilateral 3/17/2023    Bilateral L3-4 Laminectomy and Facetectomy performed by Gianluca Call MD at  [facility]    ORTHOPEDIC SURGERY  2022    spinal fusion and then I&D, L4-S1    OTHER SURGICAL HISTORY  2001    chema filter    SPINAL FUSION  2001    TOE AMPUTATION Left 07/30/2017    2nd toe        Social History     Socioeconomic History    Marital status:      Spouse name: None    Number of children: None    Years of education: None    Highest education level: None   Tobacco Use    Smoking status: Every Day     Current packs/day: 1.00     Average packs/day: 1 pack/day for 20.2 years (20.2 ttl pk-yrs)     Types: Cigarettes     Start date: 2014    Smokeless tobacco: Never   Vaping Use    Vaping Use: Former    Substances: Nicotine   Substance and Sexual Activity    Alcohol use: Not

## 2024-06-03 LAB
CATHINONE, URINE: NEGATIVE
METHCATHINONE, URINE: NEGATIVE

## 2024-06-04 ENCOUNTER — PATIENT MESSAGE (OUTPATIENT)
Dept: NEUROSURGERY | Age: 44
End: 2024-06-04

## 2024-06-04 NOTE — TELEPHONE ENCOUNTER
From: Fabio Dee  To: Dr. Conner Rush  Sent: 6/4/2024 2:27 PM EDT  Subject: Injection and worsened condition     The injection that Dr Priest gave me gave me no relief and my back has gotten more centralized pain wise and my condition has worsened. Is there anyway we can move my existing appointment up so we can discuss options. Thank you  Fabio Palmer

## 2024-06-13 PROBLEM — M48.061 SPINAL STENOSIS, LUMBAR REGION, WITHOUT NEUROGENIC CLAUDICATION: Status: ACTIVE | Noted: 2024-06-13

## 2024-06-13 PROBLEM — M54.59 LUMBAR TRIGGER POINT SYNDROME: Status: ACTIVE | Noted: 2024-06-13

## 2024-06-13 NOTE — ASSESSMENT & PLAN NOTE
He clearly still has some stenosis and that very well may have to be addressed but being that I can reproduce a significant mount of his pain by palpating his posterior superior iliac spine, I think that needs to be addressed before we proceed to anything surgical.

## 2024-06-19 DIAGNOSIS — M54.41 CHRONIC BILATERAL LOW BACK PAIN WITH BILATERAL SCIATICA: ICD-10-CM

## 2024-06-19 DIAGNOSIS — M54.42 CHRONIC BILATERAL LOW BACK PAIN WITH BILATERAL SCIATICA: ICD-10-CM

## 2024-06-19 DIAGNOSIS — G89.29 CHRONIC BILATERAL LOW BACK PAIN WITH BILATERAL SCIATICA: ICD-10-CM

## 2024-06-20 RX ORDER — GABAPENTIN 600 MG/1
600 TABLET ORAL 3 TIMES DAILY
Qty: 90 TABLET | Refills: 2 | Status: SHIPPED | OUTPATIENT
Start: 2024-06-20 | End: 2024-09-18

## 2024-06-27 ENCOUNTER — HOSPITAL ENCOUNTER (OUTPATIENT)
Dept: GENERAL RADIOLOGY | Age: 44
Discharge: HOME | End: 2024-06-27
Payer: COMMERCIAL

## 2024-06-27 ENCOUNTER — OFFICE VISIT (OUTPATIENT)
Dept: NEUROSURGERY | Age: 44
End: 2024-06-27
Payer: COMMERCIAL

## 2024-06-27 VITALS
TEMPERATURE: 97.3 F | BODY MASS INDEX: 34.67 KG/M2 | WEIGHT: 256 LBS | HEART RATE: 88 BPM | HEIGHT: 72 IN | SYSTOLIC BLOOD PRESSURE: 118 MMHG | OXYGEN SATURATION: 96 % | DIASTOLIC BLOOD PRESSURE: 76 MMHG

## 2024-06-27 DIAGNOSIS — M54.59 LUMBAR TRIGGER POINT SYNDROME: Primary | ICD-10-CM

## 2024-06-27 DIAGNOSIS — M21.372 LEFT FOOT DROP: ICD-10-CM

## 2024-06-27 DIAGNOSIS — M48.061 SPINAL STENOSIS, LUMBAR REGION, WITHOUT NEUROGENIC CLAUDICATION: ICD-10-CM

## 2024-06-27 DIAGNOSIS — G89.29 CHRONIC BILATERAL LOW BACK PAIN WITHOUT SCIATICA: ICD-10-CM

## 2024-06-27 DIAGNOSIS — M48.062 LUMBAR STENOSIS WITH NEUROGENIC CLAUDICATION: ICD-10-CM

## 2024-06-27 DIAGNOSIS — M54.50 CHRONIC BILATERAL LOW BACK PAIN WITHOUT SCIATICA: ICD-10-CM

## 2024-06-27 DIAGNOSIS — M54.59 LUMBAR TRIGGER POINT SYNDROME: ICD-10-CM

## 2024-06-27 PROCEDURE — 99215 OFFICE O/P EST HI 40 MIN: CPT | Performed by: NEUROLOGICAL SURGERY

## 2024-06-27 PROCEDURE — 72120 X-RAY BEND ONLY L-S SPINE: CPT

## 2024-06-27 NOTE — PROGRESS NOTES
work and his current Workmen's Comp. case that is pending he is very eager to move forward with some type of surgical intervention.  I told him that the current time I was very uncomfortable proceeding with the surgical solution because am not sure what the correct surgical intervention would be.  Tentative the neck step would be lets get flexion-extension x-rays and see if there is an area of instability that could be the source of his back pain.  If that is unrevealing I will probably at least review his case with Dr. Chen if not have him see Dr. Chen is another opinion before making any surgical decisions.         On this date, 7/10/2024,I have spent 40 minutes reviewing previous notes, test results and face to face (or virtual) with the patient discussing the diagnosis and treatment plan as well as documenting on the day of the visit. All questions were answered. An understanding and agreement was voiced.    This note was created using voice recognition software.  All attempts were made to recognize and correct voice recognition errors. Unfortunately some errors may persist.      HEAVEN Rush MD [facility]ANS

## 2024-07-01 ENCOUNTER — OFFICE VISIT (OUTPATIENT)
Age: 44
End: 2024-07-01
Payer: COMMERCIAL

## 2024-07-01 DIAGNOSIS — Z79.891 ENCOUNTER FOR LONG-TERM OPIATE ANALGESIC USE: ICD-10-CM

## 2024-07-01 DIAGNOSIS — M54.42 CHRONIC BILATERAL LOW BACK PAIN WITH BILATERAL SCIATICA: ICD-10-CM

## 2024-07-01 DIAGNOSIS — M48.062 LUMBAR STENOSIS WITH NEUROGENIC CLAUDICATION: Primary | ICD-10-CM

## 2024-07-01 DIAGNOSIS — M54.41 CHRONIC BILATERAL LOW BACK PAIN WITH BILATERAL SCIATICA: ICD-10-CM

## 2024-07-01 DIAGNOSIS — G89.29 CHRONIC BILATERAL LOW BACK PAIN WITH BILATERAL SCIATICA: ICD-10-CM

## 2024-07-01 DIAGNOSIS — G89.4 CHRONIC PAIN SYNDROME: ICD-10-CM

## 2024-07-01 PROCEDURE — 99214 OFFICE O/P EST MOD 30 MIN: CPT | Performed by: ANESTHESIOLOGY

## 2024-07-01 RX ORDER — BUPRENORPHINE 15 UG/H
1 PATCH TRANSDERMAL WEEKLY
Qty: 4 PATCH | Refills: 1 | Status: SHIPPED | OUTPATIENT
Start: 2024-07-01 | End: 2024-08-26

## 2024-07-01 RX ORDER — METHOCARBAMOL 750 MG/1
750 TABLET, FILM COATED ORAL 3 TIMES DAILY PRN
Qty: 90 TABLET | Refills: 2 | Status: CANCELLED | OUTPATIENT
Start: 2024-07-01

## 2024-07-01 RX ORDER — GABAPENTIN 600 MG/1
600 TABLET ORAL 3 TIMES DAILY
Qty: 90 TABLET | Refills: 0 | Status: SHIPPED | OUTPATIENT
Start: 2024-07-01 | End: 2024-09-29

## 2024-07-01 RX ORDER — CELECOXIB 200 MG/1
200 CAPSULE ORAL 2 TIMES DAILY PRN
Qty: 60 CAPSULE | Refills: 0 | Status: SHIPPED | OUTPATIENT
Start: 2024-07-01

## 2024-07-01 NOTE — PROGRESS NOTES
N/A    Opioid Risk Tool Score (low/mod/high}:    Opioid Risk Tool Female I Male   Family history of substance abuse     Alcohol 1 3   Illegal drugs 2 3   Prescription drugs 4 4   Personal history of substance abuse     Alcohol 3 3   Illegal drugs 4 4   Prescription drugs 5 5   Age between 16-45 years 1 1   History of preadolescent sexual abuse 3 0   Psychological disease     ADD, OCD, bipolar, schizophrenia 2 2   Depression 1 1   Total: 3 or less = low, 8 or higher= high    Controlled Substance Agreement signed on the following date(s}: Workmans Comp:  Lawsuit:    Past Medical History:   Diagnosis Date    ADHD     Chronic hepatitis C (HCC)     treated with antivirals finished @11/22    History of blood transfusion 2001    Hx of echocardiogram 04/05/2022    EF 55-65%    Inattention 07/05/2022    Last Assessment & Plan:    Formatting of this note might be different from the original.   At this time I do not have previous documentation of ADHD and will hold off on placing official diagnosis in chart.  Given his high risk history of opioid use disorder, I would prefer psychiatry managing and making recommendations.  Did discuss low risk medications including atomoxetine which is unfortunatel    Kidney stone     Opioid use disorder, moderate, in early remission (HCC) 04/19/2022    Last Assessment & Plan:    Formatting of this note might be different from the original.   High risk.  He currently has a reason for severe back pain with his discitis and osteomyelitis.  Prescription monitoring program reviewed before opioid prescription today.    Osteomyelitis (HCC) 04/2022    right foot    Substance abuse (HCC)     alcohol, opiates, in recovery per patient    Trauma 2001    intialy treated at [place], [place]        Past Surgical History:   Procedure Laterality Date    FEMUR FRACTURE SURGERY  2001    femur preeti    HUMERUS SURGERY  2001    plate    LUMBAR SPINE SURGERY Bilateral 3/17/2023    Bilateral L3-4

## 2024-07-10 NOTE — ASSESSMENT & PLAN NOTE
Had a very long discussion with him concerning moving forward.  As reviewed with him in the hospital he does have fairly significant spinal stenosis.  The problem is his most of the symptoms do not appear to be related to spinal stenosis.  He is primarily having just back pain.  Doing a simple decompression of the area of residual stenosis could actually make the back pain much worse.  Due to being out of work and his current Workmen's Comp. case that is pending he is very eager to move forward with some type of surgical intervention.  I told him that the current time I was very uncomfortable proceeding with the surgical solution because am not sure what the correct surgical intervention would be.  Tentative the neck step would be lets get flexion-extension x-rays and see if there is an area of instability that could be the source of his back pain.  If that is unrevealing I will probably at least review his case with Dr. Chen if not have him see Dr. Chen is another opinion before making any surgical decisions.

## 2024-07-11 ENCOUNTER — OFFICE VISIT (OUTPATIENT)
Dept: NEUROSURGERY | Age: 44
End: 2024-07-11
Payer: COMMERCIAL

## 2024-07-11 VITALS
TEMPERATURE: 97.9 F | HEIGHT: 72 IN | HEART RATE: 74 BPM | OXYGEN SATURATION: 94 % | BODY MASS INDEX: 34 KG/M2 | DIASTOLIC BLOOD PRESSURE: 80 MMHG | SYSTOLIC BLOOD PRESSURE: 128 MMHG | WEIGHT: 251 LBS

## 2024-07-11 DIAGNOSIS — G89.29 CHRONIC BILATERAL LOW BACK PAIN WITHOUT SCIATICA: ICD-10-CM

## 2024-07-11 DIAGNOSIS — M48.061 SPINAL STENOSIS, LUMBAR REGION, WITHOUT NEUROGENIC CLAUDICATION: Primary | ICD-10-CM

## 2024-07-11 DIAGNOSIS — M54.50 CHRONIC BILATERAL LOW BACK PAIN WITHOUT SCIATICA: ICD-10-CM

## 2024-07-11 PROCEDURE — 99214 OFFICE O/P EST MOD 30 MIN: CPT | Performed by: NEUROLOGICAL SURGERY

## 2024-07-11 ASSESSMENT — ENCOUNTER SYMPTOMS: BACK PAIN: 1

## 2024-07-11 NOTE — ASSESSMENT & PLAN NOTE
His imaging does demonstrate that he has significant stenosis at L3-4.  He has essentially none of the symptoms of stenosis of L3-4.  Most of his symptoms are back pain.  He does have collapse of the disc at L3-4 but he has no pathologic motion.  I am unsure how to proceed at this point.  The quick answer would be to do a TLIF at L3-4 and extend his fusion.  But I am not sure if that is the best answer at this point.  When a refill had a long discussion with  is stated that we need to be very prudent about what we did so that we were just committing surgery on him and that we were actually trying to address his problems.  Due to his most recent and long-term infection concerns before we make a definitive surgical plan I think that we should repeat his MRI of his thoracic and lumbar spine with and without contrast to make sure that we still do not have any infectious etiology.  Then I would like for him to follow-up with Dr. Chen my partner to get a new viewpoint on what if anything surgically he would benefit from.  If  wants me to perform the surgery I am fine to do that once Dr. Chen and I have a consensus or if Dr. Chen does this long or we do it together is not a big concern as long as we get them taken care of.

## 2024-07-11 NOTE — PROGRESS NOTES
consensus or if Dr. Chen does this long or we do it together is not a big concern as long as we get them taken care of.    2. Chronic bilateral low back pain without sciatica  Overview:  Last Assessment & Plan:    Formatting of this note might be different from the original.   Stable currently.  We will continue as needed anti-inflammatories.         On this date, 7/11/2024,I have spent 35 minutes reviewing previous notes, test results and face to face (or virtual) with the patient discussing the diagnosis and treatment plan as well as documenting on the day of the visit. All questions were answered. An understanding and agreement was voiced.    This note was created using voice recognition software.  All attempts were made to recognize and correct voice recognition errors. Unfortunately some errors may persist.      HEAVEN Rush MD FAANS

## 2024-07-23 DIAGNOSIS — M54.42 CHRONIC BILATERAL LOW BACK PAIN WITH BILATERAL SCIATICA: ICD-10-CM

## 2024-07-23 DIAGNOSIS — G89.29 CHRONIC BILATERAL LOW BACK PAIN WITH BILATERAL SCIATICA: ICD-10-CM

## 2024-07-23 DIAGNOSIS — M54.41 CHRONIC BILATERAL LOW BACK PAIN WITH BILATERAL SCIATICA: ICD-10-CM

## 2024-07-23 RX ORDER — METHOCARBAMOL 750 MG/1
750 TABLET, FILM COATED ORAL 3 TIMES DAILY PRN
Qty: 90 TABLET | Refills: 2 | Status: SHIPPED | OUTPATIENT
Start: 2024-07-23 | End: 2024-10-21

## 2024-07-24 ENCOUNTER — OFFICE VISIT (OUTPATIENT)
Dept: FAMILY MEDICINE CLINIC | Facility: CLINIC | Age: 44
End: 2024-07-24
Payer: COMMERCIAL

## 2024-07-24 VITALS
WEIGHT: 247 LBS | RESPIRATION RATE: 16 BRPM | HEART RATE: 78 BPM | BODY MASS INDEX: 33.46 KG/M2 | OXYGEN SATURATION: 97 % | TEMPERATURE: 96.9 F | HEIGHT: 72 IN | SYSTOLIC BLOOD PRESSURE: 102 MMHG | DIASTOLIC BLOOD PRESSURE: 58 MMHG

## 2024-07-24 DIAGNOSIS — E29.1 HYPOGONADISM IN MALE: ICD-10-CM

## 2024-07-24 DIAGNOSIS — F10.11 HISTORY OF ALCOHOL ABUSE: Primary | ICD-10-CM

## 2024-07-24 PROBLEM — E87.1 HYPONATREMIA: Status: RESOLVED | Noted: 2024-04-05 | Resolved: 2024-07-24

## 2024-07-24 PROBLEM — E66.811 OBESITY (BMI 30.0-34.9): Status: RESOLVED | Noted: 2023-03-02 | Resolved: 2024-07-24

## 2024-07-24 PROBLEM — N50.811 PAIN IN BOTH TESTICLES: Status: RESOLVED | Noted: 2024-04-07 | Resolved: 2024-07-24

## 2024-07-24 PROBLEM — E66.9 OBESITY (BMI 30-39.9): Status: RESOLVED | Noted: 2024-04-02 | Resolved: 2024-07-24

## 2024-07-24 PROBLEM — R09.81 SINUS CONGESTION: Status: RESOLVED | Noted: 2024-01-18 | Resolved: 2024-07-24

## 2024-07-24 PROBLEM — Z76.89 ESTABLISHING CARE WITH NEW DOCTOR, ENCOUNTER FOR: Status: RESOLVED | Noted: 2023-11-06 | Resolved: 2024-07-24

## 2024-07-24 PROBLEM — M46.40 DISCITIS: Status: RESOLVED | Noted: 2022-06-16 | Resolved: 2024-07-24

## 2024-07-24 PROBLEM — J90 PLEURAL EFFUSION: Status: RESOLVED | Noted: 2022-06-16 | Resolved: 2024-07-24

## 2024-07-24 PROBLEM — J86.9 EMPYEMA LUNG (HCC): Status: RESOLVED | Noted: 2022-06-16 | Resolved: 2024-07-24

## 2024-07-24 PROBLEM — Z91.89 AT RISK FOR INFECTION: Status: RESOLVED | Noted: 2024-04-15 | Resolved: 2024-07-24

## 2024-07-24 PROBLEM — R78.81 STAPHYLOCOCCUS AUREUS BACTEREMIA: Status: RESOLVED | Noted: 2024-04-05 | Resolved: 2024-07-24

## 2024-07-24 PROBLEM — R23.8 IMPAIRED TISSUE INTEGRITY: Status: RESOLVED | Noted: 2024-04-15 | Resolved: 2024-07-24

## 2024-07-24 PROBLEM — F34.1 DYSTHYMIA: Status: RESOLVED | Noted: 2022-03-09 | Resolved: 2024-07-24

## 2024-07-24 PROBLEM — N50.89 MASS OF RIGHT TESTICLE: Status: RESOLVED | Noted: 2024-04-07 | Resolved: 2024-07-24

## 2024-07-24 PROBLEM — R73.9 HYPERGLYCEMIA: Status: RESOLVED | Noted: 2024-04-05 | Resolved: 2024-07-24

## 2024-07-24 PROBLEM — D64.9 ANEMIA: Status: RESOLVED | Noted: 2024-04-05 | Resolved: 2024-07-24

## 2024-07-24 PROBLEM — E66.9 OBESITY (BMI 30.0-34.9): Status: RESOLVED | Noted: 2023-03-02 | Resolved: 2024-07-24

## 2024-07-24 PROBLEM — N50.812 PAIN IN BOTH TESTICLES: Status: RESOLVED | Noted: 2024-04-07 | Resolved: 2024-07-24

## 2024-07-24 PROBLEM — T40.1X1A ACCIDENTAL OVERDOSE OF HEROIN (HCC): Status: RESOLVED | Noted: 2022-07-17 | Resolved: 2024-07-24

## 2024-07-24 PROBLEM — L02.91 PHLEGMON: Status: RESOLVED | Noted: 2022-06-16 | Resolved: 2024-07-24

## 2024-07-24 PROBLEM — A41.9 SEPSIS (HCC): Status: RESOLVED | Noted: 2024-04-05 | Resolved: 2024-07-24

## 2024-07-24 PROBLEM — B95.61 STAPHYLOCOCCUS AUREUS BACTEREMIA: Status: RESOLVED | Noted: 2024-04-05 | Resolved: 2024-07-24

## 2024-07-24 PROBLEM — R07.9 CHEST PAIN: Status: RESOLVED | Noted: 2022-06-16 | Resolved: 2024-07-24

## 2024-07-24 PROBLEM — E55.9 VITAMIN D DEFICIENCY: Status: RESOLVED | Noted: 2023-11-06 | Resolved: 2024-07-24

## 2024-07-24 PROBLEM — F90.9 ATTENTION DEFICIT HYPERACTIVITY DISORDER: Status: RESOLVED | Noted: 2024-04-15 | Resolved: 2024-07-24

## 2024-07-24 PROCEDURE — 99213 OFFICE O/P EST LOW 20 MIN: CPT | Performed by: FAMILY MEDICINE

## 2024-07-24 RX ORDER — TESTOSTERONE 16.2 MG/G
2 GEL TRANSDERMAL DAILY
Qty: 75 G | Refills: 5 | Status: SHIPPED | OUTPATIENT
Start: 2024-07-24 | End: 2024-11-21

## 2024-07-24 NOTE — PROGRESS NOTES
Subjective:   Fabio Dee is a 43 y.o. male presents today for needing to establish with a PCP.  He has no immediate concerns other than they would like to get back on his testosterone gel as that helped very much in the way he felt in his energy level.  His medical history is complicated by history of narcotic and alcohol abuse but has been sober for about 5 years now.  He attends AA regularly.  He helps out in the recovery programs.  He does occasionally vape.  Has no other acute concerns today    Allergies   Allergen Reactions    Hydrocodone Other (See Comments)     Will cause relapse of sobriety    Oxycodone Other (See Comments)     Will cause relapse of sobriety    Ethanol-Alcohol [Ethanol]      Recovering alcoholic     PMH, MEDS reviewed     Objective:   Blood pressure (!) 102/58, pulse 78, temperature 96.9 °F (36.1 °C), temperature source Temporal, resp. rate 16, height 1.829 m (6'), weight 112 kg (247 lb), SpO2 97 %.  General- Pleasant and no distress  Psych- alert and oriented to person, place and time  Mood and affect are appropriate to situation  Musculoskeletal - Gait and station examination reveals mid-position with no abnormalities.  Neurological- grossly intact.   rrr s mrg.   bcta    Assessment/Plan:     1. History of alcohol abuse    2. Hypogonadism in male         1. History of alcohol abuse  2. Hypogonadism in male  -     Testosterone (ANDROGEL) 20.25 MG/ACT (1.62%) GEL gel; Place 2 actuation onto the skin daily for 120 days. Max Daily Amount: 2,500 mg, Disp-75 g, R-5Normal    I will see him back as needed but at age 47 need to see him annually for prostate exam and at that time we can do some additional blood work  Followup:   No follow-ups on file.

## 2024-07-28 NOTE — PROGRESS NOTES
Mr Dee is a 1 month f/u for chronic back pain with spinal stenosis adjacent to his fusion. He recommended updating MRI with and without contrast due to infection history and obtaining second opinion from his partner Dr Chen. Currently, we have him on Butrans 15mcg dosed once weekly with Gabapentin 600mg TID.

## 2024-07-29 ENCOUNTER — OFFICE VISIT (OUTPATIENT)
Age: 44
End: 2024-07-29
Payer: COMMERCIAL

## 2024-07-29 DIAGNOSIS — Z79.891 ENCOUNTER FOR LONG-TERM OPIATE ANALGESIC USE: ICD-10-CM

## 2024-07-29 DIAGNOSIS — G89.29 CHRONIC BILATERAL LOW BACK PAIN WITH BILATERAL SCIATICA: ICD-10-CM

## 2024-07-29 DIAGNOSIS — M54.42 CHRONIC BILATERAL LOW BACK PAIN WITH BILATERAL SCIATICA: ICD-10-CM

## 2024-07-29 DIAGNOSIS — G89.4 CHRONIC PAIN SYNDROME: ICD-10-CM

## 2024-07-29 DIAGNOSIS — M54.41 CHRONIC BILATERAL LOW BACK PAIN WITH BILATERAL SCIATICA: ICD-10-CM

## 2024-07-29 PROCEDURE — 99214 OFFICE O/P EST MOD 30 MIN: CPT | Performed by: PHYSICIAN ASSISTANT

## 2024-07-29 RX ORDER — CELECOXIB 200 MG/1
200 CAPSULE ORAL 2 TIMES DAILY PRN
Qty: 60 CAPSULE | Refills: 1 | Status: SHIPPED | OUTPATIENT
Start: 2024-07-29

## 2024-07-29 RX ORDER — GABAPENTIN 600 MG/1
600 TABLET ORAL 3 TIMES DAILY
Qty: 90 TABLET | Refills: 1 | Status: SHIPPED | OUTPATIENT
Start: 2024-07-29 | End: 2024-10-27

## 2024-07-29 RX ORDER — BUPRENORPHINE 15 UG/H
1 PATCH TRANSDERMAL WEEKLY
Qty: 4 PATCH | Refills: 1 | Status: SHIPPED | OUTPATIENT
Start: 2024-08-13 | End: 2024-10-08

## 2024-07-29 ASSESSMENT — ENCOUNTER SYMPTOMS
ALLERGIC/IMMUNOLOGIC NEGATIVE: 1
ABDOMINAL PAIN: 0
SHORTNESS OF BREATH: 0
EYES NEGATIVE: 1

## 2024-07-29 NOTE — PROGRESS NOTES
Chronic Pain Consult Note      Plan:     A comprehensive pain management plan may consist of the following: Testing, Therapy, Medications, Interventions, Consults, and Follow up.    Lumbar radiculopathy  Lumbar MRI reviewed.  Candidate for further decompression adjacent to previous surgical instrumentation.  Unfortunately due to current infectious state patient will have to await further surgical correction  Continue gabapentin 600 mg 3 times daily. Refill sent in today, 7/29/24 Spent time discussing general side effects concerns medication.  Had a aren discussion regarding the nonaddictive nature of this medication but the potential for abuse.  Continue Robaxin 750 mg 3 times daily as needed.  Spent time discussing general side effects concerns of this medication  Continue Celebrex 200 mg twice daily as needed.  Refill sent in today, 7/29/24 Spent time discussing general side effects concerns medication.  Postlaminectomy syndrome  Per Dr Rush, he wants him to have updated MRI with and without contrast and see Dr Chen for second opinion.   Patient given information for central scheduling for MRI to follow up on MRI orders from Dr Rush.   Myalgia, other site, posterior superior iliac spine  Performed in office trigger point injection 5/22/2024  Chronic pain syndrome  Encourage continuation of active healthy lifestyle  Patient has history of substance abuse and had very thorough conversation in full disclosure from the patient's perspective will not pursue any opioid-based or abuse of medications.  Patient has legitimate, severe pain and is open about his substance abuse history.  Considering this information and wife's control over medication we discussed starting Butrans.  The safety of this medication along with low abuse potential presents this as the only viable option moving forward.  I informed him there will be no other opioid medications trialed.  Continue Butrans 15 mcg transdermal patches. Refill sent

## 2024-08-08 ENCOUNTER — HOSPITAL ENCOUNTER (OUTPATIENT)
Dept: MRI IMAGING | Age: 44
End: 2024-08-08
Attending: NEUROLOGICAL SURGERY
Payer: COMMERCIAL

## 2024-08-08 ENCOUNTER — HOSPITAL ENCOUNTER (OUTPATIENT)
Dept: MRI IMAGING | Age: 44
Discharge: HOME | End: 2024-08-08
Attending: NEUROLOGICAL SURGERY
Payer: COMMERCIAL

## 2024-08-08 DIAGNOSIS — M48.061 SPINAL STENOSIS, LUMBAR REGION, WITHOUT NEUROGENIC CLAUDICATION: ICD-10-CM

## 2024-08-08 DIAGNOSIS — G89.29 CHRONIC BILATERAL LOW BACK PAIN WITHOUT SCIATICA: ICD-10-CM

## 2024-08-08 DIAGNOSIS — M54.50 CHRONIC BILATERAL LOW BACK PAIN WITHOUT SCIATICA: ICD-10-CM

## 2024-08-08 PROCEDURE — 72158 MRI LUMBAR SPINE W/O & W/DYE: CPT

## 2024-08-08 PROCEDURE — 72157 MRI CHEST SPINE W/O & W/DYE: CPT

## 2024-08-08 PROCEDURE — 6360000004 HC RX CONTRAST MEDICATION: Performed by: NEUROLOGICAL SURGERY

## 2024-08-08 PROCEDURE — A9579 GAD-BASE MR CONTRAST NOS,1ML: HCPCS | Performed by: NEUROLOGICAL SURGERY

## 2024-08-08 RX ADMIN — GADOTERIDOL 22 ML: 279.3 INJECTION, SOLUTION INTRAVENOUS at 21:02

## 2024-09-06 ENCOUNTER — TELEPHONE (OUTPATIENT)
Dept: NEUROSURGERY | Age: 44
End: 2024-09-06

## 2024-09-06 NOTE — TELEPHONE ENCOUNTER
Mr. Dee lvm on nurses line asking about his MRI and answers about it, I called him back and LVM letting him know to call back on Monday to get an appt scheudled with Dr. Chen per Dr. Rush's note.

## 2024-09-10 DIAGNOSIS — G89.4 CHRONIC PAIN SYNDROME: ICD-10-CM

## 2024-09-10 DIAGNOSIS — Z79.891 ENCOUNTER FOR LONG-TERM OPIATE ANALGESIC USE: ICD-10-CM

## 2024-09-11 RX ORDER — BUPRENORPHINE 15 UG/H
1 PATCH TRANSDERMAL WEEKLY
Qty: 4 PATCH | Refills: 1 | OUTPATIENT
Start: 2024-09-11 | End: 2024-11-06

## 2024-09-23 ENCOUNTER — OFFICE VISIT (OUTPATIENT)
Age: 44
End: 2024-09-23
Payer: COMMERCIAL

## 2024-09-23 DIAGNOSIS — G89.4 CHRONIC PAIN SYNDROME: ICD-10-CM

## 2024-09-23 DIAGNOSIS — Z79.891 ENCOUNTER FOR LONG-TERM OPIATE ANALGESIC USE: ICD-10-CM

## 2024-09-23 PROCEDURE — 99214 OFFICE O/P EST MOD 30 MIN: CPT | Performed by: ANESTHESIOLOGY

## 2024-09-23 RX ORDER — BUPRENORPHINE 15 UG/H
1 PATCH TRANSDERMAL WEEKLY
Qty: 4 PATCH | Refills: 2 | Status: SHIPPED | OUTPATIENT
Start: 2024-09-23 | End: 2024-12-16

## 2024-09-23 ASSESSMENT — ENCOUNTER SYMPTOMS
SHORTNESS OF BREATH: 0
EYES NEGATIVE: 1
ALLERGIC/IMMUNOLOGIC NEGATIVE: 1
ABDOMINAL PAIN: 0

## 2024-10-14 ENCOUNTER — OFFICE VISIT (OUTPATIENT)
Dept: NEUROSURGERY | Age: 44
End: 2024-10-14
Payer: COMMERCIAL

## 2024-10-14 VITALS
DIASTOLIC BLOOD PRESSURE: 74 MMHG | WEIGHT: 227 LBS | SYSTOLIC BLOOD PRESSURE: 107 MMHG | HEIGHT: 72 IN | TEMPERATURE: 97.3 F | HEART RATE: 79 BPM | OXYGEN SATURATION: 98 % | BODY MASS INDEX: 30.75 KG/M2

## 2024-10-14 DIAGNOSIS — G89.4 CHRONIC PAIN SYNDROME: ICD-10-CM

## 2024-10-14 DIAGNOSIS — Z98.1 HISTORY OF LUMBAR SPINAL FUSION: ICD-10-CM

## 2024-10-14 DIAGNOSIS — G89.29 CHRONIC BILATERAL LOW BACK PAIN WITHOUT SCIATICA: Primary | ICD-10-CM

## 2024-10-14 DIAGNOSIS — M54.50 CHRONIC BILATERAL LOW BACK PAIN WITHOUT SCIATICA: Primary | ICD-10-CM

## 2024-10-14 DIAGNOSIS — Z98.890 HISTORY OF LUMBAR LAMINECTOMY: ICD-10-CM

## 2024-10-14 DIAGNOSIS — M21.372 LEFT FOOT DROP: ICD-10-CM

## 2024-10-14 DIAGNOSIS — M48.062 LUMBAR STENOSIS WITH NEUROGENIC CLAUDICATION: ICD-10-CM

## 2024-10-14 DIAGNOSIS — M48.061 NEUROFORAMINAL STENOSIS OF LUMBAR SPINE: ICD-10-CM

## 2024-10-14 PROCEDURE — 99214 OFFICE O/P EST MOD 30 MIN: CPT | Performed by: NEUROLOGICAL SURGERY

## 2024-10-17 ENCOUNTER — PREP FOR PROCEDURE (OUTPATIENT)
Dept: NEUROSURGERY | Age: 44
End: 2024-10-17

## 2024-10-17 DIAGNOSIS — M48.061 NEURAL FORAMINAL STENOSIS OF LUMBAR SPINE: ICD-10-CM

## 2024-10-17 DIAGNOSIS — M54.50 ACUTE BILATERAL LOW BACK PAIN WITHOUT SCIATICA: ICD-10-CM

## 2024-10-17 DIAGNOSIS — Z98.1 HISTORY OF LUMBAR FUSION: ICD-10-CM

## 2024-10-17 DIAGNOSIS — Z98.890 HISTORY OF LAMINECTOMY: ICD-10-CM

## 2024-10-17 DIAGNOSIS — G89.4 CHRONIC PAIN SYNDROME: ICD-10-CM

## 2024-10-17 DIAGNOSIS — M48.062 LUMBAR STENOSIS WITH NEUROGENIC CLAUDICATION: ICD-10-CM

## 2024-10-19 DIAGNOSIS — G89.29 CHRONIC BILATERAL LOW BACK PAIN WITH BILATERAL SCIATICA: ICD-10-CM

## 2024-10-19 DIAGNOSIS — M54.42 CHRONIC BILATERAL LOW BACK PAIN WITH BILATERAL SCIATICA: ICD-10-CM

## 2024-10-19 DIAGNOSIS — M54.41 CHRONIC BILATERAL LOW BACK PAIN WITH BILATERAL SCIATICA: ICD-10-CM

## 2024-10-21 ENCOUNTER — TELEPHONE (OUTPATIENT)
Dept: NEUROSURGERY | Age: 44
End: 2024-10-21

## 2024-10-21 NOTE — TELEPHONE ENCOUNTER
Received paper work to address return to work status for is employer  Sushant left for patient to call back to explain this further

## 2024-10-23 ENCOUNTER — HOSPITAL ENCOUNTER (OUTPATIENT)
Dept: SURGERY | Age: 44
Discharge: HOME | End: 2024-10-26
Payer: COMMERCIAL

## 2024-10-23 VITALS
OXYGEN SATURATION: 95 % | HEART RATE: 85 BPM | WEIGHT: 224 LBS | DIASTOLIC BLOOD PRESSURE: 80 MMHG | RESPIRATION RATE: 16 BRPM | HEIGHT: 72 IN | BODY MASS INDEX: 30.34 KG/M2 | TEMPERATURE: 97.7 F | SYSTOLIC BLOOD PRESSURE: 113 MMHG

## 2024-10-23 LAB
ANION GAP SERPL CALC-SCNC: 12 MMOL/L (ref 9–18)
APPEARANCE UR: CLEAR
BACTERIA URNS QL MICRO: NEGATIVE /HPF
BASOPHILS # BLD: 0 K/UL (ref 0–0.2)
BASOPHILS NFR BLD: 1 % (ref 0–2)
BILIRUB UR QL: NEGATIVE
BUN SERPL-MCNC: 19 MG/DL (ref 6–23)
CALCIUM SERPL-MCNC: 9.2 MG/DL (ref 8.8–10.2)
CHLORIDE SERPL-SCNC: 105 MMOL/L (ref 98–107)
CO2 SERPL-SCNC: 25 MMOL/L (ref 20–28)
COLOR UR: ABNORMAL
CREAT SERPL-MCNC: 0.63 MG/DL (ref 0.8–1.3)
DIFFERENTIAL METHOD BLD: ABNORMAL
EKG ATRIAL RATE: 67 BPM
EKG DIAGNOSIS: NORMAL
EKG P AXIS: 47 DEGREES
EKG P-R INTERVAL: 172 MS
EKG Q-T INTERVAL: 396 MS
EKG QRS DURATION: 86 MS
EKG QTC CALCULATION (BAZETT): 418 MS
EKG R AXIS: 39 DEGREES
EKG T AXIS: 36 DEGREES
EKG VENTRICULAR RATE: 67 BPM
EOSINOPHIL # BLD: 0.1 K/UL (ref 0–0.8)
EOSINOPHIL NFR BLD: 2 % (ref 0.5–7.8)
EPI CELLS #/AREA URNS HPF: ABNORMAL /HPF
ERYTHROCYTE [DISTWIDTH] IN BLOOD BY AUTOMATED COUNT: 12.9 % (ref 11.9–14.6)
EST. AVERAGE GLUCOSE BLD GHB EST-MCNC: 98 MG/DL
GLUCOSE SERPL-MCNC: 87 MG/DL (ref 70–99)
GLUCOSE UR STRIP.AUTO-MCNC: NEGATIVE MG/DL
HBA1C MFR BLD: 5.1 % (ref 0–5.6)
HCT VFR BLD AUTO: 36.7 % (ref 41.1–50.3)
HGB BLD-MCNC: 12.5 G/DL (ref 13.6–17.2)
HGB UR QL STRIP: NEGATIVE
HYALINE CASTS URNS QL MICRO: ABNORMAL /LPF
IMM GRANULOCYTES # BLD AUTO: 0 K/UL (ref 0–0.5)
IMM GRANULOCYTES NFR BLD AUTO: 0 % (ref 0–5)
KETONES UR QL STRIP.AUTO: NEGATIVE MG/DL
LEUKOCYTE ESTERASE UR QL STRIP.AUTO: NEGATIVE
LYMPHOCYTES # BLD: 2.2 K/UL (ref 0.5–4.6)
LYMPHOCYTES NFR BLD: 36 % (ref 13–44)
MCH RBC QN AUTO: 32.1 PG (ref 26.1–32.9)
MCHC RBC AUTO-ENTMCNC: 34.1 G/DL (ref 31.4–35)
MCV RBC AUTO: 94.3 FL (ref 82–102)
MONOCYTES # BLD: 0.5 K/UL (ref 0.1–1.3)
MONOCYTES NFR BLD: 8 % (ref 4–12)
MRSA DNA SPEC QL NAA+PROBE: NOT DETECTED
NEUTS SEG # BLD: 3.4 K/UL (ref 1.7–8.2)
NEUTS SEG NFR BLD: 54 % (ref 43–78)
NITRITE UR QL STRIP.AUTO: NEGATIVE
NRBC # BLD: 0 K/UL (ref 0–0.2)
PH UR STRIP: 5.5 (ref 5–9)
PLATELET # BLD AUTO: 201 K/UL (ref 150–450)
PMV BLD AUTO: 8.9 FL (ref 9.4–12.3)
POTASSIUM SERPL-SCNC: 4.2 MMOL/L (ref 3.5–5.1)
PROT UR STRIP-MCNC: ABNORMAL MG/DL
RBC # BLD AUTO: 3.89 M/UL (ref 4.23–5.6)
RBC #/AREA URNS HPF: ABNORMAL /HPF
S AUREUS CPE NOSE QL NAA+PROBE: NOT DETECTED
SODIUM SERPL-SCNC: 142 MMOL/L (ref 136–145)
SP GR UR REFRACTOMETRY: >1.035 (ref 1–1.02)
UROBILINOGEN UR QL STRIP.AUTO: 1 EU/DL (ref 0.2–1)
WBC # BLD AUTO: 6.2 K/UL (ref 4.3–11.1)
WBC URNS QL MICRO: ABNORMAL /HPF

## 2024-10-23 PROCEDURE — 83036 HEMOGLOBIN GLYCOSYLATED A1C: CPT

## 2024-10-23 PROCEDURE — 85025 COMPLETE CBC W/AUTO DIFF WBC: CPT

## 2024-10-23 PROCEDURE — 80307 DRUG TEST PRSMV CHEM ANLYZR: CPT

## 2024-10-23 PROCEDURE — 80048 BASIC METABOLIC PNL TOTAL CA: CPT

## 2024-10-23 PROCEDURE — 93005 ELECTROCARDIOGRAM TRACING: CPT | Performed by: ANESTHESIOLOGY

## 2024-10-23 PROCEDURE — 81001 URINALYSIS AUTO W/SCOPE: CPT

## 2024-10-23 PROCEDURE — 87641 MR-STAPH DNA AMP PROBE: CPT

## 2024-10-23 RX ORDER — CEFADROXIL 1000 MG/1
1 TABLET ORAL 2 TIMES DAILY
COMMUNITY
Start: 2024-10-18

## 2024-10-23 RX ORDER — METHOCARBAMOL 750 MG/1
750 TABLET, FILM COATED ORAL 3 TIMES DAILY PRN
COMMUNITY
Start: 2024-10-13

## 2024-10-23 ASSESSMENT — PAIN SCALES - GENERAL: PAINLEVEL_OUTOF10: 5

## 2024-10-23 ASSESSMENT — PAIN DESCRIPTION - LOCATION: LOCATION: BACK

## 2024-10-23 NOTE — PROGRESS NOTES
Enhanced Recovery After Surgery- Spine Surgery    Drink clear liquids up to 2 hours prior to your hospital arrival.     Bring your patient handbook with you to the hospital.    Things to remember:    You will be up on a chair the evening of surgery and drinking clear liquids. Your diet will be advanced by your surgeon as appropriate.    Drink Ensure Surgery after surgery. This will be provided to you in the hospital after your surgery. Drink one bottle twice daily for 5 days after surgery in the hospital. It is designed to support immune health and recovery from surgery.     Beginning the day after surgery, you will be up in a chair for all meals.    Beginning the day after surgery, you will be out of the bed for a minimum of 6 hours (not all at one time)    Beginning the day after surgery, you will walk in the bocanegra in the bocanegra at least 50' at least three times a day.    You will be given scheduled non-narcotic pain medication to help keep your pain under control.  You will have stronger pain medication ordered for break through pain.     All of these measures are geared toward returning your bowel to normal function as soon as possible and to prevent complications associated with bowel blockage, blood clots, and/or pneumonia           Learning About Enhanced Recovery After Surgery  What is enhanced recovery after surgery?     Enhanced recovery after surgery, or ERAS, is a way to help people prepare for surgery. The goal is to help you get ready for surgery and get better sooner. Before your surgery, you:  Eat well in the days leading up to the surgery.  Drink lots of clear fluids.  Learn some basic information about your surgery. Doing this can put your mind at ease.  Your doctor will explain any other tips to help you recover. You may have to avoid some foods.   During surgery, you may get different kinds of medicine for pain. This can help you heal sooner and feel less pain later.  Soon after surgery, you 
CBC, BMP, U/A within anesthesia guidelines, no follow-up required. Labs automatically routed to ordering provider via Epic documentation.    
Preoperative Nutrition Screen (XENA)   Patient's Age: 43 y.o.    Last Serum Albumin Level:  No results found for: \"LABALBU\"  Patient's BMI: Estimated body mass index is 30.38 kg/m² as calculated from the following:    Height as of this encounter: 1.829 m (6').    Weight as of this encounter: 101.6 kg (224 lb).     If the answer to any of the following is Yes, then recommend prescribe Oral Nutrition Supplements (ONS) for at least 7 days prior to surgery and/or order referral to dietitian for further assessment and nutrition therapy.    1. Does the patient have a documented serum albumin less than 3.0 within the last 90 days?    Unknown = 0      2. Is patient's BMI less than 18.5 (or less than 20 if age over 65)?  No = 0       3. Has the patient had an unplanned weight loss of 10% of body weight or more in the last 6 months? No = 0   4. Has the patient been eating less than 50% of their normal diet in the preceding week? No = 0   XENA Score (number of Yes responses), 0-4 0     Plan:   No Nutrition Intervention indicated    Electronically signed by MITRA DELGADO RN on 10/23/24 at 2:49 PM EDT    
The GLP-1 agonists are associated with adverse gastrointestinal effects such as nausea, vomiting, and delayed gastric emptying. Delayed gastric emptying from GLP-1 agonists can increase the risk of regurgitation and pulmonary aspiration of gastric contents during general anesthesia and deep sedation.     The recommendation for patients taking Glucagon-Like Peptide-1 (GLP-1) Receptor Agonists is to hold these drugs for 1 week prior to surgery for weekly dosing. You are also required to have no solid food or dairy after midnight and keep a clear liquid diet the day before your surgery. The accepted clear liquids are included below.     CLEAR LIQUID DIET    Things included in a clear liquid diet:     Water  Black Coffee (may have sugar but no milk or cream)  Tea  Any type soft drink  Apple, Cranberry, or Grape juice  Chicken bouillon or broth  Beef bouillon or broth  Popsicles  Jell-O (any flavor), NO solid fruit mixed in  Hard candy that is clear, such as lifesavers or lemon drops    Things NOT included in clear liquid:     Milk and milk products  Milkshakes  Any solid food  Any solid soup with solid ingredients such as noodles  Any creamed soup  Gum or Mints  Orange juice (or any other juice containing pulp)     A copy of this note was provided to the patient at PAT appointment.        
drink after midnight prior to surgery with two exceptions:  You may take any medications listed above the morning of surgery.  Please drink 32 ounces of water 2 hours prior to your ARRIVAL time to avoid dehydration.      On the day before surgery please take 2 Tylenol in the morning and then again before bed. You may use either regular or extra strength.      Cecilia-Hex shower the night before and morning of surgery.    Bring the \"Road to Recovery\" book the day of surgery    Bring the incentive spirometer the day of surgery.     Please do not bring home medications with you on the day of surgery unless otherwise directed by your nurse.  If you are instructed to bring home medications, please give them to your nurse as they will be administered by the nursing staff.    If you have any questions, please call [facility] [phone].    A copy of this note was provided to the patient for reference.

## 2024-10-24 LAB
COMMENT:: ABNORMAL
COTININE UR QL SCN: POSITIVE NG/ML

## 2024-10-24 NOTE — PROGRESS NOTES
Hemoglobin A1C  Order: [id number]  Status: Final result       Visible to patient: Yes (not seen)       Next appt: Today at 04:45 PM in Radiology ([facility] VCT)    0 Result Notes       1 HM Topic        Component  Ref Range & Units 10/23/24 1515 4/5/24 1903 11/6/23 1036   Hemoglobin A1C  0 - 5.6 % 5.1 5.4 R 5.1 R   Comment: Reference Range  Normal       <5.7%  Prediabetes  5.7-6.4%  Diabetes     >6.4%   Estimated Avg Glucose  mg/dL 98 108  CM   Resulting Agency [facility]              Specimen Collected: 10/23/24 15:15 EDT Last Resulted: 10/23/24 17:40 EDT

## 2024-10-28 ENCOUNTER — TELEPHONE (OUTPATIENT)
Dept: NEUROSURGERY | Age: 44
End: 2024-10-28

## 2024-10-28 NOTE — TELEPHONE ENCOUNTER
Per Dr Salazar with ID- recommendations include staying on Duricef with a aggressive pre op skin cleansing. If needing any post op recommendations please free to consult post op. You also may reach her on Perfect Serve    Also, she would like Dr Chen to review CT of lumbar spine from 10-26 with mention of [place] Filter abutting the spine    11-14-24 lumbar fusion

## 2024-10-29 ENCOUNTER — TELEPHONE (OUTPATIENT)
Dept: NEUROSURGERY | Age: 44
End: 2024-10-29

## 2024-10-29 NOTE — TELEPHONE ENCOUNTER
Received [facility] paper work patient- reached out to patient and emailed our office form to be completed and a 35.00 charge that can be paid over the phone

## 2024-10-29 NOTE — TELEPHONE ENCOUNTER
Dr Chen reviewed CT scan of lumbar spine and discussed case with vascular surgeon Dr BARBARA Engle

## 2024-10-30 ENCOUNTER — TELEPHONE (OUTPATIENT)
Dept: NEUROSURGERY | Age: 44
End: 2024-10-30

## 2024-10-30 NOTE — TELEPHONE ENCOUNTER
Informed patient on VM that I am missing pages 4 and 5 of [facility] paper and that I did email him our form to be completed yesterday

## 2024-11-01 DIAGNOSIS — G89.29 CHRONIC BILATERAL LOW BACK PAIN WITH BILATERAL SCIATICA: ICD-10-CM

## 2024-11-01 DIAGNOSIS — M54.42 CHRONIC BILATERAL LOW BACK PAIN WITH BILATERAL SCIATICA: ICD-10-CM

## 2024-11-01 DIAGNOSIS — M54.41 CHRONIC BILATERAL LOW BACK PAIN WITH BILATERAL SCIATICA: ICD-10-CM

## 2024-11-01 RX ORDER — CELECOXIB 200 MG/1
200 CAPSULE ORAL 2 TIMES DAILY PRN
Qty: 60 CAPSULE | Refills: 2 | OUTPATIENT
Start: 2024-11-01

## 2024-11-01 RX ORDER — CELECOXIB 200 MG/1
200 CAPSULE ORAL 2 TIMES DAILY PRN
Qty: 60 CAPSULE | Refills: 3 | Status: SHIPPED | OUTPATIENT
Start: 2024-11-01

## 2024-11-04 ENCOUNTER — PATIENT MESSAGE (OUTPATIENT)
Dept: NEUROSURGERY | Age: 44
End: 2024-11-04

## 2024-11-04 ENCOUNTER — TELEPHONE (OUTPATIENT)
Dept: NEUROSURGERY | Age: 44
End: 2024-11-04

## 2024-11-04 NOTE — TELEPHONE ENCOUNTER
Vm left at 11:41 that [facility] paper work will be completed today-received all pages- also I will not leave a VM or discuss about the chema filter over voice mail- patient is to call back

## 2024-11-04 NOTE — TELEPHONE ENCOUNTER
Called in asking abt [facility] paperwork , asking for a call back. [phone]    Also asked abt CT results \"[place]o filter in the inferior vena cava tilted towards the left. Some of  the limbs appear to be extruded through the posterior wall of the inferior vena  cava, abutting the lumbar spine.\" Was wanting Dr. Chen' input on this.... Thank you

## 2024-11-14 ENCOUNTER — ANESTHESIA EVENT (OUTPATIENT)
Dept: SURGERY | Age: 44
End: 2024-11-14
Payer: COMMERCIAL

## 2024-11-14 ENCOUNTER — ANESTHESIA (OUTPATIENT)
Dept: SURGERY | Age: 44
End: 2024-11-14
Payer: COMMERCIAL

## 2024-11-14 ENCOUNTER — APPOINTMENT (OUTPATIENT)
Dept: GENERAL RADIOLOGY | Age: 44
DRG: 989 | End: 2024-11-14
Attending: NEUROLOGICAL SURGERY
Payer: COMMERCIAL

## 2024-11-14 ENCOUNTER — HOSPITAL ENCOUNTER (INPATIENT)
Age: 44
LOS: 3 days | Discharge: HOME | DRG: 989 | End: 2024-11-17
Attending: NEUROLOGICAL SURGERY | Admitting: NEUROLOGICAL SURGERY
Payer: COMMERCIAL

## 2024-11-14 DIAGNOSIS — Z79.891 ENCOUNTER FOR LONG-TERM OPIATE ANALGESIC USE: ICD-10-CM

## 2024-11-14 DIAGNOSIS — G89.4 CHRONIC PAIN SYNDROME: ICD-10-CM

## 2024-11-14 LAB
ABO + RH BLD: NORMAL
BLOOD GROUP ANTIBODIES SERPL: NORMAL
HCG SERPL QL: NEGATIVE
SPECIMEN EXP DATE BLD: NORMAL

## 2024-11-14 PROCEDURE — 2720000010 HC SURG SUPPLY STERILE: Performed by: NEUROLOGICAL SURGERY

## 2024-11-14 PROCEDURE — 87252 VIRUS INOCULATION TISSUE: CPT

## 2024-11-14 PROCEDURE — C1713 ANCHOR/SCREW BN/BN,TIS/BN: HCPCS | Performed by: NEUROLOGICAL SURGERY

## 2024-11-14 PROCEDURE — 2580000003 HC RX 258: Performed by: NEUROLOGICAL SURGERY

## 2024-11-14 PROCEDURE — 88305 TISSUE EXAM BY PATHOLOGIST: CPT

## 2024-11-14 PROCEDURE — 2580000003 HC RX 258

## 2024-11-14 PROCEDURE — 7100000001 HC PACU RECOVERY - ADDTL 15 MIN: Performed by: NEUROLOGICAL SURGERY

## 2024-11-14 PROCEDURE — 6360000002 HC RX W HCPCS

## 2024-11-14 PROCEDURE — 2580000003 HC RX 258: Performed by: ANESTHESIOLOGY

## 2024-11-14 PROCEDURE — 6370000000 HC RX 637 (ALT 250 FOR IP): Performed by: NEUROLOGICAL SURGERY

## 2024-11-14 PROCEDURE — 36415 COLL VENOUS BLD VENIPUNCTURE: CPT

## 2024-11-14 PROCEDURE — 3700000001 HC ADD 15 MINUTES (ANESTHESIA): Performed by: NEUROLOGICAL SURGERY

## 2024-11-14 PROCEDURE — 87075 CULTR BACTERIA EXCEPT BLOOD: CPT

## 2024-11-14 PROCEDURE — 6370000000 HC RX 637 (ALT 250 FOR IP): Performed by: ANESTHESIOLOGY

## 2024-11-14 PROCEDURE — 87070 CULTURE OTHR SPECIMN AEROBIC: CPT

## 2024-11-14 PROCEDURE — 0JC70ZZ EXTIRPATION OF MATTER FROM BACK SUBCUTANEOUS TISSUE AND FASCIA, OPEN APPROACH: ICD-10-PCS | Performed by: NEUROLOGICAL SURGERY

## 2024-11-14 PROCEDURE — 3700000000 HC ANESTHESIA ATTENDED CARE: Performed by: NEUROLOGICAL SURGERY

## 2024-11-14 PROCEDURE — 2709999900 HC NON-CHARGEABLE SUPPLY: Performed by: NEUROLOGICAL SURGERY

## 2024-11-14 PROCEDURE — 87102 FUNGUS ISOLATION CULTURE: CPT

## 2024-11-14 PROCEDURE — 87116 MYCOBACTERIA CULTURE: CPT

## 2024-11-14 PROCEDURE — 87040 BLOOD CULTURE FOR BACTERIA: CPT

## 2024-11-14 PROCEDURE — 84703 CHORIONIC GONADOTROPIN ASSAY: CPT

## 2024-11-14 PROCEDURE — 2500000003 HC RX 250 WO HCPCS

## 2024-11-14 PROCEDURE — 86900 BLOOD TYPING SEROLOGIC ABO: CPT

## 2024-11-14 PROCEDURE — 3600000004 HC SURGERY LEVEL 4 BASE: Performed by: NEUROLOGICAL SURGERY

## 2024-11-14 PROCEDURE — 87206 SMEAR FLUORESCENT/ACID STAI: CPT

## 2024-11-14 PROCEDURE — A4217 STERILE WATER/SALINE, 500 ML: HCPCS | Performed by: NEUROLOGICAL SURGERY

## 2024-11-14 PROCEDURE — 2500000003 HC RX 250 WO HCPCS: Performed by: NEUROLOGICAL SURGERY

## 2024-11-14 PROCEDURE — 6360000002 HC RX W HCPCS: Performed by: NEUROLOGICAL SURGERY

## 2024-11-14 PROCEDURE — 88300 SURGICAL PATH GROSS: CPT

## 2024-11-14 PROCEDURE — 7100000000 HC PACU RECOVERY - FIRST 15 MIN: Performed by: NEUROLOGICAL SURGERY

## 2024-11-14 PROCEDURE — 87176 TISSUE HOMOGENIZATION CULTR: CPT

## 2024-11-14 PROCEDURE — 3600000014 HC SURGERY LEVEL 4 ADDTL 15MIN: Performed by: NEUROLOGICAL SURGERY

## 2024-11-14 PROCEDURE — 1100000000 HC RM PRIVATE

## 2024-11-14 PROCEDURE — 6360000002 HC RX W HCPCS: Performed by: ANESTHESIOLOGY

## 2024-11-14 PROCEDURE — 86901 BLOOD TYPING SEROLOGIC RH(D): CPT

## 2024-11-14 PROCEDURE — 87205 SMEAR GRAM STAIN: CPT

## 2024-11-14 PROCEDURE — 86850 RBC ANTIBODY SCREEN: CPT

## 2024-11-14 RX ORDER — ACETAMINOPHEN 500 MG
500 TABLET ORAL EVERY 6 HOURS PRN
Status: DISCONTINUED | OUTPATIENT
Start: 2024-11-14 | End: 2024-11-17 | Stop reason: HOSPADM

## 2024-11-14 RX ORDER — ONDANSETRON 2 MG/ML
INJECTION INTRAMUSCULAR; INTRAVENOUS
Status: DISPENSED
Start: 2024-11-14 | End: 2024-11-14

## 2024-11-14 RX ORDER — HYDROMORPHONE HYDROCHLORIDE 1 MG/ML
INJECTION, SOLUTION INTRAMUSCULAR; INTRAVENOUS; SUBCUTANEOUS
Status: DISCONTINUED | OUTPATIENT
Start: 2024-11-14 | End: 2024-11-14 | Stop reason: SDUPTHER

## 2024-11-14 RX ORDER — OXYCODONE HYDROCHLORIDE 5 MG/1
5 TABLET ORAL PRN
Status: DISCONTINUED | OUTPATIENT
Start: 2024-11-14 | End: 2024-11-14 | Stop reason: HOSPADM

## 2024-11-14 RX ORDER — BUPIVACAINE HYDROCHLORIDE AND EPINEPHRINE 5; 5 MG/ML; UG/ML
INJECTION, SOLUTION EPIDURAL; INTRACAUDAL; PERINEURAL PRN
Status: DISCONTINUED | OUTPATIENT
Start: 2024-11-14 | End: 2024-11-14 | Stop reason: ALTCHOICE

## 2024-11-14 RX ORDER — GLYCOPYRROLATE 0.2 MG/ML
INJECTION INTRAMUSCULAR; INTRAVENOUS
Status: DISCONTINUED | OUTPATIENT
Start: 2024-11-14 | End: 2024-11-14 | Stop reason: SDUPTHER

## 2024-11-14 RX ORDER — METHADONE HYDROCHLORIDE 10 MG/1
10 TABLET ORAL ONCE
Status: DISCONTINUED | OUTPATIENT
Start: 2024-11-14 | End: 2024-11-14 | Stop reason: HOSPADM

## 2024-11-14 RX ORDER — BUPRENORPHINE 15 UG/H
1 PATCH TRANSDERMAL WEEKLY
Status: DISCONTINUED | OUTPATIENT
Start: 2024-11-21 | End: 2024-11-17 | Stop reason: HOSPADM

## 2024-11-14 RX ORDER — ONDANSETRON 2 MG/ML
INJECTION INTRAMUSCULAR; INTRAVENOUS
Status: DISCONTINUED | OUTPATIENT
Start: 2024-11-14 | End: 2024-11-14 | Stop reason: SDUPTHER

## 2024-11-14 RX ORDER — DEXAMETHASONE SODIUM PHOSPHATE 10 MG/ML
INJECTION INTRAMUSCULAR; INTRAVENOUS
Status: DISCONTINUED | OUTPATIENT
Start: 2024-11-14 | End: 2024-11-14 | Stop reason: SDUPTHER

## 2024-11-14 RX ORDER — KETAMINE HCL IN NACL, ISO-OSM 20 MG/2 ML
SYRINGE (ML) INJECTION
Status: DISCONTINUED | OUTPATIENT
Start: 2024-11-14 | End: 2024-11-14 | Stop reason: SDUPTHER

## 2024-11-14 RX ORDER — LIDOCAINE HYDROCHLORIDE 10 MG/ML
1 INJECTION, SOLUTION INFILTRATION; PERINEURAL
Status: DISCONTINUED | OUTPATIENT
Start: 2024-11-14 | End: 2024-11-14 | Stop reason: HOSPADM

## 2024-11-14 RX ORDER — SODIUM CHLORIDE 0.9 % (FLUSH) 0.9 %
5-40 SYRINGE (ML) INJECTION EVERY 12 HOURS SCHEDULED
Status: DISCONTINUED | OUTPATIENT
Start: 2024-11-14 | End: 2024-11-17 | Stop reason: HOSPADM

## 2024-11-14 RX ORDER — SUCCINYLCHOLINE/SOD CL,ISO/PF 200MG/10ML
SYRINGE (ML) INTRAVENOUS
Status: DISCONTINUED | OUTPATIENT
Start: 2024-11-14 | End: 2024-11-14 | Stop reason: SDUPTHER

## 2024-11-14 RX ORDER — SODIUM PHOSPHATE, DIBASIC AND SODIUM PHOSPHATE, MONOBASIC 7; 19 G/230ML; G/230ML
1 ENEMA RECTAL DAILY PRN
Status: DISCONTINUED | OUTPATIENT
Start: 2024-11-14 | End: 2024-11-17 | Stop reason: HOSPADM

## 2024-11-14 RX ORDER — HYDROMORPHONE HYDROCHLORIDE 1 MG/ML
0.25 INJECTION, SOLUTION INTRAMUSCULAR; INTRAVENOUS; SUBCUTANEOUS
Status: DISCONTINUED | OUTPATIENT
Start: 2024-11-14 | End: 2024-11-17 | Stop reason: HOSPADM

## 2024-11-14 RX ORDER — ONDANSETRON 2 MG/ML
4 INJECTION INTRAMUSCULAR; INTRAVENOUS
Status: DISCONTINUED | OUTPATIENT
Start: 2024-11-14 | End: 2024-11-14 | Stop reason: HOSPADM

## 2024-11-14 RX ORDER — OXYCODONE HYDROCHLORIDE 5 MG/1
10 TABLET ORAL EVERY 4 HOURS PRN
Status: DISCONTINUED | OUTPATIENT
Start: 2024-11-14 | End: 2024-11-17 | Stop reason: HOSPADM

## 2024-11-14 RX ORDER — EPHEDRINE SULFATE 5 MG/ML
INJECTION INTRAVENOUS
Status: DISCONTINUED | OUTPATIENT
Start: 2024-11-14 | End: 2024-11-14 | Stop reason: SDUPTHER

## 2024-11-14 RX ORDER — SODIUM CHLORIDE, SODIUM LACTATE, POTASSIUM CHLORIDE, CALCIUM CHLORIDE 600; 310; 30; 20 MG/100ML; MG/100ML; MG/100ML; MG/100ML
INJECTION, SOLUTION INTRAVENOUS CONTINUOUS
Status: DISCONTINUED | OUTPATIENT
Start: 2024-11-14 | End: 2024-11-14 | Stop reason: HOSPADM

## 2024-11-14 RX ORDER — SODIUM CHLORIDE 0.9 % (FLUSH) 0.9 %
5-40 SYRINGE (ML) INJECTION PRN
Status: DISCONTINUED | OUTPATIENT
Start: 2024-11-14 | End: 2024-11-14 | Stop reason: HOSPADM

## 2024-11-14 RX ORDER — ACETAMINOPHEN 500 MG
1000 TABLET ORAL ONCE
Status: DISCONTINUED | OUTPATIENT
Start: 2024-11-14 | End: 2024-11-14 | Stop reason: HOSPADM

## 2024-11-14 RX ORDER — MIDAZOLAM HYDROCHLORIDE 2 MG/2ML
2 INJECTION, SOLUTION INTRAMUSCULAR; INTRAVENOUS
Status: DISCONTINUED | OUTPATIENT
Start: 2024-11-14 | End: 2024-11-14 | Stop reason: HOSPADM

## 2024-11-14 RX ORDER — METHOCARBAMOL 750 MG/1
750 TABLET, FILM COATED ORAL 3 TIMES DAILY PRN
Status: DISCONTINUED | OUTPATIENT
Start: 2024-11-14 | End: 2024-11-17 | Stop reason: HOSPADM

## 2024-11-14 RX ORDER — ONDANSETRON 2 MG/ML
4 INJECTION INTRAMUSCULAR; INTRAVENOUS EVERY 6 HOURS PRN
Status: DISCONTINUED | OUTPATIENT
Start: 2024-11-14 | End: 2024-11-17 | Stop reason: HOSPADM

## 2024-11-14 RX ORDER — BISACODYL 10 MG
10 SUPPOSITORY, RECTAL RECTAL DAILY PRN
Status: DISCONTINUED | OUTPATIENT
Start: 2024-11-14 | End: 2024-11-17 | Stop reason: HOSPADM

## 2024-11-14 RX ORDER — SODIUM CHLORIDE 9 MG/ML
INJECTION, SOLUTION INTRAVENOUS PRN
Status: DISCONTINUED | OUTPATIENT
Start: 2024-11-14 | End: 2024-11-14 | Stop reason: HOSPADM

## 2024-11-14 RX ORDER — SODIUM CHLORIDE 9 MG/ML
INJECTION, SOLUTION INTRAVENOUS CONTINUOUS
Status: DISCONTINUED | OUTPATIENT
Start: 2024-11-14 | End: 2024-11-17 | Stop reason: HOSPADM

## 2024-11-14 RX ORDER — LIDOCAINE HYDROCHLORIDE 20 MG/ML
INJECTION, SOLUTION EPIDURAL; INFILTRATION; INTRACAUDAL; PERINEURAL
Status: DISCONTINUED | OUTPATIENT
Start: 2024-11-14 | End: 2024-11-14 | Stop reason: SDUPTHER

## 2024-11-14 RX ORDER — MIDAZOLAM HYDROCHLORIDE 1 MG/ML
INJECTION, SOLUTION INTRAMUSCULAR; INTRAVENOUS
Status: DISCONTINUED | OUTPATIENT
Start: 2024-11-14 | End: 2024-11-14 | Stop reason: SDUPTHER

## 2024-11-14 RX ORDER — LIDOCAINE HYDROCHLORIDE ANHYDROUS AND DEXTROSE MONOHYDRATE 5; 400 G/100ML; MG/100ML
INJECTION, SOLUTION INTRAVENOUS
Status: DISCONTINUED | OUTPATIENT
Start: 2024-11-14 | End: 2024-11-14 | Stop reason: SDUPTHER

## 2024-11-14 RX ORDER — NEOSTIGMINE METHYLSULFATE 1 MG/ML
INJECTION INTRAVENOUS
Status: DISCONTINUED | OUTPATIENT
Start: 2024-11-14 | End: 2024-11-14 | Stop reason: SDUPTHER

## 2024-11-14 RX ORDER — HYDROMORPHONE HYDROCHLORIDE 2 MG/ML
0.5 INJECTION, SOLUTION INTRAMUSCULAR; INTRAVENOUS; SUBCUTANEOUS EVERY 5 MIN PRN
Status: DISCONTINUED | OUTPATIENT
Start: 2024-11-14 | End: 2024-11-14 | Stop reason: HOSPADM

## 2024-11-14 RX ORDER — HYDROMORPHONE HYDROCHLORIDE 2 MG/ML
0.5 INJECTION, SOLUTION INTRAMUSCULAR; INTRAVENOUS; SUBCUTANEOUS
Status: DISPENSED | OUTPATIENT
Start: 2024-11-14 | End: 2024-11-14

## 2024-11-14 RX ORDER — OXYCODONE HYDROCHLORIDE 5 MG/1
10 TABLET ORAL PRN
Status: DISCONTINUED | OUTPATIENT
Start: 2024-11-14 | End: 2024-11-14 | Stop reason: HOSPADM

## 2024-11-14 RX ORDER — SODIUM CHLORIDE 0.9 % (FLUSH) 0.9 %
5-40 SYRINGE (ML) INJECTION EVERY 12 HOURS SCHEDULED
Status: DISCONTINUED | OUTPATIENT
Start: 2024-11-14 | End: 2024-11-14 | Stop reason: HOSPADM

## 2024-11-14 RX ORDER — SODIUM CHLORIDE 0.9 % (FLUSH) 0.9 %
5-40 SYRINGE (ML) INJECTION PRN
Status: DISCONTINUED | OUTPATIENT
Start: 2024-11-14 | End: 2024-11-17 | Stop reason: HOSPADM

## 2024-11-14 RX ORDER — ONDANSETRON 4 MG/1
4 TABLET, ORALLY DISINTEGRATING ORAL EVERY 8 HOURS PRN
Status: DISCONTINUED | OUTPATIENT
Start: 2024-11-14 | End: 2024-11-17 | Stop reason: HOSPADM

## 2024-11-14 RX ORDER — HYDROMORPHONE HYDROCHLORIDE 1 MG/ML
0.5 INJECTION, SOLUTION INTRAMUSCULAR; INTRAVENOUS; SUBCUTANEOUS
Status: DISCONTINUED | OUTPATIENT
Start: 2024-11-14 | End: 2024-11-17 | Stop reason: HOSPADM

## 2024-11-14 RX ORDER — PROCHLORPERAZINE EDISYLATE 5 MG/ML
5 INJECTION INTRAMUSCULAR; INTRAVENOUS
Status: COMPLETED | OUTPATIENT
Start: 2024-11-14 | End: 2024-11-14

## 2024-11-14 RX ORDER — PROPOFOL 10 MG/ML
INJECTION, EMULSION INTRAVENOUS
Status: DISCONTINUED | OUTPATIENT
Start: 2024-11-14 | End: 2024-11-14 | Stop reason: SDUPTHER

## 2024-11-14 RX ORDER — SENNA AND DOCUSATE SODIUM 50; 8.6 MG/1; MG/1
1 TABLET, FILM COATED ORAL 2 TIMES DAILY
Status: DISCONTINUED | OUTPATIENT
Start: 2024-11-14 | End: 2024-11-17 | Stop reason: HOSPADM

## 2024-11-14 RX ORDER — SODIUM CHLORIDE 9 MG/ML
INJECTION, SOLUTION INTRAVENOUS PRN
Status: DISCONTINUED | OUTPATIENT
Start: 2024-11-14 | End: 2024-11-17 | Stop reason: HOSPADM

## 2024-11-14 RX ORDER — OXYCODONE HYDROCHLORIDE 5 MG/1
5 TABLET ORAL EVERY 4 HOURS PRN
Status: DISCONTINUED | OUTPATIENT
Start: 2024-11-14 | End: 2024-11-17 | Stop reason: HOSPADM

## 2024-11-14 RX ORDER — POLYETHYLENE GLYCOL 3350 17 G/17G
17 POWDER, FOR SOLUTION ORAL DAILY
Status: DISCONTINUED | OUTPATIENT
Start: 2024-11-14 | End: 2024-11-17 | Stop reason: HOSPADM

## 2024-11-14 RX ORDER — HYDROMORPHONE HYDROCHLORIDE 2 MG/ML
0.5 INJECTION, SOLUTION INTRAMUSCULAR; INTRAVENOUS; SUBCUTANEOUS EVERY 5 MIN PRN
Status: COMPLETED | OUTPATIENT
Start: 2024-11-14 | End: 2024-11-14

## 2024-11-14 RX ORDER — CELECOXIB 200 MG/1
200 CAPSULE ORAL 2 TIMES DAILY PRN
Status: DISCONTINUED | OUTPATIENT
Start: 2024-11-14 | End: 2024-11-17 | Stop reason: HOSPADM

## 2024-11-14 RX ORDER — DIPHENHYDRAMINE HYDROCHLORIDE 50 MG/ML
12.5 INJECTION INTRAMUSCULAR; INTRAVENOUS
Status: DISCONTINUED | OUTPATIENT
Start: 2024-11-14 | End: 2024-11-14 | Stop reason: HOSPADM

## 2024-11-14 RX ORDER — DIPHENHYDRAMINE HCL 25 MG
25 CAPSULE ORAL EVERY 6 HOURS PRN
Status: DISCONTINUED | OUTPATIENT
Start: 2024-11-14 | End: 2024-11-17 | Stop reason: HOSPADM

## 2024-11-14 RX ORDER — DIPHENHYDRAMINE HYDROCHLORIDE 50 MG/ML
25 INJECTION INTRAMUSCULAR; INTRAVENOUS EVERY 6 HOURS PRN
Status: DISCONTINUED | OUTPATIENT
Start: 2024-11-14 | End: 2024-11-17 | Stop reason: HOSPADM

## 2024-11-14 RX ORDER — CYCLOBENZAPRINE HCL 10 MG
10 TABLET ORAL 3 TIMES DAILY PRN
Status: DISCONTINUED | OUTPATIENT
Start: 2024-11-14 | End: 2024-11-17 | Stop reason: HOSPADM

## 2024-11-14 RX ORDER — SCOLOPAMINE TRANSDERMAL SYSTEM 1 MG/1
1 PATCH, EXTENDED RELEASE TRANSDERMAL
Status: COMPLETED | OUTPATIENT
Start: 2024-11-14 | End: 2024-11-17

## 2024-11-14 RX ORDER — ROCURONIUM BROMIDE 10 MG/ML
INJECTION, SOLUTION INTRAVENOUS
Status: DISCONTINUED | OUTPATIENT
Start: 2024-11-14 | End: 2024-11-14 | Stop reason: SDUPTHER

## 2024-11-14 RX ORDER — HYDROMORPHONE HYDROCHLORIDE 2 MG/ML
INJECTION, SOLUTION INTRAMUSCULAR; INTRAVENOUS; SUBCUTANEOUS
Status: COMPLETED
Start: 2024-11-14 | End: 2024-11-14

## 2024-11-14 RX ORDER — NALOXONE HYDROCHLORIDE 0.4 MG/ML
INJECTION, SOLUTION INTRAMUSCULAR; INTRAVENOUS; SUBCUTANEOUS PRN
Status: DISCONTINUED | OUTPATIENT
Start: 2024-11-14 | End: 2024-11-14 | Stop reason: HOSPADM

## 2024-11-14 RX ORDER — FENTANYL CITRATE 50 UG/ML
INJECTION, SOLUTION INTRAMUSCULAR; INTRAVENOUS
Status: DISCONTINUED | OUTPATIENT
Start: 2024-11-14 | End: 2024-11-14 | Stop reason: SDUPTHER

## 2024-11-14 RX ORDER — GABAPENTIN 300 MG/1
600 CAPSULE ORAL 3 TIMES DAILY
Status: DISCONTINUED | OUTPATIENT
Start: 2024-11-14 | End: 2024-11-17 | Stop reason: HOSPADM

## 2024-11-14 RX ADMIN — OXYCODONE 10 MG: 5 TABLET ORAL at 20:45

## 2024-11-14 RX ADMIN — NEOSTIGMINE METHYLSULFATE 4 MG: 1 INJECTION, SOLUTION INTRAVENOUS at 10:09

## 2024-11-14 RX ADMIN — ONDANSETRON 4 MG: 2 INJECTION INTRAMUSCULAR; INTRAVENOUS at 09:12

## 2024-11-14 RX ADMIN — CEFAZOLIN 2000 MG: 2 INJECTION, POWDER, FOR SOLUTION INTRAMUSCULAR; INTRAVENOUS at 15:33

## 2024-11-14 RX ADMIN — LIDOCAINE HYDROCHLORIDE 1.5 MG/KG/HR: 4 INJECTION, SOLUTION INTRAVENOUS at 08:12

## 2024-11-14 RX ADMIN — SODIUM CHLORIDE, SODIUM LACTATE, POTASSIUM CHLORIDE, AND CALCIUM CHLORIDE: 600; 310; 30; 20 INJECTION, SOLUTION INTRAVENOUS at 07:29

## 2024-11-14 RX ADMIN — HYDROMORPHONE HYDROCHLORIDE 0.5 MG: 2 INJECTION INTRAMUSCULAR; INTRAVENOUS; SUBCUTANEOUS at 11:21

## 2024-11-14 RX ADMIN — Medication 10 MG: at 09:52

## 2024-11-14 RX ADMIN — HYDROMORPHONE HYDROCHLORIDE 0.5 MG: 2 INJECTION INTRAMUSCULAR; INTRAVENOUS; SUBCUTANEOUS at 10:55

## 2024-11-14 RX ADMIN — PHENYLEPHRINE HYDROCHLORIDE 15 MCG/MIN: 10 INJECTION INTRAVENOUS at 08:12

## 2024-11-14 RX ADMIN — POLYETHYLENE GLYCOL 3350 17 G: 17 POWDER, FOR SOLUTION ORAL at 15:25

## 2024-11-14 RX ADMIN — PROCHLORPERAZINE EDISYLATE 5 MG: 5 INJECTION INTRAMUSCULAR; INTRAVENOUS at 10:56

## 2024-11-14 RX ADMIN — PROPOFOL 100 MG: 10 INJECTION, EMULSION INTRAVENOUS at 09:51

## 2024-11-14 RX ADMIN — SODIUM CHLORIDE 1500 MG: 9 INJECTION, SOLUTION INTRAVENOUS at 08:10

## 2024-11-14 RX ADMIN — CEFAZOLIN 2000 MG: 2 INJECTION, POWDER, FOR SOLUTION INTRAMUSCULAR; INTRAVENOUS at 23:48

## 2024-11-14 RX ADMIN — HYDROMORPHONE HYDROCHLORIDE 0.5 MG: 2 INJECTION INTRAMUSCULAR; INTRAVENOUS; SUBCUTANEOUS at 12:37

## 2024-11-14 RX ADMIN — Medication 160 MG: at 08:04

## 2024-11-14 RX ADMIN — PROPOFOL 200 MG: 10 INJECTION, EMULSION INTRAVENOUS at 08:04

## 2024-11-14 RX ADMIN — LIDOCAINE HYDROCHLORIDE 50 MG: 20 INJECTION, SOLUTION EPIDURAL; INFILTRATION; INTRACAUDAL; PERINEURAL at 08:03

## 2024-11-14 RX ADMIN — Medication 3 AMPULE: at 06:59

## 2024-11-14 RX ADMIN — DIPHENHYDRAMINE HYDROCHLORIDE 25 MG: 25 CAPSULE ORAL at 21:09

## 2024-11-14 RX ADMIN — PROPOFOL 40 MG: 10 INJECTION, EMULSION INTRAVENOUS at 08:19

## 2024-11-14 RX ADMIN — EPHEDRINE SULFATE 5 MG: 5 INJECTION INTRAVENOUS at 10:07

## 2024-11-14 RX ADMIN — Medication 10 MG: at 08:38

## 2024-11-14 RX ADMIN — PROPOFOL 60 MG: 10 INJECTION, EMULSION INTRAVENOUS at 08:17

## 2024-11-14 RX ADMIN — FENTANYL CITRATE 50 MCG: 50 INJECTION, SOLUTION INTRAMUSCULAR; INTRAVENOUS at 08:38

## 2024-11-14 RX ADMIN — FENTANYL CITRATE 50 MCG: 50 INJECTION, SOLUTION INTRAMUSCULAR; INTRAVENOUS at 08:03

## 2024-11-14 RX ADMIN — DEXAMETHASONE SODIUM PHOSPHATE 10 MG: 10 INJECTION INTRAMUSCULAR; INTRAVENOUS at 08:38

## 2024-11-14 RX ADMIN — GLYCOPYRROLATE 0.4 MG: 0.2 INJECTION INTRAMUSCULAR; INTRAVENOUS at 10:09

## 2024-11-14 RX ADMIN — ROCURONIUM BROMIDE 30 MG: 10 INJECTION, SOLUTION INTRAVENOUS at 08:38

## 2024-11-14 RX ADMIN — HYDROMORPHONE HYDROCHLORIDE 0.5 MG: 2 INJECTION INTRAMUSCULAR; INTRAVENOUS; SUBCUTANEOUS at 11:12

## 2024-11-14 RX ADMIN — EPHEDRINE SULFATE 10 MG: 5 INJECTION INTRAVENOUS at 07:58

## 2024-11-14 RX ADMIN — SODIUM CHLORIDE: 9 INJECTION, SOLUTION INTRAVENOUS at 15:51

## 2024-11-14 RX ADMIN — HYDROMORPHONE HYDROCHLORIDE 0.5 MG: 2 INJECTION INTRAMUSCULAR; INTRAVENOUS; SUBCUTANEOUS at 11:36

## 2024-11-14 RX ADMIN — SODIUM CHLORIDE: 9 INJECTION, SOLUTION INTRAVENOUS at 23:52

## 2024-11-14 RX ADMIN — GABAPENTIN 600 MG: 300 CAPSULE ORAL at 15:25

## 2024-11-14 RX ADMIN — GABAPENTIN 600 MG: 300 CAPSULE ORAL at 20:07

## 2024-11-14 RX ADMIN — HYDROMORPHONE HYDROCHLORIDE 0.5 MG: 1 INJECTION, SOLUTION INTRAMUSCULAR; INTRAVENOUS; SUBCUTANEOUS at 15:21

## 2024-11-14 RX ADMIN — MIDAZOLAM 2 MG: 1 INJECTION INTRAMUSCULAR; INTRAVENOUS at 08:03

## 2024-11-14 RX ADMIN — DOCUSATE SODIUM 50 MG AND SENNOSIDES 8.6 MG 1 TABLET: 8.6; 5 TABLET, FILM COATED ORAL at 20:07

## 2024-11-14 RX ADMIN — CEFEPIME 2000 MG: 2 INJECTION, POWDER, FOR SOLUTION INTRAVENOUS at 07:31

## 2024-11-14 RX ADMIN — DOCUSATE SODIUM 50 MG AND SENNOSIDES 8.6 MG 1 TABLET: 8.6; 5 TABLET, FILM COATED ORAL at 15:25

## 2024-11-14 RX ADMIN — PROPOFOL 120 MCG/KG/MIN: 10 INJECTION, EMULSION INTRAVENOUS at 08:12

## 2024-11-14 RX ADMIN — HYDROMORPHONE HYDROCHLORIDE 0.5 MG: 1 INJECTION, SOLUTION INTRAMUSCULAR; INTRAVENOUS; SUBCUTANEOUS at 10:40

## 2024-11-14 ASSESSMENT — PAIN DESCRIPTION - LOCATION
LOCATION: BACK
LOCATION: BACK;HIP;LEG
LOCATION: BACK

## 2024-11-14 ASSESSMENT — PAIN DESCRIPTION - DESCRIPTORS
DESCRIPTORS: ACHING
DESCRIPTORS: ACHING;DISCOMFORT;SHOOTING

## 2024-11-14 ASSESSMENT — PAIN DESCRIPTION - ORIENTATION
ORIENTATION: LOWER
ORIENTATION: LOWER;RIGHT;LEFT

## 2024-11-14 ASSESSMENT — PAIN SCALES - GENERAL
PAINLEVEL_OUTOF10: 8
PAINLEVEL_OUTOF10: 7
PAINLEVEL_OUTOF10: 10
PAINLEVEL_OUTOF10: 0
PAINLEVEL_OUTOF10: 8
PAINLEVEL_OUTOF10: 0
PAINLEVEL_OUTOF10: 10

## 2024-11-14 ASSESSMENT — PAIN - FUNCTIONAL ASSESSMENT
PAIN_FUNCTIONAL_ASSESSMENT: PREVENTS OR INTERFERES SOME ACTIVE ACTIVITIES AND ADLS
PAIN_FUNCTIONAL_ASSESSMENT: 0-10
PAIN_FUNCTIONAL_ASSESSMENT: FACE, LEGS, ACTIVITY, CRY, AND CONSOLABILITY (FLACC)

## 2024-11-14 ASSESSMENT — LIFESTYLE VARIABLES: SMOKING_STATUS: 1

## 2024-11-14 ASSESSMENT — PAIN DESCRIPTION - FREQUENCY: FREQUENCY: INTERMITTENT

## 2024-11-14 ASSESSMENT — PAIN DESCRIPTION - ONSET: ONSET: GRADUAL

## 2024-11-14 ASSESSMENT — PAIN DESCRIPTION - PAIN TYPE: TYPE: ACUTE PAIN

## 2024-11-14 NOTE — PERIOP NOTE
12:43 PM  Report to SONIA Powers.    1313  Report from SONIA Powers.    1:18 PM  TRANSFER - OUT REPORT:    Verbal report given to SONIA Fisher on Fabio Dee  being transferred to [facility] for routine post-op       Report consisted of patient’s Situation, Background, Assessment and   Recommendations(SBAR).     Information from the following report(s) Nurse Handoff Report, Intake/Output, MAR, Cardiac Rhythm NSR to SB, and Neuro Assessment was reviewed with the receiving nurse.    Lines:   Peripheral IV 11/14/24 Distal;Posterior;Right Forearm (Active)   Site Assessment Clean, dry & intact 11/14/24 1024   Line Status Flushed 11/14/24 1024   Line Care Connections checked and tightened 11/14/24 0649   Phlebitis Assessment No symptoms 11/14/24 1024   Infiltration Assessment 0 11/14/24 1024   Alcohol Cap Used No 11/14/24 0649   Dressing Status Intact w/seal 11/14/24 0649   Dressing Type Transparent 11/14/24 1024   Dressing Intervention New 11/14/24 0649        Opportunity for questions and clarification was provided.      Patient transported with:   O2 @ 4 liters    VTE prophylaxis orders have been written for Fabio Dee.    Wife called and given room number.

## 2024-11-14 NOTE — ANESTHESIA POSTPROCEDURE EVALUATION
Department of Anesthesiology  Postprocedure Note    Patient: Fabio Dee  MRN: [id number]  YOB: 1980  Date of evaluation: 11/14/2024    Procedure Summary       Date: 11/14/24 Room / Location: [facility] MAIN OR  / [facility] MAIN OR    Anesthesia Start: 0747 Anesthesia Stop: 1026    Procedure: ERAS - L3-L4 with a discectomy and interbody fusion and arthrodesis, utilize intraoperative neuronavigation in the form of the [employer] 3D C-arm and [email or web address] spine Q neuronavigation, need to remove his existing hardware in place and place new hardware from L3-S1 (Spine Lumbar) Diagnosis:       Chronic bilateral low back pain without sciatica      Lumbar stenosis with neurogenic claudication      Left foot drop      (Chronic bilateral low back pain without sciatica [M54.50, G89.29])      (Lumbar stenosis with neurogenic claudication [M48.062])      (Left foot drop [M21.372])    Providers: Elias Chen MD Responsible Provider: Kwadwo Cortez MD    Anesthesia Type: general ASA Status: 2            Anesthesia Type: No value filed.    Bing Phase I: Bing Score: 9    Bing Phase II:      Anesthesia Post Evaluation    Patient location during evaluation: PACU  Patient participation: complete - patient participated  Level of consciousness: awake and alert  Airway patency: patent  Nausea & Vomiting: no nausea and no vomiting  Cardiovascular status: hemodynamically stable  Respiratory status: acceptable, nonlabored ventilation and spontaneous ventilation  Hydration status: euvolemic  Comments: /62   Pulse 57   Temp 97.6 °F (36.4 °C) (Oral)   Resp 16   Ht 1.803 m (5' 11\")   Wt 99.8 kg (220 lb)   SpO2 97%   BMI 30.68 kg/m²     Multimodal analgesia pain management approach  Pain management: adequate and satisfactory to patient        No notable events documented.

## 2024-11-14 NOTE — OP NOTE
further review of the CT lumbar spine there appears to be a fragment of retained surgical site drain at the level of the L4-L5 soft tissues.  On the CT sagittal and measures 7.3 mm x 21 mm x 5.5 mm (AP by craniocaudal by transverse dimensions ).  This is likely retained surgical site drain from one of the patient's prior lumbar spine surgeries.  This will be removed during the lumbar fusion exploration.  I will send for culture and Gram stain.  This was not read by the radiologist on the patient's CT lumbar spine formal radiology report will recommend that radiology addend the report to document said finding.      Detailed Description of Procedure:   The patient was transported operating room placed under general endotracheal esthesia.  Placed under continuous intraoperative neuromonitoring.  He was then positioned in a prone position on the Efren spine table with all bony prominences padded appropriately.  He was then prepped and draped in a standard sterile fashion. A surgical pause time-out was performed at the beginning of the case prior to incision confirming procedure, sterility and functionality of instruments, surgical site, stability of patient, and anti-biotic administration.  The patient's prior lumbar incisions were marked out along the midline.  Local anesthetic was injected along the length of the incision.  Incision which is made with a scalpel and dissection carried down to the soft tissues to the spinous process of L2 and L3 in the midline and in the soft tissues overlying his prior fusion.  In the subfascial space at approximately the L3-L4 level a well granulated pocket formed around a retained foreign object i.e. a portion of a surgical site Efren-Scott drain was identified.  Upon opening this granulated pocket a small volume of purulent fluid was encountered.  At this time a surgical pause was performed discussion whether to proceed with exposing the rest of his currently implanted hardware

## 2024-11-14 NOTE — H&P
AM    Addendum:   On further review of the CT lumbar spine there appears to be a fragment of retained surgical site drain at the level of the L4-L5 soft tissues.  On the CT sagittal and measures 7.3 mm x 21 mm x 5.5 mm (AP by craniocaudal by transverse dimensions ).  This is likely retained surgical site drain from one of the patient's prior lumbar spine surgeries.  This will be removed during the lumbar fusion exploration.  I will send for culture and Gram stain.  This was not read by the radiologist on the patient's CT lumbar spine formal radiology report will recommend that radiology addend the report to document said finding.    Elias Chen MD, FAANS, FCNS   Neurosurgeon  [facility]  [facility]   11/14/2024 at 7:51 AM

## 2024-11-14 NOTE — PERIOP NOTE
Dr. Cortez gave verbal order for an additional 1 mg of dilaudid IV to be given in 0.5mg doses x2.

## 2024-11-14 NOTE — PROGRESS NOTES
4 Eyes Skin Assessment     NAME:  Fabio Dee  YOB: 1980  MEDICAL RECORD NUMBER:  [id number]    The patient is being assessed for  Admission    I agree that at least one RN has performed a thorough Head to Toe Skin Assessment on the patient. ALL assessment sites listed below have been assessed.      Areas assessed by both nurses:    Head, Face, Ears, Shoulders, Back, Chest, Arms, Elbows, Hands, Sacrum. Buttock, Coccyx, Ischium, and Legs. Feet and Heels        Does the Patient have a Wound? No noted wound(s)       Christopher Prevention initiated by RN: Yes  Wound Care Orders initiated by RN: No    Pressure Injury (Stage 3,4, Unstageable, DTI, NWPT, and Complex wounds) if present, place Wound referral order by RN under : No    New Ostomies, if present place, Ostomy referral order under : No     Nurse 1 eSignature: Electronically signed by Natalia Qiu RN on 11/14/24 at 6:12 PM EST    **SHARE this note so that the co-signing nurse can place an eSignature**    Nurse 2 eSignature: {Esignature:[id number]}

## 2024-11-14 NOTE — ANESTHESIA PROCEDURE NOTES
Airway  Date/Time: 11/14/2024 8:06 AM  Urgency: elective    Airway not difficult    General Information and Staff    Patient location during procedure: OR  Resident/CRNA: Daysi Escobedo APRN - CRNA  Performed: resident/CRNA   Performed by: Daysi Escobedo APRN - CRNA  Authorized by: Kwadwo Cortez MD      Indications and Patient Condition  Indications for airway management: anesthesia  Spontaneous Ventilation: absent  Sedation level: deep  Preoxygenated: yes  Patient position: sniffing  MILS not maintained throughout  Mask difficulty assessment: vent by bag mask    Final Airway Details  Final airway type: endotracheal airway      Successful airway: ETT  Cuffed: yes   Successful intubation technique: direct laryngoscopy  Facilitating devices/methods: intubating stylet  Endotracheal tube insertion site: oral  Blade: Fam  Blade size: #4  ETT size (mm): 7.5  Cormack-Lehane Classification: grade I - full view of glottis  Placement verified by: chest auscultation and capnometry   Cuff volume (mL): 8  Measured from: teeth  ETT to teeth (cm): 22  Number of attempts at approach: 1  Ventilation between attempts: bag mask  Number of other approaches attempted: 0    Additional Comments  Lips and dentition unchanged.  no

## 2024-11-15 ENCOUNTER — PREP FOR PROCEDURE (OUTPATIENT)
Dept: NEUROSURGERY | Age: 44
End: 2024-11-15

## 2024-11-15 PROCEDURE — 97165 OT EVAL LOW COMPLEX 30 MIN: CPT

## 2024-11-15 PROCEDURE — 97116 GAIT TRAINING THERAPY: CPT

## 2024-11-15 PROCEDURE — 2580000003 HC RX 258: Performed by: NEUROLOGICAL SURGERY

## 2024-11-15 PROCEDURE — 1100000000 HC RM PRIVATE

## 2024-11-15 PROCEDURE — 6360000002 HC RX W HCPCS: Performed by: NEUROLOGICAL SURGERY

## 2024-11-15 PROCEDURE — 2500000003 HC RX 250 WO HCPCS: Performed by: NEUROLOGICAL SURGERY

## 2024-11-15 PROCEDURE — 97530 THERAPEUTIC ACTIVITIES: CPT

## 2024-11-15 PROCEDURE — 6370000000 HC RX 637 (ALT 250 FOR IP): Performed by: NEUROLOGICAL SURGERY

## 2024-11-15 PROCEDURE — 97161 PT EVAL LOW COMPLEX 20 MIN: CPT

## 2024-11-15 PROCEDURE — 99024 POSTOP FOLLOW-UP VISIT: CPT | Performed by: NURSE PRACTITIONER

## 2024-11-15 RX ADMIN — GABAPENTIN 600 MG: 300 CAPSULE ORAL at 14:57

## 2024-11-15 RX ADMIN — CEFAZOLIN 2000 MG: 2 INJECTION, POWDER, FOR SOLUTION INTRAMUSCULAR; INTRAVENOUS at 06:42

## 2024-11-15 RX ADMIN — HYDROMORPHONE HYDROCHLORIDE 0.5 MG: 1 INJECTION, SOLUTION INTRAMUSCULAR; INTRAVENOUS; SUBCUTANEOUS at 11:26

## 2024-11-15 RX ADMIN — DOCUSATE SODIUM 50 MG AND SENNOSIDES 8.6 MG 1 TABLET: 8.6; 5 TABLET, FILM COATED ORAL at 20:41

## 2024-11-15 RX ADMIN — DOCUSATE SODIUM 50 MG AND SENNOSIDES 8.6 MG 1 TABLET: 8.6; 5 TABLET, FILM COATED ORAL at 08:03

## 2024-11-15 RX ADMIN — ACETAMINOPHEN 500 MG: 500 TABLET, FILM COATED ORAL at 08:03

## 2024-11-15 RX ADMIN — POLYETHYLENE GLYCOL 3350 17 G: 17 POWDER, FOR SOLUTION ORAL at 08:03

## 2024-11-15 RX ADMIN — GABAPENTIN 600 MG: 300 CAPSULE ORAL at 08:03

## 2024-11-15 RX ADMIN — HYDROMORPHONE HYDROCHLORIDE 0.5 MG: 1 INJECTION, SOLUTION INTRAMUSCULAR; INTRAVENOUS; SUBCUTANEOUS at 17:48

## 2024-11-15 RX ADMIN — OXYCODONE 10 MG: 5 TABLET ORAL at 21:17

## 2024-11-15 RX ADMIN — CHOLECALCIFEROL TAB 125 MCG (5000 UNIT) 5000 UNITS: 125 TAB at 08:03

## 2024-11-15 RX ADMIN — GABAPENTIN 600 MG: 300 CAPSULE ORAL at 20:41

## 2024-11-15 RX ADMIN — CEFAZOLIN 2000 MG: 2 INJECTION, POWDER, FOR SOLUTION INTRAMUSCULAR; INTRAVENOUS at 23:23

## 2024-11-15 RX ADMIN — SODIUM CHLORIDE, PRESERVATIVE FREE 10 ML: 5 INJECTION INTRAVENOUS at 08:03

## 2024-11-15 RX ADMIN — CEFAZOLIN 2000 MG: 2 INJECTION, POWDER, FOR SOLUTION INTRAMUSCULAR; INTRAVENOUS at 14:57

## 2024-11-15 RX ADMIN — SODIUM CHLORIDE, PRESERVATIVE FREE 10 ML: 5 INJECTION INTRAVENOUS at 20:41

## 2024-11-15 RX ADMIN — OXYCODONE 10 MG: 5 TABLET ORAL at 15:10

## 2024-11-15 RX ADMIN — OXYCODONE 10 MG: 5 TABLET ORAL at 08:02

## 2024-11-15 RX ADMIN — HYDROMORPHONE HYDROCHLORIDE 0.5 MG: 1 INJECTION, SOLUTION INTRAMUSCULAR; INTRAVENOUS; SUBCUTANEOUS at 06:58

## 2024-11-15 ASSESSMENT — PAIN DESCRIPTION - LOCATION
LOCATION: BACK

## 2024-11-15 ASSESSMENT — PAIN SCALES - GENERAL
PAINLEVEL_OUTOF10: 0
PAINLEVEL_OUTOF10: 0
PAINLEVEL_OUTOF10: 8
PAINLEVEL_OUTOF10: 10
PAINLEVEL_OUTOF10: 0
PAINLEVEL_OUTOF10: 0
PAINLEVEL_OUTOF10: 7
PAINLEVEL_OUTOF10: 7
PAINLEVEL_OUTOF10: 8
PAINLEVEL_OUTOF10: 0
PAINLEVEL_OUTOF10: 7
PAINLEVEL_OUTOF10: 0
PAINLEVEL_OUTOF10: 8

## 2024-11-15 ASSESSMENT — PAIN DESCRIPTION - FREQUENCY
FREQUENCY: INTERMITTENT

## 2024-11-15 ASSESSMENT — PAIN DESCRIPTION - ONSET
ONSET: GRADUAL

## 2024-11-15 ASSESSMENT — PAIN - FUNCTIONAL ASSESSMENT
PAIN_FUNCTIONAL_ASSESSMENT: PREVENTS OR INTERFERES SOME ACTIVE ACTIVITIES AND ADLS
PAIN_FUNCTIONAL_ASSESSMENT: ACTIVITIES ARE NOT PREVENTED
PAIN_FUNCTIONAL_ASSESSMENT: PREVENTS OR INTERFERES SOME ACTIVE ACTIVITIES AND ADLS
PAIN_FUNCTIONAL_ASSESSMENT: PREVENTS OR INTERFERES SOME ACTIVE ACTIVITIES AND ADLS

## 2024-11-15 ASSESSMENT — PAIN DESCRIPTION - PAIN TYPE
TYPE: SURGICAL PAIN

## 2024-11-15 ASSESSMENT — PAIN DESCRIPTION - DESCRIPTORS
DESCRIPTORS: ACHING
DESCRIPTORS: THROBBING;ACHING
DESCRIPTORS: ACHING

## 2024-11-15 ASSESSMENT — PAIN DESCRIPTION - ORIENTATION
ORIENTATION: MID
ORIENTATION: LOWER;MID

## 2024-11-15 NOTE — CARE COORDINATION
CM screened chart for potential ANETTE needs; no CM consult received.  Patient admitted for elective surgery with Dr. Chen.  PT/OT recommending no further skilled needs at discharge.  See service assessment below.  No CM needs noted at this time.    Please consult Cm for any noted ANETTE/discharge needs.       11/15/24 1600   Service Assessment   Patient Orientation Alert and Oriented   Cognition Alert   History Provided By Medical Record   Primary Caregiver Self   Accompanied By/Relationship N/A   Support Systems Spouse/Significant Other   Patient's Healthcare Decision Maker is: Legal Next of Kin   PCP Verified by CM Yes   Last Visit to PCP Within last 3 months   Prior Functional Level Independent in ADLs/IADLs   Current Functional Level Independent in ADLs/IADLs   Can patient return to prior living arrangement Yes   Ability to make needs known: Good   Family able to assist with home care needs: Yes   Would you like for me to discuss the discharge plan with any other family members/significant others, and if so, who? No   Financial Resources Other (Comment)  (Commercial)   Community Resources None   CM/SW Referral Other (see comment)  (No CM consult)   Social/Functional History   Lives With Spouse   Type of Home House   Home Layout One level   Home Access Level entry   Bathroom Shower/Tub Walk-in shower   Home Equipment Walker - Rolling;Cane   Active  Yes   Occupation Full time employment   Discharge Planning   Type of Residence House   Living Arrangements Spouse/Significant Other   Current Services Prior To Admission None   Potential Assistance Needed N/A   DME Ordered? No   Potential Assistance Purchasing Medications No   Patient expects to be discharged to: House   Services At/After Discharge   Services At/After Discharge None

## 2024-11-15 NOTE — DISCHARGE INSTRUCTIONS
Discharge Instructions - Lumbar Surgery    KEEP POST OPERATIVE WOUND CHECK; WE WILL LOOK TO FIND A NEW TIME TO RESCHEDULE FOR THE ORIGINAL SURGERY.    Pain Medicine  Take pain medicine every 6hrs as needed.  You are encouraged to space out your pain pill doses until you are able wean off.  Pain medicine can cause constipation and upset stomach so it is advised to take with food.  It is encouraged to take a daily over the counter stool softer and drink plenty of water to help prevent constipation.  If you need a medication refill be aware it may take up to 3 days for a prescription to be called in.    Incision  Keep surgical site clean and dry.  You may use 4x4s (gauze) and skin tape when redressing incision    Please have someone check your incisions daily. If any of the following should occur, please call the office:   Drainage from incision site   Opening of incisions   Fevers greater than 101 degrees   Flu-like symptoms   Increased redness and/or tenderness  If you have staples or sutures instead of tape on your incision, they may be removed 10-14 days following your surgery. This may be done by a visiting nurse, rehab physician, or at your first follow up visit at our office. Otherwise, if your wound is covered with tape strips, they will typically fall of with showering.    Brace  If a brace is prescribed, wear it at when you up and out of bed.  Always wear a t-shirt under your brace so that it is not directly in contact with your bare skin.  The brace may cause you to sweat and you may feel warm. This can irritate your skin so pay special attention to the incision.    Showering  If your surgeon allows, you may shower as normal once the large, bulky bandages are removed from your incision. If they are not removed before your discharge from the hospital, you may remove them 36-48 hours after your surgery. Do not scrub at incisions but may allow soapy water to wash over them.  Pat them dry after showering and

## 2024-11-15 NOTE — THERAPY EVALUATION
ACUTE OCCUPATIONAL THERAPY GOALS:   (Developed with and agreed upon by patient and/or caregiver.)  1. Patient will complete functional transfers with MODIFIED INDEPENDENCE and adaptive equipment as needed.   2. Patient will complete lower body dressing with MODIFIED INDEPENDENCE and adaptive equipment as needed.  3. Patient will complete functional mobility of household distances for ADLs with MODIFIED INDEPENDENCE and adaptive equipment as needed.    Timeframe: 1 visit    ALL GOALS MET 11/15/2024      OCCUPATIONAL THERAPY Initial Assessment, Daily Note, and AM       OT Visit Days: 1  Acknowledge Orders  Time  OT Charge Capture  Rehab Caseload Tracker      Fabio Dee is a 44 y.o. male   PRIMARY DIAGNOSIS: Lumbar stenosis with neurogenic claudication  Chronic bilateral low back pain without sciatica [M54.50, G89.29]  Lumbar stenosis with neurogenic claudication [M48.062]  Left foot drop [M21.372]  Procedure(s) (LRB):  ERAS - L3-L4 with a discectomy and interbody fusion and arthrodesis, utilize intraoperative neuronavigation in the form of the UpdoxD 3D C-arm and [employer] spine Q neuronavigation, need to remove his existing hardware in place and place new hardware from L3-S1 (N/A)  1 Day Post-Op  Reason for Referral: Generalized Muscle Weakness (M62.81)  Low Back Pain (M54.5)  Inpatient: Payor: [employer] / Plan: [employer] FRONTPATH / Product Type: *No Product type* /     ASSESSMENT:     REHAB RECOMMENDATIONS:   Recommendation to date pending progress:  Setting:  No further skilled occupational therapy after discharge from hospital    Equipment:   Shower chair (discussed where to purchase/ resources)  Patient has rolling walker and SPC at home     ASSESSMENT:  Mr. Dee is a 43 y/o male who presents s/p attempted L3-4 with a discectomy and interbody fusion; procedure aborted after finding piece of retained surgical drain. Drain piece was removed and cultures taken and sent to lab. Patient

## 2024-11-15 NOTE — PROGRESS NOTES
[facility]    Assessment: S/P attempted L3-L4 with a discectomy and interbody fusion ; case canceled after finding piece of retained surgical drain.  Drain piece remvoed and cultures taken and sent to lab.    Plan:  -pt to be admitted until cultures grow out  -if cultures grow bacteria will consult ID to manage antibiotics  -if no growth then patient can discharge home  -pt states he has been taking Duricef for the last 7 months  -no culture growth as of 9:21 AM    Subjective:    Objective:  Afebrile  VSS  GCS15  Moving all extremities 5/5  Dressing CDI    Patient Vitals for the past 12 hrs:   Temp Pulse Resp BP SpO2   11/15/24 0752 97.3 °F (36.3 °C) 66 18 110/77 100 %   11/15/24 0307 97.5 °F (36.4 °C) 67 -- 115/62 94 %   11/15/24 0014 -- 77 -- 119/62 --   11/14/24 2317 97.7 °F (36.5 °C) 68 18 (!) 82/55 93 %        Recent Results (from the past 24 hour(s))   Culture, Blood 1    Collection Time: 11/14/24  9:45 AM    Specimen: Blood   Result Value Ref Range    Special Requests LEFT HAND      Culture NO GROWTH AFTER 21 HOURS     Culture, Blood 2    Collection Time: 11/14/24  9:50 AM    Specimen: Blood   Result Value Ref Range    Special Requests LEFT FOREARM      Culture NO GROWTH AFTER 21 HOURS     Pregnancy, HCG qualitative serum    Collection Time: 11/14/24  2:30 PM   Result Value Ref Range    Preg, Serum Negative          Mireille Mcmahon APRN - CNP

## 2024-11-16 LAB
ACID FAST STN SPEC: NEGATIVE
BACTERIA SPEC CULT: NORMAL
BACTERIA SPEC CULT: NORMAL
GRAM STN SPEC: NORMAL
GRAM STN SPEC: NORMAL
MYCOBACTERIUM SPEC QL CULT: NORMAL
SERVICE CMNT-IMP: NORMAL
SERVICE CMNT-IMP: NORMAL
SPECIMEN PREPARATION: NORMAL
SPECIMEN SOURCE: NORMAL

## 2024-11-16 PROCEDURE — 2500000003 HC RX 250 WO HCPCS: Performed by: NEUROLOGICAL SURGERY

## 2024-11-16 PROCEDURE — 6370000000 HC RX 637 (ALT 250 FOR IP): Performed by: NEUROLOGICAL SURGERY

## 2024-11-16 PROCEDURE — 99024 POSTOP FOLLOW-UP VISIT: CPT | Performed by: NEUROLOGICAL SURGERY

## 2024-11-16 PROCEDURE — 2580000003 HC RX 258: Performed by: NEUROLOGICAL SURGERY

## 2024-11-16 PROCEDURE — 6360000002 HC RX W HCPCS: Performed by: NEUROLOGICAL SURGERY

## 2024-11-16 PROCEDURE — 1100000000 HC RM PRIVATE

## 2024-11-16 RX ADMIN — HYDROMORPHONE HYDROCHLORIDE 0.5 MG: 1 INJECTION, SOLUTION INTRAMUSCULAR; INTRAVENOUS; SUBCUTANEOUS at 15:44

## 2024-11-16 RX ADMIN — METHOCARBAMOL TABLETS 750 MG: 750 TABLET, COATED ORAL at 10:08

## 2024-11-16 RX ADMIN — ACETAMINOPHEN 500 MG: 500 TABLET, FILM COATED ORAL at 06:28

## 2024-11-16 RX ADMIN — SODIUM CHLORIDE, PRESERVATIVE FREE 10 ML: 5 INJECTION INTRAVENOUS at 07:50

## 2024-11-16 RX ADMIN — CEFAZOLIN 2000 MG: 2 INJECTION, POWDER, FOR SOLUTION INTRAMUSCULAR; INTRAVENOUS at 06:27

## 2024-11-16 RX ADMIN — CELECOXIB 200 MG: 200 CAPSULE ORAL at 10:08

## 2024-11-16 RX ADMIN — GABAPENTIN 600 MG: 300 CAPSULE ORAL at 21:33

## 2024-11-16 RX ADMIN — ACETAMINOPHEN 500 MG: 500 TABLET, FILM COATED ORAL at 21:42

## 2024-11-16 RX ADMIN — HYDROMORPHONE HYDROCHLORIDE 0.5 MG: 1 INJECTION, SOLUTION INTRAMUSCULAR; INTRAVENOUS; SUBCUTANEOUS at 02:41

## 2024-11-16 RX ADMIN — GABAPENTIN 600 MG: 300 CAPSULE ORAL at 07:46

## 2024-11-16 RX ADMIN — SODIUM CHLORIDE, PRESERVATIVE FREE 10 ML: 5 INJECTION INTRAVENOUS at 21:33

## 2024-11-16 RX ADMIN — HYDROMORPHONE HYDROCHLORIDE 0.5 MG: 1 INJECTION, SOLUTION INTRAMUSCULAR; INTRAVENOUS; SUBCUTANEOUS at 12:36

## 2024-11-16 RX ADMIN — CEFAZOLIN 2000 MG: 2 INJECTION, POWDER, FOR SOLUTION INTRAMUSCULAR; INTRAVENOUS at 15:44

## 2024-11-16 RX ADMIN — OXYCODONE 10 MG: 5 TABLET ORAL at 19:25

## 2024-11-16 RX ADMIN — DOCUSATE SODIUM 50 MG AND SENNOSIDES 8.6 MG 1 TABLET: 8.6; 5 TABLET, FILM COATED ORAL at 21:33

## 2024-11-16 RX ADMIN — CHOLECALCIFEROL TAB 125 MCG (5000 UNIT) 5000 UNITS: 125 TAB at 07:46

## 2024-11-16 RX ADMIN — HYDROMORPHONE HYDROCHLORIDE 0.5 MG: 1 INJECTION, SOLUTION INTRAMUSCULAR; INTRAVENOUS; SUBCUTANEOUS at 07:47

## 2024-11-16 RX ADMIN — GABAPENTIN 600 MG: 300 CAPSULE ORAL at 12:36

## 2024-11-16 RX ADMIN — HYDROMORPHONE HYDROCHLORIDE 0.5 MG: 1 INJECTION, SOLUTION INTRAMUSCULAR; INTRAVENOUS; SUBCUTANEOUS at 21:41

## 2024-11-16 RX ADMIN — DOCUSATE SODIUM 50 MG AND SENNOSIDES 8.6 MG 1 TABLET: 8.6; 5 TABLET, FILM COATED ORAL at 07:46

## 2024-11-16 RX ADMIN — POLYETHYLENE GLYCOL 3350 17 G: 17 POWDER, FOR SOLUTION ORAL at 08:02

## 2024-11-16 RX ADMIN — CEFAZOLIN 2000 MG: 2 INJECTION, POWDER, FOR SOLUTION INTRAMUSCULAR; INTRAVENOUS at 23:42

## 2024-11-16 RX ADMIN — OXYCODONE 10 MG: 5 TABLET ORAL at 06:28

## 2024-11-16 ASSESSMENT — PAIN SCALES - GENERAL
PAINLEVEL_OUTOF10: 6
PAINLEVEL_OUTOF10: 6
PAINLEVEL_OUTOF10: 10
PAINLEVEL_OUTOF10: 9
PAINLEVEL_OUTOF10: 7
PAINLEVEL_OUTOF10: 8
PAINLEVEL_OUTOF10: 6
PAINLEVEL_OUTOF10: 7
PAINLEVEL_OUTOF10: 7
PAINLEVEL_OUTOF10: 0
PAINLEVEL_OUTOF10: 10

## 2024-11-16 ASSESSMENT — PAIN DESCRIPTION - LOCATION
LOCATION: BACK

## 2024-11-16 ASSESSMENT — PAIN DESCRIPTION - ORIENTATION
ORIENTATION: LOWER;MID;POSTERIOR
ORIENTATION: LOWER;MID;POSTERIOR
ORIENTATION: LOWER
ORIENTATION: LOWER
ORIENTATION: POSTERIOR
ORIENTATION: POSTERIOR
ORIENTATION: LOWER
ORIENTATION: LOWER

## 2024-11-16 ASSESSMENT — PAIN DESCRIPTION - DESCRIPTORS
DESCRIPTORS: ACHING

## 2024-11-16 ASSESSMENT — PAIN DESCRIPTION - ONSET
ONSET: GRADUAL
ONSET: GRADUAL

## 2024-11-16 ASSESSMENT — PAIN - FUNCTIONAL ASSESSMENT
PAIN_FUNCTIONAL_ASSESSMENT: ACTIVITIES ARE NOT PREVENTED
PAIN_FUNCTIONAL_ASSESSMENT: PREVENTS OR INTERFERES SOME ACTIVE ACTIVITIES AND ADLS

## 2024-11-16 ASSESSMENT — PAIN DESCRIPTION - FREQUENCY
FREQUENCY: INTERMITTENT
FREQUENCY: INTERMITTENT

## 2024-11-16 ASSESSMENT — PAIN DESCRIPTION - PAIN TYPE
TYPE: SURGICAL PAIN
TYPE: SURGICAL PAIN

## 2024-11-16 NOTE — PLAN OF CARE
Problem: Pain  Goal: Verbalizes/displays adequate comfort level or baseline comfort level  Outcome: Progressing     Problem: Safety - Adult  Goal: Free from fall injury  Outcome: Progressing     Problem: Discharge Planning  Goal: Discharge to home or other facility with appropriate resources  Outcome: Progressing     Problem: ABCDS Injury Assessment  Goal: Absence of physical injury  Outcome: Progressing     Problem: Risk for Elopement  Goal: Patient will not exit the unit/facility without proper excort  Outcome: Progressing  Flowsheets (Taken 11/16/2024 0800)  Nursing Interventions for Elopement Risk:   Assist with personal care needs such as toileting, eating, dressing, as needed to reduce the risk of wandering   Collaborate with family members/caregivers to mitigate the elopement risk     Problem: Neurosensory - Adult  Goal: Achieves stable or improved neurological status  Outcome: Progressing  Goal: Achieves maximal functionality and self care  Outcome: Progressing     Problem: Skin/Tissue Integrity - Adult  Goal: Skin integrity remains intact  Outcome: Progressing  Goal: Incisions, wounds, or drain sites healing without S/S of infection  Outcome: Progressing  Goal: Oral mucous membranes remain intact  Outcome: Progressing     Problem: Musculoskeletal - Adult  Goal: Return mobility to safest level of function  Outcome: Progressing  Goal: Maintain proper alignment of affected body part  Outcome: Progressing  Goal: Return ADL status to a safe level of function  Outcome: Progressing     Problem: Gastrointestinal - Adult  Goal: Maintains or returns to baseline bowel function  Outcome: Progressing  Goal: Maintains adequate nutritional intake  Outcome: Progressing     
risk of wandering   Collaborate with family members/caregivers to mitigate the elopement risk     Problem: Neurosensory - Adult  Goal: Achieves stable or improved neurological status  Outcome: Progressing  Goal: Achieves maximal functionality and self care  Outcome: Progressing     Problem: Skin/Tissue Integrity - Adult  Goal: Skin integrity remains intact  Outcome: Progressing  Goal: Incisions, wounds, or drain sites healing without S/S of infection  Outcome: Progressing  Goal: Oral mucous membranes remain intact  Outcome: Progressing     Problem: Musculoskeletal - Adult  Goal: Return mobility to safest level of function  Outcome: Progressing  Goal: Maintain proper alignment of affected body part  Outcome: Progressing  Goal: Return ADL status to a safe level of function  Outcome: Progressing     Problem: Gastrointestinal - Adult  Goal: Maintains or returns to baseline bowel function  Outcome: Progressing  Goal: Maintains adequate nutritional intake  Outcome: Progressing

## 2024-11-16 NOTE — PROGRESS NOTES
Neurosurgery Progress Note      Subjective    Mr. Dee is hospital day 2, and is post op day: 2 Days Post-Op status post Procedure(s):  ERAS - L3-L4 with a discectomy and interbody fusion and arthrodesis, utilize intraoperative neuronavigation in the form of the kalideaD 3D C-arm and [employer] spine Q neuronavigation, need to remove his existing hardware in place and place new hardware from L3-S1 for Lumbar stenosis with neurogenic claudication    Still having some pain but overall doing well.  He is having no specific complaints.      Objective    Neurologic Exam   Physical Exam   Awake alert and in no distress.    Overall neurologically stable.    Cultures are negative x 2 days.    Assessment & Plan    Plan is to wait for final cultures which should be tomorrow.  If those are still negative plan will be to discharge him to home with the plan to return for the definitive operation on 11/12/2024.    On 11/16/24 I have spent 25 minutes reviewing previous notes, test results and face to face with the patient ( and any present family or support) discussing the diagnosis and plan. I have all answered questions to their satisfaction.     This note was created using voice recognition software.  All attempts were made to recognize and correct voice recognition errors. Unfortunately some errors may persist.    HEAVEN Rush MD FAANS

## 2024-11-17 ENCOUNTER — PATIENT MESSAGE (OUTPATIENT)
Dept: NEUROSURGERY | Age: 44
End: 2024-11-17

## 2024-11-17 VITALS
WEIGHT: 220 LBS | HEIGHT: 71 IN | BODY MASS INDEX: 30.8 KG/M2 | RESPIRATION RATE: 16 BRPM | OXYGEN SATURATION: 96 % | HEART RATE: 85 BPM | DIASTOLIC BLOOD PRESSURE: 67 MMHG | TEMPERATURE: 98 F | SYSTOLIC BLOOD PRESSURE: 115 MMHG

## 2024-11-17 PROCEDURE — 2500000003 HC RX 250 WO HCPCS: Performed by: NEUROLOGICAL SURGERY

## 2024-11-17 PROCEDURE — 6360000002 HC RX W HCPCS: Performed by: NEUROLOGICAL SURGERY

## 2024-11-17 PROCEDURE — 99024 POSTOP FOLLOW-UP VISIT: CPT | Performed by: NEUROLOGICAL SURGERY

## 2024-11-17 PROCEDURE — 6370000000 HC RX 637 (ALT 250 FOR IP): Performed by: NEUROLOGICAL SURGERY

## 2024-11-17 PROCEDURE — 2580000003 HC RX 258: Performed by: NEUROLOGICAL SURGERY

## 2024-11-17 RX ADMIN — HYDROMORPHONE HYDROCHLORIDE 0.5 MG: 1 INJECTION, SOLUTION INTRAMUSCULAR; INTRAVENOUS; SUBCUTANEOUS at 05:28

## 2024-11-17 RX ADMIN — CYCLOBENZAPRINE HYDROCHLORIDE 10 MG: 10 TABLET, FILM COATED ORAL at 11:56

## 2024-11-17 RX ADMIN — SODIUM CHLORIDE, PRESERVATIVE FREE 10 ML: 5 INJECTION INTRAVENOUS at 09:42

## 2024-11-17 RX ADMIN — CHOLECALCIFEROL TAB 125 MCG (5000 UNIT) 5000 UNITS: 125 TAB at 09:42

## 2024-11-17 RX ADMIN — POLYETHYLENE GLYCOL 3350 17 G: 17 POWDER, FOR SOLUTION ORAL at 09:42

## 2024-11-17 RX ADMIN — DOCUSATE SODIUM 50 MG AND SENNOSIDES 8.6 MG 1 TABLET: 8.6; 5 TABLET, FILM COATED ORAL at 09:42

## 2024-11-17 RX ADMIN — OXYCODONE 10 MG: 5 TABLET ORAL at 02:09

## 2024-11-17 RX ADMIN — HYDROMORPHONE HYDROCHLORIDE 0.5 MG: 1 INJECTION, SOLUTION INTRAMUSCULAR; INTRAVENOUS; SUBCUTANEOUS at 11:55

## 2024-11-17 RX ADMIN — METHOCARBAMOL TABLETS 750 MG: 750 TABLET, COATED ORAL at 09:41

## 2024-11-17 RX ADMIN — OXYCODONE 10 MG: 5 TABLET ORAL at 09:42

## 2024-11-17 RX ADMIN — ACETAMINOPHEN 500 MG: 500 TABLET, FILM COATED ORAL at 11:55

## 2024-11-17 RX ADMIN — CEFAZOLIN 2000 MG: 2 INJECTION, POWDER, FOR SOLUTION INTRAMUSCULAR; INTRAVENOUS at 06:30

## 2024-11-17 RX ADMIN — GABAPENTIN 600 MG: 300 CAPSULE ORAL at 09:42

## 2024-11-17 ASSESSMENT — PAIN DESCRIPTION - DESCRIPTORS
DESCRIPTORS: ACHING

## 2024-11-17 ASSESSMENT — PAIN SCALES - GENERAL
PAINLEVEL_OUTOF10: 9
PAINLEVEL_OUTOF10: 7
PAINLEVEL_OUTOF10: 8
PAINLEVEL_OUTOF10: 10

## 2024-11-17 ASSESSMENT — PAIN DESCRIPTION - LOCATION
LOCATION: BACK

## 2024-11-17 ASSESSMENT — PAIN DESCRIPTION - ORIENTATION
ORIENTATION: POSTERIOR
ORIENTATION: LOWER
ORIENTATION: POSTERIOR
ORIENTATION: LOWER

## 2024-11-17 NOTE — DISCHARGE SUMMARY
Patient discharged with wife and all belongings in hand. AVS reviewed and patient had no further questions or concerns.

## 2024-11-17 NOTE — PROGRESS NOTES
Neurosurgery Progress Note      Subjective    Mr. Dee is hospital day 3, and is post op day: 3 Days Post-Op status post Procedure(s):  ERAS - L3-L4 with a discectomy and interbody fusion and arthrodesis, utilize intraoperative neuronavigation in the form of the [employer] RFD 3D C-arm and [employer] spine Q neuronavigation, need to remove his existing hardware in place and place new hardware from L3-S1 for Lumbar stenosis with neurogenic claudication    Had an uneventful night and overall is stable.  His pain is improved little bit since yesterday.  Standard cultures are returned and are all negative with final reports.  Fungals and AFBs are not back but they should not be by now.    He is very anxious to be discharged.    Objective    Neurologic Exam   Physical Exam     He is awake alert no distress.  He neurologically is stable.  His dressing is clean dry and intact.    Assessment & Plan    We will discharge him to home today.  After discussions yesterday and today he does want to proceed with the definitive surgery.  He states due to some things he had put off at work to accommodate for this surgery, the December date that had been discussed may not work for his job.  He will discuss with Dr. Chen in the office for more optimal dates to reschedule his surgery.  He would like to return to work and will call the office tomorrow to work on the paperwork necessary for him to return to work since he did not have the expected procedure.    On 11/17/24 I have spent 30 minutes reviewing previous notes, test results and face to face with the patient ( and any present family or support) discussing the diagnosis and plan. I have all answered questions to their satisfaction.     This note was created using voice recognition software.  All attempts were made to recognize and correct voice recognition errors. Unfortunately some errors may persist.    HEAVEN Rush MD FAANS

## 2024-11-18 PROBLEM — M54.50 ACUTE BILATERAL LOW BACK PAIN WITHOUT SCIATICA: Status: ACTIVE | Noted: 2024-10-17

## 2024-11-18 PROBLEM — G89.4 CHRONIC PAIN SYNDROME: Status: ACTIVE | Noted: 2024-10-17

## 2024-11-18 PROBLEM — Z98.1 HISTORY OF LUMBAR FUSION: Status: ACTIVE | Noted: 2024-10-17

## 2024-11-18 PROBLEM — Z98.890 HISTORY OF LAMINECTOMY: Status: ACTIVE | Noted: 2024-10-17

## 2024-11-18 PROBLEM — M48.061 NEURAL FORAMINAL STENOSIS OF LUMBAR SPINE: Status: ACTIVE | Noted: 2024-10-17

## 2024-11-18 NOTE — DISCHARGE SUMMARY
Discharge Summary    Date: 11/17/2024  Patient Name: Fabio Dee    YOB: 1980     Age: 44 y.o.    Admit Date: 11/14/2024  Discharge Date: 11/17/2024  Discharge Condition: Good    Admission Diagnosis  Chronic bilateral low back pain without sciatica [M54.50, G89.29];Lumbar stenosis with neurogenic claudication [M48.062];Left foot drop [M21.372]      Discharge Diagnosis  Principal Problem:    Lumbar stenosis with neurogenic claudication  Active Problems:    Left foot drop    Chronic bilateral low back pain without sciatica  Resolved Problems:    * No resolved hospital problems. *      Hospital Stay  Narrative of Hospital Course:  Was taken to the operating room with the plan to perform a definitive decompression and fusion with a retained drain from a previous infection was identified.  The most prudent judgment was to remove the foreign body culture of the foreign body and the bed and close up to wait for cultures.  Cultures are negative to date.  He was discharged to home.  With the plan for him to return to work.  Dr. Chen's going to put him on the schedule in the future.    Consultants:  None    Surgeries/procedures Performed:      Treatments:            Discharge Plan/Disposition:  Home    Hospital/Incidental Findings Requiring Follow Up:    Patient Instructions:    Diet:    Activity:  For number of days (if applicable):      Other Instructions:    Provider Follow-Up:   No follow-ups on file.     Significant Diagnostic Studies:    Recent Labs:  Admission on 11/14/2024, Discharged on 11/17/2024  Preg, Serum                                   Date: 11/14/2024  Value: Negative    Ref range:                    Status: Final  Crossmatch expiration date                    Date: 11/14/2024  Value: 11/17/2024,2359                       Status: Final  ABO/Rh                                        Date: 11/14/2024  Value: A POSITIVE    Status: Final  Antibody Screen

## 2024-11-19 LAB
BACTERIA SPEC CULT: NORMAL
BACTERIA SPEC CULT: NORMAL
FUNGAL CULT/SMEAR: NORMAL
SERVICE CMNT-IMP: NORMAL
SERVICE CMNT-IMP: NORMAL
SPECIMEN PROCESSING: NORMAL
SPECIMEN SOURCE: NORMAL

## 2024-11-20 LAB
BACTERIA SPEC CULT: NORMAL
FUNGUS SMEAR: NORMAL
SERVICE CMNT-IMP: NORMAL
SPECIMEN SOURCE: NORMAL

## 2024-11-22 LAB
SPECIMEN SOURCE: NORMAL
VIRUS SPEC CULT: NORMAL

## 2024-11-26 ENCOUNTER — NURSE ONLY (OUTPATIENT)
Dept: NEUROSURGERY | Age: 44
End: 2024-11-26

## 2024-11-26 VITALS
TEMPERATURE: 97.5 F | BODY MASS INDEX: 31.13 KG/M2 | SYSTOLIC BLOOD PRESSURE: 123 MMHG | HEIGHT: 71 IN | HEART RATE: 78 BPM | DIASTOLIC BLOOD PRESSURE: 76 MMHG | OXYGEN SATURATION: 97 % | WEIGHT: 222.38 LBS

## 2024-11-26 DIAGNOSIS — M48.062 LUMBAR STENOSIS WITH NEUROGENIC CLAUDICATION: ICD-10-CM

## 2024-11-26 DIAGNOSIS — M54.50 ACUTE BILATERAL LOW BACK PAIN WITHOUT SCIATICA: Primary | ICD-10-CM

## 2024-12-02 NOTE — PROGRESS NOTES
Subjective: Patient seen for follow up wound check; he remains with no changes in his pre op symptoms or extremity weakness      Fabio Dee is a 44 y.o. male who presents today for wound check. He has a surgical incision wound which is located on the lumbar site.   Current symptoms: pre op symptoms remain  wound healing as expected.   Interventions to date: patient is back at work; advised on post op restrictions; he denies any fevers or chills  Suture/Staple removal consent:verbal consent obtained and given by the patient. Risks of removal include bleeding,infection,re-opening and excessive scarring  ROMAN Mcmahon NP     is the supervising physician in clinic today and approves of today's treatment plan.  Objective:     /76 (Site: Left Upper Arm)   Pulse 78   Temp 97.5 °F (36.4 °C) (Temporal)   Ht 1.803 m (5' 11\")   Wt 100.9 kg (222 lb 6 oz)   SpO2 97%   BMI 31.02 kg/m²     Wound:   wound margins intact and healing well.  No signs of infection.  no exudate     Assessment:     Wound check.   Interventions today visual inspection.    Plan:     1. Discussed appropriate home care of this wound.  2. Patient instructions were given.  3. Follow up: he would like to discuss his case further with Dr Christie Chen and discuss surgery again. Dr Chen also to review all labs including cultures

## 2024-12-09 ENCOUNTER — OFFICE VISIT (OUTPATIENT)
Dept: NEUROSURGERY | Age: 44
End: 2024-12-09

## 2024-12-09 VITALS
WEIGHT: 220 LBS | HEIGHT: 71 IN | TEMPERATURE: 98.1 F | HEART RATE: 74 BPM | SYSTOLIC BLOOD PRESSURE: 115 MMHG | OXYGEN SATURATION: 96 % | BODY MASS INDEX: 30.8 KG/M2 | DIASTOLIC BLOOD PRESSURE: 71 MMHG

## 2024-12-09 DIAGNOSIS — M21.372 LEFT FOOT DROP: ICD-10-CM

## 2024-12-09 DIAGNOSIS — M48.062 LUMBAR STENOSIS WITH NEUROGENIC CLAUDICATION: ICD-10-CM

## 2024-12-09 DIAGNOSIS — M54.50 ACUTE BILATERAL LOW BACK PAIN WITHOUT SCIATICA: Primary | ICD-10-CM

## 2024-12-09 DIAGNOSIS — Z98.890 HISTORY OF LAMINECTOMY: ICD-10-CM

## 2024-12-09 DIAGNOSIS — Z98.1 HISTORY OF LUMBAR FUSION: ICD-10-CM

## 2024-12-09 DIAGNOSIS — M48.061 NEURAL FORAMINAL STENOSIS OF LUMBAR SPINE: ICD-10-CM

## 2024-12-09 DIAGNOSIS — G89.4 CHRONIC PAIN SYNDROME: ICD-10-CM

## 2024-12-09 PROCEDURE — 99024 POSTOP FOLLOW-UP VISIT: CPT | Performed by: NEUROLOGICAL SURGERY

## 2024-12-09 NOTE — PROGRESS NOTES
fixation and fusion on that same day.   I have independently reviewed and interpreted the patient's The patient has an MRI lumbar spine without contrast that demonstrates an L4-S1 posterior instrumented fixation and fusion there is evidence of laminectomies at L4-L5 and L5-S1 there is also evidence of a small midline laminectomy at L3-L4 with most of the L3 lamina endplates there is clumping of the nerve roots at L3-L4 and some moderate to severe central and lateral recess spinal stenosis residual at the L3-L4 level..  The patient has an MRI thoracic spine without significant compressive pathology there is evidence of pleat disc height collapse at what is labeled as T9-T10 where there is previous history of osteomyelitis discitis however there is no evidence of any active enhancement concerning for active infection at this time.  The patient has a CT lumbar spine without contrast that demonstrates an L4-S1 instrumented fixation and fusion the right L4 screw, left L4 and left S1 screws do appear to be medial to the pedicle.  Consistent with a medial breach.  We will use a VendRxD 3D C arm with Aceable spine Q neuronavigation spinal intraoperative neuronavigation system for placement of the instrumented.  The hardware to be used will be the Aceable [employer] system and the interbody biomechanical device will be the [employer] Prolift Titanium Expandable Biomechanical device.  We will use locally harvested autograft in cadaveric allograft in the form of DBM soaked in ProteiOS (Allograft-Derive OsteoInductive Growth Factors).  We will also perform a posterior lateral arthrodesis at L3-L4, L4-L5 and L5-S1.  Will plan for surgery on 1/2/2025. Lumbar Spinal Fusion Consent: The relative risks of complication include but are not limited to infection, bleeding, spinal fluid leak, major vascular injury, increased pain, weakness, numbness, paralysis, non-fusion, instrumentation failure, need for re-operation, incontinence,

## 2024-12-09 NOTE — H&P (VIEW-ONLY)
fixation and fusion on that same day.   I have independently reviewed and interpreted the patient's The patient has an MRI lumbar spine without contrast that demonstrates an L4-S1 posterior instrumented fixation and fusion there is evidence of laminectomies at L4-L5 and L5-S1 there is also evidence of a small midline laminectomy at L3-L4 with most of the L3 lamina endplates there is clumping of the nerve roots at L3-L4 and some moderate to severe central and lateral recess spinal stenosis residual at the L3-L4 level..  The patient has an MRI thoracic spine without significant compressive pathology there is evidence of pleat disc height collapse at what is labeled as T9-T10 where there is previous history of osteomyelitis discitis however there is no evidence of any active enhancement concerning for active infection at this time.  The patient has a CT lumbar spine without contrast that demonstrates an L4-S1 instrumented fixation and fusion the right L4 screw, left L4 and left S1 screws do appear to be medial to the pedicle.  Consistent with a medial breach.  We will use a SpritzD 3D C arm with [employer] spine Q neuronavigation spinal intraoperative neuronavigation system for placement of the instrumented.  The hardware to be used will be the [employer] system and the interbody biomechanical device will be the [employer] Prolift Titanium Expandable Biomechanical device.  We will use locally harvested autograft in cadaveric allograft in the form of DBM soaked in ProteiOS (Allograft-Derive OsteoInductive Growth Factors).  We will also perform a posterior lateral arthrodesis at L3-L4, L4-L5 and L5-S1.  Will plan for surgery on 1/2/2025. Lumbar Spinal Fusion Consent: The relative risks of complication include but are not limited to infection, bleeding, spinal fluid leak, major vascular injury, increased pain, weakness, numbness, paralysis, non-fusion, instrumentation failure, need for re-operation, incontinence,

## 2024-12-13 LAB
FUNGAL CULT/SMEAR: NORMAL
FUNGUS (MYCOLOGY) CULTURE: NEGATIVE
FUNGUS SMEAR: NORMAL
REFLEX TO ID: NORMAL
SPECIMEN PROCESSING: NORMAL
SPECIMEN SOURCE: NORMAL
SPECIMEN SOURCE: NORMAL

## 2024-12-17 ENCOUNTER — PREP FOR PROCEDURE (OUTPATIENT)
Dept: NEUROSURGERY | Age: 44
End: 2024-12-17

## 2024-12-17 DIAGNOSIS — M54.50 CHRONIC LEFT-SIDED LOW BACK PAIN WITHOUT SCIATICA: ICD-10-CM

## 2024-12-17 DIAGNOSIS — M48.061 NEUROFORAMINAL STENOSIS OF LUMBAR SPINE: ICD-10-CM

## 2024-12-17 DIAGNOSIS — Z98.1 HISTORY OF LUMBAR SPINAL FUSION: ICD-10-CM

## 2024-12-17 DIAGNOSIS — G89.29 CHRONIC LEFT-SIDED LOW BACK PAIN WITHOUT SCIATICA: ICD-10-CM

## 2024-12-18 ENCOUNTER — OFFICE VISIT (OUTPATIENT)
Age: 44
End: 2024-12-18
Payer: COMMERCIAL

## 2024-12-18 DIAGNOSIS — M54.41 CHRONIC BILATERAL LOW BACK PAIN WITH BILATERAL SCIATICA: ICD-10-CM

## 2024-12-18 DIAGNOSIS — M54.42 CHRONIC BILATERAL LOW BACK PAIN WITH BILATERAL SCIATICA: ICD-10-CM

## 2024-12-18 DIAGNOSIS — G89.4 CHRONIC PAIN SYNDROME: ICD-10-CM

## 2024-12-18 DIAGNOSIS — Z79.891 ENCOUNTER FOR LONG-TERM OPIATE ANALGESIC USE: ICD-10-CM

## 2024-12-18 DIAGNOSIS — G89.29 CHRONIC BILATERAL LOW BACK PAIN WITH BILATERAL SCIATICA: ICD-10-CM

## 2024-12-18 PROCEDURE — 99214 OFFICE O/P EST MOD 30 MIN: CPT | Performed by: ANESTHESIOLOGY

## 2024-12-18 RX ORDER — GABAPENTIN 600 MG/1
600 TABLET ORAL 3 TIMES DAILY
Qty: 90 TABLET | Refills: 1 | Status: SHIPPED | OUTPATIENT
Start: 2024-12-18 | End: 2025-03-18

## 2024-12-18 RX ORDER — BUPRENORPHINE 15 UG/H
1 PATCH TRANSDERMAL WEEKLY
Qty: 4 PATCH | Refills: 2 | Status: SHIPPED | OUTPATIENT
Start: 2025-01-02 | End: 2025-03-27

## 2024-12-18 ASSESSMENT — ENCOUNTER SYMPTOMS
EYES NEGATIVE: 1
SHORTNESS OF BREATH: 0
ALLERGIC/IMMUNOLOGIC NEGATIVE: 1
ABDOMINAL PAIN: 0

## 2024-12-18 NOTE — PROGRESS NOTES
Chronic Pain Consult Note      Plan:     A comprehensive pain management plan may consist of the following: Testing, Therapy, Medications, Interventions, Consults, and Follow up.    Lumbar radiculopathy  Lumbar MRI reviewed.  Candidate for further decompression adjacent to previous surgical instrumentation.  Unfortunately due to current infectious state patient will have to await further surgical correction  Continue gabapentin 600 mg 3 times daily. Refill sent Spent time discussing general side effects concerns medication.  Had a aren discussion regarding the nonaddictive nature of this medication but the potential for abuse.  Continue Robaxin 750 mg 3 times daily as needed.  Spent time discussing general side effects concerns of this medication  Continue Celebrex 200 mg twice daily as needed.  Refill sent. Spent time discussing general side effects concerns medication.  Postlaminectomy syndrome  Patient has upcoming surgery with Dr. April Rascon, other site, posterior superior iliac spine  Performed in office trigger point injection 5/22/2024  Chronic pain syndrome  Encourage continuation of active healthy lifestyle  Patient has history of substance abuse and had very thorough conversation in full disclosure from the patient's perspective will not pursue any opioid-based or abuse of medications.  Patient has legitimate, severe pain and is open about his substance abuse history.  Considering this information and wife's control over medication we discussed starting Butrans.  The safety of this medication along with low abuse potential presents this as the only viable option moving forward.  I informed him there will be no other opioid medications trialed.  Continue Butrans 15 mcg transdermal patches. Refill sent.  UDS completed 4/24/2024, repeat performed 5/22/2024  Medication consent completed 4/24/2024  Patient has history significant for both controlled and illicit substance abuse.  Patient's wife controlled

## 2024-12-20 ENCOUNTER — HOSPITAL ENCOUNTER (OUTPATIENT)
Dept: SURGERY | Age: 44
Discharge: HOME | End: 2024-12-23
Payer: COMMERCIAL

## 2024-12-20 VITALS
HEART RATE: 63 BPM | BODY MASS INDEX: 30.48 KG/M2 | WEIGHT: 225 LBS | DIASTOLIC BLOOD PRESSURE: 91 MMHG | OXYGEN SATURATION: 96 % | HEIGHT: 72 IN | SYSTOLIC BLOOD PRESSURE: 122 MMHG | TEMPERATURE: 97.3 F | RESPIRATION RATE: 16 BRPM

## 2024-12-20 LAB
ANION GAP SERPL CALC-SCNC: 10 MMOL/L (ref 7–16)
APPEARANCE UR: CLEAR
BASOPHILS # BLD: 0 K/UL (ref 0–0.2)
BASOPHILS NFR BLD: 0 % (ref 0–2)
BILIRUB UR QL: NEGATIVE
BUN SERPL-MCNC: 17 MG/DL (ref 6–23)
CALCIUM SERPL-MCNC: 8.9 MG/DL (ref 8.8–10.2)
CHLORIDE SERPL-SCNC: 103 MMOL/L (ref 98–107)
CO2 SERPL-SCNC: 26 MMOL/L (ref 20–29)
COLOR UR: YELLOW
CREAT SERPL-MCNC: 0.53 MG/DL (ref 0.8–1.3)
DIFFERENTIAL METHOD BLD: ABNORMAL
EOSINOPHIL # BLD: 0.1 K/UL (ref 0–0.8)
EOSINOPHIL NFR BLD: 2 % (ref 0.5–7.8)
ERYTHROCYTE [DISTWIDTH] IN BLOOD BY AUTOMATED COUNT: 12.9 % (ref 11.9–14.6)
GLUCOSE SERPL-MCNC: 104 MG/DL (ref 70–99)
GLUCOSE UR STRIP.AUTO-MCNC: NEGATIVE MG/DL
HCT VFR BLD AUTO: 38 % (ref 41.1–50.3)
HGB BLD-MCNC: 12.7 G/DL (ref 13.6–17.2)
HGB UR QL STRIP: NEGATIVE
IMM GRANULOCYTES # BLD AUTO: 0 K/UL (ref 0–0.5)
IMM GRANULOCYTES NFR BLD AUTO: 0 % (ref 0–5)
INR PPP: 1.1
KETONES UR QL STRIP.AUTO: NEGATIVE MG/DL
LEUKOCYTE ESTERASE UR QL STRIP.AUTO: NEGATIVE
LYMPHOCYTES # BLD: 2.1 K/UL (ref 0.5–4.6)
LYMPHOCYTES NFR BLD: 40 % (ref 13–44)
MCH RBC QN AUTO: 31.9 PG (ref 26.1–32.9)
MCHC RBC AUTO-ENTMCNC: 33.4 G/DL (ref 31.4–35)
MCV RBC AUTO: 95.5 FL (ref 82–102)
MONOCYTES # BLD: 0.5 K/UL (ref 0.1–1.3)
MONOCYTES NFR BLD: 9 % (ref 4–12)
MRSA DNA SPEC QL NAA+PROBE: NOT DETECTED
NEUTS SEG # BLD: 2.6 K/UL (ref 1.7–8.2)
NEUTS SEG NFR BLD: 50 % (ref 43–78)
NITRITE UR QL STRIP.AUTO: NEGATIVE
NRBC # BLD: 0 K/UL (ref 0–0.2)
PH UR STRIP: 5.5 (ref 5–9)
PLATELET # BLD AUTO: 182 K/UL (ref 150–450)
PMV BLD AUTO: 8.6 FL (ref 9.4–12.3)
POTASSIUM SERPL-SCNC: 3.9 MMOL/L (ref 3.5–5.1)
PROT UR STRIP-MCNC: NEGATIVE MG/DL
PROTHROMBIN TIME: 14.3 SEC (ref 11.3–14.9)
RBC # BLD AUTO: 3.98 M/UL (ref 4.23–5.6)
S AUREUS CPE NOSE QL NAA+PROBE: NOT DETECTED
SODIUM SERPL-SCNC: 139 MMOL/L (ref 136–145)
SP GR UR REFRACTOMETRY: 1.02 (ref 1–1.02)
UROBILINOGEN UR QL STRIP.AUTO: 0.2 EU/DL (ref 0.2–1)
WBC # BLD AUTO: 5.3 K/UL (ref 4.3–11.1)

## 2024-12-20 PROCEDURE — 80307 DRUG TEST PRSMV CHEM ANLYZR: CPT

## 2024-12-20 PROCEDURE — 87641 MR-STAPH DNA AMP PROBE: CPT

## 2024-12-20 PROCEDURE — 85610 PROTHROMBIN TIME: CPT

## 2024-12-20 PROCEDURE — 81003 URINALYSIS AUTO W/O SCOPE: CPT

## 2024-12-20 PROCEDURE — 80048 BASIC METABOLIC PNL TOTAL CA: CPT

## 2024-12-20 PROCEDURE — 85025 COMPLETE CBC W/AUTO DIFF WBC: CPT

## 2024-12-20 RX ORDER — IBUPROFEN 200 MG
200 TABLET ORAL EVERY 6 HOURS PRN
COMMUNITY

## 2024-12-20 ASSESSMENT — PAIN DESCRIPTION - LOCATION: LOCATION: BACK

## 2024-12-20 ASSESSMENT — PAIN DESCRIPTION - DESCRIPTORS: DESCRIPTORS: THROBBING

## 2024-12-20 ASSESSMENT — PAIN SCALES - GENERAL: PAINLEVEL_OUTOF10: 4

## 2024-12-20 ASSESSMENT — PAIN DESCRIPTION - ORIENTATION: ORIENTATION: LOWER

## 2024-12-20 NOTE — PERIOP NOTE
Patient verified name, , and surgery as listed in [facility]. Patient provided medical/health information and PTA medications to the best of their ability.    TYPE  CASE: 3  Orders per surgeon:  not received  Labs per surgeon: CBC w/diff, BMP, UA, PT/INR, Urine nicotine, MRSA. Results: pending  Labs per anesthesia protocol: T/S held DOS  EKG:  not needed per anesthesia protocol     MRSA/MSSA swab collected; pharmacy to review and dose antibiotic as appropriate.     Patient provided with and instructed on education handouts including Guide to Surgery,  Preventing Infections, Pain Management, and [facility] brochure.     Road to Recovery Spine surgery patient guide given. Instructed on incentive spirometry.     Surgical skin cleanser and instructions given per hospital policy.    Original medication prescription bottles not visualized during patient appointment.     Patient teach back successful and patient demonstrates knowledge of instruction.      PLEASE CONTINUE TAKING ALL PRESCRIPTION MEDICATIONS UP TO THE DAY OF SURGERY UNLESS OTHERWISE DIRECTED BELOW. You may take Tylenol, allergy,  and/or indigestion medications.     TAKE ONLY THESE MEDICATIONS ON THE DAY OF SURGERY   Gabapentin (Neurontin)   Tirzepatide    Cefadroxil     DISCONTINUE all vitamins and supplements 7 days prior to surgery. DISCONTINUE Non-Steroidal Anti-Inflammatory (NSAIDS) such as Advil and Aleve 5 days prior to surgery.     Home Medications to Hold- please continue all other medications except these.    None        Comments   Hibiclens shower the night before surgery and the morning of surgery.     Please drink 32 ounces of non-caffeinated clear liquids 4 hours prior to your arrival to avoid dehydration.        On the day before surgery please take 2 Tylenol in the morning and then again before bed. You may use either regular or extra strength.     Bring spine book with you day of surgery    Please start a clear

## 2024-12-20 NOTE — PERIOP NOTE
Preoperative Nutrition Screen (XENA)   Patient's Age: 44 y.o.    Last Serum Albumin Level:  Lab Results   Component Value Date    ALBUMIN 3.5 05/08/2024    ALBUMIN 3.0 (L) 04/05/2024    ALBUMIN 3.2 (L) 04/04/2024       Patient's BMI: Estimated body mass index is 30.52 kg/m² as calculated from the following:    Height as of this encounter: 1.829 m (6').    Weight as of this encounter: 102.1 kg (225 lb).     If the answer is yes to any of the following, then recommend prescribed Oral Nutrition Supplements (ONS) for 5 days prior to surgery and in addition, if XENA > 3, order referral to dietitian for further assessment and nutrition therapy.     1. Does the patient have a documented serum albumin less than 3.0 within the last 90 days?    no   2. Is patient's BMI less than 18.5 (or less than 20 if age over 65)?  No = 0       3. Has the patient had an unplanned weight loss of 10% of body weight or more in the last 6 months? No = 0   4. Has the patient been eating less than 50% of their normal diet in the preceding week? No = 0   XENA Score (number of Yes responses), 0-4 0     Plan:   No Nutrition Intervention indicated    Electronically signed by Maryam Manrique RN on 12/20/24 at 9:20 AM EST

## 2024-12-21 LAB
COMMENT:: ABNORMAL
COTININE UR QL SCN: POSITIVE NG/ML

## 2024-12-30 LAB
ACID FAST STN SPEC: NEGATIVE
MYCOBACTERIUM SPEC QL CULT: NEGATIVE
SPECIMEN PREPARATION: NORMAL
SPECIMEN SOURCE: NORMAL

## 2024-12-31 ENCOUNTER — TELEPHONE (OUTPATIENT)
Dept: NEUROSURGERY | Age: 44
End: 2024-12-31

## 2025-01-01 ENCOUNTER — ANESTHESIA EVENT (OUTPATIENT)
Dept: SURGERY | Age: 45
End: 2025-01-01
Payer: COMMERCIAL

## 2025-01-02 ENCOUNTER — ANESTHESIA (OUTPATIENT)
Dept: SURGERY | Age: 45
End: 2025-01-02
Payer: COMMERCIAL

## 2025-01-02 ENCOUNTER — APPOINTMENT (OUTPATIENT)
Dept: GENERAL RADIOLOGY | Age: 45
End: 2025-01-02
Attending: NEUROLOGICAL SURGERY
Payer: COMMERCIAL

## 2025-01-02 ENCOUNTER — HOSPITAL ENCOUNTER (OUTPATIENT)
Age: 45
Setting detail: SURGERY ADMIT
Discharge: HOME | End: 2025-01-02
Attending: NEUROLOGICAL SURGERY | Admitting: NEUROLOGICAL SURGERY
Payer: COMMERCIAL

## 2025-01-02 VITALS
HEART RATE: 63 BPM | SYSTOLIC BLOOD PRESSURE: 114 MMHG | DIASTOLIC BLOOD PRESSURE: 60 MMHG | OXYGEN SATURATION: 98 % | BODY MASS INDEX: 29.66 KG/M2 | HEIGHT: 72 IN | RESPIRATION RATE: 16 BRPM | WEIGHT: 219 LBS | TEMPERATURE: 98.1 F

## 2025-01-02 LAB
ABO + RH BLD: NORMAL
BLOOD GROUP ANTIBODIES SERPL: NORMAL
GLUCOSE BLD STRIP.AUTO-MCNC: 79 MG/DL (ref 65–100)
SERVICE CMNT-IMP: NORMAL
SPECIMEN EXP DATE BLD: NORMAL

## 2025-01-02 PROCEDURE — 2500000003 HC RX 250 WO HCPCS: Performed by: NURSE ANESTHETIST, CERTIFIED REGISTERED

## 2025-01-02 PROCEDURE — 2580000003 HC RX 258: Performed by: NURSE ANESTHETIST, CERTIFIED REGISTERED

## 2025-01-02 PROCEDURE — 86900 BLOOD TYPING SEROLOGIC ABO: CPT

## 2025-01-02 PROCEDURE — 86901 BLOOD TYPING SEROLOGIC RH(D): CPT

## 2025-01-02 PROCEDURE — 7100000010 HC PHASE II RECOVERY - FIRST 15 MIN: Performed by: NEUROLOGICAL SURGERY

## 2025-01-02 PROCEDURE — 6360000002 HC RX W HCPCS: Performed by: NURSE ANESTHETIST, CERTIFIED REGISTERED

## 2025-01-02 PROCEDURE — 3600000004 HC SURGERY LEVEL 4 BASE: Performed by: NEUROLOGICAL SURGERY

## 2025-01-02 PROCEDURE — 7100000011 HC PHASE II RECOVERY - ADDTL 15 MIN: Performed by: NEUROLOGICAL SURGERY

## 2025-01-02 PROCEDURE — 6370000000 HC RX 637 (ALT 250 FOR IP): Performed by: NEUROLOGICAL SURGERY

## 2025-01-02 PROCEDURE — C1713 ANCHOR/SCREW BN/BN,TIS/BN: HCPCS | Performed by: NEUROLOGICAL SURGERY

## 2025-01-02 PROCEDURE — 82962 GLUCOSE BLOOD TEST: CPT

## 2025-01-02 PROCEDURE — 6370000000 HC RX 637 (ALT 250 FOR IP): Performed by: ANESTHESIOLOGY

## 2025-01-02 PROCEDURE — 7100000001 HC PACU RECOVERY - ADDTL 15 MIN: Performed by: NEUROLOGICAL SURGERY

## 2025-01-02 PROCEDURE — 2720000010 HC SURG SUPPLY STERILE: Performed by: NEUROLOGICAL SURGERY

## 2025-01-02 PROCEDURE — 3600000014 HC SURGERY LEVEL 4 ADDTL 15MIN: Performed by: NEUROLOGICAL SURGERY

## 2025-01-02 PROCEDURE — 7100000000 HC PACU RECOVERY - FIRST 15 MIN: Performed by: NEUROLOGICAL SURGERY

## 2025-01-02 PROCEDURE — 3700000000 HC ANESTHESIA ATTENDED CARE: Performed by: NEUROLOGICAL SURGERY

## 2025-01-02 PROCEDURE — 3700000001 HC ADD 15 MINUTES (ANESTHESIA): Performed by: NEUROLOGICAL SURGERY

## 2025-01-02 PROCEDURE — 2709999900 HC NON-CHARGEABLE SUPPLY: Performed by: NEUROLOGICAL SURGERY

## 2025-01-02 PROCEDURE — 86850 RBC ANTIBODY SCREEN: CPT

## 2025-01-02 RX ORDER — PROCHLORPERAZINE EDISYLATE 5 MG/ML
5 INJECTION INTRAMUSCULAR; INTRAVENOUS
Status: DISCONTINUED | OUTPATIENT
Start: 2025-01-02 | End: 2025-01-02 | Stop reason: HOSPADM

## 2025-01-02 RX ORDER — SODIUM CHLORIDE 9 MG/ML
INJECTION, SOLUTION INTRAVENOUS
Status: DISCONTINUED | OUTPATIENT
Start: 2025-01-02 | End: 2025-01-02 | Stop reason: SDUPTHER

## 2025-01-02 RX ORDER — DEXAMETHASONE SODIUM PHOSPHATE 4 MG/ML
INJECTION, SOLUTION INTRA-ARTICULAR; INTRALESIONAL; INTRAMUSCULAR; INTRAVENOUS; SOFT TISSUE
Status: DISCONTINUED | OUTPATIENT
Start: 2025-01-02 | End: 2025-01-02 | Stop reason: SDUPTHER

## 2025-01-02 RX ORDER — SODIUM CHLORIDE 0.9 % (FLUSH) 0.9 %
5-40 SYRINGE (ML) INJECTION PRN
Status: DISCONTINUED | OUTPATIENT
Start: 2025-01-02 | End: 2025-01-02 | Stop reason: HOSPADM

## 2025-01-02 RX ORDER — SODIUM CHLORIDE 9 MG/ML
INJECTION, SOLUTION INTRAVENOUS PRN
Status: DISCONTINUED | OUTPATIENT
Start: 2025-01-02 | End: 2025-01-02 | Stop reason: HOSPADM

## 2025-01-02 RX ORDER — MIDAZOLAM HYDROCHLORIDE 2 MG/2ML
2 INJECTION, SOLUTION INTRAMUSCULAR; INTRAVENOUS
Status: DISCONTINUED | OUTPATIENT
Start: 2025-01-02 | End: 2025-01-02 | Stop reason: HOSPADM

## 2025-01-02 RX ORDER — ONDANSETRON 2 MG/ML
INJECTION INTRAMUSCULAR; INTRAVENOUS
Status: DISCONTINUED | OUTPATIENT
Start: 2025-01-02 | End: 2025-01-02 | Stop reason: SDUPTHER

## 2025-01-02 RX ORDER — SODIUM CHLORIDE 0.9 % (FLUSH) 0.9 %
5-40 SYRINGE (ML) INJECTION EVERY 12 HOURS SCHEDULED
Status: DISCONTINUED | OUTPATIENT
Start: 2025-01-02 | End: 2025-01-02 | Stop reason: HOSPADM

## 2025-01-02 RX ORDER — KETAMINE HCL IN NACL, ISO-OSM 20 MG/2 ML
SYRINGE (ML) INJECTION
Status: DISCONTINUED | OUTPATIENT
Start: 2025-01-02 | End: 2025-01-02 | Stop reason: SDUPTHER

## 2025-01-02 RX ORDER — EPHEDRINE SULFATE 5 MG/ML
INJECTION INTRAVENOUS
Status: DISCONTINUED | OUTPATIENT
Start: 2025-01-02 | End: 2025-01-02 | Stop reason: SDUPTHER

## 2025-01-02 RX ORDER — HYDROMORPHONE HYDROCHLORIDE 2 MG/ML
INJECTION, SOLUTION INTRAMUSCULAR; INTRAVENOUS; SUBCUTANEOUS
Status: DISCONTINUED | OUTPATIENT
Start: 2025-01-02 | End: 2025-01-02 | Stop reason: SDUPTHER

## 2025-01-02 RX ORDER — SUCCINYLCHOLINE/SOD CL,ISO/PF 200MG/10ML
SYRINGE (ML) INTRAVENOUS
Status: DISCONTINUED | OUTPATIENT
Start: 2025-01-02 | End: 2025-01-02 | Stop reason: SDUPTHER

## 2025-01-02 RX ORDER — IBUPROFEN 600 MG/1
1 TABLET ORAL PRN
Status: DISCONTINUED | OUTPATIENT
Start: 2025-01-02 | End: 2025-01-02 | Stop reason: HOSPADM

## 2025-01-02 RX ORDER — ONDANSETRON 2 MG/ML
4 INJECTION INTRAMUSCULAR; INTRAVENOUS
Status: DISCONTINUED | OUTPATIENT
Start: 2025-01-02 | End: 2025-01-02 | Stop reason: HOSPADM

## 2025-01-02 RX ORDER — OXYCODONE HYDROCHLORIDE 5 MG/1
5 TABLET ORAL
Status: DISCONTINUED | OUTPATIENT
Start: 2025-01-02 | End: 2025-01-02 | Stop reason: HOSPADM

## 2025-01-02 RX ORDER — DEXTROSE MONOHYDRATE 100 MG/ML
INJECTION, SOLUTION INTRAVENOUS CONTINUOUS PRN
Status: DISCONTINUED | OUTPATIENT
Start: 2025-01-02 | End: 2025-01-02 | Stop reason: HOSPADM

## 2025-01-02 RX ORDER — LIDOCAINE HYDROCHLORIDE 10 MG/ML
1 INJECTION, SOLUTION INFILTRATION; PERINEURAL
Status: DISCONTINUED | OUTPATIENT
Start: 2025-01-02 | End: 2025-01-02 | Stop reason: HOSPADM

## 2025-01-02 RX ORDER — DIPHENHYDRAMINE HYDROCHLORIDE 50 MG/ML
12.5 INJECTION INTRAMUSCULAR; INTRAVENOUS
Status: DISCONTINUED | OUTPATIENT
Start: 2025-01-02 | End: 2025-01-02 | Stop reason: HOSPADM

## 2025-01-02 RX ORDER — METHADONE HYDROCHLORIDE 10 MG/1
10 TABLET ORAL ONCE
Status: COMPLETED | OUTPATIENT
Start: 2025-01-02 | End: 2025-01-02

## 2025-01-02 RX ORDER — PROPOFOL 10 MG/ML
INJECTION, EMULSION INTRAVENOUS
Status: DISCONTINUED | OUTPATIENT
Start: 2025-01-02 | End: 2025-01-02 | Stop reason: SDUPTHER

## 2025-01-02 RX ORDER — LIDOCAINE HYDROCHLORIDE 20 MG/ML
INJECTION, SOLUTION EPIDURAL; INFILTRATION; INTRACAUDAL; PERINEURAL
Status: DISCONTINUED | OUTPATIENT
Start: 2025-01-02 | End: 2025-01-02 | Stop reason: SDUPTHER

## 2025-01-02 RX ORDER — ACETAMINOPHEN 500 MG
1000 TABLET ORAL ONCE
Status: DISCONTINUED | OUTPATIENT
Start: 2025-01-02 | End: 2025-01-02 | Stop reason: HOSPADM

## 2025-01-02 RX ORDER — SODIUM CHLORIDE, SODIUM LACTATE, POTASSIUM CHLORIDE, CALCIUM CHLORIDE 600; 310; 30; 20 MG/100ML; MG/100ML; MG/100ML; MG/100ML
INJECTION, SOLUTION INTRAVENOUS CONTINUOUS
Status: DISCONTINUED | OUTPATIENT
Start: 2025-01-02 | End: 2025-01-02 | Stop reason: HOSPADM

## 2025-01-02 RX ORDER — NALOXONE HYDROCHLORIDE 0.4 MG/ML
INJECTION, SOLUTION INTRAMUSCULAR; INTRAVENOUS; SUBCUTANEOUS PRN
Status: DISCONTINUED | OUTPATIENT
Start: 2025-01-02 | End: 2025-01-02 | Stop reason: HOSPADM

## 2025-01-02 RX ORDER — GLYCOPYRROLATE 0.2 MG/ML
INJECTION INTRAMUSCULAR; INTRAVENOUS
Status: DISCONTINUED | OUTPATIENT
Start: 2025-01-02 | End: 2025-01-02 | Stop reason: SDUPTHER

## 2025-01-02 RX ORDER — MIDAZOLAM HYDROCHLORIDE 1 MG/ML
INJECTION, SOLUTION INTRAMUSCULAR; INTRAVENOUS
Status: DISCONTINUED | OUTPATIENT
Start: 2025-01-02 | End: 2025-01-02 | Stop reason: SDUPTHER

## 2025-01-02 RX ADMIN — HYDROMORPHONE HYDROCHLORIDE 0.4 MG: 2 INJECTION INTRAMUSCULAR; INTRAVENOUS; SUBCUTANEOUS at 07:55

## 2025-01-02 RX ADMIN — Medication 20 MG: at 08:07

## 2025-01-02 RX ADMIN — EPHEDRINE SULFATE 5 MG: 5 INJECTION INTRAVENOUS at 08:48

## 2025-01-02 RX ADMIN — Medication 3 AMPULE: at 06:38

## 2025-01-02 RX ADMIN — SODIUM CHLORIDE: 900 INJECTION INTRAVENOUS at 07:55

## 2025-01-02 RX ADMIN — PROPOFOL 50 MCG/KG/MIN: 10 INJECTION, EMULSION INTRAVENOUS at 08:18

## 2025-01-02 RX ADMIN — MIDAZOLAM 2 MG: 1 INJECTION INTRAMUSCULAR; INTRAVENOUS at 07:55

## 2025-01-02 RX ADMIN — Medication 200 MG: at 08:08

## 2025-01-02 RX ADMIN — PHENYLEPHRINE HYDROCHLORIDE 100 MCG: 0.1 INJECTION, SOLUTION INTRAVENOUS at 08:41

## 2025-01-02 RX ADMIN — DEXAMETHASONE SODIUM PHOSPHATE 4 MG: 4 INJECTION INTRA-ARTICULAR; INTRALESIONAL; INTRAMUSCULAR; INTRAVENOUS; SOFT TISSUE at 08:18

## 2025-01-02 RX ADMIN — PHENYLEPHRINE HYDROCHLORIDE 100 MCG: 0.1 INJECTION, SOLUTION INTRAVENOUS at 08:32

## 2025-01-02 RX ADMIN — LIDOCAINE HYDROCHLORIDE 60 MG: 20 INJECTION, SOLUTION EPIDURAL; INFILTRATION; INTRACAUDAL; PERINEURAL at 08:07

## 2025-01-02 RX ADMIN — PROPOFOL 200 MG: 10 INJECTION, EMULSION INTRAVENOUS at 08:07

## 2025-01-02 RX ADMIN — PHENYLEPHRINE HYDROCHLORIDE 100 MCG: 0.1 INJECTION, SOLUTION INTRAVENOUS at 08:48

## 2025-01-02 RX ADMIN — EPHEDRINE SULFATE 5 MG: 5 INJECTION INTRAVENOUS at 08:39

## 2025-01-02 RX ADMIN — METHADONE HYDROCHLORIDE 10 MG: 10 TABLET ORAL at 07:53

## 2025-01-02 RX ADMIN — EPHEDRINE SULFATE 5 MG: 5 INJECTION INTRAVENOUS at 08:30

## 2025-01-02 RX ADMIN — ONDANSETRON 4 MG: 2 INJECTION INTRAMUSCULAR; INTRAVENOUS at 08:18

## 2025-01-02 RX ADMIN — GLYCOPYRROLATE 0.4 MG: 0.2 INJECTION INTRAMUSCULAR; INTRAVENOUS at 07:55

## 2025-01-02 RX ADMIN — PROPOFOL 50 MG: 10 INJECTION, EMULSION INTRAVENOUS at 08:25

## 2025-01-02 ASSESSMENT — PAIN SCALES - GENERAL
PAINLEVEL_OUTOF10: 0
PAINLEVEL_OUTOF10: 6
PAINLEVEL_OUTOF10: 6

## 2025-01-02 ASSESSMENT — PAIN DESCRIPTION - DESCRIPTORS
DESCRIPTORS: ACHING

## 2025-01-02 ASSESSMENT — PAIN DESCRIPTION - ORIENTATION: ORIENTATION: LOWER

## 2025-01-02 ASSESSMENT — PAIN DESCRIPTION - LOCATION
LOCATION: BACK
LOCATION: BACK

## 2025-01-02 ASSESSMENT — LIFESTYLE VARIABLES: SMOKING_STATUS: 1

## 2025-01-02 ASSESSMENT — PAIN - FUNCTIONAL ASSESSMENT: PAIN_FUNCTIONAL_ASSESSMENT: 0-10

## 2025-01-02 NOTE — DISCHARGE INSTRUCTIONS
After general anesthesia or intravenous sedation, for 24 hours or while taking prescription Narcotics:  Limit your activities  Some people will feel drowsy or dizzy for up to a few hours after waking up.  A responsible adult needs to be with you for the next 24 hours  Do not drive and operate hazardous machinery  Do not make important personal or business decisions  Do not drink alcoholic beverages  If you have not urinated within 8 hours after discharge, and you are experiencing discomfort from urinary retention, please go to the nearest ED.  If you have sleep apnea and have a CPAP machine, please use it for all naps and sleeping.  Please use caution when taking narcotics and any of your home medications that may cause drowsiness.  *  Please give a list of your current medications to your Primary Care Provider.  *  Please update this list whenever your medications are discontinued, doses are      changed, or new medications (including over-the-counter products) are added.  *  Please carry medication information at all times in case of emergency situations.    These are general instructions for a healthy lifestyle:  No smoking/ No tobacco products/ Avoid exposure to second hand smoke  Surgeon General's Warning:  Quitting smoking now greatly reduces serious risk to your health.  Obesity, smoking, and sedentary lifestyle greatly increases your risk for illness  A healthy diet, regular physical exercise & weight monitoring are important for maintaining a healthy lifestyle    You may be retaining fluid if you have a history of heart failure or if you experience any of the following symptoms:  Weight gain of 3 pounds or more overnight or 5 pounds in a week, increased swelling in our hands or feet or shortness of breath while lying flat in bed.  Please call your doctor as soon as you notice any of these symptoms; do not wait until your next office visit.

## 2025-01-02 NOTE — OP NOTE
Neurosurgery Operative Note  \  Patient: Fabio Dee    YOB: 1980    MRN: [id number]    Date of Procedure: 1/2/2025    Pre-Op Diagnosis Codes:      * Chronic left-sided low back pain without sciatica [M54.50, G89.29]     * Lumbar stenosis with neurogenic claudication [M48.062]     * Left foot drop [M21.372]     * Neuroforaminal stenosis of lumbar spine [M48.061]     * History of lumbar spinal fusion [Z98.1]     * History of laminectomy [Z98.890]     * Chronic pain syndrome [G89.4]    Post-Op Diagnosis: Same       Procedure(s):  Planned L3-L4 TLIF and L3-S1 instrumented fixation with revision and replacement of the patient's prior L4-S1 instrumented fixation with repositioning of any hardware with medial breaches. Surgery was canceled secondary to unavailability of appropriate and sterile operative instruments    Surgeon(s):  Elias Chen MD    Assistant:   Ayan Palacios, Surgical Technician First [facility]    Anesthesia: General    Estimated Blood Loss (mL): None    Complications: Other: Surgery could not be performed     Specimens:   * No specimens in log *    Implants:  * No implants in log *      Drains: * No LDAs found *    Indication for Procedure:   Fabio Dee is a 44 y.o. male who presented to clinic for follow-up on 12/09/2024 for postoperative follow-up evaluation the patient underwent a lumbar exploration and removal of a retained foreign body fragment consistent with a retained piece of surgical drain from prior lumbar surgery on 7/14/2024.  We are planning to perform a L3-L4 TLIF's and removal L3-S1 instrumented fixation and fusion on that same day.   I have independently reviewed and interpreted the patient's The patient has an MRI lumbar spine without contrast that demonstrates an L4-S1 posterior instrumented fixation and fusion there is evidence of laminectomies at L4-L5 and L5-S1 there is also evidence of a small midline laminectomy at L3-L4 with most of the L3 lamina

## 2025-01-02 NOTE — INTERVAL H&P NOTE
Update History & Physical    The patient's History and Physical of December 9, 2024 was reviewed with the patient and I examined the patient. There was no change. The surgical site was confirmed by the patient and me.     Plan: The risks, benefits, expected outcome, and alternative to the recommended procedure have been discussed with the patient. Patient understands and wants to proceed with the procedure.     Electronically signed by Elias Chen MD on 1/2/2025 at 7:45 AM

## 2025-01-02 NOTE — ANESTHESIA PROCEDURE NOTES
Arterial Line:    An arterial line was placed using surface landmarks, in the OR for the following indication(s): continuous blood pressure monitoring and blood sampling needed.    A 20 gauge (size), 1 and 3/4 inch (length), Arrow (type) catheter was placed, Seldinger technique used, into the right radial artery, secured by tape and Tegaderm.  Anesthesia type: General    Events:  patient tolerated procedure well with no complications and EBL < 5mL.    Additional notes:  X1 attempt  Blood return, appropriate waveform1/2/2025 8:15 AM1/2/2025 8:16 AM  Resident/CRNA: Ana Leslie APRN - CRNA  Performed: Resident/CRNA   Preanesthetic Checklist  Completed: patient identified, IV checked, site marked, risks and benefits discussed, surgical/procedural consents, equipment checked, pre-op evaluation, timeout performed, anesthesia consent given, oxygen available, monitors applied/VS acknowledged, fire risk safety assessment completed and verbalized and blood product R/B/A discussed and consented

## 2025-01-02 NOTE — ANESTHESIA POSTPROCEDURE EVALUATION
Department of Anesthesiology  Postprocedure Note    Patient: Fabio Dee  MRN: [id number]  YOB: 1980  Date of evaluation: 1/2/2025    Procedure Summary       Date: 01/02/25 Room / Location: [facility] MAIN OR  / [facility] MAIN OR    Anesthesia Start: 0755 Anesthesia Stop: 0921    Procedure: ERAS/ L3-L4 TLIF and L3-S1 instrumented fixation with revision and replacement of the patient's prior L4-S1 instrumented fixation with repositioning of any hardware with medial breaches. Diagnosis:       Chronic left-sided low back pain without sciatica      Lumbar stenosis with neurogenic claudication      Left foot drop      Neuroforaminal stenosis of lumbar spine      History of lumbar spinal fusion      History of laminectomy      Chronic pain syndrome      (Chronic left-sided low back pain without sciatica [M54.50, G89.29])      (Lumbar stenosis with neurogenic claudication [M48.062])      (Left foot drop [M21.372])      (Neuroforaminal stenosis of lumbar spine [M48.061])      (History of lumbar spinal fusion [Z98.1])      (History of laminectomy [Z98.890])      (Chronic pain syndrome [G89.4])    Providers: Elias Chen MD Responsible Provider: Julieth Genao MD    Anesthesia Type: General ASA Status: 3            Anesthesia Type: General    Bing Phase I: Bing Score: 10    Bing Phase II: Bing Score: 10    Anesthesia Post Evaluation    Patient location during evaluation: PACU  Patient participation: complete - patient participated  Level of consciousness: awake and alert  Airway patency: patent  Nausea: well controlled.  Cardiovascular status: acceptable.  Respiratory status: acceptable  Hydration status: stable  Comments: Patient extremely frustrated that procedure was cancelled intraop   Pain management: adequate    No notable events documented.

## 2025-01-02 NOTE — ANESTHESIA PRE PROCEDURE
HCT 38.0 12/20/2024 09:43 AM    MCV 95.5 12/20/2024 09:43 AM    RDW 12.9 12/20/2024 09:43 AM     12/20/2024 09:43 AM       CMP:   Lab Results   Component Value Date/Time     12/20/2024 09:43 AM    K 3.9 12/20/2024 09:43 AM     12/20/2024 09:43 AM    CO2 26 12/20/2024 09:43 AM    BUN 17 12/20/2024 09:43 AM    CREATININE 0.53 12/20/2024 09:43 AM    GFRAA >60 07/17/2022 10:22 AM    AGRATIO 0.5 03/27/2022 12:22 AM    LABGLOM >90 12/20/2024 09:43 AM    LABGLOM >90 04/10/2024 06:51 AM    GLUCOSE 104 12/20/2024 09:43 AM    CALCIUM 8.9 12/20/2024 09:43 AM    BILITOT 0.3 05/08/2024 10:53 AM    ALKPHOS 84 05/08/2024 10:53 AM    ALKPHOS 88 03/27/2022 12:22 AM    AST 27 05/08/2024 10:53 AM    ALT 19 05/08/2024 10:53 AM       POC Tests:   Recent Labs     01/02/25  0629   POCGLU 79       Coags:   Lab Results   Component Value Date/Time    PROTIME 14.3 12/20/2024 09:43 AM    INR 1.1 12/20/2024 09:43 AM       HCG (If Applicable):   Lab Results   Component Value Date    PREGSERUM Negative 11/14/2024    HCGQUANT <1 04/06/2024        ABGs: No results found for: \"PHART\", \"PO2ART\", \"UPA7SXO\", \"OTU9FXF\", \"BEART\", \"Z4TADSMB\"     Type & Screen (If Applicable):  No results found for: \"LABABO\"    Drug/Infectious Status (If Applicable):  Lab Results   Component Value Date/Time    HEPCAB >11.0 06/17/2022 09:56 AM       COVID-19 Screening (If Applicable):   Lab Results   Component Value Date/Time    COVID19 Not detected 02/09/2023 11:44 PM           Anesthesia Evaluation  Patient summary reviewed and Nursing notes reviewed   no history of anesthetic complications:   Airway: Mallampati: II  TM distance: >3 FB   Neck ROM: full  Mouth opening: > = 3 FB   Dental:    (+) upper dentures and lower dentures      Pulmonary: breath sounds clear to auscultation  (+)     sleep apnea:       current smoker (vapes)                           Cardiovascular:  Exercise tolerance: good (>4 METS)      (-) past MI      Rhythm: regular  Rate:

## 2025-01-03 ENCOUNTER — TELEPHONE (OUTPATIENT)
Dept: NEUROSURGERY | Age: 45
End: 2025-01-03

## 2025-01-03 NOTE — TELEPHONE ENCOUNTER
Pt called in wanting to speak with you about his options now.. was requesting a phone call would you lik me to bring him in?

## 2025-01-29 DIAGNOSIS — Z79.891 ENCOUNTER FOR LONG-TERM OPIATE ANALGESIC USE: ICD-10-CM

## 2025-01-29 DIAGNOSIS — G89.4 CHRONIC PAIN SYNDROME: ICD-10-CM

## 2025-01-29 RX ORDER — BUPRENORPHINE 15 UG/H
1 PATCH TRANSDERMAL WEEKLY
Qty: 4 PATCH | Refills: 2 | OUTPATIENT
Start: 2025-01-29 | End: 2025-04-23

## 2025-01-29 NOTE — TELEPHONE ENCOUNTER
Spoke with [facility], pt has another rx for Butrans on file with 2 refills.  They are filling his last refill of robaxin.  He will need another rx for that.  That rx is pended

## 2025-02-03 RX ORDER — METHOCARBAMOL 750 MG/1
750 TABLET, FILM COATED ORAL 3 TIMES DAILY
Qty: 90 TABLET | Refills: 1 | Status: SHIPPED | OUTPATIENT
Start: 2025-02-03

## 2025-03-10 ENCOUNTER — TELEPHONE (OUTPATIENT)
Age: 45
End: 2025-03-10

## 2025-03-10 NOTE — TELEPHONE ENCOUNTER
Hanna Padilla,  He doesn't need his appt on Wednesday.  CR stated that we can push his appt out until before 4/23

## 2025-03-10 NOTE — TELEPHONE ENCOUNTER
Pt LVM asking if 3/12 appointment can be done virtually, as he will be out of town. Please advise.

## 2025-03-24 ENCOUNTER — TELEMEDICINE (OUTPATIENT)
Dept: FAMILY MEDICINE CLINIC | Facility: CLINIC | Age: 45
End: 2025-03-24

## 2025-03-24 DIAGNOSIS — E29.1 HYPOGONADISM IN MALE: ICD-10-CM

## 2025-03-24 DIAGNOSIS — E29.1 HYPOGONADISM IN MALE: Primary | ICD-10-CM

## 2025-03-24 DIAGNOSIS — E78.5 DYSLIPIDEMIA: ICD-10-CM

## 2025-03-24 PROCEDURE — 99213 OFFICE O/P EST LOW 20 MIN: CPT | Performed by: FAMILY MEDICINE

## 2025-03-24 RX ORDER — TESTOSTERONE 1.62 MG/G
2 GEL TRANSDERMAL DAILY
Qty: 75 G | Refills: 5 | Status: SHIPPED | OUTPATIENT
Start: 2025-03-24 | End: 2025-07-22

## 2025-03-24 RX ORDER — TESTOSTERONE 1.62 MG/G
2 GEL TRANSDERMAL DAILY
Qty: 75 G | Refills: 5 | Status: SHIPPED | OUTPATIENT
Start: 2025-03-24 | End: 2025-03-24 | Stop reason: SDUPTHER

## 2025-03-24 RX ORDER — CEFADROXIL 1000 MG/1
1 TABLET ORAL 2 TIMES DAILY
Status: CANCELLED | OUTPATIENT
Start: 2025-03-24

## 2025-03-24 RX ORDER — TESTOSTERONE 1.62 MG/G
2 GEL TRANSDERMAL DAILY
Qty: 75 G | Refills: 5 | Status: CANCELLED | OUTPATIENT
Start: 2025-03-24 | End: 2025-07-22

## 2025-03-24 ASSESSMENT — PATIENT HEALTH QUESTIONNAIRE - PHQ9
SUM OF ALL RESPONSES TO PHQ QUESTIONS 1-9: 0
1. LITTLE INTEREST OR PLEASURE IN DOING THINGS: NOT AT ALL
2. FEELING DOWN, DEPRESSED OR HOPELESS: NOT AT ALL
SUM OF ALL RESPONSES TO PHQ QUESTIONS 1-9: 0

## 2025-03-24 NOTE — PROGRESS NOTES
Subjective:    Fabio Dee is a 44 y.o. male Fabio Dee, was evaluated through a synchronous (real-time) audio-video encounter. The patient (or guardian if applicable) is aware that this is a billable service, which includes applicable co-pays. This Virtual Visit was conducted with patient's (and/or legal guardian's) consent. Patient identification was verified, and a caregiver was present when appropriate.   The patient was located at Home: [address] [facility] [address]  Provider was located at Facility (Appt Dept): [address] [place] [address] [address]  [place] [address]  Confirm you are appropriately licensed, registered, or certified to deliver care in the state where the patient is located as indicated above. If you are not or unsure, please re-schedule the visit: Yes, I confirm.     He is here for testosterone prescription renewal but also discussed need for labs.  Has not had labs in a while.  The testosterone has helped him feel better, he is lost significant weight.  Has energy but he is due to come in for some labs.  Allergies   Allergen Reactions    Hydrocodone Other (See Comments)     Will cause relapse of sobriety    Oxycodone Other (See Comments)     Will cause relapse of sobriety    Ethanol-Alcohol [Ethanol]      Recovering alcoholic     Patient Active Problem List   Diagnosis    Discitis of thoracolumbar region    Chronic hepatitis C without hepatic coma (HCC)    Neuropathy    Left foot drop    Tobacco abuse    Osteomyelitis of symphysis pubis (HCC)    Myositis of pelvis    Chronic bilateral low back pain without sciatica    Nicotine dependence, cigarettes, uncomplicated    Hypogonadism in male    Lumbar trigger point syndrome    Spinal stenosis, lumbar region, without neurogenic claudication    History of alcohol abuse    Lumbar stenosis with neurogenic claudication    Acute bilateral low back pain without sciatica    Neural foraminal stenosis of lumbar spine    History

## 2025-03-27 DIAGNOSIS — G89.29 CHRONIC BILATERAL LOW BACK PAIN WITH BILATERAL SCIATICA: ICD-10-CM

## 2025-03-27 DIAGNOSIS — M54.41 CHRONIC BILATERAL LOW BACK PAIN WITH BILATERAL SCIATICA: ICD-10-CM

## 2025-03-27 DIAGNOSIS — M54.42 CHRONIC BILATERAL LOW BACK PAIN WITH BILATERAL SCIATICA: ICD-10-CM

## 2025-03-27 RX ORDER — CELECOXIB 200 MG/1
200 CAPSULE ORAL 2 TIMES DAILY PRN
Qty: 60 CAPSULE | Refills: 3 | Status: SHIPPED | OUTPATIENT
Start: 2025-03-27

## 2025-03-27 RX ORDER — GABAPENTIN 600 MG/1
600 TABLET ORAL 3 TIMES DAILY
Qty: 90 TABLET | Refills: 1 | Status: SHIPPED | OUTPATIENT
Start: 2025-03-27 | End: 2025-06-25

## 2025-04-18 RX ORDER — METHOCARBAMOL 750 MG/1
750 TABLET, FILM COATED ORAL 3 TIMES DAILY
Qty: 90 TABLET | Refills: 5 | Status: CANCELLED | OUTPATIENT
Start: 2025-04-18

## 2025-04-18 RX ORDER — GABAPENTIN 600 MG/1
600 TABLET ORAL 3 TIMES DAILY
Qty: 90 TABLET | Refills: 5 | Status: CANCELLED | OUTPATIENT
Start: 2025-04-18 | End: 2025-10-15

## 2025-04-18 NOTE — PROGRESS NOTES
Mr. Dee is a 3-month follow-up for chronic low back pain with history of prior back surgery.  He has lumbar radiculopathy.  He is maintained on Butrans 15 mcg changed once weekly with Celebrex 200 mg twice daily and gabapentin 600 mg 3 times daily.  He also uses Robaxin as needed for spasm.  He was scheduled for surgery with Dr. Chen but that surgery was canceled.  He was apparently referred to Dr. Hunter from Dr. Chen.  Dr. Hunter felt that because his pain was 80% axial in nature she did not feel extending his fusion would be beneficial. 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 He has cut back on Celebrex, Gabapentin and Robaxin to once daily dosing. He says usually in the morning. That will sometimes depend on activity. He says he is using his Butrans as prescribed. HE has implemented an exercise routine daily and that has helped. He feels mobility has improved. He is doing well on Butrans and changes on Wednesday or Thursday.

## 2025-04-21 ENCOUNTER — OFFICE VISIT (OUTPATIENT)
Age: 45
End: 2025-04-21
Payer: COMMERCIAL

## 2025-04-21 ENCOUNTER — TELEPHONE (OUTPATIENT)
Age: 45
End: 2025-04-21

## 2025-04-21 DIAGNOSIS — G89.29 CHRONIC BILATERAL LOW BACK PAIN WITH BILATERAL SCIATICA: ICD-10-CM

## 2025-04-21 DIAGNOSIS — M54.41 CHRONIC BILATERAL LOW BACK PAIN WITH BILATERAL SCIATICA: ICD-10-CM

## 2025-04-21 DIAGNOSIS — M96.1 FAILED BACK SURGICAL SYNDROME: Primary | ICD-10-CM

## 2025-04-21 DIAGNOSIS — M54.42 CHRONIC BILATERAL LOW BACK PAIN WITH BILATERAL SCIATICA: ICD-10-CM

## 2025-04-21 PROCEDURE — 99214 OFFICE O/P EST MOD 30 MIN: CPT | Performed by: PHYSICIAN ASSISTANT

## 2025-04-21 RX ORDER — METHOCARBAMOL 750 MG/1
750 TABLET, FILM COATED ORAL 3 TIMES DAILY
Qty: 90 TABLET | Refills: 1 | Status: SHIPPED | OUTPATIENT
Start: 2025-04-21

## 2025-04-21 RX ORDER — BUPRENORPHINE 15 UG/H
1 PATCH TRANSDERMAL WEEKLY
Qty: 4 PATCH | Refills: 2 | Status: SHIPPED | OUTPATIENT
Start: 2025-04-23 | End: 2025-07-16

## 2025-04-21 ASSESSMENT — ENCOUNTER SYMPTOMS
SHORTNESS OF BREATH: 0
EYES NEGATIVE: 1
ALLERGIC/IMMUNOLOGIC NEGATIVE: 1
ABDOMINAL PAIN: 0

## 2025-04-21 NOTE — PROGRESS NOTES
Chronic Pain Consult Note      Plan:     A comprehensive pain management plan may consist of the following: Testing, Therapy, Medications, Interventions, Consults, and Follow up.    Lumbar radiculopathy  Lumbar MRI reviewed.  Candidate for further decompression adjacent to previous surgical instrumentation.  Unfortunately due to current infectious state patient will have to await further surgical correction  Continue gabapentin 600 mg 3 times daily. Spent time discussing general side effects concerns medication.  Had a aren discussion regarding the nonaddictive nature of this medication but the potential for abuse.  Continue Robaxin 750 mg 3 times daily as needed.  Refill sent. Spent time discussing general side effects concerns of this medication  Continue Celebrex 200 mg twice daily as needed. Spent time discussing general side effects concerns medication.  Postlaminectomy syndrome  Surgery postponed.   Myalgia, other site, posterior superior iliac spine  Performed in office trigger point injection 5/22/2024  Chronic pain syndrome  Encourage continuation of active healthy lifestyle  Patient has history of substance abuse and had very thorough conversation in full disclosure from the patient's perspective will not pursue any opioid-based or abuse of medications.  Patient has legitimate, severe pain and is open about his substance abuse history.  Considering this information and wife's control over medication we discussed starting Butrans.  The safety of this medication along with low abuse potential presents this as the only viable option moving forward.  I informed him there will be no other opioid medications trialed.  Continue Butrans 15 mcg transdermal patches. Refill sent with fill date of 4/23/25 and two refills.  UDS completed 4/24/2024, repeat performed 5/22/2024  Medication consent completed 4/24/2024  Patient has history significant for both controlled and illicit substance abuse.  Patient's wife

## 2025-05-19 ENCOUNTER — PATIENT MESSAGE (OUTPATIENT)
Age: 45
End: 2025-05-19

## 2025-05-19 ENCOUNTER — OFFICE VISIT (OUTPATIENT)
Dept: FAMILY MEDICINE CLINIC | Facility: CLINIC | Age: 45
End: 2025-05-19
Payer: COMMERCIAL

## 2025-05-19 ENCOUNTER — TELEPHONE (OUTPATIENT)
Dept: FAMILY MEDICINE CLINIC | Facility: CLINIC | Age: 45
End: 2025-05-19

## 2025-05-19 VITALS
BODY MASS INDEX: 26.82 KG/M2 | TEMPERATURE: 97.7 F | RESPIRATION RATE: 18 BRPM | HEIGHT: 72 IN | SYSTOLIC BLOOD PRESSURE: 111 MMHG | OXYGEN SATURATION: 97 % | WEIGHT: 198 LBS | DIASTOLIC BLOOD PRESSURE: 73 MMHG | HEART RATE: 70 BPM

## 2025-05-19 DIAGNOSIS — E29.1 HYPOGONADISM IN MALE: ICD-10-CM

## 2025-05-19 DIAGNOSIS — R22.0 PREAURICULAR MASS: Primary | ICD-10-CM

## 2025-05-19 DIAGNOSIS — B18.2 CHRONIC HEPATITIS C WITHOUT HEPATIC COMA (HCC): ICD-10-CM

## 2025-05-19 DIAGNOSIS — M54.50 CHRONIC BILATERAL LOW BACK PAIN WITHOUT SCIATICA: ICD-10-CM

## 2025-05-19 DIAGNOSIS — E78.5 DYSLIPIDEMIA: ICD-10-CM

## 2025-05-19 DIAGNOSIS — M46.45 DISCITIS OF THORACOLUMBAR REGION: ICD-10-CM

## 2025-05-19 DIAGNOSIS — G89.29 CHRONIC BILATERAL LOW BACK PAIN WITHOUT SCIATICA: ICD-10-CM

## 2025-05-19 DIAGNOSIS — G89.4 CHRONIC PAIN SYNDROME: ICD-10-CM

## 2025-05-19 LAB
ANION GAP SERPL CALC-SCNC: 13 MMOL/L (ref 7–16)
BASOPHILS # BLD: 0.03 K/UL (ref 0–0.2)
BASOPHILS NFR BLD: 0.5 % (ref 0–2)
BUN SERPL-MCNC: 20 MG/DL (ref 6–23)
CALCIUM SERPL-MCNC: 9.1 MG/DL (ref 8.8–10.2)
CHLORIDE SERPL-SCNC: 102 MMOL/L (ref 98–107)
CHOLEST SERPL-MCNC: 151 MG/DL (ref 0–200)
CO2 SERPL-SCNC: 25 MMOL/L (ref 20–29)
CREAT SERPL-MCNC: 0.74 MG/DL (ref 0.8–1.3)
DIFFERENTIAL METHOD BLD: ABNORMAL
EOSINOPHIL # BLD: 0.08 K/UL (ref 0–0.8)
EOSINOPHIL NFR BLD: 1.2 % (ref 0.5–7.8)
ERYTHROCYTE [DISTWIDTH] IN BLOOD BY AUTOMATED COUNT: 12.5 % (ref 11.9–14.6)
GLUCOSE SERPL-MCNC: 74 MG/DL (ref 70–99)
HCT VFR BLD AUTO: 37.6 % (ref 41.1–50.3)
HDLC SERPL-MCNC: 43 MG/DL (ref 40–60)
HDLC SERPL: 3.5 (ref 0–5)
HGB BLD-MCNC: 13.1 G/DL (ref 13.6–17.2)
IMM GRANULOCYTES # BLD AUTO: 0.01 K/UL (ref 0–0.5)
IMM GRANULOCYTES NFR BLD AUTO: 0.2 % (ref 0–5)
LDLC SERPL CALC-MCNC: 92 MG/DL (ref 0–100)
LYMPHOCYTES # BLD: 2.58 K/UL (ref 0.5–4.6)
LYMPHOCYTES NFR BLD: 39.8 % (ref 13–44)
MCH RBC QN AUTO: 31.9 PG (ref 26.1–32.9)
MCHC RBC AUTO-ENTMCNC: 34.8 G/DL (ref 31.4–35)
MCV RBC AUTO: 91.5 FL (ref 82–102)
MONOCYTES # BLD: 0.42 K/UL (ref 0.1–1.3)
MONOCYTES NFR BLD: 6.5 % (ref 4–12)
NEUTS SEG # BLD: 3.37 K/UL (ref 1.7–8.2)
NEUTS SEG NFR BLD: 51.8 % (ref 43–78)
NRBC # BLD: 0 K/UL (ref 0–0.2)
PLATELET # BLD AUTO: 226 K/UL (ref 150–450)
PMV BLD AUTO: 8.6 FL (ref 9.4–12.3)
POTASSIUM SERPL-SCNC: 4.1 MMOL/L (ref 3.5–5.1)
RBC # BLD AUTO: 4.11 M/UL (ref 4.23–5.6)
SODIUM SERPL-SCNC: 140 MMOL/L (ref 136–145)
TRIGL SERPL-MCNC: 78 MG/DL (ref 0–150)
VLDLC SERPL CALC-MCNC: 16 MG/DL (ref 6–23)
WBC # BLD AUTO: 6.5 K/UL (ref 4.3–11.1)

## 2025-05-19 PROCEDURE — 99214 OFFICE O/P EST MOD 30 MIN: CPT | Performed by: FAMILY MEDICINE

## 2025-05-19 ASSESSMENT — PATIENT HEALTH QUESTIONNAIRE - PHQ9
SUM OF ALL RESPONSES TO PHQ QUESTIONS 1-9: 0
2. FEELING DOWN, DEPRESSED OR HOPELESS: NOT AT ALL
SUM OF ALL RESPONSES TO PHQ QUESTIONS 1-9: 0
1. LITTLE INTEREST OR PLEASURE IN DOING THINGS: NOT AT ALL

## 2025-05-19 NOTE — PROGRESS NOTES
Subjective:   Fabio Dee is a 44 y.o. male presents today for mainly to obtain labs previously ordered.  He did have an area near his rectum that he wanted to know if it was normal or not.  Has been there for a month or so.  He also is concerned about an 9-year history of a mass in front of the right ear.  Seems to be maybe larger recently but not painful.  Needs labs for hypotestost, dyslipidemia    Allergies   Allergen Reactions    Hydrocodone Other (See Comments)     Will cause relapse of sobriety    Oxycodone Other (See Comments)     Will cause relapse of sobriety    Ethanol-Alcohol [Ethanol]      Recovering alcoholic     PMH, MEDS reviewed     Objective:   Blood pressure 111/73, pulse 70, temperature 97.7 °F (36.5 °C), resp. rate 18, height 1.829 m (6'), weight 89.8 kg (198 lb), SpO2 97%.  General- Pleasant and no distress  Psych- alert and oriented to person, place and time  Mood and affect are appropriate to situation  Musculoskeletal - Gait and station examination reveals mid-position with no abnormalities.  Neurological- grossly intact.   rrr s mrg.   bcta  Rectal exam is normal.  These are normal glands.  Preauricular mass on right is 1.5 cm firm circumferential, mobile    Assessment/Plan:     1. Preauricular mass    2. Chronic hepatitis C without hepatic coma (HCC)    3. Discitis of thoracolumbar region    4. Chronic bilateral low back pain without sciatica    5. Chronic pain syndrome    6. Hypogonadism in male         1. Preauricular mass  -     Ambulatory referral to ENT  2. Chronic hepatitis C without hepatic coma (HCC)  3. Discitis of thoracolumbar region  4. Chronic bilateral low back pain without sciatica  5. Chronic pain syndrome  6. Hypogonadism in male    Bmp, lipid, testost, cbc  Followup:   No follow-ups on file.             Attempted [facility] intake - patient accompanied by daughter who brought him in from the bar. Daughter reports patient drinking 500 ml rum daily and had to be escorted to car by 3 people from bar due to intoxication. Daughter has assumed control of patient's finances and monitoring compliance with oral naltrexone. Patient reports last detox 2 weeks ago at [facility]; no hx of DT or seizure during Detox.    Informed patient no bed availability at this time at [facility] or [facility] in [place], but willing to assess and coordinate for [facility] - patient declined and stated will return in the AM when beds become available. Provided resources, including [facility] information for daughter. Also discussed initiating IOP/OP following detox.

## 2025-05-20 ENCOUNTER — RESULTS FOLLOW-UP (OUTPATIENT)
Dept: FAMILY MEDICINE CLINIC | Facility: CLINIC | Age: 45
End: 2025-05-20

## 2025-05-20 LAB — TESTOST SERPL-MCNC: 596 NG/DL (ref 264–916)

## 2025-06-18 ENCOUNTER — OFFICE VISIT (OUTPATIENT)
Dept: ENT CLINIC | Age: 45
End: 2025-06-18
Payer: COMMERCIAL

## 2025-06-18 VITALS
WEIGHT: 192.2 LBS | OXYGEN SATURATION: 97 % | HEIGHT: 72 IN | BODY MASS INDEX: 26.03 KG/M2 | HEART RATE: 76 BPM | RESPIRATION RATE: 18 BRPM

## 2025-06-18 DIAGNOSIS — K11.8 MASS OF RIGHT PAROTID GLAND: Primary | ICD-10-CM

## 2025-06-18 PROCEDURE — 99243 OFF/OP CNSLTJ NEW/EST LOW 30: CPT | Performed by: STUDENT IN AN ORGANIZED HEALTH CARE EDUCATION/TRAINING PROGRAM

## 2025-06-18 ASSESSMENT — ENCOUNTER SYMPTOMS
EYE DISCHARGE: 0
SINUS PRESSURE: 0
STRIDOR: 0
EYE PAIN: 0
NAUSEA: 0
FACIAL SWELLING: 0
APNEA: 0
DIARRHEA: 0
SHORTNESS OF BREATH: 0
EYE ITCHING: 0
CHOKING: 0
COUGH: 0
WHEEZING: 0
SINUS PAIN: 0
CONSTIPATION: 0

## 2025-06-18 NOTE — PROGRESS NOTES
HPI:    Fabio Dee is a 44 y.o. male seen New    Chief Complaint   Patient presents with    Mass     Patient presents today with c/o R sided preauricular mass x 5 -6 years . Non painful and unknown if size has changed .        History of Present Illness  44-year-old male presents for evaluation of a right preauricular mass. Noted since 2019, asymptomatic. No correlation with recent allergy exacerbation. Significant weight loss (~100 lbs) has altered facial contour, making mass more prominent. Concerned about oral cancer due to history of heavy smoking and IV drug use. History of staphylococcal infections causing bone loss in spine and pelvis. Wears dentures, poor oral health. Currently maintains a healthy lifestyle.    SOCIAL HISTORY  Former heavy smoker and IV drug user.        Past Medical History, Past Surgical History, Family history, Social History, and Medications were all reviewed with the patient today and updated as necessary.     Allergies   Allergen Reactions    Hydrocodone Other (See Comments)     Will cause relapse of sobriety    Other Reaction(s): Other (see comments), Unknown-Unspecified    Other Reaction(s): Unknown-Unspecified      Will cause relapse of sobriety    Oxycodone Other (See Comments)     Will cause relapse of sobriety    Other Reaction(s): Other (see comments), Unknown-Unspecified    Other Reaction(s): Unknown-Unspecified      Will cause relapse of sobriety    Alcohol      Other Reaction(s): Other- (not listed) - Allergy    Recovering alcoholic    Other Reaction(s): Other- (not listed) - Allergy      Recovering alcoholic    Ethanol-Alcohol [Ethanol]      Recovering alcoholic       Patient Active Problem List   Diagnosis    Discitis of thoracolumbar region    Chronic hepatitis C without hepatic coma (HCC)    Neuropathy    Left foot drop    Tobacco abuse    Osteomyelitis of symphysis pubis (HCC)    Myositis of pelvis    Chronic bilateral low back pain without sciatica

## 2025-06-20 DIAGNOSIS — K11.8 MASS OF RIGHT PAROTID GLAND: Primary | ICD-10-CM

## 2025-06-30 ENCOUNTER — HOSPITAL ENCOUNTER (OUTPATIENT)
Dept: ULTRASOUND IMAGING | Age: 45
Discharge: HOME | End: 2025-07-03
Payer: COMMERCIAL

## 2025-06-30 DIAGNOSIS — K11.8 MASS OF RIGHT PAROTID GLAND: ICD-10-CM

## 2025-06-30 PROCEDURE — 76536 US EXAM OF HEAD AND NECK: CPT

## 2025-07-07 DIAGNOSIS — K11.8 MASS OF RIGHT PAROTID GLAND: Primary | ICD-10-CM

## 2025-07-14 ENCOUNTER — TELEPHONE (OUTPATIENT)
Age: 45
End: 2025-07-14

## 2025-07-14 ENCOUNTER — OFFICE VISIT (OUTPATIENT)
Age: 45
End: 2025-07-14
Payer: COMMERCIAL

## 2025-07-14 DIAGNOSIS — M54.41 CHRONIC BILATERAL LOW BACK PAIN WITH BILATERAL SCIATICA: ICD-10-CM

## 2025-07-14 DIAGNOSIS — G89.29 CHRONIC BILATERAL LOW BACK PAIN WITH BILATERAL SCIATICA: ICD-10-CM

## 2025-07-14 DIAGNOSIS — Z79.891 ENCOUNTER FOR LONG-TERM OPIATE ANALGESIC USE: Primary | ICD-10-CM

## 2025-07-14 DIAGNOSIS — M54.42 CHRONIC BILATERAL LOW BACK PAIN WITH BILATERAL SCIATICA: ICD-10-CM

## 2025-07-14 DIAGNOSIS — M96.1 FAILED BACK SURGICAL SYNDROME: ICD-10-CM

## 2025-07-14 PROCEDURE — 99214 OFFICE O/P EST MOD 30 MIN: CPT | Performed by: ANESTHESIOLOGY

## 2025-07-14 RX ORDER — BUPRENORPHINE 15 UG/H
1 PATCH TRANSDERMAL WEEKLY
Qty: 4 PATCH | Refills: 2 | Status: SHIPPED | OUTPATIENT
Start: 2025-07-14 | End: 2025-10-06

## 2025-07-14 RX ORDER — CELECOXIB 200 MG/1
200 CAPSULE ORAL 2 TIMES DAILY PRN
Qty: 60 CAPSULE | Refills: 3 | Status: CANCELLED | OUTPATIENT
Start: 2025-07-14

## 2025-07-14 RX ORDER — GABAPENTIN 600 MG/1
600 TABLET ORAL 3 TIMES DAILY
Qty: 90 TABLET | Refills: 1 | Status: CANCELLED | OUTPATIENT
Start: 2025-07-14 | End: 2025-10-12

## 2025-07-14 RX ORDER — BUPRENORPHINE 15 UG/H
1 PATCH TRANSDERMAL WEEKLY
Qty: 4 PATCH | Refills: 2 | Status: SHIPPED | OUTPATIENT
Start: 2025-07-17 | End: 2025-07-14

## 2025-07-14 RX ORDER — METHOCARBAMOL 750 MG/1
750 TABLET, FILM COATED ORAL 3 TIMES DAILY
Qty: 90 TABLET | Refills: 1 | Status: CANCELLED | OUTPATIENT
Start: 2025-07-14

## 2025-07-14 ASSESSMENT — ENCOUNTER SYMPTOMS
SHORTNESS OF BREATH: 0
ALLERGIC/IMMUNOLOGIC NEGATIVE: 1
ABDOMINAL PAIN: 0
EYES NEGATIVE: 1

## 2025-07-14 NOTE — PROGRESS NOTES
skin once a week Thursday injection      vitamin D (D3 5000) 125 MCG (5000 UT) CAPS capsule Take 1 capsule by mouth daily 90 capsule 5    acetaminophen (TYLENOL) 500 MG tablet Take 1 tablet by mouth in the morning and at bedtime       No facility-administered encounter medications on file as of 7/14/2025.          Review of Systems   Constitutional:  Negative for chills, fatigue, fever and unexpected weight change.   HENT:  Negative for congestion and ear pain.    Eyes: Negative.    Respiratory:  Negative for shortness of breath.    Cardiovascular:  Negative for chest pain.   Gastrointestinal:  Negative for abdominal pain.   Endocrine: Negative.    Genitourinary: Negative.    Skin:  Negative for rash.   Allergic/Immunologic: Negative.    Neurological:  Negative for dizziness.   Hematological: Negative.    Psychiatric/Behavioral: Negative.           All 11 systems reviewed and were negative.          The [place] database for controlled substance prescription monitoring was reviewed.    Date: July 14, 2025  Patient Name: Fabio Dee  MRN:[id number]  PCP: Ankur Morris personally performed the HPI, exam, and assessment/plan, verified the documentation and approve it is updated, accurate, and complete. Parts or the entirety of this document were transcribed utilizing voice recognition software. Transcription errors may be present.    Daniela Abbott PA-C

## 2025-07-14 NOTE — TELEPHONE ENCOUNTER
Pt is requesting a refill of robaxin and celebrex.  Pt has a follow up, today.  Rxs will be sent then

## 2025-07-16 DIAGNOSIS — G89.29 CHRONIC BILATERAL LOW BACK PAIN WITH BILATERAL SCIATICA: ICD-10-CM

## 2025-07-16 DIAGNOSIS — M96.1 FAILED BACK SURGICAL SYNDROME: ICD-10-CM

## 2025-07-16 DIAGNOSIS — M54.42 CHRONIC BILATERAL LOW BACK PAIN WITH BILATERAL SCIATICA: ICD-10-CM

## 2025-07-16 DIAGNOSIS — M54.41 CHRONIC BILATERAL LOW BACK PAIN WITH BILATERAL SCIATICA: ICD-10-CM

## 2025-07-16 LAB — DRUGS UR: NORMAL

## 2025-07-16 RX ORDER — CELECOXIB 200 MG/1
200 CAPSULE ORAL 2 TIMES DAILY PRN
Qty: 60 CAPSULE | Refills: 3 | Status: SHIPPED | OUTPATIENT
Start: 2025-07-16

## 2025-07-16 RX ORDER — GABAPENTIN 600 MG/1
600 TABLET ORAL 3 TIMES DAILY
Qty: 90 TABLET | Refills: 1 | Status: SHIPPED | OUTPATIENT
Start: 2025-07-16 | End: 2025-10-14

## 2025-07-16 RX ORDER — METHOCARBAMOL 750 MG/1
750 TABLET, FILM COATED ORAL 3 TIMES DAILY
Qty: 90 TABLET | Refills: 1 | Status: SHIPPED | OUTPATIENT
Start: 2025-07-16

## 2025-07-17 ENCOUNTER — HOSPITAL ENCOUNTER (OUTPATIENT)
Dept: ULTRASOUND IMAGING | Age: 45
Discharge: HOME | End: 2025-07-19
Attending: STUDENT IN AN ORGANIZED HEALTH CARE EDUCATION/TRAINING PROGRAM
Payer: COMMERCIAL

## 2025-07-17 VITALS
SYSTOLIC BLOOD PRESSURE: 121 MMHG | RESPIRATION RATE: 17 BRPM | OXYGEN SATURATION: 98 % | HEART RATE: 81 BPM | DIASTOLIC BLOOD PRESSURE: 71 MMHG | HEIGHT: 72 IN | TEMPERATURE: 98.1 F | WEIGHT: 177 LBS | BODY MASS INDEX: 23.98 KG/M2

## 2025-07-17 DIAGNOSIS — K11.8 MASS OF RIGHT PAROTID GLAND: ICD-10-CM

## 2025-07-17 PROCEDURE — 88112 CYTOPATH CELL ENHANCE TECH: CPT

## 2025-07-17 PROCEDURE — 76942 ECHO GUIDE FOR BIOPSY: CPT

## 2025-07-17 PROCEDURE — 6360000002 HC RX W HCPCS: Performed by: PHYSICIAN ASSISTANT

## 2025-07-17 RX ORDER — LIDOCAINE HYDROCHLORIDE 10 MG/ML
INJECTION, SOLUTION EPIDURAL; INFILTRATION; INTRACAUDAL; PERINEURAL PRN
Status: COMPLETED | OUTPATIENT
Start: 2025-07-17 | End: 2025-07-17

## 2025-07-17 RX ADMIN — LIDOCAINE HYDROCHLORIDE 5 ML: 10 INJECTION, SOLUTION EPIDURAL; INFILTRATION; INTRACAUDAL; PERINEURAL at 14:01

## 2025-07-17 ASSESSMENT — PAIN - FUNCTIONAL ASSESSMENT
PAIN_FUNCTIONAL_ASSESSMENT: ACTIVITIES ARE NOT PREVENTED
PAIN_FUNCTIONAL_ASSESSMENT: 0-10

## 2025-07-17 ASSESSMENT — PAIN DESCRIPTION - DESCRIPTORS: DESCRIPTORS: GNAWING

## 2025-07-17 NOTE — OR NURSING
Recovery period without difficulty. Pt alert and oriented and denies pain. Dressing is clean, dry, and intact. Reviewed discharge instructions with patient and verbalized understanding. Pt escorted to lobby discharge area via. Vital signs completed.

## 2025-07-17 NOTE — DISCHARGE INSTRUCTIONS
If you have any questions about your procedure, please call the Interventional Radiology department at [phone].   After business hours (5pm) and weekends, call the answering service at [phone] and ask for the Interventional Radiologist on call to be paged.

## 2025-07-22 LAB
CYTOLOGY-NON GYN: NORMAL
SPECIMEN SOURCE: NORMAL

## (undated) DEVICE — BIPOLAR SEALER 23-112-1 AQM 6.0: Brand: AQUAMANTYS ®

## (undated) DEVICE — AGENT HEMOSTATIC SURGIFLOW MATRIX KIT W/THROMBIN

## (undated) DEVICE — SPHERES NAVIGATION

## (undated) DEVICE — 1LYRTR 16FR10ML 100%SILI SNAP: Brand: MEDLINE INDUSTRIES, INC.

## (undated) DEVICE — SYRINGE MED 10ML LUERLOCK TIP W/O SFTY DISP

## (undated) DEVICE — SUTURE VICRYL + SZ 3-0 L18IN ABSRB UD SH 1/2 CIR TAPERCUT NDL VCP864D

## (undated) DEVICE — DISPOSABLE STANDARD BIPOLAR FORCEPS, NON-STICK,: Brand: SPETZLER-MALIS

## (undated) DEVICE — SYSTEM SKIN CLSR 22CM DERMBND PRINEO

## (undated) DEVICE — SPONGE,NEURO,0.5"X1",XR,STRL,LF,10/PK: Brand: MEDLINE

## (undated) DEVICE — CONTAINER,SPECIMEN,O.R.STRL,4.5OZ: Brand: MEDLINE

## (undated) DEVICE — APPLICATOR MEDICATED 26 CC SOLUTION HI LT ORNG CHLORAPREP

## (undated) DEVICE — CARBIDE MATCH HEAD

## (undated) DEVICE — GLOVE SURG SZ 75 L12IN FNGR THK79MIL GRN LTX FREE

## (undated) DEVICE — AGENT HEMSTAT W3XL4IN OXIDIZED REGENERATED CELOS ABSRB FOR

## (undated) DEVICE — SUTURE VCRL VIO BR 0 18IN C/R M04 J701D

## (undated) DEVICE — SUTURE VICRYL + SZ 2-0 L18IN ABSRB UD CP-2 L26MM 1/2 CIR REV VCP762D

## (undated) DEVICE — SHEET,DRAPE,53X77,STERILE: Brand: MEDLINE

## (undated) DEVICE — GARMENT,MEDLINE,DVT,INT,CALF,MED, GEN2: Brand: MEDLINE

## (undated) DEVICE — INTENDED FOR TISSUE SEPARATION, AND OTHER PROCEDURES THAT REQUIRE A SHARP SURGICAL BLADE TO PUNCTURE OR CUT.: Brand: BARD-PARKER ® STAINLESS STEEL BLADES

## (undated) DEVICE — SUTURE VCRL + SZ 3-0 L18IN ABSRB UD SH 1/2 CIR TAPERCUT NDL VCP864D

## (undated) DEVICE — SPINE NEURO: Brand: MEDLINE INDUSTRIES, INC.

## (undated) DEVICE — MARKER SURG SKIN UTIL BLK REG TIP NONSMEARING W/ 6IN RUL

## (undated) DEVICE — DRESSING HYDROFIBER AQUACEL AG ADVANTAGE 3.5X6 IN

## (undated) DEVICE — ABSORBENT, WATERPROOF, BACTERIA PROOF FILM DRESSING: Brand: OPSITE POST OP 20X10CM CTN 20

## (undated) DEVICE — FINE BLADE: Brand: MILL PLUS

## (undated) DEVICE — TUBING, SUCTION, 1/4" X 10', STRAIGHT: Brand: MEDLINE

## (undated) DEVICE — STERILE-Z SURGICAL PATIENT COVERS CLEAR POLYETHYLENE STERILE UNIVERSAL FIT 10 PER CASE: Brand: STERILE-Z

## (undated) DEVICE — INSTRUMENT BATTERY

## (undated) DEVICE — GARMENT,MEDLINE,DVT,INT,CALF,LG, GEN2: Brand: MEDLINE

## (undated) DEVICE — C-ARM: Brand: UNBRANDED

## (undated) DEVICE — GLOVE ORANGE PI 7   MSG9070

## (undated) DEVICE — ABSORBENT, WATERPROOF, BACTERIA PROOF FILM DRESSING: Brand: OPSITE POST OP 9.5X8.5CM CTN 20

## (undated) DEVICE — 3.0MM PRECISION ROUND

## (undated) DEVICE — SOLUTION IRRIG 1000ML 0.9% SOD CHL USP POUR PLAS BTL

## (undated) DEVICE — BIO DBM PLUS PUTTY (WITH CANCELLOUS)
Type: IMPLANTABLE DEVICE | Site: SPINE LUMBAR | Status: NON-FUNCTIONAL
Brand: BIO DBM

## (undated) DEVICE — Device

## (undated) DEVICE — STERILE PACKAGE WITH CANNULA: Brand: LITE BIO DELIVERY SYSTEM

## (undated) DEVICE — DISPOSABLE DRAPE, STERILE, FOR A CDS-3060 5 FOOT TABLE: Brand: PEDIGO PRODUCTS, INC.

## (undated) DEVICE — SURGIFOAM SPNG SZ 100

## (undated) DEVICE — JACKSON TABLE POSITIONER KIT: Brand: MEDLINE INDUSTRIES, INC.

## (undated) DEVICE — THE MILL DISPOSABLE - FINE

## (undated) DEVICE — BLADE CLIPPER GEN PURP NS

## (undated) DEVICE — SUTURE VICRYL PLUS SZ 0 8X18IN MO-4 VCP701D

## (undated) DEVICE — BLADE ES ELASTOMERIC COAT INSUL DURABLE BEND UPTO 90DEG

## (undated) DEVICE — KIT EVAC 0.13IN RECT TB DIA10FR 400CC PVC 3 SPR Y CONN DRN

## (undated) DEVICE — SPONGE,NEURO,0.5"X3",XR,STRL,LF,10/PK: Brand: MEDLINE

## (undated) DEVICE — GRAFT BNE LG: Type: IMPLANTABLE DEVICE | Site: SPINE LUMBAR | Status: NON-FUNCTIONAL

## (undated) DEVICE — 3M™ TEGADERM™ TRANSPARENT FILM DRESSING FRAME STYLE, 1628, 6 IN X 8 IN (15 CM X 20 CM), 10/CT 8CT/CASE: Brand: 3M™ TEGADERM™